# Patient Record
Sex: MALE | Race: WHITE | Employment: OTHER | URBAN - NONMETROPOLITAN AREA
[De-identification: names, ages, dates, MRNs, and addresses within clinical notes are randomized per-mention and may not be internally consistent; named-entity substitution may affect disease eponyms.]

---

## 2017-01-23 ENCOUNTER — HOSPITAL ENCOUNTER (OUTPATIENT)
Dept: PAIN MANAGEMENT | Age: 77
Discharge: HOME | End: 2017-01-23
Payer: MEDICARE

## 2017-01-23 VITALS
SYSTOLIC BLOOD PRESSURE: 135 MMHG | RESPIRATION RATE: 16 BRPM | HEIGHT: 66 IN | BODY MASS INDEX: 24.75 KG/M2 | DIASTOLIC BLOOD PRESSURE: 46 MMHG | HEART RATE: 62 BPM | TEMPERATURE: 97.3 F | OXYGEN SATURATION: 98 % | WEIGHT: 154 LBS

## 2017-01-23 DIAGNOSIS — M25.512 CHRONIC PAIN OF BOTH SHOULDERS: ICD-10-CM

## 2017-01-23 DIAGNOSIS — M54.50 CHRONIC BILATERAL LOW BACK PAIN WITHOUT SCIATICA: ICD-10-CM

## 2017-01-23 DIAGNOSIS — M51.16 LUMBAR DISC DISEASE WITH RADICULOPATHY: ICD-10-CM

## 2017-01-23 DIAGNOSIS — G89.29 CHRONIC BILATERAL LOW BACK PAIN WITHOUT SCIATICA: ICD-10-CM

## 2017-01-23 DIAGNOSIS — M51.36 LUMBAR DEGENERATIVE DISC DISEASE: ICD-10-CM

## 2017-01-23 DIAGNOSIS — M51.9 LUMBAR DISC DISEASE: ICD-10-CM

## 2017-01-23 DIAGNOSIS — G89.29 CHRONIC PAIN OF BOTH SHOULDERS: ICD-10-CM

## 2017-01-23 DIAGNOSIS — M25.511 CHRONIC PAIN OF BOTH SHOULDERS: ICD-10-CM

## 2017-01-23 PROCEDURE — 6360000002 HC RX W HCPCS

## 2017-01-23 PROCEDURE — 2580000003 HC RX 258

## 2017-01-23 PROCEDURE — 3209999900 FLUORO FOR SURGICAL PROCEDURES

## 2017-01-23 PROCEDURE — 20610 DRAIN/INJ JOINT/BURSA W/O US: CPT

## 2017-01-23 PROCEDURE — 2500000003 HC RX 250 WO HCPCS

## 2017-01-23 PROCEDURE — 62323 NJX INTERLAMINAR LMBR/SAC: CPT

## 2017-01-23 RX ORDER — BUPIVACAINE HYDROCHLORIDE 2.5 MG/ML
INJECTION, SOLUTION EPIDURAL; INFILTRATION; INTRACAUDAL
Status: COMPLETED | OUTPATIENT
Start: 2017-01-23 | End: 2017-01-23

## 2017-01-23 RX ORDER — TRIAMCINOLONE ACETONIDE 40 MG/ML
INJECTION, SUSPENSION INTRA-ARTICULAR; INTRAMUSCULAR
Status: COMPLETED | OUTPATIENT
Start: 2017-01-23 | End: 2017-01-23

## 2017-01-23 RX ORDER — BUPIVACAINE HYDROCHLORIDE 5 MG/ML
INJECTION, SOLUTION EPIDURAL; INTRACAUDAL
Status: COMPLETED | OUTPATIENT
Start: 2017-01-23 | End: 2017-01-23

## 2017-01-23 RX ORDER — 0.9 % SODIUM CHLORIDE 0.9 %
VIAL (ML) INJECTION
Status: COMPLETED | OUTPATIENT
Start: 2017-01-23 | End: 2017-01-23

## 2017-01-23 RX ORDER — METHYLPREDNISOLONE ACETATE 80 MG/ML
INJECTION, SUSPENSION INTRA-ARTICULAR; INTRALESIONAL; INTRAMUSCULAR; SOFT TISSUE
Status: COMPLETED | OUTPATIENT
Start: 2017-01-23 | End: 2017-01-23

## 2017-01-23 RX ORDER — LIDOCAINE HYDROCHLORIDE 10 MG/ML
INJECTION, SOLUTION EPIDURAL; INFILTRATION; INTRACAUDAL; PERINEURAL
Status: COMPLETED | OUTPATIENT
Start: 2017-01-23 | End: 2017-01-23

## 2017-01-23 RX ADMIN — BUPIVACAINE HYDROCHLORIDE 2.5 ML: 2.5 INJECTION, SOLUTION EPIDURAL; INFILTRATION; INTRACAUDAL at 16:09

## 2017-01-23 RX ADMIN — TRIAMCINOLONE ACETONIDE 40 MG: 40 INJECTION, SUSPENSION INTRA-ARTICULAR; INTRAMUSCULAR at 16:10

## 2017-01-23 RX ADMIN — BUPIVACAINE HYDROCHLORIDE 9 ML: 5 INJECTION, SOLUTION EPIDURAL; INTRACAUDAL at 16:10

## 2017-01-23 RX ADMIN — METHYLPREDNISOLONE ACETATE 80 MG: 80 INJECTION, SUSPENSION INTRA-ARTICULAR; INTRALESIONAL; INTRAMUSCULAR; SOFT TISSUE at 16:10

## 2017-01-23 RX ADMIN — Medication 1.5 ML: at 16:09

## 2017-01-23 RX ADMIN — LIDOCAINE HYDROCHLORIDE 3 ML: 10 INJECTION, SOLUTION EPIDURAL; INFILTRATION; INTRACAUDAL; PERINEURAL at 16:08

## 2017-01-23 ASSESSMENT — PAIN - FUNCTIONAL ASSESSMENT
PAIN_FUNCTIONAL_ASSESSMENT: 0-10

## 2017-01-23 ASSESSMENT — ACTIVITIES OF DAILY LIVING (ADL): EFFECT OF PAIN ON DAILY ACTIVITIES: DAILY CHORES AND ACTIVITIES

## 2017-01-23 ASSESSMENT — PAIN DESCRIPTION - DESCRIPTORS: DESCRIPTORS: ACHING;CONSTANT;STABBING

## 2017-03-02 RX ORDER — HYDROCODONE BITARTRATE AND ACETAMINOPHEN 10; 325 MG/1; MG/1
1 TABLET ORAL 4 TIMES DAILY PRN
Qty: 120 TABLET | Refills: 0 | Status: SHIPPED | OUTPATIENT
Start: 2017-03-04 | End: 2017-03-29 | Stop reason: SDUPTHER

## 2017-03-30 RX ORDER — HYDROCODONE BITARTRATE AND ACETAMINOPHEN 10; 325 MG/1; MG/1
1 TABLET ORAL 4 TIMES DAILY PRN
Qty: 120 TABLET | Refills: 0 | Status: SHIPPED | OUTPATIENT
Start: 2017-04-03 | End: 2017-04-25 | Stop reason: SDUPTHER

## 2017-04-25 ENCOUNTER — HOSPITAL ENCOUNTER (OUTPATIENT)
Dept: PAIN MANAGEMENT | Age: 77
Discharge: HOME | End: 2017-04-25
Payer: MEDICARE

## 2017-04-25 VITALS
DIASTOLIC BLOOD PRESSURE: 67 MMHG | HEIGHT: 66 IN | TEMPERATURE: 96.7 F | OXYGEN SATURATION: 97 % | BODY MASS INDEX: 24.59 KG/M2 | HEART RATE: 51 BPM | WEIGHT: 153 LBS | SYSTOLIC BLOOD PRESSURE: 139 MMHG | RESPIRATION RATE: 20 BRPM

## 2017-04-25 DIAGNOSIS — G89.29 CHRONIC PAIN OF BOTH SHOULDERS: ICD-10-CM

## 2017-04-25 DIAGNOSIS — G89.29 CHRONIC BILATERAL LOW BACK PAIN WITHOUT SCIATICA: ICD-10-CM

## 2017-04-25 DIAGNOSIS — M25.511 CHRONIC PAIN OF BOTH SHOULDERS: ICD-10-CM

## 2017-04-25 DIAGNOSIS — M51.36 LUMBAR DEGENERATIVE DISC DISEASE: ICD-10-CM

## 2017-04-25 DIAGNOSIS — M54.50 CHRONIC BILATERAL LOW BACK PAIN WITHOUT SCIATICA: ICD-10-CM

## 2017-04-25 DIAGNOSIS — M25.512 CHRONIC PAIN OF BOTH SHOULDERS: ICD-10-CM

## 2017-04-25 DIAGNOSIS — M51.16 LUMBAR DISC DISEASE WITH RADICULOPATHY: ICD-10-CM

## 2017-04-25 DIAGNOSIS — M51.9 LUMBAR DISC DISEASE: ICD-10-CM

## 2017-04-25 PROCEDURE — 20610 DRAIN/INJ JOINT/BURSA W/O US: CPT

## 2017-04-25 PROCEDURE — 2500000003 HC RX 250 WO HCPCS

## 2017-04-25 PROCEDURE — 2580000003 HC RX 258

## 2017-04-25 PROCEDURE — 3209999900 FLUORO FOR SURGICAL PROCEDURES

## 2017-04-25 PROCEDURE — 6360000002 HC RX W HCPCS

## 2017-04-25 PROCEDURE — 62323 NJX INTERLAMINAR LMBR/SAC: CPT

## 2017-04-25 RX ORDER — BUPIVACAINE HYDROCHLORIDE 2.5 MG/ML
INJECTION, SOLUTION EPIDURAL; INFILTRATION; INTRACAUDAL
Status: COMPLETED | OUTPATIENT
Start: 2017-04-25 | End: 2017-04-25

## 2017-04-25 RX ORDER — LIDOCAINE HYDROCHLORIDE 10 MG/ML
INJECTION, SOLUTION EPIDURAL; INFILTRATION; INTRACAUDAL; PERINEURAL
Status: COMPLETED | OUTPATIENT
Start: 2017-04-25 | End: 2017-04-25

## 2017-04-25 RX ORDER — 0.9 % SODIUM CHLORIDE 0.9 %
VIAL (ML) INJECTION
Status: COMPLETED | OUTPATIENT
Start: 2017-04-25 | End: 2017-04-25

## 2017-04-25 RX ORDER — HYDROCODONE BITARTRATE AND ACETAMINOPHEN 10; 325 MG/1; MG/1
1 TABLET ORAL 4 TIMES DAILY PRN
Qty: 120 TABLET | Refills: 0 | Status: SHIPPED | OUTPATIENT
Start: 2017-06-02 | End: 2017-06-13 | Stop reason: SDUPTHER

## 2017-04-25 RX ORDER — HYDROCODONE BITARTRATE AND ACETAMINOPHEN 10; 325 MG/1; MG/1
1 TABLET ORAL 4 TIMES DAILY PRN
Qty: 120 TABLET | Refills: 0 | Status: SHIPPED | OUTPATIENT
Start: 2017-05-03 | End: 2017-04-25 | Stop reason: SDUPTHER

## 2017-04-25 RX ORDER — METHYLPREDNISOLONE ACETATE 80 MG/ML
INJECTION, SUSPENSION INTRA-ARTICULAR; INTRALESIONAL; INTRAMUSCULAR; SOFT TISSUE
Status: COMPLETED | OUTPATIENT
Start: 2017-04-25 | End: 2017-04-25

## 2017-04-25 RX ORDER — BUPIVACAINE HYDROCHLORIDE 5 MG/ML
INJECTION, SOLUTION EPIDURAL; INTRACAUDAL
Status: COMPLETED | OUTPATIENT
Start: 2017-04-25 | End: 2017-04-25

## 2017-04-25 RX ORDER — TRIAMCINOLONE ACETONIDE 40 MG/ML
INJECTION, SUSPENSION INTRA-ARTICULAR; INTRAMUSCULAR
Status: COMPLETED | OUTPATIENT
Start: 2017-04-25 | End: 2017-04-25

## 2017-04-25 RX ADMIN — METHYLPREDNISOLONE ACETATE 80 MG: 80 INJECTION, SUSPENSION INTRA-ARTICULAR; INTRALESIONAL; INTRAMUSCULAR; SOFT TISSUE at 16:22

## 2017-04-25 RX ADMIN — Medication 1.5 ML: at 16:22

## 2017-04-25 RX ADMIN — TRIAMCINOLONE ACETONIDE 40 MG: 40 INJECTION, SUSPENSION INTRA-ARTICULAR; INTRAMUSCULAR at 16:23

## 2017-04-25 RX ADMIN — BUPIVACAINE HYDROCHLORIDE 2.5 ML: 2.5 INJECTION, SOLUTION EPIDURAL; INFILTRATION; INTRACAUDAL at 16:22

## 2017-04-25 RX ADMIN — BUPIVACAINE HYDROCHLORIDE 9 ML: 5 INJECTION, SOLUTION EPIDURAL; INTRACAUDAL at 16:23

## 2017-04-25 RX ADMIN — LIDOCAINE HYDROCHLORIDE 3 ML: 10 INJECTION, SOLUTION EPIDURAL; INFILTRATION; INTRACAUDAL; PERINEURAL at 16:19

## 2017-04-25 ASSESSMENT — PAIN DESCRIPTION - FREQUENCY: FREQUENCY: CONTINUOUS

## 2017-04-25 ASSESSMENT — PAIN DESCRIPTION - ORIENTATION: ORIENTATION: LOWER;RIGHT;LEFT

## 2017-04-25 ASSESSMENT — PAIN DESCRIPTION - PROGRESSION: CLINICAL_PROGRESSION: NOT CHANGED

## 2017-04-25 ASSESSMENT — PAIN DESCRIPTION - ONSET: ONSET: ON-GOING

## 2017-04-25 ASSESSMENT — PAIN SCALES - GENERAL: PAINLEVEL_OUTOF10: 7

## 2017-04-25 ASSESSMENT — PAIN DESCRIPTION - DESCRIPTORS: DESCRIPTORS: ACHING;CONSTANT;RADIATING;STABBING

## 2017-04-25 ASSESSMENT — PAIN DESCRIPTION - PAIN TYPE: TYPE: CHRONIC PAIN

## 2017-04-25 ASSESSMENT — PAIN DESCRIPTION - DIRECTION: RADIATING_TOWARDS: LOWER BACK PAIN THAT RADIATES OUT TO THE HIPS.

## 2017-04-25 ASSESSMENT — PAIN DESCRIPTION - LOCATION: LOCATION: BACK;HIP;SHOULDER

## 2017-06-13 ENCOUNTER — HOSPITAL ENCOUNTER (OUTPATIENT)
Dept: GENERAL RADIOLOGY | Age: 77
Discharge: HOME | End: 2017-06-13
Payer: MEDICARE

## 2017-06-13 ENCOUNTER — HOSPITAL ENCOUNTER (OUTPATIENT)
Dept: PAIN MANAGEMENT | Age: 77
Discharge: HOME | End: 2017-06-13
Payer: MEDICARE

## 2017-06-13 VITALS
RESPIRATION RATE: 18 BRPM | WEIGHT: 147 LBS | SYSTOLIC BLOOD PRESSURE: 143 MMHG | TEMPERATURE: 97.3 F | OXYGEN SATURATION: 96 % | DIASTOLIC BLOOD PRESSURE: 84 MMHG | HEIGHT: 66 IN | BODY MASS INDEX: 23.63 KG/M2 | HEART RATE: 65 BPM

## 2017-06-13 DIAGNOSIS — G89.29 CHRONIC PAIN OF BOTH SHOULDERS: ICD-10-CM

## 2017-06-13 DIAGNOSIS — M25.512 CHRONIC PAIN OF BOTH SHOULDERS: ICD-10-CM

## 2017-06-13 DIAGNOSIS — M25.511 CHRONIC PAIN OF BOTH SHOULDERS: ICD-10-CM

## 2017-06-13 DIAGNOSIS — M51.9 LUMBAR DISC DISEASE: ICD-10-CM

## 2017-06-13 DIAGNOSIS — M51.16 LUMBAR DISC DISEASE WITH RADICULOPATHY: ICD-10-CM

## 2017-06-13 DIAGNOSIS — G89.29 CHRONIC BILATERAL LOW BACK PAIN WITHOUT SCIATICA: ICD-10-CM

## 2017-06-13 DIAGNOSIS — M54.50 CHRONIC BILATERAL LOW BACK PAIN WITHOUT SCIATICA: ICD-10-CM

## 2017-06-13 PROCEDURE — 99213 OFFICE O/P EST LOW 20 MIN: CPT

## 2017-06-13 PROCEDURE — 73030 X-RAY EXAM OF SHOULDER: CPT

## 2017-06-13 PROCEDURE — 72120 X-RAY BEND ONLY L-S SPINE: CPT

## 2017-06-13 RX ORDER — HYDROCODONE BITARTRATE AND ACETAMINOPHEN 10; 325 MG/1; MG/1
1 TABLET ORAL 4 TIMES DAILY PRN
Qty: 120 TABLET | Refills: 0 | Status: SHIPPED | OUTPATIENT
Start: 2017-07-02 | End: 2017-08-01 | Stop reason: SDUPTHER

## 2017-06-13 ASSESSMENT — PAIN DESCRIPTION - ONSET: ONSET: ON-GOING

## 2017-06-13 ASSESSMENT — PAIN DESCRIPTION - PROGRESSION: CLINICAL_PROGRESSION: NOT CHANGED

## 2017-06-13 ASSESSMENT — ACTIVITIES OF DAILY LIVING (ADL): EFFECT OF PAIN ON DAILY ACTIVITIES: LIMITS ACTIVITY

## 2017-06-13 ASSESSMENT — PAIN DESCRIPTION - DESCRIPTORS: DESCRIPTORS: ACHING;CONSTANT;STABBING

## 2017-06-13 ASSESSMENT — PAIN DESCRIPTION - LOCATION: LOCATION: BACK;SHOULDER

## 2017-06-13 ASSESSMENT — PAIN SCALES - GENERAL: PAINLEVEL_OUTOF10: 4

## 2017-06-13 ASSESSMENT — PAIN DESCRIPTION - PAIN TYPE: TYPE: CHRONIC PAIN

## 2017-06-13 ASSESSMENT — PAIN DESCRIPTION - DIRECTION: RADIATING_TOWARDS: INTO BILATERAL HIPS

## 2017-06-13 ASSESSMENT — PAIN DESCRIPTION - ORIENTATION: ORIENTATION: RIGHT;LEFT;LOWER

## 2017-06-13 ASSESSMENT — PAIN DESCRIPTION - FREQUENCY: FREQUENCY: CONTINUOUS

## 2017-06-30 ENCOUNTER — HOSPITAL ENCOUNTER (OUTPATIENT)
Dept: ULTRASOUND IMAGING | Age: 77
Discharge: HOME | End: 2017-06-30
Payer: MEDICARE

## 2017-06-30 ENCOUNTER — HOSPITAL ENCOUNTER (OUTPATIENT)
Dept: CT IMAGING | Age: 77
Discharge: HOME | End: 2017-06-30
Payer: MEDICARE

## 2017-06-30 DIAGNOSIS — R63.4 ABNORMAL WEIGHT LOSS: ICD-10-CM

## 2017-06-30 DIAGNOSIS — R05.9 COUGH: ICD-10-CM

## 2017-06-30 DIAGNOSIS — R10.84 ABDOMINAL PAIN, GENERALIZED: ICD-10-CM

## 2017-06-30 LAB
GFR NON-AFRICAN AMERICAN: >60
PERFORMED ON: NORMAL
POC CREATININE: 0.9 MG/DL (ref 0.3–1.3)
POC SAMPLE TYPE: NORMAL

## 2017-06-30 PROCEDURE — 71270 CT THORAX DX C-/C+: CPT

## 2017-06-30 PROCEDURE — 76700 US EXAM ABDOM COMPLETE: CPT

## 2017-06-30 PROCEDURE — 82565 ASSAY OF CREATININE: CPT

## 2017-06-30 PROCEDURE — 6360000004 HC RX CONTRAST MEDICATION: Performed by: FAMILY MEDICINE

## 2017-06-30 RX ADMIN — IOVERSOL 60 ML: 741 INJECTION INTRA-ARTERIAL; INTRAVENOUS at 11:27

## 2017-07-18 ENCOUNTER — OFFICE VISIT (OUTPATIENT)
Dept: GASTROENTEROLOGY | Facility: CLINIC | Age: 77
End: 2017-07-18

## 2017-07-18 VITALS
BODY MASS INDEX: 24.11 KG/M2 | DIASTOLIC BLOOD PRESSURE: 76 MMHG | OXYGEN SATURATION: 98 % | SYSTOLIC BLOOD PRESSURE: 148 MMHG | WEIGHT: 150 LBS | HEART RATE: 70 BPM | TEMPERATURE: 95.6 F | HEIGHT: 66 IN

## 2017-07-18 DIAGNOSIS — R10.30 LOWER ABDOMINAL PAIN: ICD-10-CM

## 2017-07-18 DIAGNOSIS — R19.7 DIARRHEA, UNSPECIFIED TYPE: ICD-10-CM

## 2017-07-18 DIAGNOSIS — Z86.010 HX OF COLONIC POLYP: ICD-10-CM

## 2017-07-18 DIAGNOSIS — R19.4 CHANGE IN BOWEL HABITS: Primary | ICD-10-CM

## 2017-07-18 DIAGNOSIS — Z72.0 TOBACCO USE: ICD-10-CM

## 2017-07-18 DIAGNOSIS — R93.89 ABNORMAL CT OF THE CHEST: ICD-10-CM

## 2017-07-18 PROBLEM — Z86.0100 HX OF COLONIC POLYP: Status: ACTIVE | Noted: 2017-07-18

## 2017-07-18 PROBLEM — R93.5 ABNORMAL CT OF THE ABDOMEN: Status: ACTIVE | Noted: 2017-07-18

## 2017-07-18 PROCEDURE — 99204 OFFICE O/P NEW MOD 45 MIN: CPT | Performed by: NURSE PRACTITIONER

## 2017-07-18 RX ORDER — HYDROCODONE BITARTRATE AND ACETAMINOPHEN 10; 325 MG/1; MG/1
1 TABLET ORAL EVERY 6 HOURS PRN
COMMUNITY
Start: 2017-07-02 | End: 2021-02-22 | Stop reason: HOSPADM

## 2017-07-18 RX ORDER — SIMVASTATIN 80 MG
20 TABLET ORAL
COMMUNITY
End: 2017-08-10 | Stop reason: DRUGHIGH

## 2017-07-18 NOTE — PROGRESS NOTES
[facility] GASTROENTEROLOGY - OFFICE NOTE    7/18/2017    Ezekiel Velez   1940    Primary Physician: Shital Mendez MD    Chief Complaint   Patient presents with   • GI Problem     frequent stools         HISTORY OF PRESENT ILLNESS    Ezekiel Velez is a 77 y.o. male presents  with complaint of change in bowel habits. He has had intermittent postprandial loose stools over the last year.  He does have associated lower abdominal crampingthat usually resolves after bowel movement.  Prior to the last year he had 1 normal bowel movement daily.  He has found no certain foods that will trigger the symptoms.  He has had a few episodes where  he had associated nausea /vomiting.  No fevers.  No chills.  No rectal bleeding.  No new medicines. He does have history of colonoscopy.  His last colonoscopy was July 2012, had 9 polyps removed and was recommended have another colonoscopy in 3 years.    He recently had an ultrasound of his abdomen June 2017 that showed urinary bladder nodule, cholelithiasis, simple cyst of the left kidney, fusiform  aneurysmal dilatation distal abdominal aorta.  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  Of note he has seen Dr. Green in the past for history of lung nodules.  He is scheduled to have his gallbladder removed next week by Dr. Ina Issa next week..  He is also supposed to see Dr. Covington this week for the ultrasound showing urinary bladder nodule. This visit note recently of Dr. Mendez, she mentioned having radiology to review the liver lesions noted on the CT of the chest.  The patient does not recall if they have any further plans in regards to liver lesions.  The CT mentioned liver lesions could be cyst or hemangioma.  States he has had some labs in the last 6  months.    Past Medical History:   Diagnosis Date   • Arthritis    • Colon polyp    • COPD (chronic obstructive pulmonary disease)    • Hyperlipidemia        Past Surgical History:   Procedure Laterality Date   • CHOLECYSTECTOMY WITH INTRAOPERATIVE CHOLANGIOGRAM N/A 7/25/2017    Procedure: CHOLECYSTECTOMY LAPAROSCOPIC AND REMOVAL CYST ON CHEST;  Surgeon: Ina Issa MD;  Location: [facility];  Service:    • COLONOSCOPY  07/24/2012   • HEMORRHOIDECTOMY     • HERNIA REPAIR         Outpatient Prescriptions Marked as Taking for the 7/18/17 encounter (Office Visit) with TERRY Alvarado   Medication Sig Dispense Refill   • aspirin 81 MG tablet Take  by mouth.     • HYDROcodone-acetaminophen (NORCO)  MG per tablet Take  by mouth.     • simvastatin (ZOCOR) 80 MG tablet Take  by mouth.         Allergies   Allergen Reactions   • Celebrex [Celecoxib]        Social History     Social History   • Marital status:      Spouse name: N/A   • Number of children: N/A   • Years of education: N/A     Occupational History   • Not on file.     Social History Main Topics   • Smoking status: Current Every Day Smoker     Packs/day: 0.25     Types: Cigarettes   • Smokeless tobacco: Never Used   • Alcohol use No   • Drug use: No   • Sexual activity: Defer     Other Topics Concern   • Not on file     Social History Narrative       Family History   Problem Relation Age of Onset   • Colon cancer Neg Hx    • Colon polyps Neg Hx        Review of Systems   Constitutional: Negative for appetite change, chills, fatigue, fever and unexpected weight change.   HENT: Negative for sore throat and trouble swallowing.    Respiratory: Negative for cough, chest tightness, shortness of breath and wheezing.    Cardiovascular: Negative for chest pain and palpitations.   Gastrointestinal: Positive for abdominal pain, diarrhea, nausea and vomiting. Negative for abdominal distention, anal bleeding, blood in stool, constipation and rectal pain.  "       As mentioned in hpi   Genitourinary: Negative for difficulty urinating, dysuria and hematuria.   Musculoskeletal: Negative for arthralgias and back pain.   Skin: Negative for color change and rash.   Neurological: Negative for dizziness, tremors, seizures, syncope, light-headedness and headaches.   Hematological: Negative for adenopathy. Does not bruise/bleed easily.   Psychiatric/Behavioral: Negative for confusion. The patient is not nervous/anxious.        Vitals:    07/18/17 0943   BP: 148/76   BP Location: Left arm   Patient Position: Sitting   Cuff Size: Adult   Pulse: 70   Temp: 95.6 °F (35.3 °C)   SpO2: 98%   Weight: 150 lb (68 kg)   Height: 66\" (167.6 cm)      Body mass index is 24.21 kg/(m^2).    Physical Exam   Constitutional: He appears well-developed and well-nourished. No distress.   HENT:   Head: Normocephalic and atraumatic.   Mouth/Throat: Oropharynx is clear and moist.   Eyes: EOM are normal. No scleral icterus.   Neck: Normal range of motion. Neck supple. No JVD present. No tracheal deviation present.   Cardiovascular: Normal rate, regular rhythm and normal heart sounds.  Exam reveals no gallop and no friction rub.    No murmur heard.  Pulmonary/Chest: Effort normal and breath sounds normal. No stridor. No respiratory distress. He has no wheezes. He has no rales.   Abdominal: Soft. Bowel sounds are normal. He exhibits no distension and no mass. There is no tenderness. There is no rebound and no guarding.   Musculoskeletal: Normal range of motion. He exhibits no edema or deformity.   Neurological: He is alert.   Skin: Skin is warm and dry. No rash noted.   Psychiatric: He has a normal mood and affect. His behavior is normal.   Vitals reviewed.              ASSESSMENT AND PLAN    Ezekiel was seen today for gi problem.    Diagnoses and all orders for this visit:    Change in bowel habits  -     Case Request; Standing  -     Case Request    Diarrhea, unspecified type  -     Case Request; " Standing  -     Case Request    Lower abdominal pain    Hx of colonic polyp  -     Case Request; Standing  -     Case Request    Abnormal CT of the chest    Tobacco use    Other orders  -     Implement Anesthesia Orders Day of Procedure; Standing  -     Obtain Informed Consent; Standing  -     polyethylene glycol (GOLYTELY) 236 G solution; Take 4,000 mL by mouth 1 (One) Time for 1 dose. Take as directed per instruction sheet.    plan for colonoscopy.   Recommend daily fiber supplement.  We will request last office visit note from Dr. Ina Issa.  Ultrasound of the abdomen showed a normal liver however CT of the chest did show multiple low attenuation lesions of the liver, Dr. Mendez did comment she was going to have the radiologist to review.  I am going to call Dr. Mendez's office to see if they did get any further information about the CT of the chest with liver.  we may need to get further imaging studies such as an MRI of the liver.  He is scheduled to have cholecystectomy next week by Dr. Ina Issa, he is going also discussed the liver lesions with her maybe she can get a look at those during her surgical procedure.     We will also request most recent labs from Dr. Mendez's office.   discussed smoking cessation, > 3min time spent.     COLONOSCOPY WITH ANESTHESIA (N/A) All risks, benefits, alternatives, and indications of colonoscopy procedure have been discussed with the patient. Risks to include perforation of the colon requiring possible surgery or colostomy, risk of bleeding from biopsies or removal of colon tissue, possibility of missing a colon polyp or cancer, or adverse drug reaction.  Benefits to include the diagnosis and management of disease of the colon and rectum. Alternatives to include barium enema, radiographic evaluation, lab testing or no intervention. Pt verbalizes understanding and agrees.         Body mass index is 24.21 kg/(m^2).           Shereen Ponce, TERRY    EMR  Dragon/transcription disclaimer:  Much of this encounter note is electronic transcription/translation of spoken language to printed text.  The electronic translation of spoken language may be erroneous, or at times, nonsensical words or phrases may be inadvertently transcribed.  Although I have reviewed the note for such errors, some may still exist.        I did call and speak with Tere at Dr. Mendez's office.  It was mentioned in Dr. Mendez's office note that she would have radiologist to review the CT chest that showed a lesion, was not noted on the ultrasound.  Tere is going to contact radiology to get their opinion.  Tere will give us a call and let us know will radiology says.      08/30/17    I called and spoke with Mrs. Velez, she states that her  saw Dr. Tanesha Mendez on 08/18/2017, she says Dr. Mendez is going to order an ultrasound of his liver.  Previous CT of the chest did show multiple low attenuation hepatic lesions.  CT of the chest was done 06/30/2017.  The patient is currently scheduled for a colonoscopy 09/19/2017.

## 2017-07-18 NOTE — PATIENT INSTRUCTIONS
Steps to Quit Smoking   Smoking tobacco can be harmful to your health and can affect almost every organ in your body. Smoking puts you, and those around you, at risk for developing many serious chronic diseases. Quitting smoking is difficult, but it is one of the best things that you can do for your health. It is never too late to quit.  WHAT ARE THE BENEFITS OF QUITTING SMOKING?  When you quit smoking, you lower your risk of developing serious diseases and conditions, such as:  · Lung cancer or lung disease, such as COPD.  · Heart disease.  · Stroke.  · Heart attack.  · Infertility.  · Osteoporosis and bone fractures.  Additionally, symptoms such as coughing, wheezing, and shortness of breath may get better when you quit. You may also find that you get sick less often because your body is stronger at fighting off colds and infections. If you are pregnant, quitting smoking can help to reduce your chances of having a baby of low birth weight.  HOW DO I GET READY TO QUIT?  When you decide to quit smoking, create a plan to make sure that you are successful. Before you quit:  · Pick a date to quit. Set a date within the next two weeks to give you time to prepare.  · Write down the reasons why you are quitting. Keep this list in places where you will see it often, such as on your bathroom mirror or in your car or wallet.  · Identify the people, places, things, and activities that make you want to smoke (triggers) and avoid them. Make sure to take these actions:    Throw away all cigarettes at home, at work, and in your car.    Throw away smoking accessories, such as ashtrays and lighters.    Clean your car and make sure to empty the ashtray.    Clean your home, including curtains and carpets.  · Tell your family, friends, and coworkers that you are quitting. Support from your loved ones can make quitting easier.  · Talk with your health care provider about your options for quitting smoking.  · Find out what treatment  "options are covered by your health insurance.  WHAT STRATEGIES CAN I USE TO QUIT SMOKING?   Talk with your healthcare provider about different strategies to quit smoking. Some strategies include:  · Quitting smoking altogether instead of gradually lessening how much you smoke over a period of time. Research shows that quitting \"cold turkey\" is more successful than gradually quitting.  · Attending in-person counseling to help you build problem-solving skills. You are more likely to have success in quitting if you attend several counseling sessions. Even short sessions of 10 minutes can be effective.  · Finding resources and support systems that can help you to quit smoking and remain smoke-free after you quit. These resources are most helpful when you use them often. They can include:    Online chats with a counselor.    Telephone quitlines.    Printed self-help materials.    Support groups or group counseling.    Text messaging programs.    Mobile phone applications.  · Taking medicines to help you quit smoking. (If you are pregnant or breastfeeding, talk with your health care provider first.) Some medicines contain nicotine and some do not. Both types of medicines help with cravings, but the medicines that include nicotine help to relieve withdrawal symptoms. Your health care provider may recommend:    Nicotine patches, gum, or lozenges.    Nicotine inhalers or sprays.    Non-nicotine medicine that is taken by mouth.  Talk with your health care provider about combining strategies, such as taking medicines while you are also receiving in-person counseling. Using these two strategies together makes you more likely to succeed in quitting than if you used either strategy on its own.  If you are pregnant or breastfeeding, talk with your health care provider about finding counseling or other support strategies to quit smoking. Do not take medicine to help you quit smoking unless told to do so by your health care " provider.  WHAT THINGS CAN I DO TO MAKE IT EASIER TO QUIT?  Quitting smoking might feel overwhelming at first, but there is a lot that you can do to make it easier. Take these important actions:  · Reach out to your family and friends and ask that they support and encourage you during this time. Call telephone quitlines, reach out to support groups, or work with a counselor for support.  · Ask people who smoke to avoid smoking around you.  · Avoid places that trigger you to smoke, such as bars, parties, or smoke-break areas at work.  · Spend time around people who do not smoke.  · Lessen stress in your life, because stress can be a smoking trigger for some people. To lessen stress, try:    Exercising regularly.    Deep-breathing exercises.    Yoga.    Meditating.    Performing a body scan. This involves closing your eyes, scanning your body from head to toe, and noticing which parts of your body are particularly tense. Purposefully relax the muscles in those areas.  · Download or purchase mobile phone or tablet apps (applications) that can help you stick to your quit plan by providing reminders, tips, and encouragement. There are many free apps, such as QuitGuide from the CDC (Centers for Disease Control and Prevention). You can find other support for quitting smoking (smoking cessation) through smokefree.gov and other websites.  HOW WILL I FEEL WHEN I QUIT SMOKING?  Within the first 24 hours of quitting smoking, you may start to feel some withdrawal symptoms. These symptoms are usually most noticeable 2-3 days after quitting, but they usually do not last beyond 2-3 weeks. Changes or symptoms that you might experience include:  · Mood swings.  · Restlessness, anxiety, or irritation.  · Difficulty concentrating.  · Dizziness.  · Strong cravings for sugary foods in addition to nicotine.  · Mild weight gain.  · Constipation.  · Nausea.  · Coughing or a sore throat.  · Changes in how your medicines work in your  body.  · A depressed mood.  · Difficulty sleeping (insomnia).  After the first 2-3 weeks of quitting, you may start to notice more positive results, such as:  · Improved sense of smell and taste.  · Decreased coughing and sore throat.  · Slower heart rate.  · Lower blood pressure.  · Clearer skin.  · The ability to breathe more easily.  · Fewer sick days.  Quitting smoking is very challenging for most people. Do not get discouraged if you are not successful the first time. Some people need to make many attempts to quit before they achieve long-term success. Do your best to stick to your quit plan, and talk with your health care provider if you have any questions or concerns.     This information is not intended to replace advice given to you by your health care provider. Make sure you discuss any questions you have with your health care provider.     Document Released: 12/12/2002 Document Revised: 05/03/2016 Document Reviewed: 05/03/2016  [employer] Interactive Patient Education ©2017 [employer]    Smoking Hazards  Smoking cigarettes is extremely bad for your health. Tobacco smoke has over 200 known poisons in it. It contains the poisonous gases nitrogen oxide and carbon monoxide. There are over 60 chemicals in tobacco smoke that cause cancer. Some of the chemicals found in cigarette smoke include:   · Cyanide.    · Benzene.    · Formaldehyde.    · Methanol (wood alcohol).    · Acetylene (fuel used in welding torches).    · Ammonia.    Even smoking lightly shortens your life expectancy by several years. You can greatly reduce the risk of medical problems for you and your family by stopping now. Smoking is the most preventable cause of death and disease in our society. Within days of quitting smoking, your circulation improves, you decrease the risk of having a heart attack, and your lung capacity improves. There may be some increased phlegm in the first few days after quitting, and it may take months for your lungs  to clear up completely. Quitting for 10 years reduces your risk of developing lung cancer to almost that of a nonsmoker.   WHAT ARE THE RISKS OF SMOKING?  Cigarette smokers have an increased risk of many serious medical problems, including:  · Lung cancer.    · Lung disease (such as pneumonia, bronchitis, and emphysema).    · Heart attack and chest pain due to the heart not getting enough oxygen (angina).    · Heart disease and peripheral blood vessel disease.    · Hypertension.    · Stroke.    · Oral cancer (cancer of the lip, mouth, or voice box).    · Bladder cancer.    · Pancreatic cancer.    · Cervical cancer.    · Pregnancy complications, including premature birth.    · Stillbirths and smaller  babies, birth defects, and genetic damage to sperm.    · Early menopause.    · Lower estrogen level for women.    · Infertility.    · Facial wrinkles.    · Blindness.    · Increased risk of broken bones (fractures).    · Senile dementia.    · Stomach ulcers and internal bleeding.    · Delayed wound healing and increased risk of complications during surgery.  Because of secondhand smoke exposure, children of smokers have an increased risk of the following:   · Sudden infant death syndrome (SIDS).    · Respiratory infections.    · Lung cancer.    · Heart disease.    · Ear infections.    WHY IS SMOKING ADDICTIVE?  Nicotine is the chemical agent in tobacco that is capable of causing addiction or dependence. When you smoke and inhale, nicotine is absorbed rapidly into the bloodstream through your lungs. Both inhaled and noninhaled nicotine may be addictive.   WHAT ARE THE BENEFITS OF QUITTING?   There are many health benefits to quitting smoking. Some are:   · The likelihood of developing cancer and heart disease decreases. Health improvements are seen almost immediately.    · Blood pressure, pulse rate, and breathing patterns start returning to normal soon after quitting.    · People who quit may see an improvement  in their overall quality of life.    HOW DO YOU QUIT SMOKING?  Smoking is an addiction with both physical and psychological effects, and longtime habits can be hard to change. Your health care provider can recommend:  · Programs and community resources, which may include group support, education, or therapy.  · Replacement products, such as patches, gum, and nasal sprays. Use these products only as directed. Do not replace cigarette smoking with electronic cigarettes (commonly called e-cigarettes). The safety of e-cigarettes is unknown, and some may contain harmful chemicals.  FOR MORE INFORMATION  · American Lung Association: www.lung.org  · American Cancer Society: www.cancer.org     This information is not intended to replace advice given to you by your health care provider. Make sure you discuss any questions you have with your health care provider.     Document Released: 01/25/2006 Document Revised: 04/10/2017 Document Reviewed: 06/09/2014  [employer] Interactive Patient Education ©2017 [employer]

## 2017-07-20 ENCOUNTER — OFFICE VISIT (OUTPATIENT)
Dept: UROLOGY | Facility: CLINIC | Age: 77
End: 2017-07-20

## 2017-07-20 ENCOUNTER — APPOINTMENT (OUTPATIENT)
Dept: PREADMISSION TESTING | Facility: HOSPITAL | Age: 77
End: 2017-07-20

## 2017-07-20 VITALS
WEIGHT: 147.6 LBS | BODY MASS INDEX: 23.72 KG/M2 | DIASTOLIC BLOOD PRESSURE: 83 MMHG | SYSTOLIC BLOOD PRESSURE: 125 MMHG | OXYGEN SATURATION: 97 % | RESPIRATION RATE: 16 BRPM | HEART RATE: 59 BPM | HEIGHT: 66 IN

## 2017-07-20 VITALS
TEMPERATURE: 98 F | SYSTOLIC BLOOD PRESSURE: 115 MMHG | BODY MASS INDEX: 23.78 KG/M2 | DIASTOLIC BLOOD PRESSURE: 73 MMHG | WEIGHT: 148 LBS | HEIGHT: 66 IN

## 2017-07-20 DIAGNOSIS — N28.1 RENAL CYST, LEFT: ICD-10-CM

## 2017-07-20 DIAGNOSIS — N32.9 LESION OF BLADDER: Primary | ICD-10-CM

## 2017-07-20 LAB
ALBUMIN SERPL-MCNC: 4 G/DL (ref 3.5–5)
ALBUMIN/GLOB SERPL: 1.3 G/DL (ref 1.1–2.5)
ALP SERPL-CCNC: 89 U/L (ref 24–120)
ALT SERPL W P-5'-P-CCNC: 36 U/L (ref 0–54)
ANION GAP SERPL CALCULATED.3IONS-SCNC: 8 MMOL/L (ref 4–13)
AST SERPL-CCNC: 21 U/L (ref 7–45)
BILIRUB BLD-MCNC: NEGATIVE MG/DL
BILIRUB SERPL-MCNC: 0.8 MG/DL (ref 0.1–1)
BUN BLD-MCNC: 11 MG/DL (ref 5–21)
BUN/CREAT SERPL: 15.3 (ref 7–25)
CALCIUM SPEC-SCNC: 9.7 MG/DL (ref 8.4–10.4)
CHLORIDE SERPL-SCNC: 102 MMOL/L (ref 98–110)
CLARITY, POC: CLEAR
CO2 SERPL-SCNC: 32 MMOL/L (ref 24–31)
COLOR UR: YELLOW
CREAT BLD-MCNC: 0.72 MG/DL (ref 0.5–1.4)
DEPRECATED RDW RBC AUTO: 45.1 FL (ref 40–54)
ERYTHROCYTE [DISTWIDTH] IN BLOOD BY AUTOMATED COUNT: 13 % (ref 12–15)
GFR SERPL CREATININE-BSD FRML MDRD: 106 ML/MIN/1.73
GLOBULIN UR ELPH-MCNC: 3 GM/DL
GLUCOSE BLD-MCNC: 93 MG/DL (ref 70–100)
GLUCOSE UR STRIP-MCNC: NEGATIVE MG/DL
HCT VFR BLD AUTO: 44 % (ref 40–52)
HGB BLD-MCNC: 14.8 G/DL (ref 14–18)
KETONES UR QL: NEGATIVE
LEUKOCYTE EST, POC: NEGATIVE
MCH RBC QN AUTO: 32.1 PG (ref 28–32)
MCHC RBC AUTO-ENTMCNC: 33.6 G/DL (ref 33–36)
MCV RBC AUTO: 95.4 FL (ref 82–95)
NITRITE UR-MCNC: NEGATIVE MG/ML
PH UR: 7 [PH] (ref 5–8)
PLATELET # BLD AUTO: 137 10*3/MM3 (ref 130–400)
PMV BLD AUTO: 11.2 FL (ref 6–12)
POTASSIUM BLD-SCNC: 4.1 MMOL/L (ref 3.5–5.3)
PROT SERPL-MCNC: 7 G/DL (ref 6.3–8.7)
PROT UR STRIP-MCNC: NEGATIVE MG/DL
RBC # BLD AUTO: 4.61 10*6/MM3 (ref 4.8–5.9)
RBC # UR STRIP: NEGATIVE /UL
SODIUM BLD-SCNC: 142 MMOL/L (ref 135–145)
SP GR UR: 1.02 (ref 1–1.03)
UROBILINOGEN UR QL: NORMAL
WBC NRBC COR # BLD: 5.11 10*3/MM3 (ref 4.8–10.8)

## 2017-07-20 PROCEDURE — 93005 ELECTROCARDIOGRAM TRACING: CPT

## 2017-07-20 PROCEDURE — 85027 COMPLETE CBC AUTOMATED: CPT | Performed by: SPECIALIST

## 2017-07-20 PROCEDURE — 52000 CYSTOURETHROSCOPY: CPT | Performed by: UROLOGY

## 2017-07-20 PROCEDURE — 93010 ELECTROCARDIOGRAM REPORT: CPT | Performed by: INTERNAL MEDICINE

## 2017-07-20 PROCEDURE — 80053 COMPREHEN METABOLIC PANEL: CPT | Performed by: SPECIALIST

## 2017-07-20 PROCEDURE — 36415 COLL VENOUS BLD VENIPUNCTURE: CPT

## 2017-07-20 PROCEDURE — 81003 URINALYSIS AUTO W/O SCOPE: CPT | Performed by: UROLOGY

## 2017-07-20 PROCEDURE — 99204 OFFICE O/P NEW MOD 45 MIN: CPT | Performed by: UROLOGY

## 2017-07-20 NOTE — DISCHARGE INSTRUCTIONS
DAY OF SURGERY INSTRUCTIONS        YOUR SURGEON: ***APRIL ROSI    PROCEDURE: ***LAPAROSCOPIC CHOLECYSECTOMY    DATE OF SURGERY: ***JULY25,2017    ARRIVAL TIME: AS DIRECTED BY OFFICE    DAY OF SURGERY TAKE ONLY THESE MEDICATIONS: ***NONE        BEFORE YOU COME TO THE HOSPITAL  (Pre-op instructions)  • Do not eat, drink, smoke or chew gum after midnight the night before surgery.  This also includes no mints.  • Morning of surgery take only the medicines you have been instructed with a sip of water unless otherwise instructed  by your physician.  • Do not shave, wear makeup or dark nail polish.  • Remove all jewelry including rings.  • Leave anything you consider valuable at home.  • Leave your suitcase in the car until after your surgery.  • Bring the following with you if applicable:  o Picture ID and insurance, Medicare or Medicaid cards  o Co-pay/deductible required by insurance (cash, check, credit card)  o Copy of advance directive, living will or power-of- documents if not brought to PAT  o CPAP or BIPAP mask and tubing  o Relaxation aids (MP3 player, book, magazine)  • On the day of surgery check in at registration located at the main entrance of the hospital.       Outpatient Surgery Guidelines, Adult  Outpatient procedures are those for which the person having the procedure is allowed to go home the same day as the procedure. Various procedures are done on an outpatient basis. You should follow some general guidelines if you will be having an outpatient procedure.  LET YOUR HEALTH CARE PROVIDER KNOW ABOUT:  · Any allergies you have.  · All medicines you are taking, including vitamins, herbs, eye drops, creams, and over-the-counter medicines.  · Previous problems you or members of your family have had with the use of anesthetics.  · Any blood disorders you have.  · Previous surgeries you have had.  · Medical conditions you have.  RISKS AND COMPLICATIONS  Your health care provider will discuss  possible risks and complications with you before surgery. Common risks and complications include:    · Problems due to the use of anesthetics.  · Blood loss and replacement (does not apply to minor surgical procedures).  · Temporary increase in pain due to surgery.  · Uncorrected pain or problems that the surgery was meant to correct.  · Infection.  · New damage.  BEFORE THE PROCEDURE  · Ask your health care provider about changing or stopping your regular medicines. You may need to stop taking certain medicines in the days or weeks before the procedure.  · Stop smoking at least 2 weeks before surgery. This lowers your risk for complications during and after surgery. Ask your health care provider for help with this if needed.  · Eat your usual meals and a light supper the day before surgery. Continue fluid intake. Do not drink alcohol.  · Do not eat or drink after midnight the night before your surgery.   · Arrange for someone to take you home and to stay with you for 24 hours after the procedure. Medicine given for your procedure may affect your ability to drive or to care for yourself.  · Call your health care provider's office if you develop an illness or problem that may prevent you from safely having your procedure.  AFTER THE PROCEDURE  After surgery, you will be taken to a recovery area, where your progress will be monitored. If there are no complications, you will be allowed to go home when you are awake, stable, and taking fluids well. You may have numbness around the surgical site. Healing will take some time. You will have tenderness at the surgical site and may have some swelling and bruising. You may also have some nausea.  HOME CARE INSTRUCTIONS  · Do not drive for 24 hours, or as directed by your health care provider. Do not drive while taking prescription pain medicines.  · Do not drink alcohol for 24 hours.  · Do not make important decisions or sign legal documents for 24 hours.  · You may resume a  normal diet and activities as directed.  · Do not lift anything heavier than 10 pounds (4.5 kg) or play contact sports until your health care provider says it is okay.  · Change your bandages (dressings) as directed.  · Only take over-the-counter or prescription medicines as directed by your health care provider.  · Follow up with your health care provider as directed.  SEEK MEDICAL CARE IF:  · You have increased bleeding (more than a small spot) from the surgical site.  · You have redness, swelling, or increasing pain in the wound.  · You see pus coming from the wound.  · You have a fever.  · You notice a bad smell coming from the wound or dressing.  · You feel lightheaded or faint.  · You develop a rash.  · You have trouble breathing.  · You develop allergies.  MAKE SURE YOU:  · Understand these instructions.  · Will watch your condition.  · Will get help right away if you are not doing well or get worse.     This information is not intended to replace advice given to you by your health care provider. Make sure you discuss any questions you have with your health care provider.     Document Released: 09/12/2002 Document Revised: 05/03/2016 Document Reviewed: 05/22/2014  [employer] Interactive Patient Education ©2016 [employer]       Fall Prevention in Hospitals, Adult  As a hospital patient, your condition and the treatments you receive can increase your risk for falls. Some additional risk factors for falls in a hospital include:  · Being in an unfamiliar environment.  · Being on bed rest.  · Your surgery.  · Taking certain medicines.  · Your tubing requirements, such as intravenous (IV) therapy or catheters.  It is important that you learn how to decrease fall risks while at the hospital. Below are important tips that can help prevent falls.  SAFETY TIPS FOR PREVENTING FALLS  Talk about your risk of falling.  · Ask your health care provider why you are at risk for falling. Is it your medicine, illness, tubing  placement, or something else?  · Make a plan with your health care provider to keep you safe from falls.  · Ask your health care provider or pharmacist about side effects of your medicines. Some medicines can make you dizzy or affect your coordination.  Ask for help.  · Ask for help before getting out of bed. You may need to press your call button.  · Ask for assistance in getting safely to the toilet.  · Ask for a walker or cane to be put at your bedside. Ask that most of the side rails on your bed be placed up before your health care provider leaves the room.  · Ask family or friends to sit with you.  · Ask for things that are out of your reach, such as your glasses, hearing aids, telephone, bedside table, or call button.  Follow these tips to avoid falling:  · Stay lying or seated, rather than standing, while waiting for help.  · Wear rubber-soled slippers or shoes whenever you walk in the hospital.  · Avoid quick, sudden movements.  ¨ Change positions slowly.  ¨ Sit on the side of your bed before standing.  ¨ Stand up slowly and wait before you start to walk.  · Let your health care provider know if there is a spill on the floor.  · Pay careful attention to the medical equipment, electrical cords, and tubes around you.  · When you need help, use your call button by your bed or in the bathroom. Wait for one of your health care providers to help you.  · If you feel dizzy or unsure of your footing, return to bed and wait for assistance.  · Avoid being distracted by the TV, telephone, or another person in your room.  · Do not lean or support yourself on rolling objects, such as IV poles or bedside tables.     This information is not intended to replace advice given to you by your health care provider. Make sure you discuss any questions you have with your health care provider.     Document Released: 12/15/2001 Document Revised: 01/08/2016 Document Reviewed: 08/25/2013  [employer] Interactive Patient Education ©2016  [employer]       Surgical Site Infections FAQs  What is a Surgical Site Infection (SSI)?  A surgical site infection is an infection that occurs after surgery in the part of the body where the surgery took place. Most patients who have surgery do not develop an infection. However, infections develop in about 1 to 3 out of every 100 patients who have surgery.  Some of the common symptoms of a surgical site infection are:  · Redness and pain around the area where you had surgery  · Drainage of cloudy fluid from your surgical wound  · Fever  Can SSIs be treated?  Yes. Most surgical site infections can be treated with antibiotics. The antibiotic given to you depends on the bacteria (germs) causing the infection. Sometimes patients with SSIs also need another surgery to treat the infection.  What are some of the things that hospitals are doing to prevent SSIs?  To prevent SSIs, doctors, nurses, and other healthcare providers:  · Clean their hands and arms up to their elbows with an antiseptic agent just before the surgery.  · Clean their hands with soap and water or an alcohol-based hand rub before and after caring for each patient.  · May remove some of your hair immediately before your surgery using electric clippers if the hair is in the same area where the procedure will occur. They should not shave you with a razor.  · Wear special hair covers, masks, gowns, and gloves during surgery to keep the surgery area clean.  · Give you antibiotics before your surgery starts. In most cases, you should get antibiotics within 60 minutes before the surgery starts and the antibiotics should be stopped within 24 hours after surgery.  · Clean the skin at the site of your surgery with a special soap that kills germs.  What can I do to help prevent SSIs?  Before your surgery:  · Tell your doctor about other medical problems you may have. Health problems such as allergies, diabetes, and obesity could affect your surgery and your  treatment.  · Quit smoking. Patients who smoke get more infections. Talk to your doctor about how you can quit before your surgery.  · Do not shave near where you will have surgery. Shaving with a razor can irritate your skin and make it easier to develop an infection.  At the time of your surgery:  · Speak up if someone tries to shave you with a razor before surgery. Ask why you need to be shaved and talk with your surgeon if you have any concerns.  · Ask if you will get antibiotics before surgery.  After your surgery:  · Make sure that your healthcare providers clean their hands before examining you, either with soap and water or an alcohol-based hand rub.  · If you do not see your providers clean their hands, please ask them to do so.  · Family and friends who visit you should not touch the surgical wound or dressings.  · Family and friends should clean their hands with soap and water or an alcohol-based hand rub before and after visiting you. If you do not see them clean their hands, ask them to clean their hands.  What do I need to do when I go home from the hospital?  · Before you go home, your doctor or nurse should explain everything you need to know about taking care of your wound. Make sure you understand how to care for your wound before you leave the hospital.  · Always clean your hands before and after caring for your wound.  · Before you go home, make sure you know who to contact if you have questions or problems after you get home.  · If you have any symptoms of an infection, such as redness and pain at the surgery site, drainage, or fever, call your doctor immediately.  If you have additional questions, please ask your doctor or nurse.  Developed and co-sponsored by The Society for Healthcare Epidemiology of [place] (SHEA); Infectious Diseases Society of [place] (IDSA); American Hospital Association; Association for Professionals in Infection Control and Epidemiology (APIC); Centers for Disease  Control and Prevention (CDC); and The Joint Commission.     This information is not intended to replace advice given to you by your health care provider. Make sure you discuss any questions you have with your health care provider.     Document Released: 12/23/2014 Document Revised: 01/08/2016 Document Reviewed: 03/02/2016  [employer] Interactive Patient Education ©2016 [employer].       [facility]  CHG 4% Patient Instruction Sheet    Preparing the Skin Before Surgery  Preparing or “prepping” skin before surgery can reduce the risk of infection at the surgical site. To make the process easier,[facility] has chosen 4% Chlorhexidine Gluconate (CHG) antiseptic solution.   The steps below outline the prepping process and should be carefully followed.                                                                                                                                                      Prep the skin at the following time(s):                                                      We recommend you shower the night before surgery, and again the morning of surgery with the 4% CHG antiseptic solution using half of the bottle and a cloth each time.  Dress in clean clothes/sleepwear after showering.  See instructions below for application.          Do not apply any lotions or moisturizers.       Do not shave the area to be prepped for at least 2 days prior to surgery.    Clipping the hair may be done immediately prior to your surgery at the hospital    if needed.    Directions:  Thoroughly rinse your body with water.  Apply 4% CHG to a cloth and wash skin gently, paying special attention to the operative site.  Rinse again thoroughly.  Once you have begun using this product do not apply anything else to your skin. If itching or redness persists, rinse affected areas and discontinue use.    When using this product:  • Keep out of eyes, ears, and mouth.  • If solution should contact these areas, rinse out promptly  and thoroughly with water.  • For external use only.  • Do not use in genital area, on your face or head.        General Anesthesia, Adult, Care After  Refer to this sheet in the next few weeks. These instructions provide you with information on caring for yourself after your procedure. Your health care provider may also give you more specific instructions. Your treatment has been planned according to current medical practices, but problems sometimes occur. Call your health care provider if you have any problems or questions after your procedure.  WHAT TO EXPECT AFTER THE PROCEDURE  After the procedure, it is typical to experience:  · Sleepiness.  · Nausea and vomiting.  HOME CARE INSTRUCTIONS  · For the first 24 hours after general anesthesia:  ¨ Have a responsible person with you.  ¨ Do not drive a car. If you are alone, do not take public transportation.  ¨ Do not drink alcohol.  ¨ Do not take medicine that has not been prescribed by your health care provider.  ¨ Do not sign important papers or make important decisions.  ¨ You may resume a normal diet and activities as directed by your health care provider.  · Change bandages (dressings) as directed.  · If you have questions or problems that seem related to general anesthesia, call the hospital and ask for the anesthetist or anesthesiologist on call.  SEEK MEDICAL CARE IF:  · You have nausea and vomiting that continue the day after anesthesia.  · You develop a rash.  SEEK IMMEDIATE MEDICAL CARE IF:    · You have difficulty breathing.  · You have chest pain.  · You have any allergic problems.     This information is not intended to replace advice given to you by your health care provider. Make sure you discuss any questions you have with your health care provider.     Document Released: 03/26/2002 Document Revised: 01/08/2016 Document Reviewed: 07/03/2014  [employer] Interactive Patient Education ©2016 [employer]    CALL YOUR PHYSICIAN IF YOU EXPERIENCE   INCREASED PAIN NOT HELPED BY YOUR PAIN MEDICATION.    .                                              Fall Prevention in the Home      Falls can cause injuries. They can happen to people of all ages. There are many things you can do to make your home safe and to help prevent falls.    WHAT CAN I DO ON THE OUTSIDE OF MY HOME?  · Regularly fix the edges of walkways and driveways and fix any cracks.  · Remove anything that might make you trip as you walk through a door, such as a raised step or threshold.  · Trim any bushes or trees on the path to your home.  · Use bright outdoor lighting.  · Clear any walking paths of anything that might make someone trip, such as rocks or tools.  · Regularly check to see if handrails are loose or broken. Make sure that both sides of any steps have handrails.  · Any raised decks and porches should have guardrails on the edges.  · Have any leaves, snow, or ice cleared regularly.  · Use sand or salt on walking paths during winter.  · Clean up any spills in your garage right away. This includes oil or grease spills.  WHAT CAN I DO IN THE BATHROOM?    · Use night lights.  · Install grab bars by the toilet and in the tub and shower. Do not use towel bars as grab bars.  · Use non-skid mats or decals in the tub or shower.  · If you need to sit down in the shower, use a plastic, non-slip stool.  · Keep the floor dry. Clean up any water that spills on the floor as soon as it happens.  · Remove soap buildup in the tub or shower regularly.  · Attach bath mats securely with double-sided non-slip rug tape.  · Do not have throw rugs and other things on the floor that can make you trip.  WHAT CAN I DO IN THE BEDROOM?  · Use night lights.  · Make sure that you have a light by your bed that is easy to reach.  · Do not use any sheets or blankets that are too big for your bed. They should not hang down onto the floor.  · Have a firm chair that has side arms. You can use this for support while you get  dressed.  · Do not have throw rugs and other things on the floor that can make you trip.  WHAT CAN I DO IN THE KITCHEN?  · Clean up any spills right away.  · Avoid walking on wet floors.  · Keep items that you use a lot in easy-to-reach places.  · If you need to reach something above you, use a strong step stool that has a grab bar.  · Keep electrical cords out of the way.  · Do not use floor polish or wax that makes floors slippery. If you must use wax, use non-skid floor wax.  · Do not have throw rugs and other things on the floor that can make you trip.  WHAT CAN I DO WITH MY STAIRS?  · Do not leave any items on the stairs.  · Make sure that there are handrails on both sides of the stairs and use them. Fix handrails that are broken or loose. Make sure that handrails are as long as the stairways.  · Check any carpeting to make sure that it is firmly attached to the stairs. Fix any carpet that is loose or worn.  · Avoid having throw rugs at the top or bottom of the stairs. If you do have throw rugs, attach them to the floor with carpet tape.  · Make sure that you have a light switch at the top of the stairs and the bottom of the stairs. If you do not have them, ask someone to add them for you.  WHAT ELSE CAN I DO TO HELP PREVENT FALLS?  · Wear shoes that:  ¨ Do not have high heels.  ¨ Have rubber bottoms.  ¨ Are comfortable and fit you well.  ¨ Are closed at the toe. Do not wear sandals.  · If you use a stepladder:  ¨ Make sure that it is fully opened. Do not climb a closed stepladder.  ¨ Make sure that both sides of the stepladder are locked into place.  ¨ Ask someone to hold it for you, if possible.  · Clearly giuliano and make sure that you can see:  ¨ Any grab bars or handrails.  ¨ First and last steps.  ¨ Where the edge of each step is.  · Use tools that help you move around (mobility aids) if they are needed. These include:  ¨ Canes.  ¨ Walkers.  ¨ Scooters.  ¨ Crutches.  · Turn on the lights when you go into a  dark area. Replace any light bulbs as soon as they burn out.  · Set up your furniture so you have a clear path. Avoid moving your furniture around.  · If any of your floors are uneven, fix them.  · If there are any pets around you, be aware of where they are.  · Review your medicines with your doctor. Some medicines can make you feel dizzy. This can increase your chance of falling.  Ask your doctor what other things that you can do to help prevent falls.     This information is not intended to replace advice given to you by your health care provider. Make sure you discuss any questions you have with your health care provider.     Document Released: 10/14/2010 Document Revised: 05/03/2016 Document Reviewed: 01/22/2016  [employer] Interactive Patient Education ©2016 [employer]

## 2017-07-20 NOTE — PROGRESS NOTES
Mr. Velez is 77 y.o. male    CHIEF COMPLAINT: I am here to because of a bladder lesion. I am not having any trouble urinating. I have my disc (ultrasound).     HPI  Patient found to have a mass in the bladder.  Identified on ultrasound in context of evaluation for generalized abdominal pain.  Onset is unknown.  The ultrasound was done 3 weeks ago.  Associated symptoms include diffuse abdominal discomfort that is mild.  There is no urgency or frequency but some decrease in stream.    The following portions of the patient's history were reviewed and updated as appropriate: allergies, current medications, past family history, past medical history, past social history, past surgical history and problem list.    Review of Systems   Constitutional: Negative for appetite change and fever.   HENT: Negative for hearing loss and sore throat.    Eyes: Negative for pain and redness.   Respiratory: Negative for cough and shortness of breath.    Cardiovascular: Negative for chest pain and leg swelling.   Gastrointestinal: Negative for anal bleeding, nausea and vomiting.   Endocrine: Negative for cold intolerance and heat intolerance.   Genitourinary: Negative for dysuria, flank pain and hematuria.   Musculoskeletal: Negative for joint swelling and myalgias.   Skin: Negative for color change and rash.   Allergic/Immunologic: Negative for immunocompromised state.   Neurological: Negative for dizziness and speech difficulty.   Hematological: Negative for adenopathy. Does not bruise/bleed easily.   Psychiatric/Behavioral: Negative for dysphoric mood and suicidal ideas.         Current Outpatient Prescriptions:   •  aspirin 81 MG tablet, Take  by mouth., Disp: , Rfl:   •  HYDROcodone-acetaminophen (NORCO)  MG per tablet, Take  by mouth., Disp: , Rfl:   •  simvastatin (ZOCOR) 80 MG tablet, Take  by mouth., Disp: , Rfl:     Past Medical History:   Diagnosis Date   • Colon polyp    • COPD (chronic obstructive pulmonary disease)   "  • Diabetes mellitus    • Hyperlipidemia        Past Surgical History:   Procedure Laterality Date   • COLONOSCOPY  07/24/2012   • HEMORRHOIDECTOMY     • HERNIA REPAIR         Social History     Social History   • Marital status:      Spouse name: N/A   • Number of children: N/A   • Years of education: N/A     Social History Main Topics   • Smoking status: Current Every Day Smoker   • Smokeless tobacco: Never Used   • Alcohol use No   • Drug use: No   • Sexual activity: Not Asked     Other Topics Concern   • None     Social History Narrative       Family History   Problem Relation Age of Onset   • Colon cancer Neg Hx    • Colon polyps Neg Hx        /73  Temp 98 °F (36.7 °C)  Ht 66\" (167.6 cm)  Wt 148 lb (67.1 kg)  BMI 23.89 kg/m2    Physical Exam  Constitutional: He is oriented to person, place, and time. He appears well-developed and well-nourished. No distress.   HENT:   Nose: Nose normal.   Neck: Normal range of motion. Neck supple. No tracheal deviation present. No thyromegaly present.   Cardiovascular: Normal rate, regular rhythm and intact distal pulses.    No significant edema or tenderness    Pulmonary/Chest: Breath sounds normal. No accessory muscle usage. No respiratory distress.   Abdominal: Soft. Bowel sounds are normal. He exhibits no distension, no ascites and no mass. There is no hepatosplenomegaly. There is no tenderness. There is no rebound, no guarding and no CVA tenderness. No hernia.   Stool specimen is not indicated for my portion of the exam   Genitourinary: Testes normal and penis normal. Rectal exam shows no mass, no tenderness, anal tone normal and guaiac negative stool. Tender: no nodules. Right testis shows no mass, no swelling and no tenderness. Left testis shows no mass, no swelling and no tenderness. No penile tenderness (no lesion or deformities).   Genitourinary Comments:  The urethral meatus normal in position without evidence of stricture. Epididymis without mass " or tenderness. Vas Deferens is palpably normal.Anus and perineum without mass or tenderness. The seminal vesicle are without mass or enlargement. The prostate is approximately 35 ml. It is Symmetric, with a Soft consistency. There are no nodules present. . The seminal vesicles are Palpably normal in size and without mass.     Lymphadenopathy:     He has no cervical adenopathy. No inguinal adenopathy noted on the right or left side.        Right: No inguinal adenopathy present.        Left: No inguinal adenopathy present.   Neurological: He is alert and oriented to person, place, and time.   Skin: Skin is warm and dry. No rash noted. He is not diaphoretic. No pallor.   Psychiatric: He has a normal mood and affect. His behavior is normal.   Vitals reviewed.        Results for orders placed or performed in visit on 07/20/17   POC Urinalysis Dipstick, Automated   Result Value Ref Range    Color Yellow Yellow, Straw, Dark Yellow, Ann    Clarity, UA Clear Clear    Glucose, UA Negative Negative, 1000 mg/dL (3+) mg/dL    Bilirubin Negative Negative    Ketones, UA Negative Negative    Specific Gravity  1.020 1.005 - 1.030    Blood, UA Negative Negative    pH, Urine 7.0 5.0 - 8.0    Protein, POC Negative Negative mg/dL    Urobilinogen, UA Normal Normal    Leukocytes Negative Negative    Nitrite, UA Negative Negative     Independent renal ultrasound review  The renal ultrasound is available for me to review.  Treatment recommendations require an independent review.  This film has been reviewed by the radiologist to determine any non urologic abnormalities that are presents.  However, I very closely inspected the kidneys for size, symmetry, contour, parenchymal thickness, perinephric reaction, presence of calcifications, and intrarenal dilation of the collecting system.  This US shows left renal cysts and a nodule in the bladder that is small.  It does not have typical anechoic pattern after it to suggest  stone.      Assessment and Plan  Diagnoses and all orders for this visit:    Lesion of bladder  -     POC Urinalysis Dipstick, Automated    Renal cyst, left  #1. Cystoscopy as the definitive lower urinary tract study is discussed . The risks of pain and discomfort, infection, and urethral stricture are discussed with the patient including the technique used in the office setting.  All patient questions were answered.   #2. Patient has simple renal cysts that we will need no further follow-up.  They are asymptomatic and should not be an etiology of his discomfort.        Cystoscopy procedure note  Pre- operative diagnosis:  Bladder lesion on ultrasound    Post operative diagnosis:  Same    Procedure:  The patient was prepped and draped in a normal sterile fashion.  The urethra was anesthetized with 2% lidocaine jelly.  A flexible cystoscope was introduced per urethra.      Urethra:  No urethral stricture    Bladder:  Bladder tumor he has a 1 cm bladder tumor consistent with low-grade urothelial cancer most likely that he is in the posterior aspect toward the right above the trigone.    Ureteral orifices:  Normal position bilaterally and Clear efflux bilaterally    Prostate:  lateral lobe hypertrophy    Patient tolerated the procedure well    Complications: none    Blood loss: minimal    Diagnoses and all orders for this visit:    Lesion of bladder  -     POC Urinalysis Dipstick, Automated    Renal cyst, left        Follow up:    Schedule for OR  TURBT  Today cystoscopy has revealed a new diagnosis,a bladder tumor.  I explained to the patient that most tumors in the bladder are cancerous.  I also explained the outcome and ultimate treatment for bladder cancer depends upon the depth of invasion of the tumor, its size, and histologic grade of the tumor.  I then recommended a transurethral resection of the bladder tumor.  Alternative options would include watchful waiting and percutaneous biopsy but TURBT is the standard  of care in this situation.  The risks of the procedure including hematuria, possible urinary retention from clots, urinary tract infection, ureteral obstruction, bladder perforation and need for open surgery and even removal of a portion of the bladder is explained.  Open surgery is described as a rare event.  The patient does understand that the TURBT serves as a diagnostic procedure to determine the depth of invasion and grade but can be adequate treatment.  This will allow us to formulate a plan for definitive therapy which may require repeat bladder biopsy, surveillance cystoscopy (described to the patient as part of long term follow-up due to the recurrence rate ranged from 20-45% based upon histologic grade), partial or radical cystectomy, intravesical chemotherapy or immunotherapy.  The patient understands this, acknowledges informed consent, and will be scheduled.    I contacted Dr. Ina Issa, his general surgeon, who plans to proceed with laparoscopic cholecystectomy.  We would then proceed with a transurethral resection of bladder tumor with bilateral retrograde ureteropyelograms after he is fully recovered from this procedure.    Cale Covington MD  07/20/17  10:57 AM      Cc: Dr. Mendez

## 2017-07-25 ENCOUNTER — ANESTHESIA (OUTPATIENT)
Dept: PERIOP | Facility: HOSPITAL | Age: 77
End: 2017-07-25

## 2017-07-25 ENCOUNTER — ANESTHESIA EVENT (OUTPATIENT)
Dept: PERIOP | Facility: HOSPITAL | Age: 77
End: 2017-07-25

## 2017-07-25 ENCOUNTER — HOSPITAL ENCOUNTER (OUTPATIENT)
Facility: HOSPITAL | Age: 77
Setting detail: HOSPITAL OUTPATIENT SURGERY
Discharge: HOME | End: 2017-07-25
Attending: SPECIALIST | Admitting: SPECIALIST

## 2017-07-25 VITALS
OXYGEN SATURATION: 95 % | DIASTOLIC BLOOD PRESSURE: 60 MMHG | TEMPERATURE: 97.2 F | SYSTOLIC BLOOD PRESSURE: 132 MMHG | RESPIRATION RATE: 16 BRPM | HEART RATE: 81 BPM

## 2017-07-25 DIAGNOSIS — L72.0 EPIDERMAL CYST: ICD-10-CM

## 2017-07-25 DIAGNOSIS — K80.60 CALCULUS OF GALLBLADDER AND BILE DUCT W/CHOLECYSTITIS W/O OBSTRUCTION: ICD-10-CM

## 2017-07-25 PROCEDURE — 25010000002 HYDROMORPHONE PER 4 MG: Performed by: ANESTHESIOLOGY

## 2017-07-25 PROCEDURE — 25010000002 ONDANSETRON PER 1 MG: Performed by: ANESTHESIOLOGY

## 2017-07-25 PROCEDURE — 88304 TISSUE EXAM BY PATHOLOGIST: CPT | Performed by: SPECIALIST

## 2017-07-25 PROCEDURE — 25010000002 FENTANYL CITRATE (PF) 250 MCG/5ML SOLUTION: Performed by: NURSE ANESTHETIST, CERTIFIED REGISTERED

## 2017-07-25 PROCEDURE — 25010000002 MIDAZOLAM PER 1 MG: Performed by: ANESTHESIOLOGY

## 2017-07-25 PROCEDURE — 25010000002 FENTANYL CITRATE (PF) 100 MCG/2ML SOLUTION: Performed by: NURSE ANESTHETIST, CERTIFIED REGISTERED

## 2017-07-25 PROCEDURE — 25010000002 PROPOFOL 10 MG/ML EMULSION: Performed by: NURSE ANESTHETIST, CERTIFIED REGISTERED

## 2017-07-25 RX ORDER — NALOXONE HCL 0.4 MG/ML
0.04 VIAL (ML) INJECTION AS NEEDED
Status: DISCONTINUED | OUTPATIENT
Start: 2017-07-25 | End: 2017-07-25 | Stop reason: HOSPADM

## 2017-07-25 RX ORDER — ONDANSETRON 2 MG/ML
4 INJECTION INTRAMUSCULAR; INTRAVENOUS AS NEEDED
Status: DISCONTINUED | OUTPATIENT
Start: 2017-07-25 | End: 2017-07-25 | Stop reason: HOSPADM

## 2017-07-25 RX ORDER — BUPIVACAINE HYDROCHLORIDE AND EPINEPHRINE 2.5; 5 MG/ML; UG/ML
INJECTION, SOLUTION INFILTRATION; PERINEURAL AS NEEDED
Status: DISCONTINUED | OUTPATIENT
Start: 2017-07-25 | End: 2017-07-25 | Stop reason: HOSPADM

## 2017-07-25 RX ORDER — SODIUM CHLORIDE 0.9 % (FLUSH) 0.9 %
3 SYRINGE (ML) INJECTION AS NEEDED
Status: DISCONTINUED | OUTPATIENT
Start: 2017-07-25 | End: 2017-07-25 | Stop reason: HOSPADM

## 2017-07-25 RX ORDER — HYDROCODONE BITARTRATE AND ACETAMINOPHEN 5; 325 MG/1; MG/1
1-2 TABLET ORAL EVERY 4 HOURS PRN
Qty: 30 TABLET | Refills: 0 | Status: SHIPPED | OUTPATIENT
Start: 2017-07-25 | End: 2017-08-14

## 2017-07-25 RX ORDER — MAGNESIUM HYDROXIDE 1200 MG/15ML
LIQUID ORAL AS NEEDED
Status: DISCONTINUED | OUTPATIENT
Start: 2017-07-25 | End: 2017-07-25 | Stop reason: HOSPADM

## 2017-07-25 RX ORDER — MEPERIDINE HYDROCHLORIDE 25 MG/ML
12.5 INJECTION INTRAMUSCULAR; INTRAVENOUS; SUBCUTANEOUS
Status: DISCONTINUED | OUTPATIENT
Start: 2017-07-25 | End: 2017-07-25 | Stop reason: HOSPADM

## 2017-07-25 RX ORDER — ROCURONIUM BROMIDE 10 MG/ML
INJECTION, SOLUTION INTRAVENOUS AS NEEDED
Status: DISCONTINUED | OUTPATIENT
Start: 2017-07-25 | End: 2017-07-25 | Stop reason: SURG

## 2017-07-25 RX ORDER — MIDAZOLAM HYDROCHLORIDE 1 MG/ML
1 INJECTION INTRAMUSCULAR; INTRAVENOUS
Status: DISCONTINUED | OUTPATIENT
Start: 2017-07-25 | End: 2017-07-25 | Stop reason: HOSPADM

## 2017-07-25 RX ORDER — LIDOCAINE HYDROCHLORIDE 40 MG/ML
SOLUTION TOPICAL AS NEEDED
Status: DISCONTINUED | OUTPATIENT
Start: 2017-07-25 | End: 2017-07-25 | Stop reason: SURG

## 2017-07-25 RX ORDER — SODIUM CHLORIDE 0.9 % (FLUSH) 0.9 %
1-10 SYRINGE (ML) INJECTION AS NEEDED
Status: DISCONTINUED | OUTPATIENT
Start: 2017-07-25 | End: 2017-07-25 | Stop reason: HOSPADM

## 2017-07-25 RX ORDER — METOCLOPRAMIDE HYDROCHLORIDE 5 MG/ML
5 INJECTION INTRAMUSCULAR; INTRAVENOUS
Status: DISCONTINUED | OUTPATIENT
Start: 2017-07-25 | End: 2017-07-25 | Stop reason: HOSPADM

## 2017-07-25 RX ORDER — PHENYLEPHRINE HCL IN 0.9% NACL 0.8MG/10ML
SYRINGE (ML) INTRAVENOUS AS NEEDED
Status: DISCONTINUED | OUTPATIENT
Start: 2017-07-25 | End: 2017-07-25 | Stop reason: SURG

## 2017-07-25 RX ORDER — FENTANYL CITRATE 50 UG/ML
INJECTION, SOLUTION INTRAMUSCULAR; INTRAVENOUS AS NEEDED
Status: DISCONTINUED | OUTPATIENT
Start: 2017-07-25 | End: 2017-07-25 | Stop reason: SURG

## 2017-07-25 RX ORDER — HYDROCODONE BITARTRATE AND ACETAMINOPHEN 5; 325 MG/1; MG/1
1 TABLET ORAL ONCE AS NEEDED
Status: DISCONTINUED | OUTPATIENT
Start: 2017-07-25 | End: 2017-07-25 | Stop reason: HOSPADM

## 2017-07-25 RX ORDER — FLUMAZENIL 0.1 MG/ML
0.2 INJECTION INTRAVENOUS AS NEEDED
Status: DISCONTINUED | OUTPATIENT
Start: 2017-07-25 | End: 2017-07-25 | Stop reason: HOSPADM

## 2017-07-25 RX ORDER — SODIUM CHLORIDE 9 MG/ML
INJECTION, SOLUTION INTRAVENOUS AS NEEDED
Status: DISCONTINUED | OUTPATIENT
Start: 2017-07-25 | End: 2017-07-25 | Stop reason: HOSPADM

## 2017-07-25 RX ORDER — MIDAZOLAM HYDROCHLORIDE 1 MG/ML
2 INJECTION INTRAMUSCULAR; INTRAVENOUS
Status: DISCONTINUED | OUTPATIENT
Start: 2017-07-25 | End: 2017-07-25 | Stop reason: HOSPADM

## 2017-07-25 RX ORDER — LIDOCAINE HYDROCHLORIDE 10 MG/ML
0.5 INJECTION, SOLUTION INFILTRATION; PERINEURAL ONCE AS NEEDED
Status: COMPLETED | OUTPATIENT
Start: 2017-07-25 | End: 2017-07-25

## 2017-07-25 RX ORDER — HYDRALAZINE HYDROCHLORIDE 20 MG/ML
5 INJECTION INTRAMUSCULAR; INTRAVENOUS
Status: DISCONTINUED | OUTPATIENT
Start: 2017-07-25 | End: 2017-07-25 | Stop reason: HOSPADM

## 2017-07-25 RX ORDER — SODIUM CHLORIDE, SODIUM LACTATE, POTASSIUM CHLORIDE, CALCIUM CHLORIDE 600; 310; 30; 20 MG/100ML; MG/100ML; MG/100ML; MG/100ML
1000 INJECTION, SOLUTION INTRAVENOUS CONTINUOUS PRN
Status: DISCONTINUED | OUTPATIENT
Start: 2017-07-25 | End: 2017-07-25 | Stop reason: HOSPADM

## 2017-07-25 RX ORDER — LIDOCAINE HYDROCHLORIDE 20 MG/ML
INJECTION, SOLUTION INFILTRATION; PERINEURAL AS NEEDED
Status: DISCONTINUED | OUTPATIENT
Start: 2017-07-25 | End: 2017-07-25 | Stop reason: SURG

## 2017-07-25 RX ORDER — IPRATROPIUM BROMIDE AND ALBUTEROL SULFATE 2.5; .5 MG/3ML; MG/3ML
3 SOLUTION RESPIRATORY (INHALATION) ONCE AS NEEDED
Status: DISCONTINUED | OUTPATIENT
Start: 2017-07-25 | End: 2017-07-25 | Stop reason: HOSPADM

## 2017-07-25 RX ORDER — LABETALOL HYDROCHLORIDE 5 MG/ML
5 INJECTION, SOLUTION INTRAVENOUS
Status: DISCONTINUED | OUTPATIENT
Start: 2017-07-25 | End: 2017-07-25 | Stop reason: HOSPADM

## 2017-07-25 RX ORDER — SODIUM CHLORIDE, SODIUM LACTATE, POTASSIUM CHLORIDE, CALCIUM CHLORIDE 600; 310; 30; 20 MG/100ML; MG/100ML; MG/100ML; MG/100ML
100 INJECTION, SOLUTION INTRAVENOUS CONTINUOUS
Status: DISCONTINUED | OUTPATIENT
Start: 2017-07-25 | End: 2017-07-25 | Stop reason: HOSPADM

## 2017-07-25 RX ORDER — PROPOFOL 10 MG/ML
VIAL (ML) INTRAVENOUS AS NEEDED
Status: DISCONTINUED | OUTPATIENT
Start: 2017-07-25 | End: 2017-07-25 | Stop reason: SURG

## 2017-07-25 RX ADMIN — HYDROMORPHONE HYDROCHLORIDE 0.5 MG: 1 INJECTION, SOLUTION INTRAMUSCULAR; INTRAVENOUS; SUBCUTANEOUS at 10:49

## 2017-07-25 RX ADMIN — CEFAZOLIN 1 G: 1 INJECTION, POWDER, FOR SOLUTION INTRAMUSCULAR; INTRAVENOUS; PARENTERAL at 07:33

## 2017-07-25 RX ADMIN — FENTANYL CITRATE 250 MCG: 50 INJECTION INTRAMUSCULAR; INTRAVENOUS at 07:36

## 2017-07-25 RX ADMIN — LIDOCAINE HYDROCHLORIDE 1 EACH: 40 SOLUTION TOPICAL at 07:36

## 2017-07-25 RX ADMIN — LIDOCAINE HYDROCHLORIDE 0.5 ML: 10 INJECTION, SOLUTION INFILTRATION; PERINEURAL at 06:00

## 2017-07-25 RX ADMIN — FENTANYL CITRATE 50 MCG: 50 INJECTION, SOLUTION INTRAMUSCULAR; INTRAVENOUS at 08:00

## 2017-07-25 RX ADMIN — PROPOFOL 150 MG: 10 INJECTION, EMULSION INTRAVENOUS at 07:36

## 2017-07-25 RX ADMIN — LIDOCAINE HYDROCHLORIDE 100 MG: 20 INJECTION, SOLUTION INFILTRATION; PERINEURAL at 07:36

## 2017-07-25 RX ADMIN — Medication 80 MCG: at 07:40

## 2017-07-25 RX ADMIN — SODIUM CHLORIDE, POTASSIUM CHLORIDE, SODIUM LACTATE AND CALCIUM CHLORIDE 1000 ML: 600; 310; 30; 20 INJECTION, SOLUTION INTRAVENOUS at 06:00

## 2017-07-25 RX ADMIN — ONDANSETRON 4 MG: 2 INJECTION INTRAMUSCULAR; INTRAVENOUS at 08:46

## 2017-07-25 RX ADMIN — HYDROMORPHONE HYDROCHLORIDE 1 MG: 1 INJECTION, SOLUTION INTRAMUSCULAR; INTRAVENOUS; SUBCUTANEOUS at 08:45

## 2017-07-25 RX ADMIN — ROCURONIUM BROMIDE 20 MG: 10 INJECTION INTRAVENOUS at 07:36

## 2017-07-25 RX ADMIN — MIDAZOLAM HYDROCHLORIDE 2 MG: 1 INJECTION, SOLUTION INTRAMUSCULAR; INTRAVENOUS at 07:29

## 2017-07-25 RX ADMIN — FENTANYL CITRATE 50 MCG: 50 INJECTION, SOLUTION INTRAMUSCULAR; INTRAVENOUS at 08:19

## 2017-07-25 RX ADMIN — HYDROCODONE BITARTRATE AND ACETAMINOPHEN 1 TABLET: 5; 325 TABLET ORAL at 10:12

## 2017-07-25 NOTE — PLAN OF CARE
Problem: Patient Care Overview (Adult)  Goal: Plan of Care Review  Outcome: Ongoing (interventions implemented as appropriate)    07/25/17 0853   Coping/Psychosocial Response Interventions   Plan Of Care Reviewed With patient   Patient Care Overview   Progress improving   Outcome Evaluation   Outcome Summary/Follow up Plan DILAUDID HELPED RELIEVE PAIN-MEETS PACU DC CRITERIA         Problem: Perioperative Period (Adult)  Goal: Signs and Symptoms of Listed Potential Problems Will be Absent or Manageable (Perioperative Period)  Outcome: Ongoing (interventions implemented as appropriate)

## 2017-07-25 NOTE — ANESTHESIA PREPROCEDURE EVALUATION
Anesthesia Evaluation     Patient summary reviewed   NPO Solid Status: > 8 hours       Airway   Mallampati: II  TM distance: >3 FB  Neck ROM: full  no difficulty expected  Dental    (+) edentulous, upper dentures and lower dentures    Pulmonary - normal exam   (+) a smoker Current, COPD moderate,   Cardiovascular - normal exam  Exercise tolerance: good (4-7 METS)    (+) hyperlipidemia      Neuro/Psych  GI/Hepatic/Renal/Endo      Musculoskeletal     (+) back pain,       ROS comment: Chronic pain   Abdominal  - normal exam   Substance History      OB/GYN          Other   (+) arthritis                                     Anesthesia Plan    ASA 3     general     intravenous induction   Anesthetic plan and risks discussed with patient.    Plan discussed with CRNA.

## 2017-07-25 NOTE — PLAN OF CARE
Problem: Perioperative Period (Adult)  Goal: Signs and Symptoms of Listed Potential Problems Will be Absent or Manageable (Perioperative Period)  Outcome: Ongoing (interventions implemented as appropriate)    07/25/17 8022   Perioperative Period   Problems Assessed (Perioperative Period) pain;hypoxia/hypoxemia;hypothermia;situational response   Problems Present (Perioperative Period) hypothermia;hypoxia/hypoxemia;situational response

## 2017-07-25 NOTE — DISCHARGE INSTRUCTIONS

## 2017-07-25 NOTE — PLAN OF CARE
Problem: Patient Care Overview (Adult)  Goal: Plan of Care Review  Outcome: Outcome(s) achieved Date Met:  07/25/17 07/25/17 8526   Coping/Psychosocial Response Interventions   Plan Of Care Reviewed With patient;spouse   Patient Care Overview   Progress improving   Outcome Evaluation   Outcome Summary/Follow up Plan patient meets discharge criteria         Problem: Perioperative Period (Adult)  Goal: Signs and Symptoms of Listed Potential Problems Will be Absent or Manageable (Perioperative Period)  Outcome: Outcome(s) achieved Date Met:  07/25/17

## 2017-07-25 NOTE — ANESTHESIA PROCEDURE NOTES
Airway  Urgency: elective    Airway not difficult    General Information and Staff    Patient location during procedure: OR  CRNA: DEMAR CHAN    Indications and Patient Condition  Indications for airway management: airway protection    Preoxygenated: yes  MILS maintained throughout  Mask difficulty assessment: 1 - vent by mask    Final Airway Details  Final airway type: endotracheal airway      Successful airway: ETT  Cuffed: yes   Successful intubation technique: direct laryngoscopy  Facilitating devices/methods: intubating stylet  Endotracheal tube insertion site: oral  Blade: Aleksander  Blade size: #4  ETT size: 7.5 mm  Cormack-Lehane Classification: grade I - full view of glottis  Placement verified by: chest auscultation and capnometry   Cuff volume (mL): 8  Measured from: lips  ETT to lips (cm): 21  Number of attempts at approach: 1

## 2017-07-25 NOTE — PLAN OF CARE
Problem: Patient Care Overview (Adult)  Goal: Plan of Care Review  Outcome: Ongoing (interventions implemented as appropriate)    07/25/17 0735   Coping/Psychosocial Response Interventions   Plan Of Care Reviewed With patient   Patient Care Overview   Progress no change

## 2017-07-25 NOTE — ANESTHESIA POSTPROCEDURE EVALUATION
Patient: Ezekiel Velez    Procedure Summary     Date Anesthesia Start Anesthesia Stop Room / Location    07/25/17 0733 0822  [facility] OR 04 / [facility]       Procedure Diagnosis Surgeon Provider    CHOLECYSTECTOMY LAPAROSCOPIC AND REMOVAL CYST ON CHEST (N/A Abdomen) (CHOLELITHIASIS ) MD Alfredo Ron CRNA          Anesthesia Type: general  Last vitals  BP   132/92 (07/25/17 0839)    Temp   97 °F (36.1 °C) (07/25/17 0824)    Pulse   63 (07/25/17 0839)   Resp   14 (07/25/17 0839)    SpO2   100 % (07/25/17 0839)      Post Anesthesia Care and Evaluation    Patient location during evaluation: PACU  Patient participation: complete - patient participated  Level of consciousness: awake and alert  Pain management: adequate  Airway patency: patent  Anesthetic complications: No anesthetic complications    Cardiovascular status: acceptable and hemodynamically stable  Respiratory status: acceptable  Hydration status: acceptable

## 2017-07-26 LAB
CYTO UR: NORMAL
LAB AP CASE REPORT: NORMAL
Lab: NORMAL
PATH REPORT.FINAL DX SPEC: NORMAL
PATH REPORT.GROSS SPEC: NORMAL

## 2017-08-01 ENCOUNTER — HOSPITAL ENCOUNTER (OUTPATIENT)
Dept: PAIN MANAGEMENT | Age: 77
Discharge: HOME | End: 2017-08-01
Payer: MEDICARE

## 2017-08-01 VITALS
DIASTOLIC BLOOD PRESSURE: 56 MMHG | HEIGHT: 66 IN | TEMPERATURE: 97.3 F | HEART RATE: 67 BPM | SYSTOLIC BLOOD PRESSURE: 119 MMHG | WEIGHT: 146 LBS | BODY MASS INDEX: 23.46 KG/M2 | OXYGEN SATURATION: 97 % | RESPIRATION RATE: 20 BRPM

## 2017-08-01 DIAGNOSIS — M51.36 LUMBAR DEGENERATIVE DISC DISEASE: ICD-10-CM

## 2017-08-01 DIAGNOSIS — M51.16 LUMBAR DISC DISEASE WITH RADICULOPATHY: Chronic | ICD-10-CM

## 2017-08-01 PROCEDURE — 20610 DRAIN/INJ JOINT/BURSA W/O US: CPT

## 2017-08-01 PROCEDURE — 6360000002 HC RX W HCPCS

## 2017-08-01 PROCEDURE — 80307 DRUG TEST PRSMV CHEM ANLYZR: CPT

## 2017-08-01 PROCEDURE — 2500000003 HC RX 250 WO HCPCS

## 2017-08-01 PROCEDURE — 62323 NJX INTERLAMINAR LMBR/SAC: CPT

## 2017-08-01 PROCEDURE — 3209999900 FLUORO FOR SURGICAL PROCEDURES

## 2017-08-01 PROCEDURE — 2580000003 HC RX 258

## 2017-08-01 RX ORDER — 0.9 % SODIUM CHLORIDE 0.9 %
VIAL (ML) INJECTION
Status: COMPLETED | OUTPATIENT
Start: 2017-08-01 | End: 2017-08-01

## 2017-08-01 RX ORDER — BUPIVACAINE HYDROCHLORIDE 2.5 MG/ML
INJECTION, SOLUTION EPIDURAL; INFILTRATION; INTRACAUDAL
Status: COMPLETED | OUTPATIENT
Start: 2017-08-01 | End: 2017-08-01

## 2017-08-01 RX ORDER — LIDOCAINE HYDROCHLORIDE 10 MG/ML
INJECTION, SOLUTION EPIDURAL; INFILTRATION; INTRACAUDAL; PERINEURAL
Status: COMPLETED | OUTPATIENT
Start: 2017-08-01 | End: 2017-08-01

## 2017-08-01 RX ORDER — METHYLPREDNISOLONE ACETATE 80 MG/ML
INJECTION, SUSPENSION INTRA-ARTICULAR; INTRALESIONAL; INTRAMUSCULAR; SOFT TISSUE
Status: COMPLETED | OUTPATIENT
Start: 2017-08-01 | End: 2017-08-01

## 2017-08-01 RX ORDER — TRIAMCINOLONE ACETONIDE 40 MG/ML
INJECTION, SUSPENSION INTRA-ARTICULAR; INTRAMUSCULAR
Status: COMPLETED | OUTPATIENT
Start: 2017-08-01 | End: 2017-08-01

## 2017-08-01 RX ORDER — BUPIVACAINE HYDROCHLORIDE 5 MG/ML
INJECTION, SOLUTION EPIDURAL; INTRACAUDAL
Status: COMPLETED | OUTPATIENT
Start: 2017-08-01 | End: 2017-08-01

## 2017-08-01 RX ORDER — HYDROCODONE BITARTRATE AND ACETAMINOPHEN 10; 325 MG/1; MG/1
1 TABLET ORAL 4 TIMES DAILY PRN
Qty: 120 TABLET | Refills: 0 | Status: SHIPPED | OUTPATIENT
Start: 2017-08-01 | End: 2017-08-30 | Stop reason: SDUPTHER

## 2017-08-01 RX ADMIN — METHYLPREDNISOLONE ACETATE 80 MG: 80 INJECTION, SUSPENSION INTRA-ARTICULAR; INTRALESIONAL; INTRAMUSCULAR; SOFT TISSUE at 16:20

## 2017-08-01 RX ADMIN — BUPIVACAINE HYDROCHLORIDE 2.5 ML: 2.5 INJECTION, SOLUTION EPIDURAL; INFILTRATION; INTRACAUDAL at 16:19

## 2017-08-01 RX ADMIN — TRIAMCINOLONE ACETONIDE 30 MG: 40 INJECTION, SUSPENSION INTRA-ARTICULAR; INTRAMUSCULAR at 16:21

## 2017-08-01 RX ADMIN — BUPIVACAINE HYDROCHLORIDE 7.5 ML: 5 INJECTION, SOLUTION EPIDURAL; INTRACAUDAL at 16:20

## 2017-08-01 RX ADMIN — LIDOCAINE HYDROCHLORIDE 3 ML: 10 INJECTION, SOLUTION EPIDURAL; INFILTRATION; INTRACAUDAL; PERINEURAL at 16:17

## 2017-08-01 RX ADMIN — Medication 1.5 ML: at 16:19

## 2017-08-01 ASSESSMENT — PAIN DESCRIPTION - DESCRIPTORS: DESCRIPTORS: ACHING

## 2017-08-01 ASSESSMENT — ACTIVITIES OF DAILY LIVING (ADL): EFFECT OF PAIN ON DAILY ACTIVITIES: LIMITS ACTIVITIES

## 2017-08-01 ASSESSMENT — PAIN - FUNCTIONAL ASSESSMENT: PAIN_FUNCTIONAL_ASSESSMENT: 0-10

## 2017-08-04 ENCOUNTER — TELEPHONE (OUTPATIENT)
Dept: UROLOGY | Facility: CLINIC | Age: 77
End: 2017-08-04

## 2017-08-04 NOTE — TELEPHONE ENCOUNTER
CALLED PT TO SEE IF HE WANTED TO SET UP SURGERY. PT STATED THAT HE HAS A FOLLOW UP WITH DR. ARANDA ON MON. 8/7/17 SO HE WANTED TO CHECK WITH THEM AND THEN LET US KNOW.

## 2017-08-07 PROBLEM — N32.9 LESION OF BLADDER: Status: ACTIVE | Noted: 2017-08-07

## 2017-08-07 LAB
AMPHETAMINES, URINE: NEGATIVE NG/ML
BARBITURATES, URINE: NEGATIVE NG/ML
BENZODIAZEPINES, URINE: NEGATIVE NG/ML
CANNABINOIDS, URINE: NEGATIVE NG/ML
COCAINE METABOLITE, URINE: NEGATIVE NG/ML
CODEINE, URINE: NEGATIVE
CREATININE, URINE: 191.1 MG/DL (ref 20–300)
ETHANOL U, QUAN: NEGATIVE %
FENTANYL URINE: NEGATIVE PG/ML
HYDROCODONE, UR CONF: >3000 NG/ML
HYDROCODONE, URINE: POSITIVE
HYDROMORPHONE, UR CONF: 2960 NG/ML
HYDROMORPHONE, URINE: POSITIVE
MEPERIDINE, UR: NEGATIVE NG/ML
METHADONE SCREEN, URINE: NEGATIVE NG/ML
MORPHINE URINE: NEGATIVE
OPIATES, URINE: ABNORMAL NG/ML
OPIATES, URINE: POSITIVE NG/ML
OXYCODONE/OXYMORPHONE, UR: NEGATIVE NG/ML
PH, URINE: 5.4 (ref 4.5–8.9)
PHENCYCLIDINE, URINE: NEGATIVE NG/ML
PROPOXYPHENE, URINE: NEGATIVE NG/ML

## 2017-08-09 ENCOUNTER — TELEPHONE (OUTPATIENT)
Dept: GASTROENTEROLOGY | Facility: CLINIC | Age: 77
End: 2017-08-09

## 2017-08-09 ENCOUNTER — OFFICE VISIT (OUTPATIENT)
Dept: CARDIAC SURGERY | Facility: CLINIC | Age: 77
End: 2017-08-09

## 2017-08-09 VITALS
SYSTOLIC BLOOD PRESSURE: 120 MMHG | DIASTOLIC BLOOD PRESSURE: 68 MMHG | BODY MASS INDEX: 23.46 KG/M2 | OXYGEN SATURATION: 99 % | WEIGHT: 146 LBS | HEIGHT: 66 IN | HEART RATE: 55 BPM

## 2017-08-09 DIAGNOSIS — R91.8 LUNG NODULE, MULTIPLE: Primary | ICD-10-CM

## 2017-08-09 DIAGNOSIS — F17.200 TOBACCO USE DISORDER: ICD-10-CM

## 2017-08-09 PROCEDURE — 99406 BEHAV CHNG SMOKING 3-10 MIN: CPT | Performed by: THORACIC SURGERY (CARDIOTHORACIC VASCULAR SURGERY)

## 2017-08-09 PROCEDURE — 99204 OFFICE O/P NEW MOD 45 MIN: CPT | Performed by: THORACIC SURGERY (CARDIOTHORACIC VASCULAR SURGERY)

## 2017-08-09 NOTE — TELEPHONE ENCOUNTER
I did call and speak with the wife today, I wanted to let her know that Tere from Dr. Mendez's office had called our office and left a message that the patient was to have a follow-up ultrasound of the abdomen 3 months from the last one which was June 2017.  He had had an ultrasound of the abdomen June 2017 and the liver was normal.  However, he had a CT of his chest June 2017 as well which noted multiple low attenuation hepatic lesions in the differential considerations were small cysts or hemangiomas.  Since then he has had his gallbladder removed and has also been diagnosed with bladder cancer.  This is information that I obtained from his wife.  He has an appointment with Dr. Covington tomorrow she says he is scheduled for surgery on his bladder 08/22/2017.  When I saw him in the office he had a change in his bowel habits and some lower abdominal pain with personal history of colon polyps and we schedule him for a colonoscopy.  At this point he is not sure he wants to have the colonoscopy.  He is 2 years overdue for colonoscopy.  There going to talk with Dr. Covington tomorrow to get the details of the bladder surgery and then will come back to our office to talk.  I just wanted to make sure that he did follow up with Dr. Mendez's office about the CT of his chest showing the liver lesions.  Dr. Mendez had mentioned in her office note she was going to talk with the radiologist and as stated there saying now he will have another ultrasound 3 months from the last.

## 2017-08-10 ENCOUNTER — APPOINTMENT (OUTPATIENT)
Dept: PREADMISSION TESTING | Facility: HOSPITAL | Age: 77
End: 2017-08-10

## 2017-08-10 ENCOUNTER — OFFICE VISIT (OUTPATIENT)
Dept: UROLOGY | Facility: CLINIC | Age: 77
End: 2017-08-10

## 2017-08-10 VITALS — BODY MASS INDEX: 23.14 KG/M2 | WEIGHT: 144 LBS | TEMPERATURE: 98.4 F | HEIGHT: 66 IN

## 2017-08-10 DIAGNOSIS — N32.9 LESION OF BLADDER: Primary | ICD-10-CM

## 2017-08-10 LAB
BILIRUB BLD-MCNC: NEGATIVE MG/DL
CLARITY, POC: CLEAR
COLOR UR: YELLOW
GLUCOSE UR STRIP-MCNC: NEGATIVE MG/DL
KETONES UR QL: NEGATIVE
LEUKOCYTE EST, POC: NEGATIVE
NITRITE UR-MCNC: NEGATIVE MG/ML
PH UR: 5.5 [PH] (ref 5–8)
PROT UR STRIP-MCNC: NEGATIVE MG/DL
RBC # UR STRIP: NEGATIVE /UL
SP GR UR: 1.02 (ref 1–1.03)
UROBILINOGEN UR QL: NORMAL

## 2017-08-10 PROCEDURE — 99213 OFFICE O/P EST LOW 20 MIN: CPT | Performed by: UROLOGY

## 2017-08-10 PROCEDURE — 81003 URINALYSIS AUTO W/O SCOPE: CPT | Performed by: UROLOGY

## 2017-08-10 RX ORDER — SIMVASTATIN 20 MG
20 TABLET ORAL NIGHTLY
COMMUNITY

## 2017-08-10 NOTE — PROGRESS NOTES
Mr. Velez is 77 y.o. male    CHIEF COMPLAINT: I am here today to discuss my bladder lesion.    HPI  Patient with known bladder tumor.  It was identified a month ago.  Patient was seen before he was scheduled for a lap scopic cholecystectomy.  He returns today in the context of being postoperative.  Once arrange for drainage resection bladder tumor.  He denies gross hematuria dysuria or urgency.    The following portions of the patient's history were reviewed and updated as appropriate: allergies, current medications, past family history, past medical history, past social history, past surgical history and problem list.    Review of Systems   Constitutional: Negative for chills and fever.   Gastrointestinal: Negative for abdominal pain, anal bleeding and blood in stool.   Genitourinary: Negative for difficulty urinating, dysuria, flank pain, frequency, hematuria and urgency.         Current Outpatient Prescriptions:   •  HYDROcodone-acetaminophen (NORCO) 5-325 MG per tablet, Take 1-2 tablets by mouth Every 4 (Four) Hours As Needed (Pain)., Disp: 30 tablet, Rfl: 0  •  simvastatin (ZOCOR) 20 MG tablet, Take 20 mg by mouth Every Night., Disp: , Rfl:   •  aspirin 81 MG tablet, Take  by mouth., Disp: , Rfl:   •  HYDROcodone-acetaminophen (NORCO)  MG per tablet, Take  by mouth., Disp: , Rfl:     Past Medical History:   Diagnosis Date   • Arthritis    • Colon polyp    • COPD (chronic obstructive pulmonary disease)    • Hyperlipidemia        Past Surgical History:   Procedure Laterality Date   • CHOLECYSTECTOMY WITH INTRAOPERATIVE CHOLANGIOGRAM N/A 7/25/2017    Procedure: CHOLECYSTECTOMY LAPAROSCOPIC AND REMOVAL CYST ON CHEST;  Surgeon: Ina Issa MD;  Location: [facility];  Service:    • COLONOSCOPY  07/24/2012   • HEMORRHOIDECTOMY     • HERNIA REPAIR         Social History     Social History   • Marital status:      Spouse name: N/A   • Number of children: N/A   • Years of education: N/A     Social  "History Main Topics   • Smoking status: Current Every Day Smoker     Packs/day: 0.25     Types: Cigarettes   • Smokeless tobacco: Never Used      Comment: 1/4 pk/day    • Alcohol use No   • Drug use: No   • Sexual activity: Defer     Other Topics Concern   • None     Social History Narrative       Family History   Problem Relation Age of Onset   • No Known Problems Father    • No Known Problems Mother    • Colon cancer Neg Hx    • Colon polyps Neg Hx        Temp 98.4 °F (36.9 °C)  Ht 66\" (167.6 cm)  Wt 144 lb (65.3 kg)  BMI 23.24 kg/m2    Physical Exam  Constitutional: The patient  is oriented to person, place, and time. They  appear well-developed and well-nourished. No distress.   Pulmonary/Chest: Effort normal.   Abdominal: Soft. The patient exhibits no distension and no mass. There is no tenderness. There is no rebound and no guarding. No hernia.   Neurological: Patient is alert and oriented to person, place, and time.   Skin: Skin is warm and dry. Not diaphoretic.   Psychiatric:  normal mood and affect.   Vitals reviewed.      Results for orders placed or performed in visit on 08/10/17   POC Urinalysis Dipstick, Automated   Result Value Ref Range    Color Yellow Yellow, Straw, Dark Yellow, Ann    Clarity, UA Clear Clear    Glucose, UA Negative Negative, 1000 mg/dL (3+) mg/dL    Bilirubin Negative Negative    Ketones, UA Negative Negative    Specific Gravity  1.020 1.005 - 1.030    Blood, UA Negative Negative    pH, Urine 5.5 5.0 - 8.0    Protein, POC Negative Negative mg/dL    Urobilinogen, UA Normal Normal    Leukocytes Negative Negative    Nitrite, UA Negative Negative       Imaging Results (last 7 days)     ** No results found for the last 168 hours. **          Assessment and Plan  Diagnoses and all orders for this visit:    Lesion of bladder  -     POC Urinalysis Dipstick, Automated  Recent visit I explained to him the finding on cystoscopy in 1 over the procedure transrectal resection bladder tumor.  " He appears fully recovered from his cholecystectomy and is fit for surgery.  They did want to go over the risks of the surgery which I discussed with him including perforation continued hematuria and required versus prolonged period of urethral catheterization.  I think he will at least need a catheter for 5-7 days.  We also talked briefly about how bladder cancer is a spectrum of disease from low-grade superficial disease all the way to high-grade disease is prone to metastasis requiring more aggressive therapy that could include systemic chemotherapy, cystectomy, or possibly radiation.    Cale Covington MD  08/10/17  2:36 PM      Cc: Shital Mendez MD

## 2017-08-14 ENCOUNTER — APPOINTMENT (OUTPATIENT)
Dept: PREADMISSION TESTING | Facility: HOSPITAL | Age: 77
End: 2017-08-14

## 2017-08-14 VITALS
OXYGEN SATURATION: 96 % | HEIGHT: 66 IN | HEART RATE: 78 BPM | SYSTOLIC BLOOD PRESSURE: 132 MMHG | WEIGHT: 144 LBS | RESPIRATION RATE: 16 BRPM | BODY MASS INDEX: 23.14 KG/M2 | DIASTOLIC BLOOD PRESSURE: 81 MMHG

## 2017-08-14 DIAGNOSIS — N32.9 LESION OF BLADDER: ICD-10-CM

## 2017-08-14 LAB
ANION GAP SERPL CALCULATED.3IONS-SCNC: 12 MMOL/L (ref 4–13)
BILIRUB UR QL STRIP: NEGATIVE
BUN BLD-MCNC: 14 MG/DL (ref 5–21)
BUN/CREAT SERPL: 20 (ref 7–25)
CALCIUM SPEC-SCNC: 9.5 MG/DL (ref 8.4–10.4)
CHLORIDE SERPL-SCNC: 103 MMOL/L (ref 98–110)
CLARITY UR: CLEAR
CO2 SERPL-SCNC: 29 MMOL/L (ref 24–31)
COLOR UR: ABNORMAL
CREAT BLD-MCNC: 0.7 MG/DL (ref 0.5–1.4)
DEPRECATED RDW RBC AUTO: 46.5 FL (ref 40–54)
ERYTHROCYTE [DISTWIDTH] IN BLOOD BY AUTOMATED COUNT: 13 % (ref 12–15)
GFR SERPL CREATININE-BSD FRML MDRD: 110 ML/MIN/1.73
GLUCOSE BLD-MCNC: 92 MG/DL (ref 70–100)
GLUCOSE UR STRIP-MCNC: NEGATIVE MG/DL
HCT VFR BLD AUTO: 45.6 % (ref 40–52)
HGB BLD-MCNC: 15.1 G/DL (ref 14–18)
HGB UR QL STRIP.AUTO: NEGATIVE
KETONES UR QL STRIP: ABNORMAL
LEUKOCYTE ESTERASE UR QL STRIP.AUTO: NEGATIVE
MCH RBC QN AUTO: 32.3 PG (ref 28–32)
MCHC RBC AUTO-ENTMCNC: 33.1 G/DL (ref 33–36)
MCV RBC AUTO: 97.6 FL (ref 82–95)
NITRITE UR QL STRIP: NEGATIVE
PH UR STRIP.AUTO: <=5 [PH] (ref 5–8)
PLATELET # BLD AUTO: 175 10*3/MM3 (ref 130–400)
PMV BLD AUTO: 11.1 FL (ref 6–12)
POTASSIUM BLD-SCNC: 3.9 MMOL/L (ref 3.5–5.3)
PROT UR QL STRIP: NEGATIVE
RBC # BLD AUTO: 4.67 10*6/MM3 (ref 4.8–5.9)
SODIUM BLD-SCNC: 144 MMOL/L (ref 135–145)
SP GR UR STRIP: >1.03 (ref 1–1.03)
UROBILINOGEN UR QL STRIP: ABNORMAL
WBC NRBC COR # BLD: 11.14 10*3/MM3 (ref 4.8–10.8)

## 2017-08-14 PROCEDURE — 81003 URINALYSIS AUTO W/O SCOPE: CPT | Performed by: UROLOGY

## 2017-08-14 PROCEDURE — 36415 COLL VENOUS BLD VENIPUNCTURE: CPT

## 2017-08-14 PROCEDURE — 85027 COMPLETE CBC AUTOMATED: CPT | Performed by: UROLOGY

## 2017-08-14 PROCEDURE — 80048 BASIC METABOLIC PNL TOTAL CA: CPT | Performed by: UROLOGY

## 2017-08-14 NOTE — DISCHARGE INSTRUCTIONS
DAY OF SURGERY INSTRUCTIONS        YOUR SURGEON: DR WELLS    PROCEDURE: CYSTOSCOPY, TRANSURETHRAL RESECTION OF BLADDER TUMOR AND POSSIBLE RETROGRADE URETEROPYELOGRAMS    DATE OF SURGERY: AUGUST 22, 2017    ARRIVAL TIME: AS DIRECTED BY OFFICE    DAY OF SURGERY TAKE ONLY THESE MEDICATIONS: NONE            BEFORE YOU COME TO THE HOSPITAL  (Pre-op instructions)  • Do not eat, drink, smoke or chew gum after midnight the night before surgery.  This also includes no mints.  • Morning of surgery take only the medicines you have been instructed with a sip of water unless otherwise instructed  by your physician.  • Do not shave, wear makeup or dark nail polish.  • Remove all jewelry including rings.  • Leave anything you consider valuable at home.  • Leave your suitcase in the car until after your surgery.  • Bring the following with you if applicable:  o Picture ID and insurance, Medicare or Medicaid cards  o Co-pay/deductible required by insurance (cash, check, credit card)  o Copy of advance directive, living will or power-of- documents if not brought to PAT  o CPAP or BIPAP mask and tubing  o Relaxation aids (MP3 player, book, magazine)  • On the day of surgery check in at registration located at the main entrance of the hospital.       Outpatient Surgery Guidelines, Adult  Outpatient procedures are those for which the person having the procedure is allowed to go home the same day as the procedure. Various procedures are done on an outpatient basis. You should follow some general guidelines if you will be having an outpatient procedure.  LET YOUR HEALTH CARE PROVIDER KNOW ABOUT:  · Any allergies you have.  · All medicines you are taking, including vitamins, herbs, eye drops, creams, and over-the-counter medicines.  · Previous problems you or members of your family have had with the use of anesthetics.  · Any blood disorders you have.  · Previous surgeries you have had.  · Medical conditions you have.  RISKS AND  COMPLICATIONS  Your health care provider will discuss possible risks and complications with you before surgery. Common risks and complications include:    · Problems due to the use of anesthetics.  · Blood loss and replacement (does not apply to minor surgical procedures).  · Temporary increase in pain due to surgery.  · Uncorrected pain or problems that the surgery was meant to correct.  · Infection.  · New damage.  BEFORE THE PROCEDURE  · Ask your health care provider about changing or stopping your regular medicines. You may need to stop taking certain medicines in the days or weeks before the procedure.  · Stop smoking at least 2 weeks before surgery. This lowers your risk for complications during and after surgery. Ask your health care provider for help with this if needed.  · Eat your usual meals and a light supper the day before surgery. Continue fluid intake. Do not drink alcohol.  · Do not eat or drink after midnight the night before your surgery.   · Arrange for someone to take you home and to stay with you for 24 hours after the procedure. Medicine given for your procedure may affect your ability to drive or to care for yourself.  · Call your health care provider's office if you develop an illness or problem that may prevent you from safely having your procedure.  AFTER THE PROCEDURE  After surgery, you will be taken to a recovery area, where your progress will be monitored. If there are no complications, you will be allowed to go home when you are awake, stable, and taking fluids well. You may have numbness around the surgical site. Healing will take some time. You will have tenderness at the surgical site and may have some swelling and bruising. You may also have some nausea.  HOME CARE INSTRUCTIONS  · Do not drive for 24 hours, or as directed by your health care provider. Do not drive while taking prescription pain medicines.  · Do not drink alcohol for 24 hours.  · Do not make important decisions or  sign legal documents for 24 hours.  · You may resume a normal diet and activities as directed.  · Do not lift anything heavier than 10 pounds (4.5 kg) or play contact sports until your health care provider says it is okay.  · Change your bandages (dressings) as directed.  · Only take over-the-counter or prescription medicines as directed by your health care provider.  · Follow up with your health care provider as directed.  SEEK MEDICAL CARE IF:  · You have increased bleeding (more than a small spot) from the surgical site.  · You have redness, swelling, or increasing pain in the wound.  · You see pus coming from the wound.  · You have a fever.  · You notice a bad smell coming from the wound or dressing.  · You feel lightheaded or faint.  · You develop a rash.  · You have trouble breathing.  · You develop allergies.  MAKE SURE YOU:  · Understand these instructions.  · Will watch your condition.  · Will get help right away if you are not doing well or get worse.     This information is not intended to replace advice given to you by your health care provider. Make sure you discuss any questions you have with your health care provider.     Document Released: 09/12/2002 Document Revised: 05/03/2016 Document Reviewed: 05/22/2014  [employer] Interactive Patient Education ©2016 [employer]       Fall Prevention in Hospitals, Adult  As a hospital patient, your condition and the treatments you receive can increase your risk for falls. Some additional risk factors for falls in a hospital include:  · Being in an unfamiliar environment.  · Being on bed rest.  · Your surgery.  · Taking certain medicines.  · Your tubing requirements, such as intravenous (IV) therapy or catheters.  It is important that you learn how to decrease fall risks while at the hospital. Below are important tips that can help prevent falls.  SAFETY TIPS FOR PREVENTING FALLS  Talk about your risk of falling.  · Ask your health care provider why you are at  risk for falling. Is it your medicine, illness, tubing placement, or something else?  · Make a plan with your health care provider to keep you safe from falls.  · Ask your health care provider or pharmacist about side effects of your medicines. Some medicines can make you dizzy or affect your coordination.  Ask for help.  · Ask for help before getting out of bed. You may need to press your call button.  · Ask for assistance in getting safely to the toilet.  · Ask for a walker or cane to be put at your bedside. Ask that most of the side rails on your bed be placed up before your health care provider leaves the room.  · Ask family or friends to sit with you.  · Ask for things that are out of your reach, such as your glasses, hearing aids, telephone, bedside table, or call button.  Follow these tips to avoid falling:  · Stay lying or seated, rather than standing, while waiting for help.  · Wear rubber-soled slippers or shoes whenever you walk in the hospital.  · Avoid quick, sudden movements.  ¨ Change positions slowly.  ¨ Sit on the side of your bed before standing.  ¨ Stand up slowly and wait before you start to walk.  · Let your health care provider know if there is a spill on the floor.  · Pay careful attention to the medical equipment, electrical cords, and tubes around you.  · When you need help, use your call button by your bed or in the bathroom. Wait for one of your health care providers to help you.  · If you feel dizzy or unsure of your footing, return to bed and wait for assistance.  · Avoid being distracted by the TV, telephone, or another person in your room.  · Do not lean or support yourself on rolling objects, such as IV poles or bedside tables.     This information is not intended to replace advice given to you by your health care provider. Make sure you discuss any questions you have with your health care provider.     Document Released: 12/15/2001 Document Revised: 01/08/2016 Document Reviewed:  08/25/2013  [employer] Interactive Patient Education ©2016 [employer]       Surgical Site Infections FAQs  What is a Surgical Site Infection (SSI)?  A surgical site infection is an infection that occurs after surgery in the part of the body where the surgery took place. Most patients who have surgery do not develop an infection. However, infections develop in about 1 to 3 out of every 100 patients who have surgery.  Some of the common symptoms of a surgical site infection are:  · Redness and pain around the area where you had surgery  · Drainage of cloudy fluid from your surgical wound  · Fever  Can SSIs be treated?  Yes. Most surgical site infections can be treated with antibiotics. The antibiotic given to you depends on the bacteria (germs) causing the infection. Sometimes patients with SSIs also need another surgery to treat the infection.  What are some of the things that hospitals are doing to prevent SSIs?  To prevent SSIs, doctors, nurses, and other healthcare providers:  · Clean their hands and arms up to their elbows with an antiseptic agent just before the surgery.  · Clean their hands with soap and water or an alcohol-based hand rub before and after caring for each patient.  · May remove some of your hair immediately before your surgery using electric clippers if the hair is in the same area where the procedure will occur. They should not shave you with a razor.  · Wear special hair covers, masks, gowns, and gloves during surgery to keep the surgery area clean.  · Give you antibiotics before your surgery starts. In most cases, you should get antibiotics within 60 minutes before the surgery starts and the antibiotics should be stopped within 24 hours after surgery.  · Clean the skin at the site of your surgery with a special soap that kills germs.  What can I do to help prevent SSIs?  Before your surgery:  · Tell your doctor about other medical problems you may have. Health problems such as allergies,  diabetes, and obesity could affect your surgery and your treatment.  · Quit smoking. Patients who smoke get more infections. Talk to your doctor about how you can quit before your surgery.  · Do not shave near where you will have surgery. Shaving with a razor can irritate your skin and make it easier to develop an infection.  At the time of your surgery:  · Speak up if someone tries to shave you with a razor before surgery. Ask why you need to be shaved and talk with your surgeon if you have any concerns.  · Ask if you will get antibiotics before surgery.  After your surgery:  · Make sure that your healthcare providers clean their hands before examining you, either with soap and water or an alcohol-based hand rub.  · If you do not see your providers clean their hands, please ask them to do so.  · Family and friends who visit you should not touch the surgical wound or dressings.  · Family and friends should clean their hands with soap and water or an alcohol-based hand rub before and after visiting you. If you do not see them clean their hands, ask them to clean their hands.  What do I need to do when I go home from the hospital?  · Before you go home, your doctor or nurse should explain everything you need to know about taking care of your wound. Make sure you understand how to care for your wound before you leave the hospital.  · Always clean your hands before and after caring for your wound.  · Before you go home, make sure you know who to contact if you have questions or problems after you get home.  · If you have any symptoms of an infection, such as redness and pain at the surgery site, drainage, or fever, call your doctor immediately.  If you have additional questions, please ask your doctor or nurse.  Developed and co-sponsored by The Society for Healthcare Epidemiology of [place] (SHEA); Infectious Diseases Society of [place] (IDSA); American Hospital Association; Association for Professionals in Infection  Control and Epidemiology (APIC); Centers for Disease Control and Prevention (CDC); and The Joint Commission.     This information is not intended to replace advice given to you by your health care provider. Make sure you discuss any questions you have with your health care provider.     Document Released: 12/23/2014 Document Revised: 01/08/2016 Document Reviewed: 03/02/2016  [employer] Interactive Patient Education ©2016 [employer]     PATIENT/FAMILY/RESPONSIBLE PARTY VERBALIZES UNDERSTANDING OF ABOVE EDUCATION.  COPY OF PAIN SCALE GIVEN AND REVIEWED WITH VERBALIZED UNDERSTANDING.

## 2017-08-22 ENCOUNTER — TELEPHONE (OUTPATIENT)
Dept: UROLOGY | Facility: CLINIC | Age: 77
End: 2017-08-22

## 2017-08-22 ENCOUNTER — ANESTHESIA EVENT (OUTPATIENT)
Dept: PERIOP | Facility: HOSPITAL | Age: 77
End: 2017-08-22

## 2017-08-22 ENCOUNTER — APPOINTMENT (OUTPATIENT)
Dept: GENERAL RADIOLOGY | Facility: HOSPITAL | Age: 77
End: 2017-08-22

## 2017-08-22 ENCOUNTER — HOSPITAL ENCOUNTER (OUTPATIENT)
Facility: HOSPITAL | Age: 77
Setting detail: HOSPITAL OUTPATIENT SURGERY
Discharge: HOME | End: 2017-08-22
Attending: UROLOGY | Admitting: UROLOGY

## 2017-08-22 ENCOUNTER — ANESTHESIA (OUTPATIENT)
Dept: PERIOP | Facility: HOSPITAL | Age: 77
End: 2017-08-22

## 2017-08-22 VITALS
DIASTOLIC BLOOD PRESSURE: 78 MMHG | TEMPERATURE: 97.9 F | SYSTOLIC BLOOD PRESSURE: 123 MMHG | HEART RATE: 60 BPM | RESPIRATION RATE: 16 BRPM | OXYGEN SATURATION: 95 %

## 2017-08-22 DIAGNOSIS — N32.9 LESION OF BLADDER: ICD-10-CM

## 2017-08-22 PROCEDURE — 25010000002 DEXAMETHASONE PER 1 MG: Performed by: NURSE ANESTHETIST, CERTIFIED REGISTERED

## 2017-08-22 PROCEDURE — 0 IOPAMIDOL 61 % SOLUTION: Performed by: UROLOGY

## 2017-08-22 PROCEDURE — 52235 CYSTOSCOPY AND TREATMENT: CPT | Performed by: UROLOGY

## 2017-08-22 PROCEDURE — C1769 GUIDE WIRE: HCPCS | Performed by: UROLOGY

## 2017-08-22 PROCEDURE — 74420 UROGRAPHY RTRGR +-KUB: CPT | Performed by: UROLOGY

## 2017-08-22 PROCEDURE — 25010000002 HYDROMORPHONE PER 4 MG: Performed by: ANESTHESIOLOGY

## 2017-08-22 PROCEDURE — C1758 CATHETER, URETERAL: HCPCS | Performed by: UROLOGY

## 2017-08-22 PROCEDURE — 25010000002 PROPOFOL 10 MG/ML EMULSION: Performed by: NURSE ANESTHETIST, CERTIFIED REGISTERED

## 2017-08-22 PROCEDURE — 25010000002 NEOSTIGMINE PER 0.5 MG: Performed by: NURSE ANESTHETIST, CERTIFIED REGISTERED

## 2017-08-22 PROCEDURE — 52351 CYSTOURETERO & OR PYELOSCOPE: CPT | Performed by: UROLOGY

## 2017-08-22 PROCEDURE — 74420 UROGRAPHY RTRGR +-KUB: CPT

## 2017-08-22 PROCEDURE — 25010000002 FENTANYL CITRATE (PF) 100 MCG/2ML SOLUTION: Performed by: NURSE ANESTHETIST, CERTIFIED REGISTERED

## 2017-08-22 PROCEDURE — 25010000002 ONDANSETRON PER 1 MG: Performed by: NURSE ANESTHETIST, CERTIFIED REGISTERED

## 2017-08-22 PROCEDURE — 88307 TISSUE EXAM BY PATHOLOGIST: CPT | Performed by: UROLOGY

## 2017-08-22 PROCEDURE — 25010000003 CEFAZOLIN PER 500 MG: Performed by: UROLOGY

## 2017-08-22 RX ORDER — ONDANSETRON 2 MG/ML
4 INJECTION INTRAMUSCULAR; INTRAVENOUS AS NEEDED
Status: DISCONTINUED | OUTPATIENT
Start: 2017-08-22 | End: 2017-08-22 | Stop reason: HOSPADM

## 2017-08-22 RX ORDER — SODIUM CHLORIDE, SODIUM LACTATE, POTASSIUM CHLORIDE, CALCIUM CHLORIDE 600; 310; 30; 20 MG/100ML; MG/100ML; MG/100ML; MG/100ML
100 INJECTION, SOLUTION INTRAVENOUS CONTINUOUS
Status: DISCONTINUED | OUTPATIENT
Start: 2017-08-22 | End: 2017-08-22 | Stop reason: HOSPADM

## 2017-08-22 RX ORDER — ROCURONIUM BROMIDE 10 MG/ML
INJECTION, SOLUTION INTRAVENOUS AS NEEDED
Status: DISCONTINUED | OUTPATIENT
Start: 2017-08-22 | End: 2017-08-22 | Stop reason: SURG

## 2017-08-22 RX ORDER — SODIUM CHLORIDE, SODIUM LACTATE, POTASSIUM CHLORIDE, CALCIUM CHLORIDE 600; 310; 30; 20 MG/100ML; MG/100ML; MG/100ML; MG/100ML
1000 INJECTION, SOLUTION INTRAVENOUS CONTINUOUS PRN
Status: DISCONTINUED | OUTPATIENT
Start: 2017-08-22 | End: 2017-08-22 | Stop reason: HOSPADM

## 2017-08-22 RX ORDER — ONDANSETRON 2 MG/ML
INJECTION INTRAMUSCULAR; INTRAVENOUS AS NEEDED
Status: DISCONTINUED | OUTPATIENT
Start: 2017-08-22 | End: 2017-08-22 | Stop reason: SURG

## 2017-08-22 RX ORDER — DEXAMETHASONE SODIUM PHOSPHATE 4 MG/ML
INJECTION, SOLUTION INTRA-ARTICULAR; INTRALESIONAL; INTRAMUSCULAR; INTRAVENOUS; SOFT TISSUE AS NEEDED
Status: DISCONTINUED | OUTPATIENT
Start: 2017-08-22 | End: 2017-08-22 | Stop reason: SURG

## 2017-08-22 RX ORDER — PROPOFOL 10 MG/ML
VIAL (ML) INTRAVENOUS AS NEEDED
Status: DISCONTINUED | OUTPATIENT
Start: 2017-08-22 | End: 2017-08-22 | Stop reason: SURG

## 2017-08-22 RX ORDER — SORBITOL 30 G/1000ML
IRRIGANT IRRIGATION AS NEEDED
Status: DISCONTINUED | OUTPATIENT
Start: 2017-08-22 | End: 2017-08-22 | Stop reason: HOSPADM

## 2017-08-22 RX ORDER — FENTANYL CITRATE 50 UG/ML
INJECTION, SOLUTION INTRAMUSCULAR; INTRAVENOUS AS NEEDED
Status: DISCONTINUED | OUTPATIENT
Start: 2017-08-22 | End: 2017-08-22 | Stop reason: SURG

## 2017-08-22 RX ORDER — PHENYLEPHRINE HCL IN 0.9% NACL 0.8MG/10ML
SYRINGE (ML) INTRAVENOUS AS NEEDED
Status: DISCONTINUED | OUTPATIENT
Start: 2017-08-22 | End: 2017-08-22 | Stop reason: SURG

## 2017-08-22 RX ORDER — HYDROCODONE BITARTRATE AND ACETAMINOPHEN 5; 325 MG/1; MG/1
1 TABLET ORAL ONCE AS NEEDED
Status: COMPLETED | OUTPATIENT
Start: 2017-08-22 | End: 2017-08-22

## 2017-08-22 RX ORDER — FLUMAZENIL 0.1 MG/ML
0.2 INJECTION INTRAVENOUS AS NEEDED
Status: DISCONTINUED | OUTPATIENT
Start: 2017-08-22 | End: 2017-08-22 | Stop reason: HOSPADM

## 2017-08-22 RX ORDER — GLYCOPYRROLATE 0.2 MG/ML
INJECTION INTRAMUSCULAR; INTRAVENOUS AS NEEDED
Status: DISCONTINUED | OUTPATIENT
Start: 2017-08-22 | End: 2017-08-22 | Stop reason: SURG

## 2017-08-22 RX ORDER — MAGNESIUM HYDROXIDE 1200 MG/15ML
LIQUID ORAL AS NEEDED
Status: DISCONTINUED | OUTPATIENT
Start: 2017-08-22 | End: 2017-08-22 | Stop reason: HOSPADM

## 2017-08-22 RX ORDER — SODIUM CHLORIDE 0.9 % (FLUSH) 0.9 %
1-10 SYRINGE (ML) INJECTION AS NEEDED
Status: DISCONTINUED | OUTPATIENT
Start: 2017-08-22 | End: 2017-08-22 | Stop reason: HOSPADM

## 2017-08-22 RX ORDER — METOCLOPRAMIDE HYDROCHLORIDE 5 MG/ML
5 INJECTION INTRAMUSCULAR; INTRAVENOUS
Status: DISCONTINUED | OUTPATIENT
Start: 2017-08-22 | End: 2017-08-22 | Stop reason: HOSPADM

## 2017-08-22 RX ORDER — HYDRALAZINE HYDROCHLORIDE 20 MG/ML
5 INJECTION INTRAMUSCULAR; INTRAVENOUS
Status: DISCONTINUED | OUTPATIENT
Start: 2017-08-22 | End: 2017-08-22 | Stop reason: HOSPADM

## 2017-08-22 RX ORDER — LABETALOL HYDROCHLORIDE 5 MG/ML
5 INJECTION, SOLUTION INTRAVENOUS
Status: DISCONTINUED | OUTPATIENT
Start: 2017-08-22 | End: 2017-08-22 | Stop reason: HOSPADM

## 2017-08-22 RX ORDER — NALOXONE HCL 0.4 MG/ML
0.04 VIAL (ML) INJECTION AS NEEDED
Status: DISCONTINUED | OUTPATIENT
Start: 2017-08-22 | End: 2017-08-22 | Stop reason: HOSPADM

## 2017-08-22 RX ORDER — SODIUM CHLORIDE 0.9 % (FLUSH) 0.9 %
3 SYRINGE (ML) INJECTION AS NEEDED
Status: DISCONTINUED | OUTPATIENT
Start: 2017-08-22 | End: 2017-08-22 | Stop reason: HOSPADM

## 2017-08-22 RX ORDER — HYDROCODONE BITARTRATE AND ACETAMINOPHEN 5; 325 MG/1; MG/1
1 TABLET ORAL EVERY 4 HOURS PRN
Qty: 12 TABLET | Refills: 0 | Status: ON HOLD | OUTPATIENT
Start: 2017-08-22 | End: 2017-09-19

## 2017-08-22 RX ORDER — MORPHINE SULFATE 2 MG/ML
2 INJECTION, SOLUTION INTRAMUSCULAR; INTRAVENOUS AS NEEDED
Status: DISCONTINUED | OUTPATIENT
Start: 2017-08-22 | End: 2017-08-22 | Stop reason: HOSPADM

## 2017-08-22 RX ORDER — IPRATROPIUM BROMIDE AND ALBUTEROL SULFATE 2.5; .5 MG/3ML; MG/3ML
3 SOLUTION RESPIRATORY (INHALATION) ONCE AS NEEDED
Status: DISCONTINUED | OUTPATIENT
Start: 2017-08-22 | End: 2017-08-22 | Stop reason: HOSPADM

## 2017-08-22 RX ORDER — LIDOCAINE HYDROCHLORIDE 40 MG/ML
SOLUTION TOPICAL AS NEEDED
Status: DISCONTINUED | OUTPATIENT
Start: 2017-08-22 | End: 2017-08-22 | Stop reason: SURG

## 2017-08-22 RX ORDER — LIDOCAINE HYDROCHLORIDE 10 MG/ML
0.5 INJECTION, SOLUTION INFILTRATION; PERINEURAL ONCE AS NEEDED
Status: DISCONTINUED | OUTPATIENT
Start: 2017-08-22 | End: 2017-08-22 | Stop reason: SDUPTHER

## 2017-08-22 RX ORDER — MEPERIDINE HYDROCHLORIDE 25 MG/ML
12.5 INJECTION INTRAMUSCULAR; INTRAVENOUS; SUBCUTANEOUS
Status: DISCONTINUED | OUTPATIENT
Start: 2017-08-22 | End: 2017-08-22 | Stop reason: HOSPADM

## 2017-08-22 RX ORDER — LEVOFLOXACIN 500 MG/1
500 TABLET, FILM COATED ORAL ONCE
Qty: 1 TABLET | Refills: 0 | Status: SHIPPED | OUTPATIENT
Start: 2017-08-22 | End: 2017-08-22

## 2017-08-22 RX ADMIN — ONDANSETRON HYDROCHLORIDE 4 MG: 2 SOLUTION INTRAMUSCULAR; INTRAVENOUS at 07:56

## 2017-08-22 RX ADMIN — HYDROMORPHONE HYDROCHLORIDE 1 MG: 1 INJECTION, SOLUTION INTRAMUSCULAR; INTRAVENOUS; SUBCUTANEOUS at 09:04

## 2017-08-22 RX ADMIN — DEXAMETHASONE SODIUM PHOSPHATE 4 MG: 4 INJECTION, SOLUTION INTRAMUSCULAR; INTRAVENOUS at 07:56

## 2017-08-22 RX ADMIN — Medication 4 MG: at 08:26

## 2017-08-22 RX ADMIN — FENTANYL CITRATE 100 MCG: 50 INJECTION, SOLUTION INTRAMUSCULAR; INTRAVENOUS at 07:50

## 2017-08-22 RX ADMIN — SODIUM CHLORIDE, POTASSIUM CHLORIDE, SODIUM LACTATE AND CALCIUM CHLORIDE 1000 ML: 600; 310; 30; 20 INJECTION, SOLUTION INTRAVENOUS at 05:45

## 2017-08-22 RX ADMIN — ROCURONIUM BROMIDE 10 MG: 10 INJECTION INTRAVENOUS at 08:15

## 2017-08-22 RX ADMIN — LIDOCAINE HYDROCHLORIDE 1 EACH: 40 SOLUTION TOPICAL at 07:52

## 2017-08-22 RX ADMIN — GLYCOPYRROLATE 0.6 MG: 0.2 INJECTION, SOLUTION INTRAMUSCULAR; INTRAVENOUS at 08:26

## 2017-08-22 RX ADMIN — LIDOCAINE HYDROCHLORIDE 0.5 ML: 10 INJECTION, SOLUTION EPIDURAL; INFILTRATION; INTRACAUDAL; PERINEURAL at 05:45

## 2017-08-22 RX ADMIN — Medication 40 MCG: at 08:04

## 2017-08-22 RX ADMIN — CEFAZOLIN SODIUM 2 G: 2 SOLUTION INTRAVENOUS at 07:56

## 2017-08-22 RX ADMIN — PROPOFOL 130 MG: 10 INJECTION, EMULSION INTRAVENOUS at 07:50

## 2017-08-22 RX ADMIN — HYDROCODONE BITARTRATE AND ACETAMINOPHEN 1 TABLET: 5; 325 TABLET ORAL at 10:18

## 2017-08-22 RX ADMIN — HYDROMORPHONE HYDROCHLORIDE 1 MG: 1 INJECTION, SOLUTION INTRAMUSCULAR; INTRAVENOUS; SUBCUTANEOUS at 09:10

## 2017-08-22 RX ADMIN — ROCURONIUM BROMIDE 35 MG: 10 INJECTION INTRAVENOUS at 07:50

## 2017-08-22 NOTE — PLAN OF CARE
Problem: Perioperative Period (Adult)  Goal: Signs and Symptoms of Listed Potential Problems Will be Absent or Manageable (Perioperative Period)  Outcome: Ongoing (interventions implemented as appropriate)    08/22/17 0649   Perioperative Period   Problems Assessed (Perioperative Period) pain;hypothermia;hypoxia/hypoxemia;situational response;infection   Problems Present (Perioperative Period) situational response

## 2017-08-22 NOTE — ANESTHESIA POSTPROCEDURE EVALUATION
Patient: Ezekiel Velez    Procedure Summary     Date Anesthesia Start Anesthesia Stop Room / Location    08/22/17 0745 0838  [facility] OR 01 / [facility]       Procedure Diagnosis Surgeon Provider    CYSTOSCOPY TRANSURETHRAL RESECTION OF BLADDER TUMOR bilateral retrogrades (Bilateral Bladder) Lesion of bladder  (Lesion of bladder [N32.9]) MD Trino Pereira, HOLLY          Anesthesia Type: general  Last vitals  BP        Temp        Pulse       Resp        SpO2          Post Anesthesia Care and Evaluation      Comments: Patient discharged from PACU prior to anesthesia evaluation based on Geni Score.  For details, see RN note.     /78  Pulse 60  Temp 97.9 °F (36.6 °C) (Temporal Artery )   Resp 16  SpO2 95%

## 2017-08-22 NOTE — ANESTHESIA PREPROCEDURE EVALUATION
Anesthesia Evaluation     Patient summary reviewed and Nursing notes reviewed   no history of anesthetic complications:  NPO Solid Status: > 8 hours  NPO Liquid Status: > 8 hours     Airway   Mallampati: II  TM distance: >3 FB  Neck ROM: full  no difficulty expected  Dental    (+) edentulous, upper dentures and lower dentures    Pulmonary - normal exam    breath sounds clear to auscultation  (+) a smoker (4-5 cigarettes daily) Current, COPD,   (-) asthma, recent URI, sleep apnea  Cardiovascular - normal exam  Exercise tolerance: good (4-7 METS)    ECG reviewed  Rhythm: regular  Rate: normal    (+) hyperlipidemia  (-) hypertension, past MI, angina, murmur, cardiac stents      Neuro/Psych  (-) seizures, TIA, CVA  GI/Hepatic/Renal/Endo    (-) GERD, liver disease, no renal disease, diabetes, hypothyroidism, hyperthyroidism    Musculoskeletal     (+) back pain,   (-) neck pain, neck stiffness      ROS comment: Chronic pain   Abdominal  - normal exam   Substance History      OB/GYN          Other   (+) arthritis                                     Anesthesia Plan    ASA 3     general     intravenous induction   Anesthetic plan and risks discussed with patient.  Use of blood products discussed with patient  Consented to blood products.

## 2017-08-22 NOTE — PLAN OF CARE
Problem: Patient Care Overview (Adult)  Goal: Plan of Care Review  Outcome: Ongoing (interventions implemented as appropriate)    08/22/17 0855   Coping/Psychosocial Response Interventions   Plan Of Care Reviewed With patient   Patient Care Overview   Progress improving   Outcome Evaluation   Outcome Summary/Follow up Plan Alert, calm. PACU dc criteria met.         Problem: Perioperative Period (Adult)  Goal: Signs and Symptoms of Listed Potential Problems Will be Absent or Manageable (Perioperative Period)  Outcome: Ongoing (interventions implemented as appropriate)

## 2017-08-22 NOTE — H&P (VIEW-ONLY)
Mr. Velez is 77 y.o. male    CHIEF COMPLAINT: I am here today to discuss my bladder lesion.    HPI  Patient with known bladder tumor.  It was identified a month ago.  Patient was seen before he was scheduled for a lap scopic cholecystectomy.  He returns today in the context of being postoperative.  Once arrange for drainage resection bladder tumor.  He denies gross hematuria dysuria or urgency.    The following portions of the patient's history were reviewed and updated as appropriate: allergies, current medications, past family history, past medical history, past social history, past surgical history and problem list.    Review of Systems   Constitutional: Negative for chills and fever.   Gastrointestinal: Negative for abdominal pain, anal bleeding and blood in stool.   Genitourinary: Negative for difficulty urinating, dysuria, flank pain, frequency, hematuria and urgency.         Current Outpatient Prescriptions:   •  HYDROcodone-acetaminophen (NORCO) 5-325 MG per tablet, Take 1-2 tablets by mouth Every 4 (Four) Hours As Needed (Pain)., Disp: 30 tablet, Rfl: 0  •  simvastatin (ZOCOR) 20 MG tablet, Take 20 mg by mouth Every Night., Disp: , Rfl:   •  aspirin 81 MG tablet, Take  by mouth., Disp: , Rfl:   •  HYDROcodone-acetaminophen (NORCO)  MG per tablet, Take  by mouth., Disp: , Rfl:     Past Medical History:   Diagnosis Date   • Arthritis    • Colon polyp    • COPD (chronic obstructive pulmonary disease)    • Hyperlipidemia        Past Surgical History:   Procedure Laterality Date   • CHOLECYSTECTOMY WITH INTRAOPERATIVE CHOLANGIOGRAM N/A 7/25/2017    Procedure: CHOLECYSTECTOMY LAPAROSCOPIC AND REMOVAL CYST ON CHEST;  Surgeon: Ina Issa MD;  Location: [facility];  Service:    • COLONOSCOPY  07/24/2012   • HEMORRHOIDECTOMY     • HERNIA REPAIR         Social History     Social History   • Marital status:      Spouse name: N/A   • Number of children: N/A   • Years of education: N/A     Social  "History Main Topics   • Smoking status: Current Every Day Smoker     Packs/day: 0.25     Types: Cigarettes   • Smokeless tobacco: Never Used      Comment: 1/4 pk/day    • Alcohol use No   • Drug use: No   • Sexual activity: Defer     Other Topics Concern   • None     Social History Narrative       Family History   Problem Relation Age of Onset   • No Known Problems Father    • No Known Problems Mother    • Colon cancer Neg Hx    • Colon polyps Neg Hx        Temp 98.4 °F (36.9 °C)  Ht 66\" (167.6 cm)  Wt 144 lb (65.3 kg)  BMI 23.24 kg/m2    Physical Exam  Constitutional: The patient  is oriented to person, place, and time. They  appear well-developed and well-nourished. No distress.   Pulmonary/Chest: Effort normal.   Abdominal: Soft. The patient exhibits no distension and no mass. There is no tenderness. There is no rebound and no guarding. No hernia.   Neurological: Patient is alert and oriented to person, place, and time.   Skin: Skin is warm and dry. Not diaphoretic.   Psychiatric:  normal mood and affect.   Vitals reviewed.      Results for orders placed or performed in visit on 08/10/17   POC Urinalysis Dipstick, Automated   Result Value Ref Range    Color Yellow Yellow, Straw, Dark Yellow, Ann    Clarity, UA Clear Clear    Glucose, UA Negative Negative, 1000 mg/dL (3+) mg/dL    Bilirubin Negative Negative    Ketones, UA Negative Negative    Specific Gravity  1.020 1.005 - 1.030    Blood, UA Negative Negative    pH, Urine 5.5 5.0 - 8.0    Protein, POC Negative Negative mg/dL    Urobilinogen, UA Normal Normal    Leukocytes Negative Negative    Nitrite, UA Negative Negative       Imaging Results (last 7 days)     ** No results found for the last 168 hours. **          Assessment and Plan  Diagnoses and all orders for this visit:    Lesion of bladder  -     POC Urinalysis Dipstick, Automated  Recent visit I explained to him the finding on cystoscopy in 1 over the procedure transrectal resection bladder tumor.  " He appears fully recovered from his cholecystectomy and is fit for surgery.  They did want to go over the risks of the surgery which I discussed with him including perforation continued hematuria and required versus prolonged period of urethral catheterization.  I think he will at least need a catheter for 5-7 days.  We also talked briefly about how bladder cancer is a spectrum of disease from low-grade superficial disease all the way to high-grade disease is prone to metastasis requiring more aggressive therapy that could include systemic chemotherapy, cystectomy, or possibly radiation.    Cale Covington MD  08/10/17  2:36 PM      Cc: Shital Mendez MD

## 2017-08-22 NOTE — OP NOTE
Operative Summary    Ezekiel DIEGO Millay  Date of Procedure: 8/22/2017    Pre-op Diagnosis:   Lesion of bladder [N32.9]    Post-op Diagnosis:     Post-Op Diagnosis Codes:     * Lesion of bladder [N32.9]    Procedure/CPT® Codes:      Procedure(s):  CYSTOSCOPY TRANSURETHRAL RESECTION OF BLADDER TUMOR (3x2 cm) &  bilateral retrogrades    Surgeon(s):  Cale Covington MD    Anesthesia: General    Staff:   Circulator: Desmond Cage RN  Scrub Person: Amelie Rand; Hortencia Skelton    Indications for procedure:  This is a 77 year old Caucasianmale without a prior history of urothelial carcinoma of the bladder found to have a suspicious lesion on cystoscopy that is concerning enough to warrant further evaluation. The risks of bleeding (immediate or delayed), infection, perforation of urinary bladder requiring prolonged urethral catheterization or even immediate open repair(the latter could lead to progression of disease outside of bladder), ureteral obstruction with possible need for ureteral stent or percutaneous nephrostomy tube, incomplete resection or inadequate specimen for diagnosis, are described.     Findings:   #1.  2 x 3 cm papillary bladder tumor lateral and cephalad to the trigone and the posterior lateral aspect of the bladder.  #2.  Normal bilateral retrograde ureteropyelograms    Procedure details:  The patient is identified in the preoperative holding area.  The informed consent process had been completed In the office documented by my last progress note that included a discussion of the risks of this procedure, alternative management options, and the potential benefits of this procedure.  The patient voiced a good recollection of that discussion.  The patient voiced no additional questions.     The patient is taken to the cystoscopic suite and given effective general anesthesia. The patient is then placed in theBody position: dorsal lithotomy position with care being placed to appropriately pad the patient to  avoid excessive compression with the Zion stirrups, especially laterally to the leg on the peroneal nerve.  The patient is then prepped and draped in the usual sterile fashion.  At this point appropriate timeout protocol was observed.    A 22 Cymro cystoscope sheath with 30° lens is inserted into the bladder.  I also used a 70° lens to carry out a panendoscopic inspection of the urinary bladder.  Findings are as follows:  The urethra is without evidence of mass or obstruction. There is no evidence of stricture. ., The prostatic urethra reveals  ds prostate cystoscopic findings: Bilobar enlargement with  moderate obstructive appearance. and The bladder  detrussor shows  moderate trabeculation  without diverticuli. , the ureteral orifices are orthotopic and normal in appearance and shows a bladder tumor located in the right posterior lateral aspect of the bladder that is cephalad and lateral to the right ureteral orifice of the urinary bladder measurin approximately 3 x 2 cm.      Bilateral retrograde ureteropyelograms are then carried out with a cone tip ureteral using diluted contrast with spot fluoroscopic images taken. Findings are described below as interpreted by me using the spot images and live fluoroscopy.     The previously described bladder tumor(s) as described is then addressed.  I used the cold cup biopsy forcep to do 3 separate biopsies with the cold cup.  This was to aid in histologic diagnosis of grade of tumor.  I then switched over to 26 Cymro sec scope sheath with 30° lens and small cutting loop.  I resected this tumor into the muscle of the bladder.  Some fat was exposed.    At the conclusion of this I fulgurated this electrocautery staying away from the ureteral orifice.  I then placed a three-way Louie catheter for the patient to go home with.  .       Interpretation of right ureteropyelogram:  The right ureteral orifice is identified in the orthotopic position. It is cannulated with an 5  Telugu  cone tip ureteral using diluted contrast injected under fluoroscopic vision. The course and caliber of the ureter are normal from ureterovesical junction to ureteropelvic junction  and The pelvis and calyceal anatomy is normal without filling defect, hydronephrosis or extrinsic compression.      Interpretation of left ureteropyelogram:  The left ureteral orifice is identified in the orthotopic position. It is cannulated with an 5 Telugu  cone tip ureteral using diluted contrast injected under fluoroscopic vision. The course and caliber of the ureter are normal from ureterovesical junction to ureteropelvic junction  and The pelvis and calyceal anatomy is normal without filling defect, hydronephrosis or extrinsic compression.      Estimated Blood Loss: Less than 30 mL    Specimens:                  ID Type Source Tests Collected by Time Destination   A : cold cut resection of right lateral bladder tumor Tissue Urinary Bladder TISSUE EXAM Cale Covington MD 8/22/2017 0821    B : tumor resection Tissue Urinary Bladder TISSUE EXAM Cale Covington MD 8/22/2017 0822          Drains:   Urethral Catheter 08/22/17 0826 latex 20 5 5 (Active)   Daily Indications Selected surgeries ( tract, abdomen) 8/22/2017  8:34 AM   Securement secured to upper leg with adhesive device 8/22/2017  8:34 AM   Tolerance no signs/symptoms of discomfort 8/22/2017  8:34 AM           Complications: none    Plan: Continue catheter until the patient returns on Monday, August 28 to discuss pathology at which time the catheter will be removed.    Cale Covington MD     Date: 8/22/2017  Time: 8:55 AM

## 2017-08-22 NOTE — TELEPHONE ENCOUNTER
Spoke with patient and she informed me that she thought patient was suppose to go home with a leg and bed bag. I informed patients wife that he could use the leg bag all the time. She voiced understanding. I did offer patient a bed bag that she could come . She will call me tomorrow if she thinks he needs this. HC

## 2017-08-22 NOTE — PLAN OF CARE
Problem: Patient Care Overview (Adult)  Goal: Plan of Care Review  Outcome: Outcome(s) achieved Date Met:  08/22/17 08/22/17 8418   Coping/Psychosocial Response Interventions   Plan Of Care Reviewed With patient;spouse   Patient Care Overview   Progress improving   Outcome Evaluation   Outcome Summary/Follow up Plan Patient meets discharge criteria. Patient medicated for compliants of pain with good relief noted. Patient and spouse instructed on saxena catheter care. Patient and spouse verbalize understanding of discharge instructions.          Problem: Perioperative Period (Adult)  Goal: Signs and Symptoms of Listed Potential Problems Will be Absent or Manageable (Perioperative Period)  Outcome: Outcome(s) achieved Date Met:  08/22/17

## 2017-08-22 NOTE — ANESTHESIA PROCEDURE NOTES
Airway  Airway not difficult    General Information and Staff    Patient location during procedure: OR  CRNA: EDDIE ALMANZA    Indications and Patient Condition  Indications for airway management: airway protection    Preoxygenated: yes  Mask difficulty assessment: 2 - vent by mask + OA or adjuvant +/- NMBA    Final Airway Details  Final airway type: endotracheal airway      Successful airway: ETT  Cuffed: yes   Successful intubation technique: direct laryngoscopy  Facilitating devices/methods: intubating stylet  Endotracheal tube insertion site: oral  Blade: Aleksander  Blade size: #3  ETT size: 7.0 mm  Cormack-Lehane Classification: grade I - full view of glottis  Placement verified by: chest auscultation and capnometry   Cuff volume (mL): 6  Measured from: lips  ETT to lips (cm): 22  Number of attempts at approach: 1    Additional Comments  ATRAUMATIC INTUBATION

## 2017-08-22 NOTE — TELEPHONE ENCOUNTER
Patient's wife called.  Pt had been discharged from hospital today with one catheter.  She felt he was supposed to have 2 catheters.  Also she did not get any instructions of how to take care of it.      Pt's wife asked if she could get a call-back soon.

## 2017-08-22 NOTE — DISCHARGE INSTRUCTIONS

## 2017-08-23 ENCOUNTER — PROCEDURE VISIT (OUTPATIENT)
Dept: UROLOGY | Facility: CLINIC | Age: 77
End: 2017-08-23

## 2017-08-23 DIAGNOSIS — N32.9 LESION OF BLADDER: Primary | ICD-10-CM

## 2017-08-23 LAB
CYTO UR: NORMAL
LAB AP CASE REPORT: NORMAL
LAB AP SYNOPTIC CHECKLIST: NORMAL
Lab: NORMAL
PATH REPORT.FINAL DX SPEC: NORMAL
PATH REPORT.GROSS SPEC: NORMAL

## 2017-08-23 NOTE — PROGRESS NOTES
Patient was in office today complaining about his catheter. He was concerned about the way the bag was and informed me that the catheter was leaking around penis and at the bag. I replaced this catheter bag with another catheter bag and patient was please with this new bag. I also informed patient that he was leaking around his penis because of bladder spasms. I informed patient I will call in a medication that will help with this. Patient voiced understanding.  Patient will call me with any problems regarding the catheter.

## 2017-08-28 ENCOUNTER — OFFICE VISIT (OUTPATIENT)
Dept: UROLOGY | Facility: CLINIC | Age: 77
End: 2017-08-28

## 2017-08-28 VITALS — WEIGHT: 147 LBS | BODY MASS INDEX: 24.49 KG/M2 | HEIGHT: 65 IN | TEMPERATURE: 98.1 F

## 2017-08-28 DIAGNOSIS — F17.200 SMOKING: ICD-10-CM

## 2017-08-28 DIAGNOSIS — C67.2 MALIGNANT NEOPLASM OF LATERAL WALL OF URINARY BLADDER (HCC): Primary | ICD-10-CM

## 2017-08-28 PROCEDURE — 99214 OFFICE O/P EST MOD 30 MIN: CPT | Performed by: UROLOGY

## 2017-08-28 NOTE — PROGRESS NOTES
Mr. Velez is 77 y.o. male    CHIEF COMPLAINT: I am here to discuss my bladder tumor.     HPI  Bladder Cancer  The patient presents today with urothelial cancer of the bladder. This is a new diagnois diagnosis.. The patient was initially diagnosed 1 week(s) ago. Severity is best is explained as low grade superficial disease. Previous management includes TURBT. Symptoms include no hematuria, dysuria or flank pain.  Last upper tract imaging was retrograde ureteropyelogram done approximately  1  week(s) ago.     The following portions of the patient's history were reviewed and updated as appropriate: allergies, current medications, past family history, past medical history, past social history, past surgical history and problem list.    Review of Systems   Constitutional: Negative for chills and fever.   Gastrointestinal: Negative for abdominal pain, anal bleeding and blood in stool.   Genitourinary: Negative for flank pain and hematuria.         Current Outpatient Prescriptions:   •  aspirin 81 MG tablet, Take  by mouth., Disp: , Rfl:   •  HYDROcodone-acetaminophen (NORCO)  MG per tablet, Take  by mouth., Disp: , Rfl:   •  HYDROcodone-acetaminophen (NORCO) 5-325 MG per tablet, Take 1 tablet by mouth Every 4 (Four) Hours As Needed for Moderate Pain  for up to 12 doses., Disp: 12 tablet, Rfl: 0  •  simvastatin (ZOCOR) 20 MG tablet, Take 20 mg by mouth Every Night., Disp: , Rfl:     Past Medical History:   Diagnosis Date   • Arthritis    • Colon polyp    • COPD (chronic obstructive pulmonary disease)    • Hyperlipidemia        Past Surgical History:   Procedure Laterality Date   • CHOLECYSTECTOMY WITH INTRAOPERATIVE CHOLANGIOGRAM N/A 7/25/2017    Procedure: CHOLECYSTECTOMY LAPAROSCOPIC AND REMOVAL CYST ON CHEST;  Surgeon: Ina Issa MD;  Location: [facility];  Service:    • COLONOSCOPY  07/24/2012   • HEMORRHOIDECTOMY     • HERNIA REPAIR     • TRANSURETHRAL RESECTION OF BLADDER TUMOR Bilateral 8/22/2017     "Procedure: CYSTOSCOPY TRANSURETHRAL RESECTION OF BLADDER TUMOR bilateral retrogrades;  Surgeon: Cale Covington MD;  Location: [facility];  Service:        Social History     Social History   • Marital status:      Spouse name: N/A   • Number of children: N/A   • Years of education: N/A     Social History Main Topics   • Smoking status: Current Every Day Smoker     Packs/day: 0.25     Types: Cigarettes   • Smokeless tobacco: Never Used      Comment: 1/4 pk/day    • Alcohol use No   • Drug use: No   • Sexual activity: Defer     Other Topics Concern   • None     Social History Narrative       Family History   Problem Relation Age of Onset   • No Known Problems Father    • No Known Problems Mother    • Colon cancer Neg Hx    • Colon polyps Neg Hx        Temp 98.1 °F (36.7 °C)  Ht 65\" (165.1 cm)  Wt 147 lb (66.7 kg)  BMI 24.46 kg/m2    Physical Exam  Constitutional: The patient  is oriented to person, place, and time. They  appear well-developed and well-nourished. No distress.   Pulmonary/Chest: Effort normal.   Abdominal: Soft. The patient exhibits no distension and no mass. There is no tenderness. There is no rebound and no guarding. No hernia.   Neurological: Patient is alert and oriented to person, place, and time.   Skin: Skin is warm and dry. Not diaphoretic.   Psychiatric:  normal mood and affect.   Vitals reviewed.      Results for orders placed or performed during the hospital encounter of 08/22/17   Tissue Pathology Exam   Result Value Ref Range    Case Report       Surgical Pathology Report                         Case: [id number]                                  Authorizing Provider:  Cale Covington MD        Collected:           08/22/2017 08:21 AM          Ordering Location:     [facility]  Received:            08/22/2017 10:20 AM          Pathologist:           Jon Pires MD                                                         Specimens:   1) - Urinary Bladder, cold " "cut resection of right lateral bladder tumor                             2) - Urinary Bladder, tumor resection                                                      Final Diagnosis       1.  Bladder tumor, right lateral wall, transurethral resection:  Low-grade papillary urothelial carcinoma, noninvasive.  Muscularis propria not present.    2.  Bladder tumor, transurethral resection:  Low-grade papillary urothelial carcinoma, noninvasive.  Muscularis propria identified with no evidence of tumor involvement.    AJCC pathologic stage:  pTa Nx      Synoptic Checklist       URINARY BLADDER: Biopsy and Transurethral Resection of Bladder Tumor (TURBT)  (Bladder Bx - All Specimens)            Specimen Site:    Urinary bladder structure      Tumor Site:    Urinary bladder structure      SPECIMEN      Procedure:    TURBT      TUMOR      Tumor Type:    Non-invasive (papillary) urothelial carcinoma        Non-invasive Histologic Type:    Non-invasive urothelial (transitional cell) carcinoma      Histologic Grade  (select appropriate histologic category and grade):    Urothelial carcinoma        Urothelial Carcinoma Grade:    Low-grade      EXTENT      Microscopic Tumor Extension:    Noninvasive papillary carcinoma      ACCESSORY FINDINGS      Tumor Configuration:    Papillary      Adequacy of Material for Determining Muscularis Propria Invasion:    Muscularis propria (detrusor muscle) present      Lymph-Vascular Invasion:    Not identified      ADDITIONAL NON-TUMOR      Associated Epithelial Lesions:    None identified      Additional Pathologic Findings:    Inflammation / regenerative changes      Gross Description       Specimen #1 is received in a formalin filled container, labeled with the patient's name, date of birth, and \"resection of right lateral bladder tumor\".  The specimen consists of 2 pink-gray soft tissue fragments aggregating to 0.2 x 0.1 x 0.1 cm, totally submitted in block 1A.    Specimen #2 is received in a " "formalin filled container, labeled with the patient's name, date of birth, and \"bladder tumor resection\".  The specimen consists of multiple red-tan mucosa covered tissue fragments aggregating to 1.5 x 0.9 x 0.3 cm mucosal surfaces appear hyperemic and raised.  The specimen is totally submitted in block 2A.      Microscopic Description       1.  Sections demonstrate fragments of a papillary urothelial carcinoma with low nuclear grade and no evidence of lamina propria invasion.  Muscularis propria is not present.    2.  Sections demonstrate fragments of bladder wall which show a low-grade papillary urothelial carcinoma with no lamina propria invasion.  Also present are fragments of muscularis propria with no evidence of tumor involvement.      Embedded Images         Imaging Results (last 7 days)     ** No results found for the last 168 hours. **          Assessment and Plan  Diagnoses and all orders for this visit:    Malignant neoplasm of lateral wall of urinary bladder    Smoking    #1. Discussed surveillance. Discussed path report. Explained the will need cystoscopy every three months this year.   #2. Explained importance to stop smoking. Discussed nicotine replacement products.     Cale Covington MD  08/28/17  10:06 AM      Cc: Shital Mendez MD  "

## 2017-08-30 ENCOUNTER — TELEPHONE (OUTPATIENT)
Dept: GASTROENTEROLOGY | Facility: CLINIC | Age: 77
End: 2017-08-30

## 2017-08-30 NOTE — TELEPHONE ENCOUNTER
I called today and spoke with Cliff Rosie, she states that her  saw Dr. Mendez on 08/18/2017 and  they discussed setting up an ultrasound of the liver.  I just wanted to follow up and make sure that that was taking care of,   the patient is now scheduled for colonoscopy 09/19/2017

## 2017-08-31 RX ORDER — HYDROCODONE BITARTRATE AND ACETAMINOPHEN 10; 325 MG/1; MG/1
1 TABLET ORAL 4 TIMES DAILY PRN
Qty: 120 TABLET | Refills: 0 | Status: SHIPPED | OUTPATIENT
Start: 2017-08-31 | End: 2017-09-21 | Stop reason: SDUPTHER

## 2017-09-19 ENCOUNTER — ANESTHESIA EVENT (OUTPATIENT)
Dept: GASTROENTEROLOGY | Facility: HOSPITAL | Age: 77
End: 2017-09-19

## 2017-09-19 ENCOUNTER — HOSPITAL ENCOUNTER (OUTPATIENT)
Facility: HOSPITAL | Age: 77
Setting detail: HOSPITAL OUTPATIENT SURGERY
Discharge: HOME | End: 2017-09-19
Attending: INTERNAL MEDICINE | Admitting: INTERNAL MEDICINE

## 2017-09-19 ENCOUNTER — ANESTHESIA (OUTPATIENT)
Dept: GASTROENTEROLOGY | Facility: HOSPITAL | Age: 77
End: 2017-09-19

## 2017-09-19 VITALS
TEMPERATURE: 97.1 F | BODY MASS INDEX: 23.63 KG/M2 | OXYGEN SATURATION: 94 % | RESPIRATION RATE: 21 BRPM | SYSTOLIC BLOOD PRESSURE: 145 MMHG | HEIGHT: 66 IN | DIASTOLIC BLOOD PRESSURE: 75 MMHG | WEIGHT: 147 LBS | HEART RATE: 56 BPM

## 2017-09-19 DIAGNOSIS — R19.7 DIARRHEA, UNSPECIFIED TYPE: ICD-10-CM

## 2017-09-19 DIAGNOSIS — Z86.010 HX OF COLONIC POLYP: ICD-10-CM

## 2017-09-19 DIAGNOSIS — R19.4 CHANGE IN BOWEL HABITS: ICD-10-CM

## 2017-09-19 PROCEDURE — 25010000002 PROPOFOL 10 MG/ML EMULSION: Performed by: NURSE ANESTHETIST, CERTIFIED REGISTERED

## 2017-09-19 PROCEDURE — 45385 COLONOSCOPY W/LESION REMOVAL: CPT | Performed by: INTERNAL MEDICINE

## 2017-09-19 PROCEDURE — 88305 TISSUE EXAM BY PATHOLOGIST: CPT | Performed by: INTERNAL MEDICINE

## 2017-09-19 RX ORDER — SODIUM CHLORIDE 9 MG/ML
500 INJECTION, SOLUTION INTRAVENOUS CONTINUOUS PRN
Status: DISCONTINUED | OUTPATIENT
Start: 2017-09-19 | End: 2017-09-19 | Stop reason: HOSPADM

## 2017-09-19 RX ORDER — SODIUM CHLORIDE 9 MG/ML
100 INJECTION, SOLUTION INTRAVENOUS CONTINUOUS
Status: CANCELLED | OUTPATIENT
Start: 2017-09-19

## 2017-09-19 RX ORDER — SODIUM CHLORIDE 0.9 % (FLUSH) 0.9 %
3 SYRINGE (ML) INJECTION AS NEEDED
Status: DISCONTINUED | OUTPATIENT
Start: 2017-09-19 | End: 2017-09-19 | Stop reason: HOSPADM

## 2017-09-19 RX ORDER — PROPOFOL 10 MG/ML
VIAL (ML) INTRAVENOUS AS NEEDED
Status: DISCONTINUED | OUTPATIENT
Start: 2017-09-19 | End: 2017-09-19 | Stop reason: SURG

## 2017-09-19 RX ORDER — SODIUM CHLORIDE 0.9 % (FLUSH) 0.9 %
1-10 SYRINGE (ML) INJECTION AS NEEDED
Status: CANCELLED | OUTPATIENT
Start: 2017-09-19

## 2017-09-19 RX ORDER — ONDANSETRON 2 MG/ML
4 INJECTION INTRAMUSCULAR; INTRAVENOUS ONCE AS NEEDED
Status: DISCONTINUED | OUTPATIENT
Start: 2017-09-19 | End: 2017-09-19 | Stop reason: HOSPADM

## 2017-09-19 RX ADMIN — PROPOFOL 290 MG: 10 INJECTION, EMULSION INTRAVENOUS at 08:48

## 2017-09-19 RX ADMIN — SODIUM CHLORIDE 500 ML: 9 INJECTION, SOLUTION INTRAVENOUS at 07:47

## 2017-09-19 NOTE — H&P
"[facility]  Pre Procedure History & Physical    Chief Complaint:   Colon polyps    Subjective     HPI:   The patient has a history of colon polyps who presents for exam.    Past Medical History:   Past Medical History:   Diagnosis Date   • Arthritis    • Cancer     bladder   • Colon polyp    • COPD (chronic obstructive pulmonary disease)    • Hyperlipidemia        Past Surgical History:  [unfilled]    Family History:  Family History   Problem Relation Age of Onset   • No Known Problems Father    • No Known Problems Mother    • Colon cancer Neg Hx    • Colon polyps Neg Hx        Social History:   reports that he has been smoking Cigarettes.  He has been smoking about 0.25 packs per day. He has never used smokeless tobacco. He reports that he does not drink alcohol or use illicit drugs.    Medications:   Prior to Admission medications    Medication Sig Start Date End Date Taking? Authorizing Provider   HYDROcodone-acetaminophen (NORCO)  MG per tablet Take  by mouth. 7/2/17  Yes Historical Provider, MD   aspirin 81 MG tablet Take  by mouth.    Historical Provider, MD   simvastatin (ZOCOR) 20 MG tablet Take 20 mg by mouth Every Night.    Historical Provider, MD   HYDROcodone-acetaminophen (NORCO) 5-325 MG per tablet Take 1 tablet by mouth Every 4 (Four) Hours As Needed for Moderate Pain  for up to 12 doses. 8/22/17 9/19/17  Cale Covington MD       Allergies:  Celebrex [celecoxib]    Objective     Blood pressure 129/71, pulse 60, temperature 97.1 °F (36.2 °C), temperature source Temporal Artery , resp. rate 20, height 66\" (167.6 cm), weight 147 lb (66.7 kg), SpO2 96 %.    Physical Exam   Constitutional: Pt is oriented to person, place, and in no distress.   HENT: Mouth/Throat: Oropharynx is clear.   Cardiovascular: Normal rate, regular rhythm.    Pulmonary/Chest: Effort normal. No respiratory distress. No  wheezes.   Abdominal: Soft. Non-distended.  Skin: Skin is warm and dry.   Psychiatric: Mood, " memory, affect and judgment appear normal.     Assessment/Plan     Diagnosis:  Colon polyps    Anticipated Surgical Procedure:  Colonoscopy    The risks, benefits, and alternatives of this procedure have been discussed with the patient or the responsible party- the patient understands and agrees to proceed.      EMR Dragon/transcription disclaimer:  Much of this encounter note is electronic transcription/translation of spoken language to printed text.  The electronic translation of spoken language may be erroneous, or at times, nonsensical words or phrases may be inadvertently transcribed.  Although I have reviewed the note for such errors, some may still exist.

## 2017-09-19 NOTE — PLAN OF CARE
Problem: Patient Care Overview (Adult)  Goal: Plan of Care Review  Outcome: Ongoing (interventions implemented as appropriate)    09/19/17 0923   Coping/Psychosocial Response Interventions   Plan Of Care Reviewed With patient   Patient Care Overview   Progress no change   Outcome Evaluation   Outcome Summary/Follow up Plan pt tolerated procedure well

## 2017-09-19 NOTE — PLAN OF CARE
Problem: Patient Care Overview (Adult)  Goal: Plan of Care Review  Outcome: Outcome(s) achieved Date Met:  09/19/17 09/19/17 0929   Coping/Psychosocial Response Interventions   Plan Of Care Reviewed With patient;spouse   Patient Care Overview   Progress improving   Outcome Evaluation   Outcome Summary/Follow up Plan DISCHARGE CRITERIA MET

## 2017-09-19 NOTE — ANESTHESIA POSTPROCEDURE EVALUATION
"Patient: Ezekiel Velez    Procedure Summary     Date Anesthesia Start Anesthesia Stop Room / Location    09/19/17 0845 0922  [facility] / [facility]       Procedure Diagnosis Surgeon Provider    COLONOSCOPY WITH ANESTHESIA (N/A ) Change in bowel habits; Hx of colonic polyp; Diarrhea, unspecified type  (Change in bowel habits [R19.4]; Hx of colonic polyp [Z86.010]; Diarrhea, unspecified type [R19.7]) MD Alfredo Schafer CRNA          Anesthesia Type: general  Last vitals  BP        Temp        Pulse       Resp        SpO2          Post Anesthesia Care and Evaluation    Patient location during evaluation: PACU  Patient participation: complete - patient participated  Level of consciousness: awake and alert  Pain score: 0  Pain management: adequate  Airway patency: patent  Anesthetic complications: No anesthetic complications    Cardiovascular status: acceptable  Respiratory status: acceptable  Hydration status: acceptable    Comments: Blood pressure 129/71, pulse 60, temperature 97.1 °F (36.2 °C), temperature source Temporal Artery , resp. rate 20, height 66\" (167.6 cm), weight 147 lb (66.7 kg), SpO2 96 %.        "

## 2017-09-19 NOTE — PLAN OF CARE
Problem: Patient Care Overview (Adult)  Goal: Adult Individualization and Mutuality  Outcome: Ongoing (interventions implemented as appropriate)    09/19/17 0766   Individualization   Patient Specific Preferences none

## 2017-09-19 NOTE — ANESTHESIA PREPROCEDURE EVALUATION
Anesthesia Evaluation     Patient summary reviewed and Nursing notes reviewed   no history of anesthetic complications:  NPO Solid Status: > 8 hours  NPO Liquid Status: > 2 hours     Airway   Mallampati: II  TM distance: >3 FB  Neck ROM: full  Dental    (+) edentulous, upper dentures and lower dentures    Pulmonary - normal exam    breath sounds clear to auscultation  (+) a smoker (4-5 cigarettes daily) Current, COPD,   (-) asthma, recent URI, sleep apnea  Cardiovascular - normal exam  Exercise tolerance: good (4-7 METS)    ECG reviewed  Rhythm: regular  Rate: normal    (+) hyperlipidemia  (-) hypertension, past MI, angina, murmur, cardiac stents, CABG      Neuro/Psych  (-) seizures, TIA, CVA  GI/Hepatic/Renal/Endo    (-) GERD, liver disease, no renal disease, diabetes, hypothyroidism, hyperthyroidism    Musculoskeletal     (+) back pain,   (-) neck pain, neck stiffness      ROS comment: Chronic pain   Abdominal  - normal exam   Substance History      OB/GYN          Other   (+) arthritis                                     Anesthesia Plan    ASA 3     general   total IV anesthesia  intravenous induction   Anesthetic plan and risks discussed with patient.

## 2017-09-19 NOTE — PLAN OF CARE
Problem: GI Endoscopy (Adult)  Goal: Signs and Symptoms of Listed Potential Problems Will be Absent or Manageable (GI Endoscopy)  Outcome: Outcome(s) achieved Date Met:  09/19/17 09/19/17 0982   GI Endoscopy   Problems Assessed (GI Endoscopy) all   Problems Present (GI Endoscopy) none

## 2017-09-20 LAB
CYTO UR: NORMAL
LAB AP CASE REPORT: NORMAL
LAB AP CLINICAL INFORMATION: NORMAL
Lab: NORMAL
PATH REPORT.FINAL DX SPEC: NORMAL
PATH REPORT.GROSS SPEC: NORMAL

## 2017-09-21 ENCOUNTER — HOSPITAL ENCOUNTER (OUTPATIENT)
Dept: PAIN MANAGEMENT | Age: 77
Discharge: HOME | End: 2017-09-21
Payer: MEDICARE

## 2017-09-21 VITALS
TEMPERATURE: 97.6 F | DIASTOLIC BLOOD PRESSURE: 65 MMHG | HEART RATE: 62 BPM | WEIGHT: 149 LBS | RESPIRATION RATE: 18 BRPM | BODY MASS INDEX: 23.95 KG/M2 | HEIGHT: 66 IN | SYSTOLIC BLOOD PRESSURE: 114 MMHG | OXYGEN SATURATION: 98 %

## 2017-09-21 DIAGNOSIS — M51.16 LUMBAR DISC DISEASE WITH RADICULOPATHY: ICD-10-CM

## 2017-09-21 DIAGNOSIS — M25.511 CHRONIC PAIN OF BOTH SHOULDERS: ICD-10-CM

## 2017-09-21 DIAGNOSIS — G89.29 CHRONIC BILATERAL LOW BACK PAIN WITHOUT SCIATICA: ICD-10-CM

## 2017-09-21 DIAGNOSIS — M54.50 CHRONIC BILATERAL LOW BACK PAIN WITHOUT SCIATICA: ICD-10-CM

## 2017-09-21 DIAGNOSIS — M25.512 CHRONIC PAIN OF BOTH SHOULDERS: ICD-10-CM

## 2017-09-21 DIAGNOSIS — M51.9 LUMBAR DISC DISEASE: ICD-10-CM

## 2017-09-21 DIAGNOSIS — G89.29 CHRONIC PAIN OF BOTH SHOULDERS: ICD-10-CM

## 2017-09-21 PROCEDURE — 99213 OFFICE O/P EST LOW 20 MIN: CPT

## 2017-09-21 RX ORDER — HYDROCODONE BITARTRATE AND ACETAMINOPHEN 10; 325 MG/1; MG/1
1 TABLET ORAL 4 TIMES DAILY PRN
Qty: 120 TABLET | Refills: 0 | Status: SHIPPED | OUTPATIENT
Start: 2017-09-30 | End: 2017-10-31 | Stop reason: SDUPTHER

## 2017-09-21 ASSESSMENT — PAIN DESCRIPTION - DIRECTION: RADIATING_TOWARDS: INTO BILATERAL HIPS

## 2017-09-21 ASSESSMENT — PAIN DESCRIPTION - ORIENTATION: ORIENTATION: LOWER;RIGHT;LEFT

## 2017-09-21 ASSESSMENT — PAIN DESCRIPTION - PROGRESSION: CLINICAL_PROGRESSION: NOT CHANGED

## 2017-09-21 ASSESSMENT — PAIN DESCRIPTION - DESCRIPTORS: DESCRIPTORS: ACHING;CONSTANT

## 2017-09-21 ASSESSMENT — PAIN SCALES - GENERAL: PAINLEVEL_OUTOF10: 5

## 2017-09-21 ASSESSMENT — PAIN DESCRIPTION - FREQUENCY: FREQUENCY: CONTINUOUS

## 2017-09-21 ASSESSMENT — PAIN DESCRIPTION - PAIN TYPE: TYPE: CHRONIC PAIN

## 2017-09-21 ASSESSMENT — PAIN DESCRIPTION - LOCATION: LOCATION: BACK;SHOULDER

## 2017-09-21 ASSESSMENT — PAIN DESCRIPTION - ONSET: ONSET: ON-GOING

## 2017-09-21 ASSESSMENT — ACTIVITIES OF DAILY LIVING (ADL): EFFECT OF PAIN ON DAILY ACTIVITIES: LIMITS ACTIVITY

## 2017-09-25 ENCOUNTER — OFFICE VISIT (OUTPATIENT)
Dept: VASCULAR SURGERY | Age: 77
End: 2017-09-25
Payer: MEDICARE

## 2017-09-25 ENCOUNTER — HOSPITAL ENCOUNTER (OUTPATIENT)
Dept: VASCULAR LAB | Age: 77
Discharge: HOME | End: 2017-09-25
Payer: MEDICARE

## 2017-09-25 VITALS
RESPIRATION RATE: 18 BRPM | DIASTOLIC BLOOD PRESSURE: 83 MMHG | SYSTOLIC BLOOD PRESSURE: 150 MMHG | HEIGHT: 66 IN | BODY MASS INDEX: 23.63 KG/M2 | HEART RATE: 51 BPM | WEIGHT: 147 LBS

## 2017-09-25 DIAGNOSIS — I65.23 BILATERAL CAROTID ARTERY STENOSIS: Primary | ICD-10-CM

## 2017-09-25 DIAGNOSIS — I71.40 ABDOMINAL AORTIC ANEURYSM (AAA) WITHOUT RUPTURE: ICD-10-CM

## 2017-09-25 DIAGNOSIS — I65.23 BILATERAL CAROTID ARTERY STENOSIS: ICD-10-CM

## 2017-09-25 PROCEDURE — G8427 DOCREV CUR MEDS BY ELIG CLIN: HCPCS | Performed by: NURSE PRACTITIONER

## 2017-09-25 PROCEDURE — 99212 OFFICE O/P EST SF 10 MIN: CPT | Performed by: NURSE PRACTITIONER

## 2017-09-25 PROCEDURE — 1036F TOBACCO NON-USER: CPT | Performed by: NURSE PRACTITIONER

## 2017-09-25 PROCEDURE — 93880 EXTRACRANIAL BILAT STUDY: CPT

## 2017-09-25 PROCEDURE — G8598 ASA/ANTIPLAT THER USED: HCPCS | Performed by: NURSE PRACTITIONER

## 2017-09-25 PROCEDURE — 1123F ACP DISCUSS/DSCN MKR DOCD: CPT | Performed by: NURSE PRACTITIONER

## 2017-09-25 PROCEDURE — 4040F PNEUMOC VAC/ADMIN/RCVD: CPT | Performed by: NURSE PRACTITIONER

## 2017-09-25 PROCEDURE — G8420 CALC BMI NORM PARAMETERS: HCPCS | Performed by: NURSE PRACTITIONER

## 2017-10-03 ENCOUNTER — TELEPHONE (OUTPATIENT)
Dept: GASTROENTEROLOGY | Facility: CLINIC | Age: 77
End: 2017-10-03

## 2017-10-03 NOTE — TELEPHONE ENCOUNTER
Tree from Dr Soto office called stating Dr Mendez is wanting to know if there is any more testing they need to have the pt do? Pt had US and CT. I asked Tere to fax those to [facility].

## 2017-10-06 ENCOUNTER — HOSPITAL ENCOUNTER (OUTPATIENT)
Dept: ULTRASOUND IMAGING | Age: 77
Discharge: HOME | End: 2017-10-06
Payer: MEDICARE

## 2017-10-06 DIAGNOSIS — R93.2 ABNORMAL FINDINGS ON DIAGNOSTIC IMAGING OF LIVER: ICD-10-CM

## 2017-10-06 PROCEDURE — 76700 US EXAM ABDOM COMPLETE: CPT

## 2017-10-06 NOTE — TELEPHONE ENCOUNTER
I spoke with Tere at Dr. Mendez's office.  They evidently spoke with the radiologist about the CT of the chest with liver lesions, radiologist a differential considerations include small cyst or hemangiomas.  I told Tere that if if she spoke with radiologist and they felt like these were either cyst or hemangiomas then no further workup would be recommended.  However if they felt like these were suspicious liver lesions then the metastatic workup since he has had bladder cancer and has lung nodules.  Tere verbalized understanding and will call me if any further questions.

## 2017-10-31 RX ORDER — HYDROCODONE BITARTRATE AND ACETAMINOPHEN 10; 325 MG/1; MG/1
1 TABLET ORAL 4 TIMES DAILY PRN
Qty: 120 TABLET | Refills: 0 | Status: SHIPPED | OUTPATIENT
Start: 2017-11-01 | End: 2017-11-09 | Stop reason: SDUPTHER

## 2017-11-09 ENCOUNTER — HOSPITAL ENCOUNTER (OUTPATIENT)
Dept: PAIN MANAGEMENT | Age: 77
Discharge: HOME | End: 2017-11-09
Payer: MEDICARE

## 2017-11-09 VITALS
RESPIRATION RATE: 20 BRPM | TEMPERATURE: 97.1 F | SYSTOLIC BLOOD PRESSURE: 146 MMHG | OXYGEN SATURATION: 98 % | WEIGHT: 152 LBS | HEART RATE: 61 BPM | BODY MASS INDEX: 24.43 KG/M2 | DIASTOLIC BLOOD PRESSURE: 61 MMHG | HEIGHT: 66 IN

## 2017-11-09 DIAGNOSIS — M51.9 LUMBAR DISC DISEASE: ICD-10-CM

## 2017-11-09 DIAGNOSIS — M25.511 CHRONIC PAIN OF BOTH SHOULDERS: ICD-10-CM

## 2017-11-09 DIAGNOSIS — M51.16 LUMBAR DISC DISEASE WITH RADICULOPATHY: ICD-10-CM

## 2017-11-09 DIAGNOSIS — G89.29 CHRONIC PAIN OF BOTH SHOULDERS: ICD-10-CM

## 2017-11-09 DIAGNOSIS — G89.29 CHRONIC BILATERAL LOW BACK PAIN WITHOUT SCIATICA: ICD-10-CM

## 2017-11-09 DIAGNOSIS — M25.512 CHRONIC PAIN OF BOTH SHOULDERS: ICD-10-CM

## 2017-11-09 DIAGNOSIS — M51.16 DISPLACEMENT OF LUMBAR DISC WITH RADICULOPATHY: Chronic | ICD-10-CM

## 2017-11-09 DIAGNOSIS — M54.50 CHRONIC BILATERAL LOW BACK PAIN WITHOUT SCIATICA: ICD-10-CM

## 2017-11-09 DIAGNOSIS — M51.36 LUMBAR DEGENERATIVE DISC DISEASE: ICD-10-CM

## 2017-11-09 PROCEDURE — 62323 NJX INTERLAMINAR LMBR/SAC: CPT

## 2017-11-09 PROCEDURE — 20610 DRAIN/INJ JOINT/BURSA W/O US: CPT

## 2017-11-09 PROCEDURE — 3209999900 FLUORO FOR SURGICAL PROCEDURES

## 2017-11-09 PROCEDURE — 2500000003 HC RX 250 WO HCPCS

## 2017-11-09 PROCEDURE — 6360000002 HC RX W HCPCS

## 2017-11-09 PROCEDURE — 2580000003 HC RX 258

## 2017-11-09 RX ORDER — HYDROCODONE BITARTRATE AND ACETAMINOPHEN 10; 325 MG/1; MG/1
1 TABLET ORAL 4 TIMES DAILY PRN
Qty: 120 TABLET | Refills: 0 | Status: SHIPPED | OUTPATIENT
Start: 2017-12-01 | End: 2017-11-30 | Stop reason: SDUPTHER

## 2017-11-09 RX ORDER — LIDOCAINE HYDROCHLORIDE 10 MG/ML
INJECTION, SOLUTION EPIDURAL; INFILTRATION; INTRACAUDAL; PERINEURAL
Status: COMPLETED | OUTPATIENT
Start: 2017-11-09 | End: 2017-11-09

## 2017-11-09 RX ORDER — BUPIVACAINE HYDROCHLORIDE 5 MG/ML
INJECTION, SOLUTION EPIDURAL; INTRACAUDAL
Status: COMPLETED | OUTPATIENT
Start: 2017-11-09 | End: 2017-11-09

## 2017-11-09 RX ORDER — METHYLPREDNISOLONE ACETATE 80 MG/ML
INJECTION, SUSPENSION INTRA-ARTICULAR; INTRALESIONAL; INTRAMUSCULAR; SOFT TISSUE
Status: COMPLETED | OUTPATIENT
Start: 2017-11-09 | End: 2017-11-09

## 2017-11-09 RX ORDER — 0.9 % SODIUM CHLORIDE 0.9 %
VIAL (ML) INJECTION
Status: COMPLETED | OUTPATIENT
Start: 2017-11-09 | End: 2017-11-09

## 2017-11-09 RX ORDER — TRIAMCINOLONE ACETONIDE 40 MG/ML
INJECTION, SUSPENSION INTRA-ARTICULAR; INTRAMUSCULAR
Status: COMPLETED | OUTPATIENT
Start: 2017-11-09 | End: 2017-11-09

## 2017-11-09 RX ORDER — BUPIVACAINE HYDROCHLORIDE 2.5 MG/ML
INJECTION, SOLUTION EPIDURAL; INFILTRATION; INTRACAUDAL
Status: COMPLETED | OUTPATIENT
Start: 2017-11-09 | End: 2017-11-09

## 2017-11-09 RX ADMIN — Medication 1.5 ML: at 14:14

## 2017-11-09 RX ADMIN — LIDOCAINE HYDROCHLORIDE 3 ML: 10 INJECTION, SOLUTION EPIDURAL; INFILTRATION; INTRACAUDAL; PERINEURAL at 14:12

## 2017-11-09 RX ADMIN — BUPIVACAINE HYDROCHLORIDE 2.5 ML: 2.5 INJECTION, SOLUTION EPIDURAL; INFILTRATION; INTRACAUDAL at 14:14

## 2017-11-09 RX ADMIN — BUPIVACAINE HYDROCHLORIDE 9 ML: 5 INJECTION, SOLUTION EPIDURAL; INTRACAUDAL at 14:15

## 2017-11-09 RX ADMIN — METHYLPREDNISOLONE ACETATE 80 MG: 80 INJECTION, SUSPENSION INTRA-ARTICULAR; INTRALESIONAL; INTRAMUSCULAR; SOFT TISSUE at 14:15

## 2017-11-09 RX ADMIN — TRIAMCINOLONE ACETONIDE 40 MG: 40 INJECTION, SUSPENSION INTRA-ARTICULAR; INTRAMUSCULAR at 14:15

## 2017-11-09 ASSESSMENT — PAIN DESCRIPTION - LOCATION: LOCATION: BACK;HIP;SHOULDER

## 2017-11-09 ASSESSMENT — PAIN DESCRIPTION - PAIN TYPE: TYPE: CHRONIC PAIN

## 2017-11-09 ASSESSMENT — PAIN SCALES - GENERAL: PAINLEVEL_OUTOF10: 8

## 2017-11-09 ASSESSMENT — PAIN DESCRIPTION - ORIENTATION: ORIENTATION: RIGHT;LEFT;LOWER

## 2017-11-09 ASSESSMENT — PAIN DESCRIPTION - ONSET: ONSET: ON-GOING

## 2017-11-09 ASSESSMENT — PAIN - FUNCTIONAL ASSESSMENT: PAIN_FUNCTIONAL_ASSESSMENT: 0-10

## 2017-11-09 ASSESSMENT — PAIN DESCRIPTION - DESCRIPTORS: DESCRIPTORS: ACHING;CONSTANT;RADIATING

## 2017-11-09 ASSESSMENT — PAIN DESCRIPTION - FREQUENCY: FREQUENCY: CONTINUOUS

## 2017-11-09 NOTE — PROGRESS NOTES
Nursing Admission Record    Current Issues / Falls / ER Visits:  No    Percentage of Pain Relief after Last Procedure:  70 %    How long lasted:  10 weeks    Radiology exams received during the last 12 months: Yes       When 9/2017              Where [facility]       Imaging on chart: Yes         Imaging records requested: Yes  MRI exams received in the past 2 years:  No  Physical therapy during the last 6 months: Yes         Labs during the last 12 months: Yes    Education Provided:  [x] Review of Peggy Laure  [] Agreement Review  [] Compliance Issues Discussed    [] Cognitive Behavior Needs [] Exercise [] Review of Test [] Financial Issues  [] Tobacco/Alcohol Use [x] Teaching [] New Patient [] Picture Obtained    Physician Plan:  [] Outgoing Referral  [] Pharmacy Consult  [] Test Ordered   [] Obtained Test Results / Consult Notes  [] UDS due at next visit, verified per EPIC      [] Suspected Physical Abuse or Suicide Risk assessed - IF YES COMPLETE QUESTIONS BELOW    If any of the following questions are answered yes - contact attending physician for referral:    Has been considering harming self to escape stress, pain problems? [] YES  [] NO  Has a suicide plan? [] YES  [] NO  Has attempted suicide in the past?   [] YES  [] NO  Has a close friend or family member who committed suicide? [] YES  [] NO    Patient Referred To :      Additional Notes:    Assessment Completed by:  Electronically signed by Mercedes Altamirano RN on 11/9/2017 at 1:28 PM

## 2017-11-09 NOTE — PROCEDURES
DATE: 11/9/2017      REASON FOR VISIT: Lumbar Degenerative Disc Disease, Lumbar Radiculopathy  PROCEDURE: Lumbar Epidural Steroid Injection. [] Moderate Sedation    DESCRIPTION OF PROCEDURE:              After obtaining informed consent, the patient was taken to the procedure room, positioned prone, and sterilely prepped.  The procedure was performed under fluoroscopic guidance.  First 5 ml of 1% Xylocaine was used at L2 - 3 for local anesthesia.  A 19-gauge Omnisiotead needle was advanced to the epidural space.  The was confirmed on the fluoroscope with an injection of [x] 2ml   Contrast.  Then, [x] 2.5ml of 0.25% Marcaine, [x] 1.5ml of Normal Saline, and [x] 80 mg of Depo Medrol was gently injected.      There were no complications. There were no complications. DIAGNOSES:  [] Low Back Pain  [x] *Lumbar Radiculopathy  [x] *Lumbar Degenerative Disc Disease  [] *Lumbar Spinal Stenosis  [] *Lumbar Postlaminectomy Syndrome  [] Other            REASON FOR VISIT: Bilateral Shoulder Osteoarthritis  PROCEDURE: Steroid Joint Injection    [] Moderate Sedation      [x] Fluoroscopy Used  [] Ultrasound guidance    DESCRIPTION OF PROCEDURE:    After obtaining informed consent, 10 ml of 0.5% Marcaine and 40 mg of Kenalog was injected using sterile technique in the [x] Left Shoulder . [x]  In addition, 10 ml of 0.5% Marcaine and 40 mg of Kenalog was injected using sterile technique into the [x]  Right Shoulder . There were no complications. He is considering joint replacement surgery    DIAGNOSES:  [x] Osteoarthritis   [] Left [] Right [x] Bilateral  []  Ankle [] Wrist [] Knee [x] Shoulder [] Hip [] Other    [] Trochanteric Bursitis  [] Intercostal Neuralgia    [] Other         . Procedure:  Level of Consciousness: [x]Alert [x]Oriented []Disoriented []Lethargic  Anxiety Level: [x]Calm []Anxious []Depressed []Other  Skin: [x]Warm [x]Dry []Cool []Moist []Intact []Other  Cardiovascular: [x]Palpitations: []Never []Occasionally []Frequently  Chest Pain: [x]No []Yes  Respiratory:  [x]Unlabored []Labored []Cough ([] Productive []Unproductive)  HCG Required: [x]No []Yes   Results: []Negative []Positive  Knowledge Level:        [x]Patient/Other verbalized understanding of pre-procedure instructions. [x]Assessment of post-op care needs (transportation, responsible caregiver)        [x]Able to discuss health care problems and how to deal with it. Factors that Affect Teaching:        Language Barrier: [x]No []Yes - why:        Hearing Loss:        []No [x]Yes            Corrective Device:  [x]Yes []No        Vision Loss:           []No [x]Yes            Corrective Device:  [x]Yes []No        Memory Loss:       [x]No []Yes            []Short Term []Long Term  Motivational Level:  [x]Asks Questions                  []Extremely Anxious       [x]Seems Interested               []Seems Uninterested                  []Denies need for Education  Risk for Injury:  [x]Patient oriented to person, place and time  []History of frequent falls/loss of balance  Nutritional:  []Change in appetite   []Weight Gain   []Weight Loss  Functional:      Nursing Admission Record   Current Issues / Geoffrey Frye / ER Visits:  No   Percentage of Pain Relief after Last Procedure:  70 %    How long lasted:  10 weeks   Radiology exams received during the last 12 months: Yes       When 9/2017              Where [facility]       Imaging on chart: Yes         Imaging records requested: Yes  MRI exams received in the past 2 years:  No  Physical therapy during the last 6 months: Yes       Labs during the last 12 months: Yes    COMMENTS:  Patient complains of low back and bilateral shoulder pain. Patient feels that he gained 70% relief that has lasted 10 weeks from last injections. Discussed the risk and benefits of procedure with patient. Will proceed today with Lumbar Epidural and Bilateral Shoulder Injections.      PLAN:  [x] Will return to office in  3 month(s)for  [x] Planned

## 2017-11-20 ENCOUNTER — TELEPHONE (OUTPATIENT)
Dept: GASTROENTEROLOGY | Facility: CLINIC | Age: 77
End: 2017-11-20

## 2017-11-20 NOTE — TELEPHONE ENCOUNTER
Received copy of an ultrasound the abdomen that was ordered by Dr. Mendez done on 10/06/2017.  I will Dr. Mendez's office and once again spoke with Tere, once again her referred back to the message that had discussed with her on 10/03/2017.  Once again referred back to the same information regarding if the liver lesions are suspicious then  the patient would need a metastatic workup since he has history of bladder cancer as well as lung nodules.  Tere from Dr. Mendez's office stated that Dr. Mendez just wanted us to  have a copy of the ultrasound.  She will continue to follow this patient.

## 2017-11-30 RX ORDER — HYDROCODONE BITARTRATE AND ACETAMINOPHEN 10; 325 MG/1; MG/1
1 TABLET ORAL 4 TIMES DAILY PRN
Qty: 120 TABLET | Refills: 0 | Status: SHIPPED | OUTPATIENT
Start: 2017-12-01 | End: 2017-12-26 | Stop reason: SDUPTHER

## 2017-12-04 ENCOUNTER — PROCEDURE VISIT (OUTPATIENT)
Dept: UROLOGY | Facility: CLINIC | Age: 77
End: 2017-12-04

## 2017-12-04 DIAGNOSIS — C67.2 MALIGNANT NEOPLASM OF LATERAL WALL OF URINARY BLADDER (HCC): Primary | ICD-10-CM

## 2017-12-04 LAB
BILIRUB BLD-MCNC: NEGATIVE MG/DL
CLARITY, POC: CLEAR
COLOR UR: YELLOW
GLUCOSE UR STRIP-MCNC: NEGATIVE MG/DL
KETONES UR QL: NEGATIVE
LEUKOCYTE EST, POC: NEGATIVE
NITRITE UR-MCNC: NEGATIVE MG/ML
PH UR: 7 [PH] (ref 5–8)
PROT UR STRIP-MCNC: NEGATIVE MG/DL
RBC # UR STRIP: NEGATIVE /UL
SP GR UR: 1.02 (ref 1–1.03)
UROBILINOGEN UR QL: NORMAL

## 2017-12-04 PROCEDURE — 52000 CYSTOURETHROSCOPY: CPT | Performed by: UROLOGY

## 2017-12-04 PROCEDURE — 81003 URINALYSIS AUTO W/O SCOPE: CPT | Performed by: UROLOGY

## 2017-12-04 NOTE — PROGRESS NOTES
CC: I am here for the doctor to look at my bladder    Cystoscopy procedure note  Pre- operative diagnosis:  Bladder cancer surveillance    Post operative diagnosis:  Same    Procedure:  The patient was prepped and draped in a normal sterile fashion.  The urethra was anesthetized with 2% lidocaine jelly.  A flexible cystoscope was introduced per urethra.      Urethra:  No urethral stricture    Bladder:  There is no evidence of a stone, foreign body or mass within the bladder.  The bladder is minimally trabeculated.  The bladder neck is without contracture.    Ureteral orifices:  Normal position bilaterally and Clear efflux bilaterally    Prostate:  lateral lobe hypertrophy    Patient tolerated the procedure well    Complications: none    Blood loss: minimal    Diagnoses and all orders for this visit:    Malignant neoplasm of lateral wall of urinary bladder  Comments:  low grade ta disease  Orders:  -     POC Urinalysis Dipstick, Automated    Other orders  -     SCANNED - IMAGING        Follow up:  No evidence recurrent disease on today's exam.  Routine follow up with follow-up surveillance cystoscopy in 3 months.

## 2017-12-07 DIAGNOSIS — N32.9 LESION OF BLADDER: ICD-10-CM

## 2017-12-07 RX ORDER — LEVOFLOXACIN 500 MG/1
TABLET, FILM COATED ORAL
Qty: 1 TABLET | Refills: 0 | OUTPATIENT
Start: 2017-12-07

## 2017-12-26 ENCOUNTER — HOSPITAL ENCOUNTER (OUTPATIENT)
Dept: PAIN MANAGEMENT | Age: 77
Discharge: HOME | End: 2017-12-26
Payer: MEDICARE

## 2017-12-26 VITALS
HEIGHT: 67 IN | BODY MASS INDEX: 24.48 KG/M2 | OXYGEN SATURATION: 96 % | WEIGHT: 156 LBS | RESPIRATION RATE: 18 BRPM | HEART RATE: 63 BPM | DIASTOLIC BLOOD PRESSURE: 72 MMHG | SYSTOLIC BLOOD PRESSURE: 134 MMHG | TEMPERATURE: 97 F

## 2017-12-26 DIAGNOSIS — M51.9 LUMBAR DISC DISEASE: ICD-10-CM

## 2017-12-26 DIAGNOSIS — M25.512 CHRONIC PAIN OF BOTH SHOULDERS: ICD-10-CM

## 2017-12-26 DIAGNOSIS — G89.29 CHRONIC BILATERAL LOW BACK PAIN WITHOUT SCIATICA: ICD-10-CM

## 2017-12-26 DIAGNOSIS — M51.16 LUMBAR DISC DISEASE WITH RADICULOPATHY: ICD-10-CM

## 2017-12-26 DIAGNOSIS — M51.16 DISPLACEMENT OF LUMBAR DISC WITH RADICULOPATHY: ICD-10-CM

## 2017-12-26 DIAGNOSIS — G89.29 CHRONIC PAIN OF BOTH SHOULDERS: ICD-10-CM

## 2017-12-26 DIAGNOSIS — M25.511 CHRONIC PAIN OF BOTH SHOULDERS: ICD-10-CM

## 2017-12-26 DIAGNOSIS — M54.50 CHRONIC BILATERAL LOW BACK PAIN WITHOUT SCIATICA: ICD-10-CM

## 2017-12-26 PROCEDURE — 99213 OFFICE O/P EST LOW 20 MIN: CPT

## 2017-12-26 RX ORDER — HYDROCODONE BITARTRATE AND ACETAMINOPHEN 10; 325 MG/1; MG/1
1 TABLET ORAL 4 TIMES DAILY PRN
Qty: 120 TABLET | Refills: 0 | Status: SHIPPED | OUTPATIENT
Start: 2017-12-31 | End: 2018-01-30 | Stop reason: SDUPTHER

## 2017-12-26 ASSESSMENT — PAIN DESCRIPTION - ORIENTATION: ORIENTATION: LOWER;LEFT;RIGHT

## 2017-12-26 ASSESSMENT — PAIN DESCRIPTION - DESCRIPTORS: DESCRIPTORS: ACHING;CONSTANT;RADIATING

## 2017-12-26 ASSESSMENT — PAIN DESCRIPTION - ONSET: ONSET: ON-GOING

## 2017-12-26 ASSESSMENT — PAIN DESCRIPTION - PROGRESSION: CLINICAL_PROGRESSION: NOT CHANGED

## 2017-12-26 ASSESSMENT — ACTIVITIES OF DAILY LIVING (ADL): EFFECT OF PAIN ON DAILY ACTIVITIES: LIMITS ACTIVITY

## 2017-12-26 ASSESSMENT — PAIN DESCRIPTION - FREQUENCY: FREQUENCY: CONTINUOUS

## 2017-12-26 ASSESSMENT — PAIN SCALES - GENERAL: PAINLEVEL_OUTOF10: 6

## 2017-12-26 ASSESSMENT — PAIN DESCRIPTION - LOCATION: LOCATION: BACK;SHOULDER

## 2017-12-26 ASSESSMENT — PAIN DESCRIPTION - PAIN TYPE: TYPE: CHRONIC PAIN

## 2017-12-26 NOTE — PROGRESS NOTES
activity: Not Asked     Other Topics Concern    None     Social History Narrative    None      reports that he does not use drugs. History   Alcohol Use No       Tobacco Use:    He was counseled regarding smoking cessation    Review of Systems:  Other than stated above in the HPI, is negative     UDS done today? no  Are you currently pregnant? no     Reagan Bustillo compliant? yes  Concern for prescription abuse? no          Past Medical History      Diagnosis Date    AAA (abdominal aortic aneurysm) (HCC)     Arthritis     Bilateral low back pain without sciatica 1/13/2016    Bilateral shoulder pain 1/13/2016    Blood clotting disorder (HCC)     Cancer [facility] [address] 09/2017    Bladder CA    Carotid artery stenosis     DDD (degenerative disc disease), lumbar     Hx of blood clots     Hyperlipidemia        Surgical History  Past Surgical History:   Procedure Laterality Date    BLADDER SURGERY  08/22/2017    Removal of Cancer by Dr. Marisa Peng, LAPAROSCOPIC      july 25, 2017    COLONOSCOPY      HEMORRHOID SURGERY      HERNIA REPAIR      8/2016       Medications  Current Outpatient Prescriptions   Medication Sig Dispense Refill    HYDROcodone-acetaminophen (NORCO)  MG per tablet Take 1 tablet by mouth 4 times daily as needed for Pain (may fill 12/01/17) . 120 tablet 0    aspirin 81 MG tablet Take 81 mg by mouth daily      simvastatin (ZOCOR) 80 MG tablet Take 80 mg by mouth daily       No current facility-administered medications for this encounter. Allergies  Celebrex [celecoxib]    Family History  family history includes Cancer in his sister. Objective Information:  Vitals: /72   Pulse 63   Temp 97 °F (36.1 °C) (Oral)   Resp 18   Ht 5' 7\" (1.702 m)   Wt 156 lb (70.8 kg)   SpO2 96%   BMI 24.43 kg/m² , Body mass index is 24.43 kg/m².     Physical Exam  General appearance: no acute distress  Head: NCAT, EOMI  SKIN: Warm, Dry   Musculoskeletal: ambulatory per self, steady gait  Positive for pain straight leg raise left  Neurologic: alert and oriented X 3, speech clear  Mood and affect: appropriate, no SI or HI     UDS:  Lab Results   Component Value Date    BARBITURATES Negative 08/01/2017    BENZODIAZURI Negative 08/01/2017    OPIAU SEE BELOW 08/01/2017    OPIAU Positive 08/01/2017    OXYCOOXYMO Negative 08/01/2017    PHENCYCU Negative 08/01/2017    LABMETH Negative 08/01/2017    PPXUR Negative 08/01/2017    MEPERIDU Negative 08/01/2017    FENTU Negative 08/01/2017    ETHUQN Negative 08/01/2017    CANNANBINURI Negative 08/01/2017    CODEINEU Negative 08/01/2017    MORPHINE Negative 08/01/2017    HYDROMORU Positive 08/01/2017    HYDROMORPH 2,960 08/01/2017    HYDROCOU Positive 08/01/2017    HYDROCODONE >3,000 08/01/2017    AMPHETUR Negative 08/01/2017    COCAINEMETUR Negative 08/01/2017   Nursing Admission Record    Current Issues / Falls / ER Visits:  No    Percentage of Pain Relief after Last Procedure:  80 %    How long lasted:  4 weeks    Radiology exams received during the last 12 months: Yes       When 6/2017                                              Where [facility]       Imaging on chart: Yes         Imaging records requested: No  MRI exams received in the past 2 years:  No  Physical therapy during the last 6 months: No       When: na                                             Where  [facility]  Labs during the last 12 months: Yes    Injections for pain control discussed:  Yes    ASSESSMENT  Patient Active Problem List   Diagnosis    Bilateral low back pain without sciatica    Bilateral shoulder pain    Bilateral carotid artery stenosis    Lumbar disc disease    Lumbar disc disease with radiculopathy    Lumbar disc disease with radiculopathy    Lumbar disc disease with radiculopathy    Lumbar disc disease with radiculopathy    Lumbar disc disease with radiculopathy    AAA (abdominal aortic aneurysm) ([address]    Carotid artery stenosis    Displacement of lumbar disc with radiculopathy        PLAN  1. Patient is to call with any questions or concerns which may arise prior to the next office visit   2. Continue current dosage of Norco 10mg #120 per KONG fill date   3. Schedule LESI at level L2-3 and bilateral shoulder injections    Discussion:    Education Provided:  [x] Review of Hood Miller [x] Agreement Review [x] Compliance Issues Discussed   [] Cognitive Behavior Needs [x] Exercise [] Review of Test [] Financial Issues  [x] Tobacco/Alcohol Use [x] Teaching [] New Patient [] Picture Obtained    Controlled Substance Monitoring:   Discussed with patient possible medication side effects, risk of tolerance and/or dependence, and alternative treatments. Discussed the growing epidemic in the [address] [place] [address] with the overprescribing and at times the abuse of narcotics. Discussed the detrimental effects of long term narcotic use in younger patients. Patient encouraged to set daily goals of exercising and decreasing daily narcotic intake. Discussed with the patient about the development of hyperalgesia with long term narcotic intake.      Electronically signed by Rand Saldaña RN on 12/26/2017 at 1:55 PM

## 2018-01-30 RX ORDER — HYDROCODONE BITARTRATE AND ACETAMINOPHEN 10; 325 MG/1; MG/1
1 TABLET ORAL 4 TIMES DAILY PRN
Qty: 120 TABLET | Refills: 0 | Status: SHIPPED | OUTPATIENT
Start: 2018-01-31 | End: 2018-02-12 | Stop reason: SDUPTHER

## 2018-02-12 ENCOUNTER — HOSPITAL ENCOUNTER (OUTPATIENT)
Dept: PAIN MANAGEMENT | Age: 78
Discharge: HOME | End: 2018-02-12
Payer: MEDICARE

## 2018-02-12 VITALS
OXYGEN SATURATION: 100 % | HEART RATE: 59 BPM | WEIGHT: 160 LBS | TEMPERATURE: 96.7 F | HEIGHT: 67 IN | SYSTOLIC BLOOD PRESSURE: 124 MMHG | BODY MASS INDEX: 25.11 KG/M2 | DIASTOLIC BLOOD PRESSURE: 67 MMHG | RESPIRATION RATE: 20 BRPM

## 2018-02-12 DIAGNOSIS — M51.36 LUMBAR DEGENERATIVE DISC DISEASE: ICD-10-CM

## 2018-02-12 DIAGNOSIS — M51.16 LUMBAR DISC DISEASE WITH RADICULOPATHY: ICD-10-CM

## 2018-02-12 DIAGNOSIS — M51.16 DISPLACEMENT OF LUMBAR DISC WITH RADICULOPATHY: ICD-10-CM

## 2018-02-12 PROCEDURE — 20610 DRAIN/INJ JOINT/BURSA W/O US: CPT

## 2018-02-12 PROCEDURE — 3209999900 FLUORO FOR SURGICAL PROCEDURES

## 2018-02-12 PROCEDURE — 6360000002 HC RX W HCPCS

## 2018-02-12 PROCEDURE — 2500000003 HC RX 250 WO HCPCS

## 2018-02-12 PROCEDURE — 2580000003 HC RX 258

## 2018-02-12 PROCEDURE — 62323 NJX INTERLAMINAR LMBR/SAC: CPT

## 2018-02-12 RX ORDER — METHYLPREDNISOLONE ACETATE 80 MG/ML
INJECTION, SUSPENSION INTRA-ARTICULAR; INTRALESIONAL; INTRAMUSCULAR; SOFT TISSUE
Status: COMPLETED | OUTPATIENT
Start: 2018-02-12 | End: 2018-02-12

## 2018-02-12 RX ORDER — BUPIVACAINE HYDROCHLORIDE 2.5 MG/ML
INJECTION, SOLUTION EPIDURAL; INFILTRATION; INTRACAUDAL
Status: COMPLETED | OUTPATIENT
Start: 2018-02-12 | End: 2018-02-12

## 2018-02-12 RX ORDER — BUPIVACAINE HYDROCHLORIDE 5 MG/ML
INJECTION, SOLUTION EPIDURAL; INTRACAUDAL
Status: COMPLETED | OUTPATIENT
Start: 2018-02-12 | End: 2018-02-12

## 2018-02-12 RX ORDER — LIDOCAINE HYDROCHLORIDE 10 MG/ML
INJECTION, SOLUTION EPIDURAL; INFILTRATION; INTRACAUDAL; PERINEURAL
Status: COMPLETED | OUTPATIENT
Start: 2018-02-12 | End: 2018-02-12

## 2018-02-12 RX ORDER — TRIAMCINOLONE ACETONIDE 40 MG/ML
INJECTION, SUSPENSION INTRA-ARTICULAR; INTRAMUSCULAR
Status: COMPLETED | OUTPATIENT
Start: 2018-02-12 | End: 2018-02-12

## 2018-02-12 RX ORDER — 0.9 % SODIUM CHLORIDE 0.9 %
VIAL (ML) INJECTION
Status: COMPLETED | OUTPATIENT
Start: 2018-02-12 | End: 2018-02-12

## 2018-02-12 RX ADMIN — BUPIVACAINE HYDROCHLORIDE 2.5 ML: 2.5 INJECTION, SOLUTION EPIDURAL; INFILTRATION; INTRACAUDAL at 14:47

## 2018-02-12 RX ADMIN — TRIAMCINOLONE ACETONIDE 40 MG: 40 INJECTION, SUSPENSION INTRA-ARTICULAR; INTRAMUSCULAR at 14:48

## 2018-02-12 RX ADMIN — Medication 1.5 ML: at 14:47

## 2018-02-12 RX ADMIN — METHYLPREDNISOLONE ACETATE 80 MG: 80 INJECTION, SUSPENSION INTRA-ARTICULAR; INTRALESIONAL; INTRAMUSCULAR; SOFT TISSUE at 14:47

## 2018-02-12 RX ADMIN — BUPIVACAINE HYDROCHLORIDE 9 ML: 5 INJECTION, SOLUTION EPIDURAL; INTRACAUDAL at 14:48

## 2018-02-12 RX ADMIN — LIDOCAINE HYDROCHLORIDE 3 ML: 10 INJECTION, SOLUTION EPIDURAL; INFILTRATION; INTRACAUDAL; PERINEURAL at 14:45

## 2018-02-12 ASSESSMENT — PAIN DESCRIPTION - DESCRIPTORS: DESCRIPTORS: ACHING;CONSTANT;RADIATING

## 2018-02-12 ASSESSMENT — PAIN - FUNCTIONAL ASSESSMENT: PAIN_FUNCTIONAL_ASSESSMENT: 0-10

## 2018-02-12 NOTE — PROCEDURES
DATE: 2/12/2018      REASON FOR VISIT: Principal Problem:    Lumbar disc disease with radiculopathy  Resolved Problems:    * No resolved hospital problems. *    Procedure:  Level of Consciousness: [x]Alert [x]Oriented []Disoriented []Lethargic  Anxiety Level: [x]Calm []Anxious []Depressed []Other  Skin: [x]Warm [x]Dry []Cool []Moist []Intact []Other  Cardiovascular: []Palpitations: [x]Never []Occasionally []Frequently  Chest Pain: [x]No []Yes  Respiratory:  [x]Unlabored []Labored []Cough ([] Productive []Unproductive)  HCG Required: [x]No []Yes   Results: []Negative []Positive  Knowledge Level:        [x]Patient/Other verbalized understanding of pre-procedure instructions. [x]Assessment of post-op care needs (transportation, responsible caregiver)        [x]Able to discuss health care problems and how to deal with it.   Factors that Affect Teaching:        Language Barrier: [x]No []Yes - why:        Hearing Loss:        [x]No []Yes            Corrective Device:  []Yes []No        Vision Loss:           []No [x]Yes            Corrective Device:  [x]Yes []No        Memory Loss:       [x]No []Yes            []Short Term []Long Term  Motivational Level:  [x]Asks Questions                  []Extremely Anxious       [x]Seems Interested               []Seems Uninterested                  [x]Denies need for Education  Risk for Injury:  [x]Patient oriented to person, place and time  [x]History of frequent falls/loss of balance  Nutritional:  []Change in appetite   []Weight Gain   []Weight Loss  Functional:  []Requires assistance with ADL'sNursing Admission Record     Current Issues / Falls / ER Visits:  No     Percentage of Pain Relief after Last Procedure:  80 %    How long lasted:  4 weeks     Radiology exams received during the last 12 months: Yes Myke Barcenas       [facility]                                              [facility]       Imaging on chart: Yes         Imaging records requested: Yes  MRI exams received in the past 2 years:  No  Physical therapy during the last 6 months: No       When: na                                             Where [facility]  Labs during the last 12 months: Yes     Education Provided:  [x] Review of Michelle Del Toro  [x] Agreement Review  [] Compliance Issues Discussed    [] Cognitive Behavior Needs [x] Exercise [] Review of Test [] Financial Issues  [] Tobacco/Alcohol Use [x] Teaching [] New Patient [] Picture Obtained    PROCEDURE: Lumbar Epidural Steroid Injection. [] Moderate Sedation    DESCRIPTION OF PROCEDURE:              After obtaining informed consent, the patient was taken to the procedure room, positioned prone, and sterilely prepped. The procedure was performed under fluoroscopic guidance. First 5 ml of 1% Xylocaine was used at L4 - 5 for local anesthesia. A 19-gauge [employer] needle was advanced to the epidural space. The was confirmed on the fluoroscope with an injection of [x] 2ml  [] ml Contrast.  Then, [x] 2.5ml of 0.25% Marcaine, [x] 1.5ml of Normal Saline, and [x] 80 mg of Depo Medrol was gently injected. There were no complications. DIAGNOSES:  [] Low Back Pain  [x] *Lumbar Radiculopathy  [x] *Lumbar Degenerative Disc Disease  [] *Lumbar Spinal Stenosis  [] *Lumbar Postlaminectomy Syndrome  [] Other      REASON FOR VISIT: Shoulder pain    PROCEDURE: Steroid Joint Injection    [] Moderate Sedation      [x] Fluoroscopy Used    DESCRIPTION OF PROCEDURE:    After obtaining informed consent, 9 ml of 0.5% Marcaine and 40 mg of Kenalog was injected using sterile technique in the [x] Left [] Right shoulder. [x]  In addition, 9 ml of 0.5% Marcaine and 40 mg of Kenalog was injected using sterile technique into the [] Left [x] Right Shoulder . There were no complications.     DIAGNOSES:  [x] Osteoarthritis   [] Left [] Right [x] Bilateral  []  Ankle [] Wrist [] Knee [x] Shoulder [] Hip [] Other    [] Trochanteric Bursitis  [] Intercostal Neuralgia    [] Other     PLAN:  [x] Will return to office in 3 month(s)  for [x] Planned Procedure [] Office Visit  [x] Prescriptions were given today    [] No prescriptions needed today  [x] Patient is to call with any questions or concerns which may arise prior to the next office visit. COMMENTS:    Left shoulder seems worsening more quickly                      [] Over 50% of today's appointment was given to discussion, evaluation and counseling.

## 2018-02-12 NOTE — PROGRESS NOTES
months: Yes    Education Provided:  [x] Review of Halle Aracelis  [x] Agreement Review  [] Compliance Issues Discussed    [] Cognitive Behavior Needs [x] Exercise [] Review of Test [] Financial Issues  [] Tobacco/Alcohol Use [x] Teaching [] New Patient [] Picture Obtained    Physician Plan:  [] Outgoing Referral  [] Pharmacy Consult  [] Test Ordered   [] Obtained Test Results / Consult Notes  [] UDS due at next visit, verified per EPIC      [] Suspected Physical Abuse or Suicide Risk assessed - IF YES COMPLETE QUESTIONS BELOW    If any of the following questions are answered yes - contact attending physician for referral:    Has been considering harming self to escape stress, pain problems? [] YES  [] NO  Has a suicide plan? [] YES  [] NO  Has attempted suicide in the past?   [] YES  [] NO  Has a close friend or family member who committed suicide? [] YES  [] NO    Patient Referred To :      Additional Notes:    Assessment Completed by:  Electronically signed by Handy Zamudio RN on 2/12/2018 at 1:41 PM

## 2018-02-26 ENCOUNTER — APPOINTMENT (OUTPATIENT)
Dept: GENERAL RADIOLOGY | Age: 78
End: 2018-02-26
Attending: FAMILY MEDICINE
Payer: MEDICARE

## 2018-02-26 ENCOUNTER — HOSPITAL ENCOUNTER (OUTPATIENT)
Age: 78
Setting detail: OBSERVATION
Discharge: HOME | End: 2018-03-03
Attending: FAMILY MEDICINE | Admitting: FAMILY MEDICINE
Payer: MEDICARE

## 2018-02-26 LAB
ALBUMIN SERPL-MCNC: 3 G/DL (ref 3.5–5.2)
ALP BLD-CCNC: 102 U/L (ref 40–130)
ALT SERPL-CCNC: 42 U/L (ref 5–41)
ANION GAP SERPL CALCULATED.3IONS-SCNC: 13 MMOL/L (ref 7–19)
AST SERPL-CCNC: 29 U/L (ref 5–40)
BACTERIA: NEGATIVE /HPF
BASE EXCESS ARTERIAL: 5.2 MMOL/L (ref -2–2)
BASOPHILS ABSOLUTE: 0 K/UL (ref 0–0.2)
BASOPHILS RELATIVE PERCENT: 0.3 % (ref 0–1)
BILIRUB SERPL-MCNC: 0.8 MG/DL (ref 0.2–1.2)
BILIRUBIN URINE: ABNORMAL
BLOOD, URINE: ABNORMAL
BUN BLDV-MCNC: 14 MG/DL (ref 8–23)
CALCIUM SERPL-MCNC: 8.9 MG/DL (ref 8.8–10.2)
CARBOXYHEMOGLOBIN ARTERIAL: 3.2 % (ref 0–5)
CHLORIDE BLD-SCNC: 99 MMOL/L (ref 98–111)
CLARITY: CLEAR
CO2: 27 MMOL/L (ref 22–29)
COLOR: ABNORMAL
CREAT SERPL-MCNC: 0.6 MG/DL (ref 0.5–1.2)
EOSINOPHILS ABSOLUTE: 0 K/UL (ref 0–0.6)
EOSINOPHILS RELATIVE PERCENT: 0.2 % (ref 0–5)
EPITHELIAL CELLS, UA: 2 /HPF (ref 0–5)
GFR NON-AFRICAN AMERICAN: >60
GLUCOSE BLD-MCNC: 123 MG/DL (ref 74–109)
GLUCOSE URINE: NEGATIVE MG/DL
HCO3 ARTERIAL: 29 MMOL/L (ref 22–26)
HCT VFR BLD CALC: 43 % (ref 42–52)
HEMOGLOBIN, ART, EXTENDED: 15.1 G/DL (ref 14–18)
HEMOGLOBIN: 14.3 G/DL (ref 14–18)
HYALINE CASTS: 10 /HPF (ref 0–8)
KETONES, URINE: NEGATIVE MG/DL
LEUKOCYTE ESTERASE, URINE: NEGATIVE
LYMPHOCYTES ABSOLUTE: 1.3 K/UL (ref 1.1–4.5)
LYMPHOCYTES RELATIVE PERCENT: 12.1 % (ref 20–40)
MCH RBC QN AUTO: 32.1 PG (ref 27–31)
MCHC RBC AUTO-ENTMCNC: 33.3 G/DL (ref 33–37)
MCV RBC AUTO: 96.6 FL (ref 80–94)
METHEMOGLOBIN ARTERIAL: 1.1 %
MONOCYTES ABSOLUTE: 1.5 K/UL (ref 0–0.9)
MONOCYTES RELATIVE PERCENT: 13.9 % (ref 0–10)
NEUTROPHILS ABSOLUTE: 7.7 K/UL (ref 1.5–7.5)
NEUTROPHILS RELATIVE PERCENT: 73.2 % (ref 50–65)
NITRITE, URINE: NEGATIVE
O2 CONTENT ARTERIAL: 19.5 ML/DL
O2 SAT, ARTERIAL: 91.8 %
O2 THERAPY: ABNORMAL
PCO2 ARTERIAL: 39 MMHG (ref 35–45)
PDW BLD-RTO: 12.5 % (ref 11.5–14.5)
PH ARTERIAL: 7.48 (ref 7.35–7.45)
PH UA: 5
PLATELET # BLD: 159 K/UL (ref 130–400)
PLATELET SLIDE REVIEW: ADEQUATE
PMV BLD AUTO: 11.7 FL (ref 9.4–12.4)
PO2 ARTERIAL: 69 MMHG (ref 80–100)
POTASSIUM SERPL-SCNC: 3.8 MMOL/L (ref 3.5–5)
POTASSIUM, WHOLE BLOOD: 3.7
PROTEIN UA: 30 MG/DL
RAPID INFLUENZA  B AGN: NEGATIVE
RAPID INFLUENZA A AGN: NEGATIVE
RBC # BLD: 4.45 M/UL (ref 4.7–6.1)
RBC UA: 5 /HPF (ref 0–4)
SODIUM BLD-SCNC: 139 MMOL/L (ref 136–145)
SPECIFIC GRAVITY UA: 1.03
TOTAL PROTEIN: 7.1 G/DL (ref 6.6–8.7)
URINE REFLEX TO CULTURE: ABNORMAL
UROBILINOGEN, URINE: 1 E.U./DL
WBC # BLD: 10.6 K/UL (ref 4.8–10.8)
WBC UA: 3 /HPF (ref 0–5)

## 2018-02-26 PROCEDURE — 80053 COMPREHEN METABOLIC PANEL: CPT

## 2018-02-26 PROCEDURE — 87804 INFLUENZA ASSAY W/OPTIC: CPT

## 2018-02-26 PROCEDURE — 2580000003 HC RX 258: Performed by: FAMILY MEDICINE

## 2018-02-26 PROCEDURE — 6370000000 HC RX 637 (ALT 250 FOR IP): Performed by: FAMILY MEDICINE

## 2018-02-26 PROCEDURE — 1210000000 HC MED SURG R&B

## 2018-02-26 PROCEDURE — 36415 COLL VENOUS BLD VENIPUNCTURE: CPT

## 2018-02-26 PROCEDURE — 85025 COMPLETE CBC W/AUTO DIFF WBC: CPT

## 2018-02-26 PROCEDURE — 36600 WITHDRAWAL OF ARTERIAL BLOOD: CPT

## 2018-02-26 PROCEDURE — 71046 X-RAY EXAM CHEST 2 VIEWS: CPT

## 2018-02-26 PROCEDURE — 82803 BLOOD GASES ANY COMBINATION: CPT

## 2018-02-26 PROCEDURE — 84132 ASSAY OF SERUM POTASSIUM: CPT

## 2018-02-26 PROCEDURE — 6360000002 HC RX W HCPCS: Performed by: FAMILY MEDICINE

## 2018-02-26 PROCEDURE — 81001 URINALYSIS AUTO W/SCOPE: CPT

## 2018-02-26 PROCEDURE — 87040 BLOOD CULTURE FOR BACTERIA: CPT

## 2018-02-26 PROCEDURE — 94640 AIRWAY INHALATION TREATMENT: CPT

## 2018-02-26 RX ORDER — IPRATROPIUM BROMIDE AND ALBUTEROL SULFATE 2.5; .5 MG/3ML; MG/3ML
1 SOLUTION RESPIRATORY (INHALATION)
Status: DISCONTINUED | OUTPATIENT
Start: 2018-02-26 | End: 2018-02-28

## 2018-02-26 RX ORDER — HYDROCODONE BITARTRATE AND ACETAMINOPHEN 5; 325 MG/1; MG/1
1 TABLET ORAL EVERY 6 HOURS PRN
Status: DISCONTINUED | OUTPATIENT
Start: 2018-02-26 | End: 2018-03-03 | Stop reason: HOSPADM

## 2018-02-26 RX ORDER — METHYLPREDNISOLONE SODIUM SUCCINATE 40 MG/ML
40 INJECTION, POWDER, LYOPHILIZED, FOR SOLUTION INTRAMUSCULAR; INTRAVENOUS EVERY 6 HOURS
Status: DISCONTINUED | OUTPATIENT
Start: 2018-02-26 | End: 2018-03-02

## 2018-02-26 RX ORDER — ACETAMINOPHEN 325 MG/1
650 TABLET ORAL EVERY 4 HOURS PRN
Status: DISCONTINUED | OUTPATIENT
Start: 2018-02-26 | End: 2018-03-03 | Stop reason: HOSPADM

## 2018-02-26 RX ADMIN — IPRATROPIUM BROMIDE AND ALBUTEROL SULFATE 1 AMPULE: .5; 3 SOLUTION RESPIRATORY (INHALATION) at 19:47

## 2018-02-26 RX ADMIN — Medication 1 G: at 22:00

## 2018-02-26 RX ADMIN — HYDROCODONE BITARTRATE AND ACETAMINOPHEN 1 TABLET: 5; 325 TABLET ORAL at 22:00

## 2018-02-26 RX ADMIN — METHYLPREDNISOLONE SODIUM SUCCINATE 40 MG: 40 INJECTION, POWDER, FOR SOLUTION INTRAMUSCULAR; INTRAVENOUS at 22:12

## 2018-02-26 RX ADMIN — ENOXAPARIN SODIUM 40 MG: 40 INJECTION SUBCUTANEOUS at 22:00

## 2018-02-26 RX ADMIN — AZITHROMYCIN MONOHYDRATE 500 MG: 500 INJECTION, POWDER, LYOPHILIZED, FOR SOLUTION INTRAVENOUS at 22:01

## 2018-02-26 ASSESSMENT — PAIN SCALES - GENERAL
PAINLEVEL_OUTOF10: 9
PAINLEVEL_OUTOF10: 0
PAINLEVEL_OUTOF10: 0

## 2018-02-27 PROCEDURE — 96372 THER/PROPH/DIAG INJ SC/IM: CPT

## 2018-02-27 PROCEDURE — 6370000000 HC RX 637 (ALT 250 FOR IP): Performed by: FAMILY MEDICINE

## 2018-02-27 PROCEDURE — G0378 HOSPITAL OBSERVATION PER HR: HCPCS

## 2018-02-27 PROCEDURE — 2580000003 HC RX 258: Performed by: FAMILY MEDICINE

## 2018-02-27 PROCEDURE — 94640 AIRWAY INHALATION TREATMENT: CPT

## 2018-02-27 PROCEDURE — 6360000002 HC RX W HCPCS: Performed by: FAMILY MEDICINE

## 2018-02-27 PROCEDURE — 96376 TX/PRO/DX INJ SAME DRUG ADON: CPT

## 2018-02-27 PROCEDURE — 96375 TX/PRO/DX INJ NEW DRUG ADDON: CPT

## 2018-02-27 PROCEDURE — 96365 THER/PROPH/DIAG IV INF INIT: CPT

## 2018-02-27 RX ORDER — GUAIFENESIN 600 MG/1
1200 TABLET, EXTENDED RELEASE ORAL 2 TIMES DAILY
Status: DISCONTINUED | OUTPATIENT
Start: 2018-02-27 | End: 2018-03-03 | Stop reason: HOSPADM

## 2018-02-27 RX ADMIN — HYDROCODONE BITARTRATE AND ACETAMINOPHEN 1 TABLET: 5; 325 TABLET ORAL at 08:04

## 2018-02-27 RX ADMIN — METHYLPREDNISOLONE SODIUM SUCCINATE 40 MG: 40 INJECTION, POWDER, FOR SOLUTION INTRAMUSCULAR; INTRAVENOUS at 14:26

## 2018-02-27 RX ADMIN — AZITHROMYCIN MONOHYDRATE 500 MG: 500 INJECTION, POWDER, LYOPHILIZED, FOR SOLUTION INTRAVENOUS at 19:59

## 2018-02-27 RX ADMIN — IPRATROPIUM BROMIDE AND ALBUTEROL SULFATE 1 AMPULE: .5; 3 SOLUTION RESPIRATORY (INHALATION) at 19:58

## 2018-02-27 RX ADMIN — HYDROCODONE BITARTRATE AND ACETAMINOPHEN 1 TABLET: 5; 325 TABLET ORAL at 14:26

## 2018-02-27 RX ADMIN — METHYLPREDNISOLONE SODIUM SUCCINATE 40 MG: 40 INJECTION, POWDER, FOR SOLUTION INTRAMUSCULAR; INTRAVENOUS at 08:04

## 2018-02-27 RX ADMIN — Medication 1 G: at 19:54

## 2018-02-27 RX ADMIN — IPRATROPIUM BROMIDE AND ALBUTEROL SULFATE 1 AMPULE: .5; 3 SOLUTION RESPIRATORY (INHALATION) at 15:21

## 2018-02-27 RX ADMIN — GUAIFENESIN 1200 MG: 600 TABLET, EXTENDED RELEASE ORAL at 19:54

## 2018-02-27 RX ADMIN — METHYLPREDNISOLONE SODIUM SUCCINATE 40 MG: 40 INJECTION, POWDER, FOR SOLUTION INTRAMUSCULAR; INTRAVENOUS at 19:55

## 2018-02-27 RX ADMIN — HYDROCODONE BITARTRATE AND ACETAMINOPHEN 1 TABLET: 5; 325 TABLET ORAL at 20:07

## 2018-02-27 RX ADMIN — IPRATROPIUM BROMIDE AND ALBUTEROL SULFATE 1 AMPULE: .5; 3 SOLUTION RESPIRATORY (INHALATION) at 07:32

## 2018-02-27 RX ADMIN — METHYLPREDNISOLONE SODIUM SUCCINATE 40 MG: 40 INJECTION, POWDER, FOR SOLUTION INTRAMUSCULAR; INTRAVENOUS at 02:34

## 2018-02-27 RX ADMIN — IPRATROPIUM BROMIDE AND ALBUTEROL SULFATE 1 AMPULE: .5; 3 SOLUTION RESPIRATORY (INHALATION) at 10:50

## 2018-02-27 RX ADMIN — ENOXAPARIN SODIUM 40 MG: 40 INJECTION SUBCUTANEOUS at 19:54

## 2018-02-27 ASSESSMENT — PAIN SCALES - GENERAL
PAINLEVEL_OUTOF10: 0
PAINLEVEL_OUTOF10: 6
PAINLEVEL_OUTOF10: 7
PAINLEVEL_OUTOF10: 0
PAINLEVEL_OUTOF10: 6
PAINLEVEL_OUTOF10: 7

## 2018-02-27 ASSESSMENT — PAIN DESCRIPTION - PROGRESSION: CLINICAL_PROGRESSION: NOT CHANGED

## 2018-02-27 NOTE — PROGRESS NOTES
Patient arrived to room 322. Alert and oriented. Call light within reach.  Will notify dr. Mk Mejía on patient's arrival.

## 2018-02-27 NOTE — PLAN OF CARE
Problem: Safety:  Goal: Free from accidental physical injury  Free from accidental physical injury   Outcome: Ongoing    Goal: Free from intentional harm  Free from intentional harm   Outcome: Ongoing

## 2018-02-28 LAB
ANION GAP SERPL CALCULATED.3IONS-SCNC: 13 MMOL/L (ref 7–19)
BASOPHILS ABSOLUTE: 0 K/UL (ref 0–0.2)
BASOPHILS RELATIVE PERCENT: 0.1 % (ref 0–1)
BUN BLDV-MCNC: 22 MG/DL (ref 8–23)
CALCIUM SERPL-MCNC: 9.2 MG/DL (ref 8.8–10.2)
CHLORIDE BLD-SCNC: 97 MMOL/L (ref 98–111)
CO2: 31 MMOL/L (ref 22–29)
CREAT SERPL-MCNC: 0.8 MG/DL (ref 0.5–1.2)
EOSINOPHILS ABSOLUTE: 0 K/UL (ref 0–0.6)
EOSINOPHILS RELATIVE PERCENT: 0 % (ref 0–5)
GFR NON-AFRICAN AMERICAN: >60
GLUCOSE BLD-MCNC: 145 MG/DL (ref 74–109)
HCT VFR BLD CALC: 41.4 % (ref 42–52)
HEMOGLOBIN: 13.7 G/DL (ref 14–18)
LYMPHOCYTES ABSOLUTE: 0.9 K/UL (ref 1.1–4.5)
LYMPHOCYTES RELATIVE PERCENT: 10.4 % (ref 20–40)
MCH RBC QN AUTO: 32.1 PG (ref 27–31)
MCHC RBC AUTO-ENTMCNC: 33.1 G/DL (ref 33–37)
MCV RBC AUTO: 97 FL (ref 80–94)
MONOCYTES ABSOLUTE: 0.5 K/UL (ref 0–0.9)
MONOCYTES RELATIVE PERCENT: 5.6 % (ref 0–10)
NEUTROPHILS ABSOLUTE: 7 K/UL (ref 1.5–7.5)
NEUTROPHILS RELATIVE PERCENT: 83.3 % (ref 50–65)
PDW BLD-RTO: 12.4 % (ref 11.5–14.5)
PLATELET # BLD: 176 K/UL (ref 130–400)
PMV BLD AUTO: 11 FL (ref 9.4–12.4)
POTASSIUM SERPL-SCNC: 3.5 MMOL/L (ref 3.5–5)
RBC # BLD: 4.27 M/UL (ref 4.7–6.1)
SODIUM BLD-SCNC: 141 MMOL/L (ref 136–145)
WBC # BLD: 8.4 K/UL (ref 4.8–10.8)

## 2018-02-28 PROCEDURE — 85025 COMPLETE CBC W/AUTO DIFF WBC: CPT

## 2018-02-28 PROCEDURE — 96372 THER/PROPH/DIAG INJ SC/IM: CPT

## 2018-02-28 PROCEDURE — 6360000002 HC RX W HCPCS: Performed by: FAMILY MEDICINE

## 2018-02-28 PROCEDURE — 6370000000 HC RX 637 (ALT 250 FOR IP): Performed by: FAMILY MEDICINE

## 2018-02-28 PROCEDURE — 96366 THER/PROPH/DIAG IV INF ADDON: CPT

## 2018-02-28 PROCEDURE — 6360000002 HC RX W HCPCS: Performed by: NURSE PRACTITIONER

## 2018-02-28 PROCEDURE — 2580000003 HC RX 258: Performed by: FAMILY MEDICINE

## 2018-02-28 PROCEDURE — 94640 AIRWAY INHALATION TREATMENT: CPT

## 2018-02-28 PROCEDURE — 6370000000 HC RX 637 (ALT 250 FOR IP): Performed by: NURSE PRACTITIONER

## 2018-02-28 PROCEDURE — 36415 COLL VENOUS BLD VENIPUNCTURE: CPT

## 2018-02-28 PROCEDURE — G0378 HOSPITAL OBSERVATION PER HR: HCPCS

## 2018-02-28 PROCEDURE — 96376 TX/PRO/DX INJ SAME DRUG ADON: CPT

## 2018-02-28 PROCEDURE — 80048 BASIC METABOLIC PNL TOTAL CA: CPT

## 2018-02-28 RX ORDER — LEVALBUTEROL INHALATION SOLUTION 1.25 MG/3ML
1.25 SOLUTION RESPIRATORY (INHALATION) EVERY 6 HOURS
Status: DISCONTINUED | OUTPATIENT
Start: 2018-02-28 | End: 2018-03-03 | Stop reason: HOSPADM

## 2018-02-28 RX ADMIN — Medication 1 G: at 20:55

## 2018-02-28 RX ADMIN — HYDROCODONE BITARTRATE AND ACETAMINOPHEN 1 TABLET: 5; 325 TABLET ORAL at 14:55

## 2018-02-28 RX ADMIN — AZITHROMYCIN MONOHYDRATE 500 MG: 500 INJECTION, POWDER, LYOPHILIZED, FOR SOLUTION INTRAVENOUS at 21:00

## 2018-02-28 RX ADMIN — HYDROCODONE BITARTRATE AND ACETAMINOPHEN 1 TABLET: 5; 325 TABLET ORAL at 20:59

## 2018-02-28 RX ADMIN — METHYLPREDNISOLONE SODIUM SUCCINATE 40 MG: 40 INJECTION, POWDER, FOR SOLUTION INTRAMUSCULAR; INTRAVENOUS at 14:55

## 2018-02-28 RX ADMIN — GUAIFENESIN 1200 MG: 600 TABLET, EXTENDED RELEASE ORAL at 08:42

## 2018-02-28 RX ADMIN — HYDROCODONE BITARTRATE AND ACETAMINOPHEN 1 TABLET: 5; 325 TABLET ORAL at 08:42

## 2018-02-28 RX ADMIN — LEVALBUTEROL HYDROCHLORIDE 1.25 MG: 1.25 SOLUTION RESPIRATORY (INHALATION) at 19:36

## 2018-02-28 RX ADMIN — METHYLPREDNISOLONE SODIUM SUCCINATE 40 MG: 40 INJECTION, POWDER, FOR SOLUTION INTRAMUSCULAR; INTRAVENOUS at 08:43

## 2018-02-28 RX ADMIN — GUAIFENESIN 1200 MG: 600 TABLET, EXTENDED RELEASE ORAL at 20:56

## 2018-02-28 RX ADMIN — ENOXAPARIN SODIUM 40 MG: 40 INJECTION SUBCUTANEOUS at 20:56

## 2018-02-28 RX ADMIN — IPRATROPIUM BROMIDE AND ALBUTEROL SULFATE 1 AMPULE: .5; 3 SOLUTION RESPIRATORY (INHALATION) at 06:24

## 2018-02-28 RX ADMIN — METHYLPREDNISOLONE SODIUM SUCCINATE 40 MG: 40 INJECTION, POWDER, FOR SOLUTION INTRAMUSCULAR; INTRAVENOUS at 01:52

## 2018-02-28 RX ADMIN — MOMETASONE FUROATE AND FORMOTEROL FUMARATE DIHYDRATE 2 PUFF: 200; 5 AEROSOL RESPIRATORY (INHALATION) at 20:56

## 2018-02-28 RX ADMIN — IPRATROPIUM BROMIDE AND ALBUTEROL SULFATE 1 AMPULE: .5; 3 SOLUTION RESPIRATORY (INHALATION) at 10:45

## 2018-02-28 RX ADMIN — METHYLPREDNISOLONE SODIUM SUCCINATE 40 MG: 40 INJECTION, POWDER, FOR SOLUTION INTRAMUSCULAR; INTRAVENOUS at 20:56

## 2018-02-28 RX ADMIN — LEVALBUTEROL HYDROCHLORIDE 1.25 MG: 1.25 SOLUTION RESPIRATORY (INHALATION) at 15:07

## 2018-02-28 ASSESSMENT — ENCOUNTER SYMPTOMS
NAUSEA: 0
COUGH: 1
CONSTIPATION: 0
ABDOMINAL PAIN: 0
SHORTNESS OF BREATH: 1
DIARRHEA: 0
DOUBLE VISION: 0
WHEEZING: 1
BLURRED VISION: 0
VOMITING: 0

## 2018-02-28 ASSESSMENT — PAIN SCALES - GENERAL
PAINLEVEL_OUTOF10: 6
PAINLEVEL_OUTOF10: 0

## 2018-02-28 NOTE — PROGRESS NOTES
Progress Note  Jackie Cohen  2/28/2018 12:02 PM  Subjective:   Admit Date:   2/26/2018      CC/ADMIT DX:       Interval History:   Reviewed overnight events and nursing notes. He is still c/o SOA. No CP. He has a more productive cough. I have reviewed all labs/diagnostics from the last 24hrs. ROS:   I have done a 10 point ROS and all are negative, except what is mentioned in the HPI. DIET GENERAL;    Medications:      guaiFENesin  1,200 mg Oral BID    methylPREDNISolone  40 mg Intravenous Q6H    cefTRIAXone (ROCEPHIN) IV  1 g Intravenous Q24H    azithromycin  500 mg Intravenous Q24H    enoxaparin  40 mg Subcutaneous Nightly    ipratropium-albuterol  1 ampule Inhalation Q4H WA           Objective:   Vitals: /72   Pulse 86   Temp 97.9 °F (36.6 °C) (Temporal)   Resp 16   SpO2 93%    Intake/Output Summary (Last 24 hours) at 02/28/18 1202  Last data filed at 02/28/18 0744   Gross per 24 hour   Intake              830 ml   Output                0 ml   Net              830 ml     General appearance: alert and cooperative with exam  Lungs: wheezing throughout  Heart: RRR  Abdomen: soft, non-tender; bowel sounds normal; no masses,  no organomegaly  Extremities: extremities normal, atraumatic, no cyanosis or edema  Neurologic:  No obvious focal neurologic deficits. Assessment and Plan:   COPD Exac  Acute Bronchitis      Plan:  1. Continue present medication(s)   2. Continue current care  3. Ask Pulmonology to see patient in consultation  4. Increase activity      Discharge planning:  home     Reviewed treatment plans with the patient and/or family.              Electronically signed by Pavithra Babb MD on 2/28/2018 at 12:02 PM

## 2018-02-28 NOTE — CONSULTS
content normal.   Nursing note and vitals reviewed. Recent Labs      02/26/18 1937 02/28/18 0420   WBC  10.6  8.4   RBC  4.45*  4.27*   HGB  14.3  13.7*   HCT  43.0  41.4*   PLT  159  176   MCV  96.6*  97.0*   MCH  32.1*  32.1*   MCHC  33.3  33.1   RDW  12.5  12.4      Recent Labs      02/26/18 1937 02/26/18 1942 02/28/18 0420   NA  139   --   141   K  3.8  3.7  3.5   CL  99   --   97*   CO2  27   --   31*   BUN  14   --   22   CREATININE  0.6   --   0.8   CALCIUM  8.9   --   9.2   GLUCOSE  123*   --   145*      Recent Labs      02/26/18 1942   PHART  7.480*   [id number]  39.0   PO2ART  69.0*   [id number]  29.0*   [id number]  91.8   BEART  5.2*     Recent Labs      02/26/18 1937 02/28/18 0420   AST  29   --    ALT  42*   --    ALKPHOS  102   --    BILITOT  0.8   --    CALCIUM  8.9  9.2     Recent Labs      02/26/18 1936   BC  No Growth to date. Any change in status will be called. Radiograph:  XR CHEST (2 VW) 2/26/2018 6:00 PM   HISTORY: Short of air cough   COMPARISON: None. FINDINGS:    Hyperinflation flattening diaphragms noted. Mild chronic interstitial   changes present. . The cardiomediastinal silhouette and pulmonary   vascularity are within normal limits. Vascular calcifications noted in   the aortic arch The osseous structures and surrounding soft tissues   demonstrate no acute abnormality.       Impression   1. Mild changes COPD no evidence of active disease.    Signed by Dr Vianey Jarvis on 2/26/2018 7:28 PM     My radiograph interpretation/independent review of imaging: No infiltrates, hyperinflation  Other test results (not lab or imaging): none   Independent review of ekg: none   Patient Active Problem List   Diagnosis    Bilateral low back pain without sciatica    Bilateral shoulder pain    Bilateral carotid artery stenosis    Lumbar disc disease    Lumbar disc disease with radiculopathy    Lumbar disc disease with radiculopathy    Lumbar disc disease with radiculopathy  Lumbar disc disease with radiculopathy    Lumbar disc disease with radiculopathy    AAA (abdominal aortic aneurysm) (HCC)    Carotid artery stenosis    Displacement of lumbar disc with radiculopathy    Lumbar disc disease with radiculopathy    Dyspnea     Pulmonary Assessment:  New problem (to me), with additional workup planned: bronchospasm, probable COPD AE, spirometry unknown   New problem (to me), no additional workup planned: abdominal aortic aneurysm  Other problems either stable, failing to improve or worsenin. Tobacco abuse     Recommend/plan:   · Continue zithromax/rocephin  · Continue solumedrol 40mg IV q6hr  · Will change albuterol to xopenex, patient states he does not think albuterol is helping  · Continue mucinex   · Respiratory cx pending  · BC no growth to date noted, follow  · Add IS/flutter  · Add dulera   · Plan for outpatient complete PFTs pre and post bronchodilator     Electronically signed by Abdelrahman Palomo on 18 at 2:56 PM    Physician's substantive portion:  Subjective: Pt admitted with wheezing and cough. Says he has never had lung problems until now. Hx smoking. Says no benefit from aerosol nebs  Objective: ill appearing, wheezing, no rhonchi  Assessment/recommendation: change to xopenex, possibly sensitive to benzalkonium chloride preservative in other aerosols. Continue iv steroids. Need to consider other dx including fixed or central airway obstruction, chf, although currently no clear evidence for these diagnoses. An older CT chest 17 is reviewed with the following findings:  1. Multiple subcentimeter pulmonary nodules. Imaging follow-up   suggested. 2. Remote granulomatous disease. 3. Probable chronic changes in the lung apices. 4. 2 cm subcutaneous soft tissue density mass medial left breast.   Correlate with physical findings. 5. Multiple low-attenuation hepatic lesions. Differential   considerations could small cysts or hemangiomas.    6.

## 2018-02-28 NOTE — PROGRESS NOTES
Patient's home medications of 81mg daily aspirin and 80mg Zocor have not been added to [facility]. Can these be added?

## 2018-03-01 PROCEDURE — 6360000002 HC RX W HCPCS: Performed by: NURSE PRACTITIONER

## 2018-03-01 PROCEDURE — 2580000003 HC RX 258: Performed by: FAMILY MEDICINE

## 2018-03-01 PROCEDURE — 6360000002 HC RX W HCPCS: Performed by: FAMILY MEDICINE

## 2018-03-01 PROCEDURE — 96366 THER/PROPH/DIAG IV INF ADDON: CPT

## 2018-03-01 PROCEDURE — 94640 AIRWAY INHALATION TREATMENT: CPT

## 2018-03-01 PROCEDURE — 96372 THER/PROPH/DIAG INJ SC/IM: CPT

## 2018-03-01 PROCEDURE — G0378 HOSPITAL OBSERVATION PER HR: HCPCS

## 2018-03-01 PROCEDURE — 6370000000 HC RX 637 (ALT 250 FOR IP): Performed by: FAMILY MEDICINE

## 2018-03-01 PROCEDURE — 96376 TX/PRO/DX INJ SAME DRUG ADON: CPT

## 2018-03-01 PROCEDURE — 94664 DEMO&/EVAL PT USE INHALER: CPT

## 2018-03-01 RX ADMIN — MOMETASONE FUROATE AND FORMOTEROL FUMARATE DIHYDRATE 2 PUFF: 200; 5 AEROSOL RESPIRATORY (INHALATION) at 21:05

## 2018-03-01 RX ADMIN — GUAIFENESIN 1200 MG: 600 TABLET, EXTENDED RELEASE ORAL at 21:05

## 2018-03-01 RX ADMIN — IPRATROPIUM BROMIDE 0.5 MG: 0.5 SOLUTION RESPIRATORY (INHALATION) at 20:30

## 2018-03-01 RX ADMIN — IPRATROPIUM BROMIDE 0.5 MG: 0.5 SOLUTION RESPIRATORY (INHALATION) at 15:20

## 2018-03-01 RX ADMIN — IPRATROPIUM BROMIDE 0.5 MG: 0.5 SOLUTION RESPIRATORY (INHALATION) at 02:26

## 2018-03-01 RX ADMIN — Medication 1 G: at 21:19

## 2018-03-01 RX ADMIN — METHYLPREDNISOLONE SODIUM SUCCINATE 40 MG: 40 INJECTION, POWDER, FOR SOLUTION INTRAMUSCULAR; INTRAVENOUS at 13:09

## 2018-03-01 RX ADMIN — HYDROCODONE BITARTRATE AND ACETAMINOPHEN 1 TABLET: 5; 325 TABLET ORAL at 14:56

## 2018-03-01 RX ADMIN — LEVALBUTEROL HYDROCHLORIDE 1.25 MG: 1.25 SOLUTION RESPIRATORY (INHALATION) at 07:31

## 2018-03-01 RX ADMIN — METHYLPREDNISOLONE SODIUM SUCCINATE 40 MG: 40 INJECTION, POWDER, FOR SOLUTION INTRAMUSCULAR; INTRAVENOUS at 21:04

## 2018-03-01 RX ADMIN — LEVALBUTEROL HYDROCHLORIDE 1.25 MG: 1.25 SOLUTION RESPIRATORY (INHALATION) at 15:20

## 2018-03-01 RX ADMIN — METHYLPREDNISOLONE SODIUM SUCCINATE 40 MG: 40 INJECTION, POWDER, FOR SOLUTION INTRAMUSCULAR; INTRAVENOUS at 02:28

## 2018-03-01 RX ADMIN — AZITHROMYCIN MONOHYDRATE 500 MG: 500 INJECTION, POWDER, LYOPHILIZED, FOR SOLUTION INTRAVENOUS at 21:31

## 2018-03-01 RX ADMIN — ENOXAPARIN SODIUM 40 MG: 40 INJECTION SUBCUTANEOUS at 21:04

## 2018-03-01 RX ADMIN — HYDROCODONE BITARTRATE AND ACETAMINOPHEN 1 TABLET: 5; 325 TABLET ORAL at 21:19

## 2018-03-01 RX ADMIN — LEVALBUTEROL HYDROCHLORIDE 1.25 MG: 1.25 SOLUTION RESPIRATORY (INHALATION) at 02:26

## 2018-03-01 RX ADMIN — HYDROCODONE BITARTRATE AND ACETAMINOPHEN 1 TABLET: 5; 325 TABLET ORAL at 08:22

## 2018-03-01 RX ADMIN — GUAIFENESIN 1200 MG: 600 TABLET, EXTENDED RELEASE ORAL at 08:17

## 2018-03-01 RX ADMIN — IPRATROPIUM BROMIDE 0.5 MG: 0.5 SOLUTION RESPIRATORY (INHALATION) at 07:30

## 2018-03-01 RX ADMIN — LEVALBUTEROL HYDROCHLORIDE 1.25 MG: 1.25 SOLUTION RESPIRATORY (INHALATION) at 20:30

## 2018-03-01 RX ADMIN — MOMETASONE FUROATE AND FORMOTEROL FUMARATE DIHYDRATE 2 PUFF: 200; 5 AEROSOL RESPIRATORY (INHALATION) at 08:17

## 2018-03-01 RX ADMIN — METHYLPREDNISOLONE SODIUM SUCCINATE 40 MG: 40 INJECTION, POWDER, FOR SOLUTION INTRAMUSCULAR; INTRAVENOUS at 08:18

## 2018-03-01 ASSESSMENT — PAIN SCALES - GENERAL
PAINLEVEL_OUTOF10: 6
PAINLEVEL_OUTOF10: 2
PAINLEVEL_OUTOF10: 6
PAINLEVEL_OUTOF10: 6

## 2018-03-01 ASSESSMENT — ENCOUNTER SYMPTOMS
SPUTUM PRODUCTION: 0
SHORTNESS OF BREATH: 1

## 2018-03-01 NOTE — PROGRESS NOTES
Pulmonary and Critical Care Progress Note 400 [facility]    Patient: Adilia Deutsch  1940   MR# [id number]   Acct# [de-identified]  03/01/18   11:54 AM  Referring Provider: Francois Yusuf MD  Problem list:   Patient Active Problem List   Diagnosis    Bilateral low back pain without sciatica    Bilateral shoulder pain    Bilateral carotid artery stenosis    Lumbar disc disease    Lumbar disc disease with radiculopathy    Lumbar disc disease with radiculopathy    Lumbar disc disease with radiculopathy    Lumbar disc disease with radiculopathy    Lumbar disc disease with radiculopathy    AAA (abdominal aortic aneurysm) ([facility] [address]    Carotid artery stenosis    Displacement of lumbar disc with radiculopathy    Lumbar disc disease with radiculopathy    Dyspnea     Chief Complaint: shortness of breath  Interval history: Patient complains of moderate shortness of breath in the mid chest, left chest and right chest for 2 days, aggravated by exertion and alleviated by rest, and associated with wheezing. Otilia [place] has been helpful.      levalbuterol 1.25 mg Nebulization Q6H   ipratropium 0.5 mg Nebulization Q6H   mometasone-formoterol 2 puff Inhalation BID   guaiFENesin 1,200 mg Oral BID   methylPREDNISolone 40 mg Intravenous Q6H   cefTRIAXone (ROCEPHIN) IV 1 g Intravenous Q24H   azithromycin 500 mg Intravenous Q24H   enoxaparin 40 mg Subcutaneous Nightly       Review of Systems:   Review of Systems   Constitutional: Negative for chills, fever and malaise/fatigue. Respiratory: Positive for shortness of breath. Negative for sputum production. Genitourinary: Negative for dysuria. Physical Exam:  Blood pressure 119/63, pulse 68, temperature 97.6 °F (36.4 °C), resp. rate 18, SpO2 96 %.   Intake/Output Summary (Last 24 hours) at 03/01/18 1154  Last data filed at 03/01/18 0902   Gross per 24 hour   Intake              630 ml   Output                0 ml   Net              630 ml     Physical Exam

## 2018-03-01 NOTE — PLAN OF CARE
Problem: Safety:  Goal: Free from accidental physical injury  Free from accidental physical injury   Outcome: Ongoing    Goal: Free from intentional harm  Free from intentional harm   Outcome: Ongoing      Problem: Falls - Risk of  Goal: Absence of falls  Outcome: Ongoing

## 2018-03-02 PROCEDURE — 96376 TX/PRO/DX INJ SAME DRUG ADON: CPT

## 2018-03-02 PROCEDURE — 6360000002 HC RX W HCPCS: Performed by: FAMILY MEDICINE

## 2018-03-02 PROCEDURE — 6370000000 HC RX 637 (ALT 250 FOR IP): Performed by: FAMILY MEDICINE

## 2018-03-02 PROCEDURE — 2580000003 HC RX 258: Performed by: FAMILY MEDICINE

## 2018-03-02 PROCEDURE — 6370000000 HC RX 637 (ALT 250 FOR IP): Performed by: INTERNAL MEDICINE

## 2018-03-02 PROCEDURE — 6360000002 HC RX W HCPCS: Performed by: INTERNAL MEDICINE

## 2018-03-02 PROCEDURE — 96372 THER/PROPH/DIAG INJ SC/IM: CPT

## 2018-03-02 PROCEDURE — G0378 HOSPITAL OBSERVATION PER HR: HCPCS

## 2018-03-02 PROCEDURE — 94640 AIRWAY INHALATION TREATMENT: CPT

## 2018-03-02 PROCEDURE — 6360000002 HC RX W HCPCS: Performed by: NURSE PRACTITIONER

## 2018-03-02 RX ORDER — AZITHROMYCIN 250 MG/1
250 TABLET, FILM COATED ORAL DAILY
Status: DISCONTINUED | OUTPATIENT
Start: 2018-03-02 | End: 2018-03-03

## 2018-03-02 RX ORDER — METHYLPREDNISOLONE SODIUM SUCCINATE 40 MG/ML
40 INJECTION, POWDER, LYOPHILIZED, FOR SOLUTION INTRAMUSCULAR; INTRAVENOUS EVERY 12 HOURS
Status: DISCONTINUED | OUTPATIENT
Start: 2018-03-02 | End: 2018-03-03

## 2018-03-02 RX ADMIN — GUAIFENESIN 1200 MG: 600 TABLET, EXTENDED RELEASE ORAL at 20:50

## 2018-03-02 RX ADMIN — GUAIFENESIN 1200 MG: 600 TABLET, EXTENDED RELEASE ORAL at 08:16

## 2018-03-02 RX ADMIN — HYDROCODONE BITARTRATE AND ACETAMINOPHEN 1 TABLET: 5; 325 TABLET ORAL at 08:15

## 2018-03-02 RX ADMIN — METHYLPREDNISOLONE SODIUM SUCCINATE 40 MG: 40 INJECTION, POWDER, FOR SOLUTION INTRAMUSCULAR; INTRAVENOUS at 08:16

## 2018-03-02 RX ADMIN — HYDROCODONE BITARTRATE AND ACETAMINOPHEN 1 TABLET: 5; 325 TABLET ORAL at 21:18

## 2018-03-02 RX ADMIN — ENOXAPARIN SODIUM 40 MG: 40 INJECTION SUBCUTANEOUS at 20:50

## 2018-03-02 RX ADMIN — Medication 1 G: at 19:54

## 2018-03-02 RX ADMIN — LEVALBUTEROL HYDROCHLORIDE 1.25 MG: 1.25 SOLUTION RESPIRATORY (INHALATION) at 07:47

## 2018-03-02 RX ADMIN — IPRATROPIUM BROMIDE 0.5 MG: 0.5 SOLUTION RESPIRATORY (INHALATION) at 15:54

## 2018-03-02 RX ADMIN — AZITHROMYCIN 250 MG: 250 TABLET, FILM COATED ORAL at 20:53

## 2018-03-02 RX ADMIN — LEVALBUTEROL HYDROCHLORIDE 1.25 MG: 1.25 SOLUTION RESPIRATORY (INHALATION) at 19:36

## 2018-03-02 RX ADMIN — MOMETASONE FUROATE AND FORMOTEROL FUMARATE DIHYDRATE 2 PUFF: 200; 5 AEROSOL RESPIRATORY (INHALATION) at 08:20

## 2018-03-02 RX ADMIN — IPRATROPIUM BROMIDE 0.5 MG: 0.5 SOLUTION RESPIRATORY (INHALATION) at 07:47

## 2018-03-02 RX ADMIN — LEVALBUTEROL HYDROCHLORIDE 1.25 MG: 1.25 SOLUTION RESPIRATORY (INHALATION) at 15:55

## 2018-03-02 RX ADMIN — LEVALBUTEROL HYDROCHLORIDE 1.25 MG: 1.25 SOLUTION RESPIRATORY (INHALATION) at 02:47

## 2018-03-02 RX ADMIN — METHYLPREDNISOLONE SODIUM SUCCINATE 40 MG: 40 INJECTION, POWDER, FOR SOLUTION INTRAMUSCULAR; INTRAVENOUS at 19:55

## 2018-03-02 RX ADMIN — METHYLPREDNISOLONE SODIUM SUCCINATE 40 MG: 40 INJECTION, POWDER, FOR SOLUTION INTRAMUSCULAR; INTRAVENOUS at 02:16

## 2018-03-02 RX ADMIN — IPRATROPIUM BROMIDE 0.5 MG: 0.5 SOLUTION RESPIRATORY (INHALATION) at 02:47

## 2018-03-02 RX ADMIN — HYDROCODONE BITARTRATE AND ACETAMINOPHEN 1 TABLET: 5; 325 TABLET ORAL at 15:15

## 2018-03-02 RX ADMIN — IPRATROPIUM BROMIDE 0.5 MG: 0.5 SOLUTION RESPIRATORY (INHALATION) at 19:36

## 2018-03-02 RX ADMIN — MOMETASONE FUROATE AND FORMOTEROL FUMARATE DIHYDRATE 2 PUFF: 200; 5 AEROSOL RESPIRATORY (INHALATION) at 19:54

## 2018-03-02 ASSESSMENT — ENCOUNTER SYMPTOMS
SPUTUM PRODUCTION: 0
NAUSEA: 0
SHORTNESS OF BREATH: 1
VOMITING: 0

## 2018-03-02 ASSESSMENT — PAIN SCALES - GENERAL
PAINLEVEL_OUTOF10: 6
PAINLEVEL_OUTOF10: 0
PAINLEVEL_OUTOF10: 7
PAINLEVEL_OUTOF10: 6

## 2018-03-02 NOTE — PROGRESS NOTES
Pulmonary and Critical Care Progress Note 400 [facility]    Patient: Pierce Chaparro  1940   MR# [id number]   Acct# [de-identified]  03/02/18   11:29 AM  Referring Provider: Triston Sctot MD  Problem list:   Patient Active Problem List   Diagnosis    Bilateral low back pain without sciatica    Bilateral shoulder pain    Bilateral carotid artery stenosis    Lumbar disc disease    Lumbar disc disease with radiculopathy    Lumbar disc disease with radiculopathy    Lumbar disc disease with radiculopathy    Lumbar disc disease with radiculopathy    Lumbar disc disease with radiculopathy    AAA (abdominal aortic aneurysm) ([facility] [address]    Carotid artery stenosis    Displacement of lumbar disc with radiculopathy    Lumbar disc disease with radiculopathy    Dyspnea     Chief Complaint: shortness of breath  Interval history: Patient complains of moderate shortness of breath in the mid chest, left chest and right chest for 3 days, improved slightly overnight last night., aggravated by exertion and alleviated by rest, and associated with wheezing. . Akash Blazer has continued to be well tolerated and helpful.      levalbuterol 1.25 mg Nebulization Q6H   ipratropium 0.5 mg Nebulization Q6H   mometasone-formoterol 2 puff Inhalation BID   guaiFENesin 1,200 mg Oral BID   methylPREDNISolone 40 mg Intravenous Q6H   cefTRIAXone (ROCEPHIN) IV 1 g Intravenous Q24H   azithromycin 500 mg Intravenous Q24H   enoxaparin 40 mg Subcutaneous Nightly       Review of Systems:   Review of Systems   Constitutional: Negative for chills, fever and malaise/fatigue. Respiratory: Positive for shortness of breath (improved). Negative for sputum production. Cardiovascular: Negative for chest pain. Gastrointestinal: Negative for nausea and vomiting. Physical Exam:  Blood pressure 137/81, pulse 78, temperature 98.3 °F (36.8 °C), resp. rate 18, SpO2 97 %.     Intake/Output Summary (Last 24 hours) at 03/02/18 1129  Last data filed at

## 2018-03-02 NOTE — PROGRESS NOTES
Progress Note  Melissa Fonseca  3/2/2018 5:10 PM  Subjective:   Admit Date:   2/26/2018      CC/ADMIT DX:       Interval History:   Reviewed overnight events and nursing notes. He continues to feel better. Still with SOA. No CP. I have reviewed all labs/diagnostics from the last 24hrs. ROS:   I have done a 10 point ROS and all are negative, except what is mentioned in the HPI. DIET GENERAL;    Medications:      methylPREDNISolone  40 mg Intravenous Q12H    azithromycin  250 mg Oral Daily    levalbuterol  1.25 mg Nebulization Q6H    ipratropium  0.5 mg Nebulization Q6H    mometasone-formoterol  2 puff Inhalation BID    guaiFENesin  1,200 mg Oral BID    cefTRIAXone (ROCEPHIN) IV  1 g Intravenous Q24H    enoxaparin  40 mg Subcutaneous Nightly           Objective:   Vitals: /67   Pulse 62   Temp 98.3 °F (36.8 °C)   Resp 16   SpO2 95%      Intake/Output Summary (Last 24 hours) at 03/02/18 1710  Last data filed at 03/02/18 1521   Gross per 24 hour   Intake             1440 ml   Output                0 ml   Net             1440 ml     General appearance: alert and cooperative with exam  Lungs: wheeze throughout  Heart: RRR  Abdomen: soft, non-tender; bowel sounds normal; no masses,  no organomegaly  Extremities: extremities normal, atraumatic, no cyanosis or edema  Neurologic:  No obvious focal neurologic deficits. Assessment and Plan:   COPD Exacerbation  Acute Bronchitis      Plan:  1. Continue present medication(s)   2. Continue current care. 3.  Plan for d/c in next 1-2 days if he is feeling better. 4.  Increase activity      Discharge planning:   home     Reviewed treatment plans with the patient and/or family.              Electronically signed by Guido Joyner MD on 3/2/2018 at 5:10 PM

## 2018-03-03 VITALS
HEART RATE: 64 BPM | RESPIRATION RATE: 16 BRPM | SYSTOLIC BLOOD PRESSURE: 133 MMHG | TEMPERATURE: 97.7 F | OXYGEN SATURATION: 95 % | DIASTOLIC BLOOD PRESSURE: 68 MMHG

## 2018-03-03 LAB — BLOOD CULTURE, ROUTINE: NORMAL

## 2018-03-03 PROCEDURE — 96376 TX/PRO/DX INJ SAME DRUG ADON: CPT

## 2018-03-03 PROCEDURE — 6360000002 HC RX W HCPCS: Performed by: INTERNAL MEDICINE

## 2018-03-03 PROCEDURE — 6370000000 HC RX 637 (ALT 250 FOR IP): Performed by: INTERNAL MEDICINE

## 2018-03-03 PROCEDURE — G0378 HOSPITAL OBSERVATION PER HR: HCPCS

## 2018-03-03 PROCEDURE — 6370000000 HC RX 637 (ALT 250 FOR IP): Performed by: FAMILY MEDICINE

## 2018-03-03 PROCEDURE — 6360000002 HC RX W HCPCS: Performed by: NURSE PRACTITIONER

## 2018-03-03 PROCEDURE — 94640 AIRWAY INHALATION TREATMENT: CPT

## 2018-03-03 PROCEDURE — 6370000000 HC RX 637 (ALT 250 FOR IP): Performed by: NURSE PRACTITIONER

## 2018-03-03 RX ORDER — PREDNISONE 1 MG/1
5 TABLET ORAL DAILY
Status: DISCONTINUED | OUTPATIENT
Start: 2018-03-12 | End: 2018-03-03 | Stop reason: HOSPADM

## 2018-03-03 RX ORDER — PREDNISONE 1 MG/1
15 TABLET ORAL DAILY
Qty: 30 TABLET | Refills: 0 | Status: SHIPPED | OUTPATIENT
Start: 2018-03-06 | End: 2018-03-16

## 2018-03-03 RX ORDER — PREDNISONE 10 MG/1
10 TABLET ORAL DAILY
Qty: 10 TABLET | Refills: 0 | Status: SHIPPED | OUTPATIENT
Start: 2018-03-09 | End: 2018-03-19

## 2018-03-03 RX ORDER — PREDNISONE 10 MG/1
10 TABLET ORAL DAILY
Status: DISCONTINUED | OUTPATIENT
Start: 2018-03-09 | End: 2018-03-03 | Stop reason: HOSPADM

## 2018-03-03 RX ORDER — CEFDINIR 300 MG/1
300 CAPSULE ORAL EVERY 12 HOURS SCHEDULED
Status: DISCONTINUED | OUTPATIENT
Start: 2018-03-03 | End: 2018-03-03 | Stop reason: HOSPADM

## 2018-03-03 RX ORDER — PREDNISONE 20 MG/1
20 TABLET ORAL DAILY
Status: DISCONTINUED | OUTPATIENT
Start: 2018-03-03 | End: 2018-03-03 | Stop reason: HOSPADM

## 2018-03-03 RX ORDER — LEVALBUTEROL TARTRATE 45 UG/1
1 AEROSOL, METERED ORAL EVERY 6 HOURS PRN
Qty: 1 INHALER | Refills: 3 | Status: SHIPPED | OUTPATIENT
Start: 2018-03-03 | End: 2018-10-03 | Stop reason: CLARIF

## 2018-03-03 RX ORDER — PREDNISONE 1 MG/1
5 TABLET ORAL DAILY
Qty: 10 TABLET | Refills: 0 | Status: SHIPPED | OUTPATIENT
Start: 2018-03-12 | End: 2018-03-22

## 2018-03-03 RX ORDER — CEFDINIR 300 MG/1
300 CAPSULE ORAL EVERY 12 HOURS SCHEDULED
Qty: 20 CAPSULE | Refills: 0 | Status: SHIPPED | OUTPATIENT
Start: 2018-03-03 | End: 2018-03-13

## 2018-03-03 RX ORDER — PREDNISONE 20 MG/1
20 TABLET ORAL DAILY
Qty: 10 TABLET | Refills: 0 | Status: SHIPPED | OUTPATIENT
Start: 2018-03-03 | End: 2018-03-13

## 2018-03-03 RX ORDER — GUAIFENESIN 600 MG/1
1200 TABLET, EXTENDED RELEASE ORAL 2 TIMES DAILY
Qty: 60 TABLET | Refills: 3 | Status: SHIPPED | OUTPATIENT
Start: 2018-03-03 | End: 2018-10-03 | Stop reason: CLARIF

## 2018-03-03 RX ADMIN — IPRATROPIUM BROMIDE 0.5 MG: 0.5 SOLUTION RESPIRATORY (INHALATION) at 07:12

## 2018-03-03 RX ADMIN — GUAIFENESIN 1200 MG: 600 TABLET, EXTENDED RELEASE ORAL at 08:40

## 2018-03-03 RX ADMIN — HYDROCODONE BITARTRATE AND ACETAMINOPHEN 1 TABLET: 5; 325 TABLET ORAL at 15:24

## 2018-03-03 RX ADMIN — MOMETASONE FUROATE AND FORMOTEROL FUMARATE DIHYDRATE 2 PUFF: 200; 5 AEROSOL RESPIRATORY (INHALATION) at 08:41

## 2018-03-03 RX ADMIN — HYDROCODONE BITARTRATE AND ACETAMINOPHEN 1 TABLET: 5; 325 TABLET ORAL at 08:40

## 2018-03-03 RX ADMIN — AZITHROMYCIN 250 MG: 250 TABLET, FILM COATED ORAL at 08:40

## 2018-03-03 RX ADMIN — LEVALBUTEROL HYDROCHLORIDE 1.25 MG: 1.25 SOLUTION RESPIRATORY (INHALATION) at 07:12

## 2018-03-03 RX ADMIN — METHYLPREDNISOLONE SODIUM SUCCINATE 40 MG: 40 INJECTION, POWDER, FOR SOLUTION INTRAMUSCULAR; INTRAVENOUS at 08:40

## 2018-03-03 RX ADMIN — PREDNISONE 20 MG: 20 TABLET ORAL at 15:22

## 2018-03-03 RX ADMIN — LEVALBUTEROL HYDROCHLORIDE 1.25 MG: 1.25 SOLUTION RESPIRATORY (INHALATION) at 14:48

## 2018-03-03 RX ADMIN — IPRATROPIUM BROMIDE 0.5 MG: 0.5 SOLUTION RESPIRATORY (INHALATION) at 14:48

## 2018-03-03 ASSESSMENT — PAIN SCALES - GENERAL
PAINLEVEL_OUTOF10: 7
PAINLEVEL_OUTOF10: 0
PAINLEVEL_OUTOF10: 7

## 2018-03-03 ASSESSMENT — ENCOUNTER SYMPTOMS
NAUSEA: 0
SHORTNESS OF BREATH: 1
SPUTUM PRODUCTION: 0
VOMITING: 0

## 2018-03-03 NOTE — PROGRESS NOTES
Pulmonary and Critical Care Progress Note 400 [facility]    Patient: Kateryna Diallo  1940   MR# [id number]   Acct# [de-identified]  03/03/18   12:20 PM  Referring Provider: Irasema Duncan MD  Problem list:   Patient Active Problem List   Diagnosis    Bilateral low back pain without sciatica    Bilateral shoulder pain    Bilateral carotid artery stenosis    Lumbar disc disease    Lumbar disc disease with radiculopathy    Lumbar disc disease with radiculopathy    Lumbar disc disease with radiculopathy    Lumbar disc disease with radiculopathy    Lumbar disc disease with radiculopathy    AAA (abdominal aortic aneurysm) ([facility] [address]    Carotid artery stenosis    Displacement of lumbar disc with radiculopathy    Lumbar disc disease with radiculopathy    Dyspnea     Chief Complaint: Shortness of breath    Interval history: Patient complains of moderate shortness of breath in the mid chest, left chest and right chest for 8 days, improved slightly overnight last night., aggravated by exertion and alleviated by rest, and associated with wheezing. Roly Lopesay has continued to be well tolerated and helpful. He feels better and is anxious to go home and wants to know if he can mow his yard. He is on room air. No other aggravating or alleviating factors or associated symptoms. cefdinir 300 mg Oral 2 times per day   predniSONE 20 mg Oral Daily   Followed by      Shiar Martinez ON 3/6/2018] predniSONE 15 mg Oral Daily   Followed by      Shira Martinez ON 3/9/2018] predniSONE 10 mg Oral Daily   Followed by      Shira Martinez ON 3/12/2018] predniSONE 5 mg Oral Daily   levalbuterol 1.25 mg Nebulization Q6H   ipratropium 0.5 mg Nebulization Q6H   mometasone-formoterol 2 puff Inhalation BID   guaiFENesin 1,200 mg Oral BID   enoxaparin 40 mg Subcutaneous Nightly       Review of Systems:   Review of Systems   Constitutional: Negative for chills, fever and malaise/fatigue. Respiratory: Positive for shortness of breath (improved).  Negative will need [address] outpatient for 5 more days  · Continue to ambulate at home  · Needs rx for xopenex and atrovent inhalers at discharge please  · F/U with Dr. Reese Davey in one month  · Transition antibiotics to po  · Encourage to hold off on yard work for the time being    Electronically signed by Ericka Dale on 3/3/2018 at 12:20 PM     Physician's substantive portion:  Subjective: pt is better, feels better  Objective: NAD, chest mild wheeze  Assessment/recommendation: pt is anxious to go home. Has been mobile in hospital.  Recommend transition to po meds, continue to ambulate and ok to home if no new problems. I have seen and examined patient personally, performing a face-to-face diagnostic evaluation with plan of care reviewed and developed with APRN. I have addended and/or modified the above history of present illness, physical examination, and assessment and plan to reflect my findings and impressions. Essential elements of the care plan were discussed with APRN above. Agree with findings and assessment/plan as documented above.     Electronically signed by Maria Fernanda Gómez MD on 3/3/18 at 6:40 PM

## 2018-03-03 NOTE — DISCHARGE SUMMARY
Hospital Discharge Summary    Lashonda Perez  :  1940  MRN:  [id number]    Admit date:  2018  Discharge date:   3/3/2018      Admitting Physician:    Xavier Wang MD    Discharge Diagnoses:    COPD EXACERBATION  AAA    Hospital Course: The patient was admitted for the above noted medical/surgical issues. Please see daily progress note for further details concerning their stay. The patient improved throughout their stay and reached maximum medical improvement on the day of discharge. The patient/family agree with the treatment plan as outlined above. All questions concerning their stay were answered prior to discharge. They understand the importance of follow up concerning any abnormal test results. Discharge Medications:       Anna Martin   [place] Medication Instructions [facility]:[id number]    Printed on:[id number]   Medication Information                      aspirin 81 MG tablet  Take 81 mg by mouth daily             cefdinir (OMNICEF) 300 MG capsule  Take 1 capsule by mouth every 12 hours for 10 days             guaiFENesin (MUCINEX) 600 MG extended release tablet  Take 2 tablets by mouth 2 times daily             HYDROcodone-acetaminophen (NORCO)  MG per tablet  Take 1 tablet by mouth 4 times daily as needed for Pain for up to 30 days.  Earliest Fill Date: 3/2/18             levalbuterol (XOPENEX HFA) 45 MCG/ACT inhaler  Inhale 1 puff into the lungs every 6 hours as needed for Wheezing             mometasone-formoterol (DULERA) 200-5 MCG/ACT inhaler  Inhale 2 puffs into the lungs 2 times daily             predniSONE (DELTASONE) 10 MG tablet  Take 1 tablet by mouth daily for 10 days             predniSONE (DELTASONE) 20 MG tablet  Take 1 tablet by mouth daily for 10 days             predniSONE (DELTASONE) 5 MG tablet  Take 3 tablets by mouth daily for 10 days             predniSONE (DELTASONE) 5 MG tablet  Take 1 tablet by mouth daily for 10 days             simvastatin (ZOCOR) 80 MG tablet  Take 80 mg by mouth daily                 Consults:   DR Shaneka Rahman  Significant Diagnostic Studies:    See summary of labs/x-rays/path report for further details      Treatments:   IV ABX, STEROIDS, NEBS    Disposition:   HOME  Follow up with Patricia Muniz MD in 1 weeks.     Signed:  Armando Abdul MD, MPH  3/3/2018, 2:21 PM

## 2018-03-04 LAB — CULTURE, BLOOD 2: NORMAL

## 2018-03-12 ENCOUNTER — PROCEDURE VISIT (OUTPATIENT)
Dept: UROLOGY | Facility: CLINIC | Age: 78
End: 2018-03-12

## 2018-03-12 DIAGNOSIS — C67.2 MALIGNANT NEOPLASM OF LATERAL WALL OF URINARY BLADDER (HCC): Primary | ICD-10-CM

## 2018-03-12 LAB
BILIRUB BLD-MCNC: NEGATIVE MG/DL
CLARITY, POC: CLEAR
COLOR UR: YELLOW
GLUCOSE UR STRIP-MCNC: NEGATIVE MG/DL
KETONES UR QL: NEGATIVE
LEUKOCYTE EST, POC: NEGATIVE
NITRITE UR-MCNC: NEGATIVE MG/ML
PH UR: 5 [PH] (ref 5–8)
PROT UR STRIP-MCNC: NEGATIVE MG/DL
RBC # UR STRIP: NEGATIVE /UL
SP GR UR: 1.02 (ref 1–1.03)
UROBILINOGEN UR QL: NORMAL

## 2018-03-12 PROCEDURE — 81003 URINALYSIS AUTO W/O SCOPE: CPT | Performed by: UROLOGY

## 2018-03-12 PROCEDURE — 52000 CYSTOURETHROSCOPY: CPT | Performed by: UROLOGY

## 2018-03-12 NOTE — PROGRESS NOTES
CC: I am here for the doctor to look at my bladder    Cystoscopy procedure note  Pre- operative diagnosis:  Bladder cancer surveillance    Post operative diagnosis:  No recurrent bladder tumor    Procedure:  The patient was prepped and draped in a normal sterile fashion.  The urethra was anesthetized with 2% lidocaine jelly.  A flexible cystoscope was introduced per urethra.      Urethra:  No urethral stricture    Bladder:  There is no evidence of a stone, foreign body or mass within the bladder.  The bladder is minimally trabeculated.  The bladder neck is without contracture.    Ureteral orifices:  Normal position bilaterally    Prostate:  lateral lobe hypertrophy    Patient tolerated the procedure well    Complications: none    Blood loss: minimal    Diagnoses and all orders for this visit:    Malignant neoplasm of lateral wall of urinary bladder  -     POC Urinalysis Dipstick, Automated        Follow up:    Routine follow up  08/2017- TURBT  Tissue Pathology Exam   Order: [id number]   Status:  Final result   Visible to patient:  No (Not Released) Dx:  Lesion of bladder    6mo ago   Final Diagnosis   1.  Bladder tumor, right lateral wall, transurethral resection:  Low-grade papillary urothelial carcinoma, noninvasive.  Muscularis propria not present.     2.  Bladder tumor, transurethral resection:  Low-grade papillary urothelial carcinoma, noninvasive.  Muscularis propria identified with no evidence of tumor involvement.     AJCC pathologic stage:  pTa Nx   Electronically signed by Jon Pires MD on 8/23/2017 at 0928

## 2018-04-03 RX ORDER — HYDROCODONE BITARTRATE AND ACETAMINOPHEN 10; 325 MG/1; MG/1
1 TABLET ORAL 4 TIMES DAILY PRN
Qty: 120 TABLET | Refills: 0 | Status: SHIPPED | OUTPATIENT
Start: 2018-04-04 | End: 2018-04-26 | Stop reason: SDUPTHER

## 2018-04-09 ENCOUNTER — HOSPITAL ENCOUNTER (OUTPATIENT)
Dept: CT IMAGING | Age: 78
Discharge: HOME | End: 2018-04-09
Payer: MEDICARE

## 2018-04-09 LAB
GFR NON-AFRICAN AMERICAN: >60
PERFORMED ON: NORMAL
POC CREATININE: 0.8 MG/DL (ref 0.3–1.3)
POC SAMPLE TYPE: NORMAL

## 2018-04-09 PROCEDURE — 82565 ASSAY OF CREATININE: CPT

## 2018-04-09 PROCEDURE — 74177 CT ABD & PELVIS W/CONTRAST: CPT

## 2018-04-09 PROCEDURE — 6360000004 HC RX CONTRAST MEDICATION: Performed by: FAMILY MEDICINE

## 2018-04-09 RX ADMIN — IOPAMIDOL 90 ML: 755 INJECTION, SOLUTION INTRAVENOUS at 09:04

## 2018-04-26 ENCOUNTER — HOSPITAL ENCOUNTER (OUTPATIENT)
Dept: PAIN MANAGEMENT | Age: 78
Discharge: HOME | End: 2018-04-26
Payer: MEDICARE

## 2018-04-26 VITALS
HEART RATE: 60 BPM | HEIGHT: 67 IN | RESPIRATION RATE: 18 BRPM | DIASTOLIC BLOOD PRESSURE: 83 MMHG | BODY MASS INDEX: 24.33 KG/M2 | SYSTOLIC BLOOD PRESSURE: 146 MMHG | OXYGEN SATURATION: 92 % | TEMPERATURE: 97.9 F | WEIGHT: 155 LBS

## 2018-04-26 DIAGNOSIS — M51.16 DISPLACEMENT OF LUMBAR DISC WITH RADICULOPATHY: ICD-10-CM

## 2018-04-26 DIAGNOSIS — M51.16 LUMBAR DISC DISEASE WITH RADICULOPATHY: ICD-10-CM

## 2018-04-26 PROCEDURE — 99213 OFFICE O/P EST LOW 20 MIN: CPT

## 2018-04-26 RX ORDER — HYDROCODONE BITARTRATE AND ACETAMINOPHEN 10; 325 MG/1; MG/1
1 TABLET ORAL 4 TIMES DAILY PRN
Qty: 120 TABLET | Refills: 0 | Status: SHIPPED | OUTPATIENT
Start: 2018-05-05 | End: 2018-05-15 | Stop reason: SDUPTHER

## 2018-04-26 ASSESSMENT — PAIN SCALES - GENERAL: PAINLEVEL_OUTOF10: 7

## 2018-04-26 ASSESSMENT — PAIN DESCRIPTION - ONSET: ONSET: ON-GOING

## 2018-04-26 ASSESSMENT — PAIN DESCRIPTION - PROGRESSION: CLINICAL_PROGRESSION: NOT CHANGED

## 2018-04-26 ASSESSMENT — PAIN DESCRIPTION - ORIENTATION: ORIENTATION: LOWER;RIGHT;LEFT

## 2018-04-26 ASSESSMENT — PAIN DESCRIPTION - LOCATION: LOCATION: BACK;SHOULDER

## 2018-04-26 ASSESSMENT — PAIN DESCRIPTION - FREQUENCY: FREQUENCY: CONTINUOUS

## 2018-04-26 ASSESSMENT — PAIN DESCRIPTION - DESCRIPTORS: DESCRIPTORS: ACHING;CONSTANT

## 2018-04-26 ASSESSMENT — PAIN DESCRIPTION - PAIN TYPE: TYPE: CHRONIC PAIN

## 2018-04-27 ENCOUNTER — HOSPITAL ENCOUNTER (OUTPATIENT)
Dept: CT IMAGING | Age: 78
Discharge: HOME | End: 2018-04-27
Payer: MEDICARE

## 2018-04-27 DIAGNOSIS — R91.8 LUNG NODULES: ICD-10-CM

## 2018-04-27 PROCEDURE — 71250 CT THORAX DX C-: CPT

## 2018-05-15 ENCOUNTER — HOSPITAL ENCOUNTER (OUTPATIENT)
Dept: PAIN MANAGEMENT | Age: 78
Discharge: HOME | End: 2018-05-15
Payer: MEDICARE

## 2018-05-15 VITALS
HEART RATE: 60 BPM | HEIGHT: 67 IN | WEIGHT: 154 LBS | RESPIRATION RATE: 16 BRPM | OXYGEN SATURATION: 97 % | SYSTOLIC BLOOD PRESSURE: 129 MMHG | DIASTOLIC BLOOD PRESSURE: 65 MMHG | BODY MASS INDEX: 24.17 KG/M2 | TEMPERATURE: 97.1 F

## 2018-05-15 DIAGNOSIS — M51.36 LUMBAR DEGENERATIVE DISC DISEASE: ICD-10-CM

## 2018-05-15 DIAGNOSIS — M51.16 LUMBAR DISC DISEASE WITH RADICULOPATHY: Chronic | ICD-10-CM

## 2018-05-15 PROCEDURE — 2580000003 HC RX 258

## 2018-05-15 PROCEDURE — 20610 DRAIN/INJ JOINT/BURSA W/O US: CPT

## 2018-05-15 PROCEDURE — 62323 NJX INTERLAMINAR LMBR/SAC: CPT

## 2018-05-15 PROCEDURE — 3209999900 FLUORO FOR SURGICAL PROCEDURES

## 2018-05-15 PROCEDURE — 2500000003 HC RX 250 WO HCPCS

## 2018-05-15 PROCEDURE — 6360000002 HC RX W HCPCS

## 2018-05-15 RX ORDER — METHYLPREDNISOLONE ACETATE 80 MG/ML
INJECTION, SUSPENSION INTRA-ARTICULAR; INTRALESIONAL; INTRAMUSCULAR; SOFT TISSUE
Status: COMPLETED | OUTPATIENT
Start: 2018-05-15 | End: 2018-05-15

## 2018-05-15 RX ORDER — HYDROCODONE BITARTRATE AND ACETAMINOPHEN 10; 325 MG/1; MG/1
1 TABLET ORAL 4 TIMES DAILY PRN
Qty: 120 TABLET | Refills: 0 | Status: SHIPPED | OUTPATIENT
Start: 2018-06-04 | End: 2018-07-05 | Stop reason: SDUPTHER

## 2018-05-15 RX ORDER — BUPIVACAINE HYDROCHLORIDE 5 MG/ML
INJECTION, SOLUTION EPIDURAL; INTRACAUDAL
Status: COMPLETED | OUTPATIENT
Start: 2018-05-15 | End: 2018-05-15

## 2018-05-15 RX ORDER — 0.9 % SODIUM CHLORIDE 0.9 %
VIAL (ML) INJECTION
Status: COMPLETED | OUTPATIENT
Start: 2018-05-15 | End: 2018-05-15

## 2018-05-15 RX ORDER — TRIAMCINOLONE ACETONIDE 40 MG/ML
INJECTION, SUSPENSION INTRA-ARTICULAR; INTRAMUSCULAR
Status: COMPLETED | OUTPATIENT
Start: 2018-05-15 | End: 2018-05-15

## 2018-05-15 RX ORDER — BUPIVACAINE HYDROCHLORIDE 2.5 MG/ML
INJECTION, SOLUTION EPIDURAL; INFILTRATION; INTRACAUDAL
Status: COMPLETED | OUTPATIENT
Start: 2018-05-15 | End: 2018-05-15

## 2018-05-15 RX ORDER — LIDOCAINE HYDROCHLORIDE 10 MG/ML
INJECTION, SOLUTION EPIDURAL; INFILTRATION; INTRACAUDAL; PERINEURAL
Status: COMPLETED | OUTPATIENT
Start: 2018-05-15 | End: 2018-05-15

## 2018-05-15 RX ADMIN — TRIAMCINOLONE ACETONIDE 40 MG: 40 INJECTION, SUSPENSION INTRA-ARTICULAR; INTRAMUSCULAR at 15:55

## 2018-05-15 RX ADMIN — BUPIVACAINE HYDROCHLORIDE 2.5 ML: 2.5 INJECTION, SOLUTION EPIDURAL; INFILTRATION; INTRACAUDAL at 15:53

## 2018-05-15 RX ADMIN — METHYLPREDNISOLONE ACETATE 80 MG: 80 INJECTION, SUSPENSION INTRA-ARTICULAR; INTRALESIONAL; INTRAMUSCULAR; SOFT TISSUE at 15:54

## 2018-05-15 RX ADMIN — BUPIVACAINE HYDROCHLORIDE 9 ML: 5 INJECTION, SOLUTION EPIDURAL; INTRACAUDAL at 15:55

## 2018-05-15 RX ADMIN — Medication 1.5 ML: at 15:54

## 2018-05-15 RX ADMIN — LIDOCAINE HYDROCHLORIDE 3 ML: 10 INJECTION, SOLUTION EPIDURAL; INFILTRATION; INTRACAUDAL; PERINEURAL at 15:52

## 2018-05-15 ASSESSMENT — PAIN DESCRIPTION - DESCRIPTORS: DESCRIPTORS: ACHING;CONSTANT;RADIATING

## 2018-05-15 ASSESSMENT — PAIN DESCRIPTION - PAIN TYPE: TYPE: CHRONIC PAIN

## 2018-05-15 ASSESSMENT — PAIN DESCRIPTION - FREQUENCY: FREQUENCY: CONTINUOUS

## 2018-05-15 ASSESSMENT — PAIN SCALES - GENERAL: PAINLEVEL_OUTOF10: 8

## 2018-05-15 ASSESSMENT — PAIN DESCRIPTION - ORIENTATION: ORIENTATION: RIGHT;LEFT;LOWER

## 2018-05-15 ASSESSMENT — PAIN DESCRIPTION - ONSET: ONSET: ON-GOING

## 2018-05-15 ASSESSMENT — PAIN DESCRIPTION - LOCATION: LOCATION: BACK;HIP;SHOULDER

## 2018-05-15 ASSESSMENT — PAIN DESCRIPTION - PROGRESSION: CLINICAL_PROGRESSION: NOT CHANGED

## 2018-07-05 DIAGNOSIS — M51.16 LUMBAR DISC DISEASE WITH RADICULOPATHY: Primary | ICD-10-CM

## 2018-07-05 RX ORDER — HYDROCODONE BITARTRATE AND ACETAMINOPHEN 10; 325 MG/1; MG/1
1 TABLET ORAL 4 TIMES DAILY PRN
Qty: 120 TABLET | Refills: 0 | Status: SHIPPED | OUTPATIENT
Start: 2018-07-06 | End: 2020-10-28 | Stop reason: ALTCHOICE

## 2018-07-19 ENCOUNTER — HOSPITAL ENCOUNTER (OUTPATIENT)
Dept: PAIN MANAGEMENT | Age: 78
Discharge: HOME | End: 2018-07-19
Payer: MEDICARE

## 2018-07-19 VITALS
OXYGEN SATURATION: 96 % | BODY MASS INDEX: 24.17 KG/M2 | TEMPERATURE: 97.6 F | HEART RATE: 65 BPM | RESPIRATION RATE: 16 BRPM | HEIGHT: 67 IN | WEIGHT: 154 LBS | SYSTOLIC BLOOD PRESSURE: 159 MMHG | DIASTOLIC BLOOD PRESSURE: 78 MMHG

## 2018-07-19 DIAGNOSIS — M51.16 LUMBAR DISC DISEASE WITH RADICULOPATHY: ICD-10-CM

## 2018-07-19 DIAGNOSIS — M51.16 DISPLACEMENT OF LUMBAR DISC WITH RADICULOPATHY: ICD-10-CM

## 2018-07-19 PROCEDURE — 99212 OFFICE O/P EST SF 10 MIN: CPT

## 2018-07-19 PROCEDURE — 99212 OFFICE O/P EST SF 10 MIN: CPT | Performed by: NURSE PRACTITIONER

## 2018-07-19 ASSESSMENT — PAIN DESCRIPTION - DESCRIPTORS: DESCRIPTORS: ACHING;CONSTANT;RADIATING

## 2018-07-19 ASSESSMENT — PAIN DESCRIPTION - FREQUENCY: FREQUENCY: CONTINUOUS

## 2018-07-19 ASSESSMENT — PAIN DESCRIPTION - ORIENTATION: ORIENTATION: LOWER;RIGHT;LEFT

## 2018-07-19 ASSESSMENT — PAIN DESCRIPTION - LOCATION: LOCATION: BACK;SHOULDER

## 2018-07-19 ASSESSMENT — PAIN DESCRIPTION - PROGRESSION: CLINICAL_PROGRESSION: NOT CHANGED

## 2018-07-19 ASSESSMENT — ENCOUNTER SYMPTOMS
BACK PAIN: 1
CONSTIPATION: 0

## 2018-07-19 ASSESSMENT — PAIN SCALES - GENERAL: PAINLEVEL_OUTOF10: 8

## 2018-07-19 ASSESSMENT — PAIN DESCRIPTION - ONSET: ONSET: ON-GOING

## 2018-07-19 ASSESSMENT — PAIN DESCRIPTION - PAIN TYPE: TYPE: CHRONIC PAIN

## 2018-07-19 NOTE — PROGRESS NOTES
 COLONOSCOPY      HEMORRHOID SURGERY      HERNIA REPAIR      8/2016        Family History  family history includes Cancer in his sister. Social History    Social History   Substance Use Topics    Smoking status: Current Every Day Smoker     Packs/day: 0.25     Years: 67.00     Types: Cigarettes    Smokeless tobacco: Never Used      Comment: started smoking again    Alcohol use No       Allergies  Celebrex [celecoxib]     Current Medications  Current Outpatient Prescriptions   Medication Sig Dispense Refill    HYDROcodone-acetaminophen (NORCO)  MG per tablet Take 1 tablet by mouth 4 times daily as needed for Pain for up to 30 days. Basia Gonzalez Date: 7/6/18 120 tablet 0    mometasone-formoterol (DULERA) 200-5 MCG/ACT inhaler Inhale 2 puffs into the lungs 2 times daily 120 puff 1    guaiFENesin (MUCINEX) 600 MG extended release tablet Take 2 tablets by mouth 2 times daily 60 tablet 3    levalbuterol (XOPENEX HFA) 45 MCG/ACT inhaler Inhale 1 puff into the lungs every 6 hours as needed for Wheezing 1 Inhaler 3    aspirin 81 MG tablet Take 81 mg by mouth daily      simvastatin (ZOCOR) 80 MG tablet Take 80 mg by mouth daily       No current facility-administered medications for this encounter. Review of Systems:  Review of Systems   Gastrointestinal: Negative for constipation. Musculoskeletal: Positive for back pain and joint pain. Negative for neck pain. Neurological: Negative for tingling, sensory change, weakness and headaches. 14 point ROS negative besides that noted in HPI    Physical Exam:    Vitals:    07/19/18 1116   BP: (!) 159/78   Pulse: 65   Resp: 16   Temp: 97.6 °F (36.4 °C)   TempSrc: Oral   SpO2: 96%   Weight: 154 lb (69.9 kg)   Height: 5' 7\" (1.702 m)       Body mass index is 24.12 kg/m². Physical Exam   Constitutional: He is oriented to person, place, and time. He appears well-developed and well-nourished. No distress. HENT:   Head: Normocephalic.    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 Discussed the growing epidemic in the [place] with the overprescribing and at times the abuse of narcotics. Discussed the detrimental effects of long term narcotic use in younger patients. Patient encouraged to set daily goals of exercising and if on narcotics decreasing daily narcotic intake. Discussed with the patient about the development of hyperalgesia with long term narcotic intake.      CC:  MD Prabhjot Bird, APRN - CNP, 7/19/2018 at 11:27 AM

## 2018-08-15 ENCOUNTER — HOSPITAL ENCOUNTER (OUTPATIENT)
Dept: SLEEP CENTER | Age: 78
Discharge: HOME | End: 2018-08-15
Payer: MEDICARE

## 2018-08-20 ENCOUNTER — PROCEDURE VISIT (OUTPATIENT)
Dept: UROLOGY | Facility: CLINIC | Age: 78
End: 2018-08-20

## 2018-08-20 DIAGNOSIS — C67.2 MALIGNANT NEOPLASM OF LATERAL WALL OF URINARY BLADDER (HCC): Primary | ICD-10-CM

## 2018-08-20 LAB
BILIRUB BLD-MCNC: ABNORMAL MG/DL
CLARITY, POC: CLEAR
COLOR UR: YELLOW
GLUCOSE UR STRIP-MCNC: NEGATIVE MG/DL
KETONES UR QL: NEGATIVE
LEUKOCYTE EST, POC: NEGATIVE
NITRITE UR-MCNC: NEGATIVE MG/ML
PH UR: 5 [PH] (ref 5–8)
PROT UR STRIP-MCNC: NEGATIVE MG/DL
RBC # UR STRIP: NEGATIVE /UL
SP GR UR: 1.03 (ref 1–1.03)
UROBILINOGEN UR QL: NORMAL

## 2018-08-20 PROCEDURE — 81003 URINALYSIS AUTO W/O SCOPE: CPT | Performed by: UROLOGY

## 2018-08-20 PROCEDURE — 52000 CYSTOURETHROSCOPY: CPT | Performed by: UROLOGY

## 2018-08-20 NOTE — PROGRESS NOTES
CC: I am here for the doctor to look at my bladder    Cystoscopy procedure note  Pre- operative diagnosis:  Bladder cancer surveillance    Post operative diagnosis:  Same    Procedure:  The patient was prepped and draped in a normal sterile fashion.  The urethra was anesthetized with 2% lidocaine jelly.  A well lubricated flexible cystoscope was introduced per urethra.  The anterior urethra is normal in its caliber without evidence of a mass.  There appears to be no contracture at the bladder neck.  The prostatic urethra reveals Bilateral lobe enlargement.   The bladder shows a normal urothelium without evidence of a mass, erythema, nor diverticulum.  There is no evidence of a bladder stone nor foreign body.  The detrusor is minimally trabeculated.  The ureteral orifces are essentially normal in locationn and there is clear efflux of urine.    Patient tolerated the procedure well    Complications: none    Blood loss: minimal    Diagnoses and all orders for this visit:    Malignant neoplasm of lateral wall of urinary bladder (CMS/HCC)  -     POC Urinalysis Dipstick, Multipro        Follow up:    Routine follow up    Final Diagnosis of Initial Bladder Tumor   1.  Bladder tumor, right lateral wall, transurethral resection:  Low-grade papillary urothelial carcinoma, noninvasive.  Muscularis propria not present.     2.  Bladder tumor, transurethral resection:  Low-grade papillary urothelial carcinoma, noninvasive.  Muscularis propria identified with no evidence of tumor involvement.     AJCC pathologic stage:  pTa Nx   Electronically signed by Jon Pires MD on 8/23/2017 at 0954

## 2018-10-03 ENCOUNTER — HOSPITAL ENCOUNTER (OUTPATIENT)
Dept: VASCULAR LAB | Age: 78
Discharge: HOME | End: 2018-10-03
Payer: MEDICARE

## 2018-10-03 ENCOUNTER — OFFICE VISIT (OUTPATIENT)
Dept: VASCULAR SURGERY | Age: 78
End: 2018-10-03
Payer: MEDICARE

## 2018-10-03 VITALS
SYSTOLIC BLOOD PRESSURE: 147 MMHG | HEART RATE: 50 BPM | DIASTOLIC BLOOD PRESSURE: 76 MMHG | RESPIRATION RATE: 18 BRPM | TEMPERATURE: 98.6 F

## 2018-10-03 DIAGNOSIS — I65.23 BILATERAL CAROTID ARTERY STENOSIS: Primary | ICD-10-CM

## 2018-10-03 DIAGNOSIS — I71.40 ABDOMINAL AORTIC ANEURYSM (AAA) WITHOUT RUPTURE: ICD-10-CM

## 2018-10-03 DIAGNOSIS — I65.23 BILATERAL CAROTID ARTERY STENOSIS: ICD-10-CM

## 2018-10-03 PROCEDURE — 4004F PT TOBACCO SCREEN RCVD TLK: CPT | Performed by: NURSE PRACTITIONER

## 2018-10-03 PROCEDURE — 4040F PNEUMOC VAC/ADMIN/RCVD: CPT | Performed by: NURSE PRACTITIONER

## 2018-10-03 PROCEDURE — 1101F PT FALLS ASSESS-DOCD LE1/YR: CPT | Performed by: NURSE PRACTITIONER

## 2018-10-03 PROCEDURE — G8427 DOCREV CUR MEDS BY ELIG CLIN: HCPCS | Performed by: NURSE PRACTITIONER

## 2018-10-03 PROCEDURE — 1123F ACP DISCUSS/DSCN MKR DOCD: CPT | Performed by: NURSE PRACTITIONER

## 2018-10-03 PROCEDURE — G8598 ASA/ANTIPLAT THER USED: HCPCS | Performed by: NURSE PRACTITIONER

## 2018-10-03 PROCEDURE — G8484 FLU IMMUNIZE NO ADMIN: HCPCS | Performed by: NURSE PRACTITIONER

## 2018-10-03 PROCEDURE — G8420 CALC BMI NORM PARAMETERS: HCPCS | Performed by: NURSE PRACTITIONER

## 2018-10-03 PROCEDURE — 99212 OFFICE O/P EST SF 10 MIN: CPT | Performed by: NURSE PRACTITIONER

## 2018-10-03 PROCEDURE — 93880 EXTRACRANIAL BILAT STUDY: CPT

## 2018-10-03 NOTE — PROGRESS NOTES
Patient Care Team:  Darcy Hernandez MD as PCP - General (Family Medicine)      Subjective    He presents for follow-up of carotid artery stenosis. He has prior history of carotid artery stenosis for 1 - 5 years. His current treatment includes ASA EC daily. He has no history of CVA. He reports no TIA's, episodes of amaurosis fugax and episodes of lateralizing weakness. Fallon Eliazbeth is a 69 y.o. male with the following history reviewed and recorded in [facility]:  Patient Active Problem List    Diagnosis Date Noted    Lumbar disc disease with radiculopathy 05/15/2018    Dyspnea 02/27/2018    Lumbar disc disease with radiculopathy 02/12/2018    Displacement of lumbar disc with radiculopathy 11/09/2017    AAA (abdominal aortic aneurysm) (HCC)     Carotid artery stenosis     Lumbar disc disease with radiculopathy 08/01/2017    Lumbar disc disease with radiculopathy 04/25/2017    Lumbar disc disease with radiculopathy 01/23/2017    Lumbar disc disease with radiculopathy 10/18/2016    Lumbar disc disease with radiculopathy 07/12/2016    Lumbar disc disease 04/13/2016    Bilateral carotid artery stenosis 03/14/2016    Bilateral low back pain without sciatica 01/13/2016    Bilateral shoulder pain 01/13/2016     Current Outpatient Prescriptions   Medication Sig Dispense Refill    mometasone-formoterol (DULERA) 200-5 MCG/ACT inhaler Inhale 2 puffs into the lungs 2 times daily 120 puff 1    aspirin 81 MG tablet Take 81 mg by mouth daily      simvastatin (ZOCOR) 80 MG tablet Take 80 mg by mouth daily      HYDROcodone-acetaminophen (NORCO)  MG per tablet Take 1 tablet by mouth 4 times daily as needed for Pain for up to 30 days. Curvin Smart Date: 7/6/18 120 tablet 0     No current facility-administered medications for this visit.       Current Outpatient Prescriptions on File Prior to Visit   Medication Sig Dispense Refill    mometasone-formoterol (DULERA) 200-5 MCG/ACT inhaler Inhale 2 puffs into the lungs 2 times daily 120 puff 1    aspirin 81 MG tablet Take 81 mg by mouth daily      simvastatin (ZOCOR) 80 MG tablet Take 80 mg by mouth daily      HYDROcodone-acetaminophen (NORCO)  MG per tablet Take 1 tablet by mouth 4 times daily as needed for Pain for up to 30 days. Nimesh Buenos Date: 7/6/18 120 tablet 0     No current facility-administered medications on file prior to visit. Allergies: Celebrex [celecoxib]  Past Medical History:   Diagnosis Date    AAA (abdominal aortic aneurysm) (HCC)     Arthritis     Bilateral low back pain without sciatica 1/13/2016    Bilateral shoulder pain 1/13/2016    Blood clotting disorder (HCC)     Cancer ([facility] [address] 09/2017    Bladder CA    Carotid artery stenosis     DDD (degenerative disc disease), lumbar     Hx of blood clots     Hyperlipidemia      Past Surgical History:   Procedure Laterality Date    BLADDER SURGERY  08/22/2017    Removal of Cancer by Dr. Luz Elena Gurrola, LAPAROSCOPIC      july 25, 2017    COLONOSCOPY      HEMORRHOID SURGERY      HERNIA REPAIR      8/2016     Family History   Problem Relation Age of Onset    Cancer Sister      Social History   Substance Use Topics    Smoking status: Current Every Day Smoker     Packs/day: 0.25     Years: 67.00     Types: Cigarettes    Smokeless tobacco: Never Used      Comment: started smoking again    Alcohol use No           Review of Systems    Constitutional - no significant activity change, appetite change, or unexpected weight change. No fever or chills. No diaphoresis or significant fatigue. HENT - no significant rhinorrhea or epistaxis. No tinnitus or significant hearing loss. Eyes - no sudden vision change or amaurosis. Respiratory - no significant shortness of breath, wheezing, or stridor. No apnea, cough, or chest tightness associated with shortness of breath. Cardiovascular - no chest pain, syncope, or significant dizziness.   No palpitations or significant leg

## 2018-10-08 ENCOUNTER — HOSPITAL ENCOUNTER (OUTPATIENT)
Dept: CT IMAGING | Age: 78
Discharge: HOME | End: 2018-10-08
Payer: MEDICARE

## 2018-10-08 DIAGNOSIS — K76.89 HEPATIC CYST: ICD-10-CM

## 2018-10-08 LAB
GFR NON-AFRICAN AMERICAN: >60
PERFORMED ON: NORMAL
POC CREATININE: 1 MG/DL (ref 0.3–1.3)
POC SAMPLE TYPE: NORMAL

## 2018-10-08 PROCEDURE — 6360000004 HC RX CONTRAST MEDICATION: Performed by: FAMILY MEDICINE

## 2018-10-08 PROCEDURE — 74177 CT ABD & PELVIS W/CONTRAST: CPT

## 2018-10-08 PROCEDURE — 82565 ASSAY OF CREATININE: CPT

## 2018-10-08 RX ADMIN — IOPAMIDOL 90 ML: 755 INJECTION, SOLUTION INTRAVENOUS at 10:01

## 2018-11-01 ENCOUNTER — HOSPITAL ENCOUNTER (OUTPATIENT)
Dept: SLEEP CENTER | Age: 78
Setting detail: SPECIMEN
Discharge: HOME | End: 2018-11-03
Payer: MEDICARE

## 2018-11-01 PROCEDURE — 95810 POLYSOM 6/> YRS 4/> PARAM: CPT | Performed by: PSYCHIATRY & NEUROLOGY

## 2018-11-01 PROCEDURE — 95810 POLYSOM 6/> YRS 4/> PARAM: CPT

## 2018-11-23 DIAGNOSIS — G47.33 OBSTRUCTIVE SLEEP APNEA: Primary | ICD-10-CM

## 2018-12-14 ENCOUNTER — HOSPITAL ENCOUNTER (OUTPATIENT)
Dept: SLEEP CENTER | Age: 78
Discharge: HOME | End: 2018-12-16
Payer: MEDICARE

## 2018-12-14 PROCEDURE — 95811 POLYSOM 6/>YRS CPAP 4/> PARM: CPT | Performed by: PSYCHIATRY & NEUROLOGY

## 2018-12-14 PROCEDURE — 95811 POLYSOM 6/>YRS CPAP 4/> PARM: CPT

## 2018-12-27 ENCOUNTER — TRANSCRIBE ORDERS (OUTPATIENT)
Dept: ADMINISTRATIVE | Facility: HOSPITAL | Age: 78
End: 2018-12-27

## 2018-12-27 DIAGNOSIS — M25.511 RIGHT SHOULDER PAIN, UNSPECIFIED CHRONICITY: ICD-10-CM

## 2018-12-27 DIAGNOSIS — M43.06 SPONDYLOLYSIS, LUMBAR REGION: Primary | ICD-10-CM

## 2019-01-06 DIAGNOSIS — G47.33 OBSTRUCTIVE SLEEP APNEA: Primary | ICD-10-CM

## 2019-01-14 ENCOUNTER — HOSPITAL ENCOUNTER (OUTPATIENT)
Dept: MRI IMAGING | Facility: HOSPITAL | Age: 79
Discharge: HOME | End: 2019-01-14
Admitting: ANESTHESIOLOGY

## 2019-01-14 ENCOUNTER — TRANSCRIBE ORDERS (OUTPATIENT)
Dept: ADMINISTRATIVE | Facility: HOSPITAL | Age: 79
End: 2019-01-14

## 2019-01-14 ENCOUNTER — HOSPITAL ENCOUNTER (OUTPATIENT)
Dept: GENERAL RADIOLOGY | Facility: HOSPITAL | Age: 79
Discharge: HOME | End: 2019-01-14

## 2019-01-14 DIAGNOSIS — M25.511 RIGHT SHOULDER PAIN, UNSPECIFIED CHRONICITY: ICD-10-CM

## 2019-01-14 DIAGNOSIS — M43.06 SPONDYLOLYSIS, LUMBAR REGION: ICD-10-CM

## 2019-01-14 DIAGNOSIS — M25.511 RIGHT SHOULDER PAIN, UNSPECIFIED CHRONICITY: Primary | ICD-10-CM

## 2019-01-14 DIAGNOSIS — M25.512 LEFT SHOULDER PAIN, UNSPECIFIED CHRONICITY: ICD-10-CM

## 2019-01-14 PROCEDURE — 72148 MRI LUMBAR SPINE W/O DYE: CPT

## 2019-01-14 PROCEDURE — 73030 X-RAY EXAM OF SHOULDER: CPT

## 2019-02-01 ENCOUNTER — TELEPHONE (OUTPATIENT)
Dept: NEUROLOGY | Age: 79
End: 2019-02-01

## 2019-02-18 ENCOUNTER — PROCEDURE VISIT (OUTPATIENT)
Dept: UROLOGY | Facility: CLINIC | Age: 79
End: 2019-02-18

## 2019-02-18 ENCOUNTER — APPOINTMENT (OUTPATIENT)
Dept: PREADMISSION TESTING | Facility: HOSPITAL | Age: 79
End: 2019-02-18

## 2019-02-18 VITALS
OXYGEN SATURATION: 98 % | HEART RATE: 60 BPM | DIASTOLIC BLOOD PRESSURE: 68 MMHG | RESPIRATION RATE: 14 BRPM | WEIGHT: 156.53 LBS | SYSTOLIC BLOOD PRESSURE: 139 MMHG | HEIGHT: 65 IN | BODY MASS INDEX: 26.08 KG/M2

## 2019-02-18 DIAGNOSIS — C67.4 MALIGNANT NEOPLASM OF POSTERIOR WALL OF URINARY BLADDER (HCC): ICD-10-CM

## 2019-02-18 DIAGNOSIS — C67.2 MALIGNANT NEOPLASM OF LATERAL WALL OF URINARY BLADDER (HCC): Primary | ICD-10-CM

## 2019-02-18 LAB
ANION GAP SERPL CALCULATED.3IONS-SCNC: 4 MMOL/L (ref 4–13)
BILIRUB BLD-MCNC: NEGATIVE MG/DL
BUN BLD-MCNC: 13 MG/DL (ref 5–21)
BUN/CREAT SERPL: 19.1 (ref 7–25)
CALCIUM SPEC-SCNC: 9.2 MG/DL (ref 8.4–10.4)
CHLORIDE SERPL-SCNC: 100 MMOL/L (ref 98–110)
CLARITY, POC: CLEAR
CO2 SERPL-SCNC: 33 MMOL/L (ref 24–31)
COLOR UR: YELLOW
CREAT BLD-MCNC: 0.68 MG/DL (ref 0.5–1.4)
DEPRECATED RDW RBC AUTO: 42.6 FL (ref 40–54)
ERYTHROCYTE [DISTWIDTH] IN BLOOD BY AUTOMATED COUNT: 12.3 % (ref 12–15)
GFR SERPL CREATININE-BSD FRML MDRD: 113 ML/MIN/1.73
GLUCOSE BLD-MCNC: 111 MG/DL (ref 70–100)
GLUCOSE UR STRIP-MCNC: NEGATIVE MG/DL
HCT VFR BLD AUTO: 40.4 % (ref 40–52)
HGB BLD-MCNC: 14 G/DL (ref 14–18)
KETONES UR QL: NEGATIVE
LEUKOCYTE EST, POC: NEGATIVE
MCH RBC QN AUTO: 32.6 PG (ref 28–32)
MCHC RBC AUTO-ENTMCNC: 34.7 G/DL (ref 33–36)
MCV RBC AUTO: 94 FL (ref 82–95)
NITRITE UR-MCNC: NEGATIVE MG/ML
PH UR: 7.5 [PH] (ref 5–8)
PLATELET # BLD AUTO: 123 10*3/MM3 (ref 130–400)
PMV BLD AUTO: 11.1 FL (ref 6–12)
POTASSIUM BLD-SCNC: 4 MMOL/L (ref 3.5–5.3)
PROT UR STRIP-MCNC: NEGATIVE MG/DL
RBC # BLD AUTO: 4.3 10*6/MM3 (ref 4.8–5.9)
RBC # UR STRIP: NEGATIVE /UL
SODIUM BLD-SCNC: 137 MMOL/L (ref 135–145)
SP GR UR: 1.02 (ref 1–1.03)
UROBILINOGEN UR QL: NORMAL
WBC NRBC COR # BLD: 4.85 10*3/MM3 (ref 4.8–10.8)

## 2019-02-18 PROCEDURE — 80048 BASIC METABOLIC PNL TOTAL CA: CPT | Performed by: UROLOGY

## 2019-02-18 PROCEDURE — 93005 ELECTROCARDIOGRAM TRACING: CPT

## 2019-02-18 PROCEDURE — 36415 COLL VENOUS BLD VENIPUNCTURE: CPT

## 2019-02-18 PROCEDURE — 85027 COMPLETE CBC AUTOMATED: CPT | Performed by: UROLOGY

## 2019-02-18 PROCEDURE — 99213 OFFICE O/P EST LOW 20 MIN: CPT | Performed by: UROLOGY

## 2019-02-18 PROCEDURE — 81003 URINALYSIS AUTO W/O SCOPE: CPT | Performed by: UROLOGY

## 2019-02-18 PROCEDURE — 93010 ELECTROCARDIOGRAM REPORT: CPT | Performed by: INTERNAL MEDICINE

## 2019-02-18 PROCEDURE — 52000 CYSTOURETHROSCOPY: CPT | Performed by: UROLOGY

## 2019-02-18 NOTE — PROGRESS NOTES
CC: I am here for the doctor to look at my bladder    Cystoscopy procedure note  Pre- operative diagnosis:  Bladder cancer surveillance    Post operative diagnosis:  Bladder Tumor    Procedure:  The patient was prepped and draped in a normal sterile fashion.  The urethra was anesthetized with 2% lidocaine jelly.  A flexible cystoscope was introduced per urethra.      Urethra:  No urethral stricture    Bladder:  Bladder tumor 1.5 cm lesion in dome of the bladder    Ureteral orifices:  Normal position bilaterally and Clear efflux bilaterally    Prostate:  lateral lobe hypertrophy    Patient tolerated the procedure well    Complications: none    Blood loss: minimal      Final Diagnosis of Initial Bladder Tumor   1.  Bladder tumor, right lateral wall, transurethral resection:  Low-grade papillary urothelial carcinoma, noninvasive.  Muscularis propria not present.     2.  Bladder tumor, transurethral resection:  Low-grade papillary urothelial carcinoma, noninvasive.  Muscularis propria identified with no evidence of tumor involvement.     AJCC pathologic stage:  pTa Nx   Electronically signed by Jon Pires MD on 8/23/2017 at 0928     Given these findings, I had to evaluate the patient to assess fitness for surgery, formulate and discussa plan, that included alternatives, risks and benefits of management.. This went well beyond the typical description of procedural findings and therefore an additional evaluation and management was required.        HPI  Cystoscopy finding  The patient's cystoscopy today revealed papillary bladder neoplasm worrisome for urothelial cancer on today's exam  will require further evaluation. This is a recurrence of a previous diagnosis finding warranting further evaluation. Onset of this is unknown. It was found in the context of cystoscopy being done for an evaluation of prior hx of urothelial carcinoma.  Symptoms include no hematuria, dysuria or flank pain.      The following portions  of the patient's history were reviewed and updated as appropriate: allergies, current medications, past family history, past medical history, past social history, past surgical history and problem list.      Review of Systems   Constitutional: Negative for chills and fever.   Gastrointestinal: Negative for abdominal pain, anal bleeding and blood in stool.   Genitourinary: Negative for dysuria and hematuria.           Current Outpatient Medications:   •  aspirin 81 MG tablet, Take  by mouth., Disp: , Rfl:   •  HYDROcodone-acetaminophen (NORCO)  MG per tablet, Take  by mouth., Disp: , Rfl:   •  simvastatin (ZOCOR) 20 MG tablet, Take 20 mg by mouth Every Night., Disp: , Rfl:     Past Medical History:   Diagnosis Date   • Arthritis    • Cancer (CMS/HCC) 08/2017    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   • Colon polyp    • COPD (chronic obstructive pulmonary disease) (CMS/HCC)    • Hyperlipidemia        Past Surgical History:   Procedure Laterality Date   • CHOLECYSTECTOMY WITH INTRAOPERATIVE CHOLANGIOGRAM N/A 7/25/2017    Procedure: CHOLECYSTECTOMY LAPAROSCOPIC AND REMOVAL CYST ON CHEST;  Surgeon: Ina Issa MD;  Location: [facility] OR;  Service:    • COLONOSCOPY  07/24/2012   • COLONOSCOPY N/A 9/19/2017    Procedure: COLONOSCOPY WITH ANESTHESIA;  Surgeon: Jimmie Tirado MD;  Location: [facility] ENDOSCOPY;  Service:    • HEMORRHOIDECTOMY     • HERNIA REPAIR     • TRANSURETHRAL RESECTION OF BLADDER TUMOR Bilateral 8/22/2017    Procedure: CYSTOSCOPY TRANSURETHRAL RESECTION OF BLADDER TUMOR bilateral retrogrades;  Surgeon: Cale Covington MD;  Location: [facility] OR;  Service:        Social History     Socioeconomic History   • Marital status:      Spouse name: Not on file   • Number of children: Not on file   • Years of education: Not on file   • Highest education level: Not on file   Tobacco Use   • Smoking status: Current Every Day Smoker     Packs/day: 0.25     Types: Cigarettes   • Smokeless  tobacco: Never Used   • Tobacco comment: quit about a month ago   Substance and Sexual Activity   • Alcohol use: No   • Drug use: No   • Sexual activity: Defer       Family History   Problem Relation Age of Onset   • No Known Problems Father    • No Known Problems Mother    • Colon cancer Neg Hx    • Colon polyps Neg Hx          There were no vitals taken for this visit.      Physical Exam  Neuro: Alert and oriented ×3  Const: Not agitated or distressed; Vital signs are reviewed. No obvious deformities  Pulmonary: No respiratory distress  Skin: Without pallor or diaphoresis  GI: Abdomen is soft and nontender.  No significant distention.  No hernias noted.  : Penis and testicles are normal.      Data  Results for orders placed or performed in visit on 02/18/19   POC Urinalysis Dipstick, Multipro   Result Value Ref Range    Color Yellow Yellow, Straw, Dark Yellow, Ann    Clarity, UA Clear Clear    Glucose, UA Negative Negative, 1000 mg/dL (3+) mg/dL    Bilirubin Negative Negative    Ketones, UA Negative Negative    Specific Gravity  1.020 1.005 - 1.030    Blood, UA Negative Negative    pH, Urine 7.5 5.0 - 8.0    Protein, POC Negative Negative mg/dL    Urobilinogen, UA Normal Normal    Nitrite, UA Negative Negative    Leukocytes Negative Negative         Imaging Results (last 7 days)     ** No results found for the last 168 hours. **            Assessment and Plan  Diagnoses and all orders for this visit:    Malignant neoplasm of lateral wall of urinary bladder (CMS/HCC)  -     POC Urinalysis Dipstick, Multipro    Malignant neoplasm of posterior wall of urinary bladder (CMS/HCC)  -     Case Request; Standing  -     ceFAZolin (ANCEF) 2 g in sodium chloride 0.9 % 100 mL IVPB; Infuse 2 g into a venous catheter 1 (One) Time.  -     Case Request    Other orders  -     Follow Anesthesia Guidelines / Standing Orders; Future  -     Provide NPO Instructions to Patient; Future  -     Follow Anesthesia Guidelines / Standing  Orders; Standing  -     Verify NPO Status; Standing  -     Obtain Informed Consent; Standing      He has a definite recurrence of bladder cancer.  This is a small lesion that is located at the posterior aspect of the dome.  Is clearly a low-grade superficial lesion and I think we can just biopsy and fulgurate this as opposed to doing a full blown transurethral resection.  We discussed pros and cons of using intravesical mitomycin.  At this point he would like to hold off on this due to concerns about the possibility of developing a chemical cystitis.  I think that is reasonable given such a small lesion.  We would of course highly recommend this if he developed another lesion.    (Please note that portions of this note were completed with a voice recognition program.)  Cale Covington MD  02/18/19  9:43 AM      Cc:Dr. Mendez

## 2019-02-18 NOTE — DISCHARGE INSTRUCTIONS
DAY OF SURGERY INSTRUCTIONS        YOUR SURGEON: MARIO WELLS    PROCEDURE: CYSTOSCOPY, BLADDER BIOPSY WITH FULGURATION, BILATERAL RETROGRADE PYELOGRAMS    DATE OF SURGERY: February 22, 2019    ARRIVAL TIME: AS DIRECTED BY OFFICE    YOU MAY TAKE THE FOLLOWING MEDICATION(S) THE MORNING OF SURGERY WITH A SIP OF WATER: NORCO    ALL OTHER HOME MEDICATIONS CHECK WITH YOUR DOCTOR              MANAGING PAIN AFTER SURGERY    We know you are probably wondering what your pain will be like after surgery.  Following surgery it is unrealistic to expect you will not have pain.   Pain is how our bodies let us know that something is wrong or cautions us to be careful.  That said, our goal is to make your pain tolerable.    Methods we may use to treat your pain include (oral or IV medications, PCAs, epidurals, nerve blocks, etc.)   While some procedures require IV pain medications for a short time after surgery, transitioning to pain medications by mouth allows for better management of pain.   Your nurse will encourage you to take oral pain medications whenever possible.  IV medications work almost immediately, but only last a short while.  Taking medications by mouth allows for a more constant level of medication in your blood stream for a longer period of time.      Once your pain is out of control it is harder to get back under control.  It is important you are aware when your next dose of pain medication is due.  If you are admitted, your nurse may write the time of your next dose on the white board in your room to help you remember.      We are interested in your pain and encourage you to inform us about aggravating factors during your visit.   Many times a simple repositioning every few hours can make a big difference.    If your physician says it is okay, do not let your pain prevent you from getting out of bed. Be sure to call your nurse for assistance prior to getting up so you do not fall.      Before surgery, please  decide your tolerable pain goal.  These faces help describe the pain ratings we use on a 0-10 scale.   Be prepared to tell us your goal and whether or not you take pain or anxiety medications at home.            BEFORE YOU COME TO THE HOSPITAL  (Pre-op instructions)  • Do not eat, drink, smoke or chew gum after midnight the night before surgery.  This also includes no mints.  • Morning of surgery take only the medicines you have been instructed with a sip of water unless otherwise instructed  by your physician.  • Do not shave, wear makeup or dark nail polish.  • Remove all jewelry including rings.  • Leave anything you consider valuable at home.  • Leave your suitcase in the car until after your surgery.  • Bring the following with you if applicable:  o Picture ID and insurance, Medicare or Medicaid cards  o Co-pay/deductible required by insurance (cash, check, credit card)  o Copy of advance directive, living will or power-of- documents if not brought to PAT  o CPAP or BIPAP mask and tubing  o Relaxation aids (MP3 player, book, magazine)  • On the day of surgery check in at registration located at the main entrance of the hospital.       Outpatient Surgery Guidelines, Adult  Outpatient procedures are those for which the person having the procedure is allowed to go home the same day as the procedure. Various procedures are done on an outpatient basis. You should follow some general guidelines if you will be having an outpatient procedure.  LET YOUR HEALTH CARE PROVIDER KNOW ABOUT:  · Any allergies you have.  · All medicines you are taking, including vitamins, herbs, eye drops, creams, and over-the-counter medicines.  · Previous problems you or members of your family have had with the use of anesthetics.  · Any blood disorders you have.  · Previous surgeries you have had.  · Medical conditions you have.  RISKS AND COMPLICATIONS  Your health care provider will discuss possible risks and complications with you  before surgery. Common risks and complications include:    · Problems due to the use of anesthetics.  · Blood loss and replacement (does not apply to minor surgical procedures).  · Temporary increase in pain due to surgery.  · Uncorrected pain or problems that the surgery was meant to correct.  · Infection.  · New damage.  BEFORE THE PROCEDURE  · Ask your health care provider about changing or stopping your regular medicines. You may need to stop taking certain medicines in the days or weeks before the procedure.  · Stop smoking at least 2 weeks before surgery. This lowers your risk for complications during and after surgery. Ask your health care provider for help with this if needed.  · Eat your usual meals and a light supper the day before surgery. Continue fluid intake. Do not drink alcohol.  · Do not eat or drink after midnight the night before your surgery.   · Arrange for someone to take you home and to stay with you for 24 hours after the procedure. Medicine given for your procedure may affect your ability to drive or to care for yourself.  · Call your health care provider's office if you develop an illness or problem that may prevent you from safely having your procedure.  AFTER THE PROCEDURE  After surgery, you will be taken to a recovery area, where your progress will be monitored. If there are no complications, you will be allowed to go home when you are awake, stable, and taking fluids well. You may have numbness around the surgical site. Healing will take some time. You will have tenderness at the surgical site and may have some swelling and bruising. You may also have some nausea.  HOME CARE INSTRUCTIONS  · Do not drive for 24 hours, or as directed by your health care provider. Do not drive while taking prescription pain medicines.  · Do not drink alcohol for 24 hours.  · Do not make important decisions or sign legal documents for 24 hours.  · You may resume a normal diet and activities as  directed.  · Do not lift anything heavier than 10 pounds (4.5 kg) or play contact sports until your health care provider says it is okay.  · Change your bandages (dressings) as directed.  · Only take over-the-counter or prescription medicines as directed by your health care provider.  · Follow up with your health care provider as directed.  SEEK MEDICAL CARE IF:  · You have increased bleeding (more than a small spot) from the surgical site.  · You have redness, swelling, or increasing pain in the wound.  · You see pus coming from the wound.  · You have a fever.  · You notice a bad smell coming from the wound or dressing.  · You feel lightheaded or faint.  · You develop a rash.  · You have trouble breathing.  · You develop allergies.  MAKE SURE YOU:  · Understand these instructions.  · Will watch your condition.  · Will get help right away if you are not doing well or get worse.     This information is not intended to replace advice given to you by your health care provider. Make sure you discuss any questions you have with your health care provider.     Document Released: 09/12/2002 Document Revised: 05/03/2016 Document Reviewed: 05/22/2014  [employer] Interactive Patient Education ©2016 [employer]       Fall Prevention in Hospitals, Adult  As a hospital patient, your condition and the treatments you receive can increase your risk for falls. Some additional risk factors for falls in a hospital include:  · Being in an unfamiliar environment.  · Being on bed rest.  · Your surgery.  · Taking certain medicines.  · Your tubing requirements, such as intravenous (IV) therapy or catheters.  It is important that you learn how to decrease fall risks while at the hospital. Below are important tips that can help prevent falls.  SAFETY TIPS FOR PREVENTING FALLS  Talk about your risk of falling.  · Ask your health care provider why you are at risk for falling. Is it your medicine, illness, tubing placement, or something  else?  · Make a plan with your health care provider to keep you safe from falls.  · Ask your health care provider or pharmacist about side effects of your medicines. Some medicines can make you dizzy or affect your coordination.  Ask for help.  · Ask for help before getting out of bed. You may need to press your call button.  · Ask for assistance in getting safely to the toilet.  · Ask for a walker or cane to be put at your bedside. Ask that most of the side rails on your bed be placed up before your health care provider leaves the room.  · Ask family or friends to sit with you.  · Ask for things that are out of your reach, such as your glasses, hearing aids, telephone, bedside table, or call button.  Follow these tips to avoid falling:  · Stay lying or seated, rather than standing, while waiting for help.  · Wear rubber-soled slippers or shoes whenever you walk in the hospital.  · Avoid quick, sudden movements.  ¨ Change positions slowly.  ¨ Sit on the side of your bed before standing.  ¨ Stand up slowly and wait before you start to walk.  · Let your health care provider know if there is a spill on the floor.  · Pay careful attention to the medical equipment, electrical cords, and tubes around you.  · When you need help, use your call button by your bed or in the bathroom. Wait for one of your health care providers to help you.  · If you feel dizzy or unsure of your footing, return to bed and wait for assistance.  · Avoid being distracted by the TV, telephone, or another person in your room.  · Do not lean or support yourself on rolling objects, such as IV poles or bedside tables.     This information is not intended to replace advice given to you by your health care provider. Make sure you discuss any questions you have with your health care provider.     Document Released: 12/15/2001 Document Revised: 01/08/2016 Document Reviewed: 08/25/2013  [employer] Interactive Patient Education ©2016 [employer]       Surgical Site Infections FAQs  What is a Surgical Site Infection (SSI)?  A surgical site infection is an infection that occurs after surgery in the part of the body where the surgery took place. Most patients who have surgery do not develop an infection. However, infections develop in about 1 to 3 out of every 100 patients who have surgery.  Some of the common symptoms of a surgical site infection are:  · Redness and pain around the area where you had surgery  · Drainage of cloudy fluid from your surgical wound  · Fever  Can SSIs be treated?  Yes. Most surgical site infections can be treated with antibiotics. The antibiotic given to you depends on the bacteria (germs) causing the infection. Sometimes patients with SSIs also need another surgery to treat the infection.  What are some of the things that hospitals are doing to prevent SSIs?  To prevent SSIs, doctors, nurses, and other healthcare providers:  · Clean their hands and arms up to their elbows with an antiseptic agent just before the surgery.  · Clean their hands with soap and water or an alcohol-based hand rub before and after caring for each patient.  · May remove some of your hair immediately before your surgery using electric clippers if the hair is in the same area where the procedure will occur. They should not shave you with a razor.  · Wear special hair covers, masks, gowns, and gloves during surgery to keep the surgery area clean.  · Give you antibiotics before your surgery starts. In most cases, you should get antibiotics within 60 minutes before the surgery starts and the antibiotics should be stopped within 24 hours after surgery.  · Clean the skin at the site of your surgery with a special soap that kills germs.  What can I do to help prevent SSIs?  Before your surgery:  · Tell your doctor about other medical problems you may have. Health problems such as allergies, diabetes, and obesity could affect your surgery and your  treatment.  · Quit smoking. Patients who smoke get more infections. Talk to your doctor about how you can quit before your surgery.  · Do not shave near where you will have surgery. Shaving with a razor can irritate your skin and make it easier to develop an infection.  At the time of your surgery:  · Speak up if someone tries to shave you with a razor before surgery. Ask why you need to be shaved and talk with your surgeon if you have any concerns.  · Ask if you will get antibiotics before surgery.  After your surgery:  · Make sure that your healthcare providers clean their hands before examining you, either with soap and water or an alcohol-based hand rub.  · If you do not see your providers clean their hands, please ask them to do so.  · Family and friends who visit you should not touch the surgical wound or dressings.  · Family and friends should clean their hands with soap and water or an alcohol-based hand rub before and after visiting you. If you do not see them clean their hands, ask them to clean their hands.  What do I need to do when I go home from the hospital?  · Before you go home, your doctor or nurse should explain everything you need to know about taking care of your wound. Make sure you understand how to care for your wound before you leave the hospital.  · Always clean your hands before and after caring for your wound.  · Before you go home, make sure you know who to contact if you have questions or problems after you get home.  · If you have any symptoms of an infection, such as redness and pain at the surgery site, drainage, or fever, call your doctor immediately.  If you have additional questions, please ask your doctor or nurse.  Developed and co-sponsored by The Society for Healthcare Epidemiology of [place] (SHEA); Infectious Diseases Society of [place] (IDSA); American Hospital Association; Association for Professionals in Infection Control and Epidemiology (APIC); Centers for Disease  Control and Prevention (CDC); and The Joint Commission.     This information is not intended to replace advice given to you by your health care provider. Make sure you discuss any questions you have with your health care provider.     Document Released: 12/23/2014 Document Revised: 01/08/2016 Document Reviewed: 03/02/2016  [employer] Interactive Patient Education ©2016 [employer]     PATIENT/FAMILY/RESPONSIBLE PARTY VERBALIZES UNDERSTANDING OF ABOVE EDUCATION.  COPY OF PAIN SCALE GIVEN AND REVIEWED WITH VERBALIZED UNDERSTANDING.

## 2019-02-18 NOTE — H&P (VIEW-ONLY)
CC: I am here for the doctor to look at my bladder    Cystoscopy procedure note  Pre- operative diagnosis:  Bladder cancer surveillance    Post operative diagnosis:  Bladder Tumor    Procedure:  The patient was prepped and draped in a normal sterile fashion.  The urethra was anesthetized with 2% lidocaine jelly.  A flexible cystoscope was introduced per urethra.      Urethra:  No urethral stricture    Bladder:  Bladder tumor 1.5 cm lesion in dome of the bladder    Ureteral orifices:  Normal position bilaterally and Clear efflux bilaterally    Prostate:  lateral lobe hypertrophy    Patient tolerated the procedure well    Complications: none    Blood loss: minimal      Final Diagnosis of Initial Bladder Tumor   1.  Bladder tumor, right lateral wall, transurethral resection:  Low-grade papillary urothelial carcinoma, noninvasive.  Muscularis propria not present.     2.  Bladder tumor, transurethral resection:  Low-grade papillary urothelial carcinoma, noninvasive.  Muscularis propria identified with no evidence of tumor involvement.     AJCC pathologic stage:  pTa Nx   Electronically signed by Jon Pires MD on 8/23/2017 at 0928     Given these findings, I had to evaluate the patient to assess fitness for surgery, formulate and discussa plan, that included alternatives, risks and benefits of management.. This went well beyond the typical description of procedural findings and therefore an additional evaluation and management was required.        HPI  Cystoscopy finding  The patient's cystoscopy today revealed papillary bladder neoplasm worrisome for urothelial cancer on today's exam  will require further evaluation. This is a recurrence of a previous diagnosis finding warranting further evaluation. Onset of this is unknown. It was found in the context of cystoscopy being done for an evaluation of prior hx of urothelial carcinoma.  Symptoms include no hematuria, dysuria or flank pain.      The following portions  of the patient's history were reviewed and updated as appropriate: allergies, current medications, past family history, past medical history, past social history, past surgical history and problem list.      Review of Systems   Constitutional: Negative for chills and fever.   Gastrointestinal: Negative for abdominal pain, anal bleeding and blood in stool.   Genitourinary: Negative for dysuria and hematuria.           Current Outpatient Medications:   •  aspirin 81 MG tablet, Take  by mouth., Disp: , Rfl:   •  HYDROcodone-acetaminophen (NORCO)  MG per tablet, Take  by mouth., Disp: , Rfl:   •  simvastatin (ZOCOR) 20 MG tablet, Take 20 mg by mouth Every Night., Disp: , Rfl:     Past Medical History:   Diagnosis Date   • Arthritis    • Cancer (CMS/HCC) 08/2017    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   • Colon polyp    • COPD (chronic obstructive pulmonary disease) (CMS/HCC)    • Hyperlipidemia        Past Surgical History:   Procedure Laterality Date   • CHOLECYSTECTOMY WITH INTRAOPERATIVE CHOLANGIOGRAM N/A 7/25/2017    Procedure: CHOLECYSTECTOMY LAPAROSCOPIC AND REMOVAL CYST ON CHEST;  Surgeon: Ina Issa MD;  Location: [facility] OR;  Service:    • COLONOSCOPY  07/24/2012   • COLONOSCOPY N/A 9/19/2017    Procedure: COLONOSCOPY WITH ANESTHESIA;  Surgeon: Jimmie Tirado MD;  Location: [facility] ENDOSCOPY;  Service:    • HEMORRHOIDECTOMY     • HERNIA REPAIR     • TRANSURETHRAL RESECTION OF BLADDER TUMOR Bilateral 8/22/2017    Procedure: CYSTOSCOPY TRANSURETHRAL RESECTION OF BLADDER TUMOR bilateral retrogrades;  Surgeon: Cale Covington MD;  Location: [facility] OR;  Service:        Social History     Socioeconomic History   • Marital status:      Spouse name: Not on file   • Number of children: Not on file   • Years of education: Not on file   • Highest education level: Not on file   Tobacco Use   • Smoking status: Current Every Day Smoker     Packs/day: 0.25     Types: Cigarettes   • Smokeless  tobacco: Never Used   • Tobacco comment: quit about a month ago   Substance and Sexual Activity   • Alcohol use: No   • Drug use: No   • Sexual activity: Defer       Family History   Problem Relation Age of Onset   • No Known Problems Father    • No Known Problems Mother    • Colon cancer Neg Hx    • Colon polyps Neg Hx          There were no vitals taken for this visit.      Physical Exam  Neuro: Alert and oriented ×3  Const: Not agitated or distressed; Vital signs are reviewed. No obvious deformities  Pulmonary: No respiratory distress  Skin: Without pallor or diaphoresis  GI: Abdomen is soft and nontender.  No significant distention.  No hernias noted.  : Penis and testicles are normal.      Data  Results for orders placed or performed in visit on 02/18/19   POC Urinalysis Dipstick, Multipro   Result Value Ref Range    Color Yellow Yellow, Straw, Dark Yellow, Ann    Clarity, UA Clear Clear    Glucose, UA Negative Negative, 1000 mg/dL (3+) mg/dL    Bilirubin Negative Negative    Ketones, UA Negative Negative    Specific Gravity  1.020 1.005 - 1.030    Blood, UA Negative Negative    pH, Urine 7.5 5.0 - 8.0    Protein, POC Negative Negative mg/dL    Urobilinogen, UA Normal Normal    Nitrite, UA Negative Negative    Leukocytes Negative Negative         Imaging Results (last 7 days)     ** No results found for the last 168 hours. **            Assessment and Plan  Diagnoses and all orders for this visit:    Malignant neoplasm of lateral wall of urinary bladder (CMS/HCC)  -     POC Urinalysis Dipstick, Multipro    Malignant neoplasm of posterior wall of urinary bladder (CMS/HCC)  -     Case Request; Standing  -     ceFAZolin (ANCEF) 2 g in sodium chloride 0.9 % 100 mL IVPB; Infuse 2 g into a venous catheter 1 (One) Time.  -     Case Request    Other orders  -     Follow Anesthesia Guidelines / Standing Orders; Future  -     Provide NPO Instructions to Patient; Future  -     Follow Anesthesia Guidelines / Standing  Orders; Standing  -     Verify NPO Status; Standing  -     Obtain Informed Consent; Standing      He has a definite recurrence of bladder cancer.  This is a small lesion that is located at the posterior aspect of the dome.  Is clearly a low-grade superficial lesion and I think we can just biopsy and fulgurate this as opposed to doing a full blown transurethral resection.  We discussed pros and cons of using intravesical mitomycin.  At this point he would like to hold off on this due to concerns about the possibility of developing a chemical cystitis.  I think that is reasonable given such a small lesion.  We would of course highly recommend this if he developed another lesion.    (Please note that portions of this note were completed with a voice recognition program.)  Cale Covington MD  02/18/19  9:43 AM      Cc:Dr. Mendez

## 2019-02-22 ENCOUNTER — ANESTHESIA EVENT (OUTPATIENT)
Dept: PERIOP | Facility: HOSPITAL | Age: 79
End: 2019-02-22

## 2019-02-22 ENCOUNTER — ANESTHESIA (OUTPATIENT)
Dept: PERIOP | Facility: HOSPITAL | Age: 79
End: 2019-02-22

## 2019-02-22 ENCOUNTER — HOSPITAL ENCOUNTER (OUTPATIENT)
Facility: HOSPITAL | Age: 79
Setting detail: HOSPITAL OUTPATIENT SURGERY
Discharge: HOME | End: 2019-02-22
Attending: UROLOGY | Admitting: UROLOGY

## 2019-02-22 ENCOUNTER — APPOINTMENT (OUTPATIENT)
Dept: GENERAL RADIOLOGY | Facility: HOSPITAL | Age: 79
End: 2019-02-22

## 2019-02-22 VITALS
SYSTOLIC BLOOD PRESSURE: 125 MMHG | DIASTOLIC BLOOD PRESSURE: 70 MMHG | TEMPERATURE: 97.7 F | HEART RATE: 70 BPM | RESPIRATION RATE: 16 BRPM | OXYGEN SATURATION: 95 %

## 2019-02-22 DIAGNOSIS — C67.4 MALIGNANT NEOPLASM OF POSTERIOR WALL OF URINARY BLADDER (HCC): ICD-10-CM

## 2019-02-22 PROCEDURE — 25010000002 DEXAMETHASONE PER 1 MG: Performed by: NURSE ANESTHETIST, CERTIFIED REGISTERED

## 2019-02-22 PROCEDURE — 52214 CYSTOSCOPY AND TREATMENT: CPT | Performed by: UROLOGY

## 2019-02-22 PROCEDURE — 25010000002 ONDANSETRON PER 1 MG: Performed by: NURSE ANESTHETIST, CERTIFIED REGISTERED

## 2019-02-22 PROCEDURE — 74420 UROGRAPHY RTRGR +-KUB: CPT

## 2019-02-22 PROCEDURE — 25010000002 FENTANYL CITRATE (PF) 100 MCG/2ML SOLUTION: Performed by: NURSE ANESTHETIST, CERTIFIED REGISTERED

## 2019-02-22 PROCEDURE — C1758 CATHETER, URETERAL: HCPCS | Performed by: UROLOGY

## 2019-02-22 PROCEDURE — 25010000002 SUCCINYLCHOLINE PER 20 MG: Performed by: NURSE ANESTHETIST, CERTIFIED REGISTERED

## 2019-02-22 PROCEDURE — 88305 TISSUE EXAM BY PATHOLOGIST: CPT | Performed by: UROLOGY

## 2019-02-22 PROCEDURE — 25010000002 MIDAZOLAM PER 1 MG: Performed by: ANESTHESIOLOGY

## 2019-02-22 PROCEDURE — 25010000002 PROPOFOL 10 MG/ML EMULSION: Performed by: NURSE ANESTHETIST, CERTIFIED REGISTERED

## 2019-02-22 PROCEDURE — 25010000002 IOPAMIDOL 61 % SOLUTION: Performed by: UROLOGY

## 2019-02-22 PROCEDURE — 52351 CYSTOURETERO & OR PYELOSCOPE: CPT | Performed by: UROLOGY

## 2019-02-22 RX ORDER — ACETAMINOPHEN 500 MG
1000 TABLET ORAL ONCE
Status: COMPLETED | OUTPATIENT
Start: 2019-02-22 | End: 2019-02-22

## 2019-02-22 RX ORDER — METOCLOPRAMIDE HYDROCHLORIDE 5 MG/ML
5 INJECTION INTRAMUSCULAR; INTRAVENOUS
Status: DISCONTINUED | OUTPATIENT
Start: 2019-02-22 | End: 2019-02-22 | Stop reason: HOSPADM

## 2019-02-22 RX ORDER — BUPIVACAINE HCL/0.9 % NACL/PF 0.1 %
2 PLASTIC BAG, INJECTION (ML) EPIDURAL ONCE
Status: COMPLETED | OUTPATIENT
Start: 2019-02-22 | End: 2019-02-22

## 2019-02-22 RX ORDER — DEXAMETHASONE SODIUM PHOSPHATE 4 MG/ML
INJECTION, SOLUTION INTRA-ARTICULAR; INTRALESIONAL; INTRAMUSCULAR; INTRAVENOUS; SOFT TISSUE AS NEEDED
Status: DISCONTINUED | OUTPATIENT
Start: 2019-02-22 | End: 2019-02-22 | Stop reason: SURG

## 2019-02-22 RX ORDER — FENTANYL CITRATE 50 UG/ML
25 INJECTION, SOLUTION INTRAMUSCULAR; INTRAVENOUS AS NEEDED
Status: DISCONTINUED | OUTPATIENT
Start: 2019-02-22 | End: 2019-02-22 | Stop reason: HOSPADM

## 2019-02-22 RX ORDER — FENTANYL CITRATE 50 UG/ML
INJECTION, SOLUTION INTRAMUSCULAR; INTRAVENOUS AS NEEDED
Status: DISCONTINUED | OUTPATIENT
Start: 2019-02-22 | End: 2019-02-22 | Stop reason: SURG

## 2019-02-22 RX ORDER — IPRATROPIUM BROMIDE AND ALBUTEROL SULFATE 2.5; .5 MG/3ML; MG/3ML
3 SOLUTION RESPIRATORY (INHALATION) ONCE AS NEEDED
Status: DISCONTINUED | OUTPATIENT
Start: 2019-02-22 | End: 2019-02-22 | Stop reason: HOSPADM

## 2019-02-22 RX ORDER — LABETALOL HYDROCHLORIDE 5 MG/ML
5 INJECTION, SOLUTION INTRAVENOUS
Status: DISCONTINUED | OUTPATIENT
Start: 2019-02-22 | End: 2019-02-22 | Stop reason: HOSPADM

## 2019-02-22 RX ORDER — HYDROCODONE BITARTRATE AND ACETAMINOPHEN 5; 325 MG/1; MG/1
1 TABLET ORAL ONCE AS NEEDED
Status: DISCONTINUED | OUTPATIENT
Start: 2019-02-22 | End: 2019-02-22 | Stop reason: HOSPADM

## 2019-02-22 RX ORDER — SODIUM CHLORIDE 0.9 % (FLUSH) 0.9 %
1-10 SYRINGE (ML) INJECTION AS NEEDED
Status: DISCONTINUED | OUTPATIENT
Start: 2019-02-22 | End: 2019-02-22 | Stop reason: HOSPADM

## 2019-02-22 RX ORDER — IBUPROFEN 600 MG/1
600 TABLET ORAL ONCE AS NEEDED
Status: DISCONTINUED | OUTPATIENT
Start: 2019-02-22 | End: 2019-02-22 | Stop reason: HOSPADM

## 2019-02-22 RX ORDER — MAGNESIUM HYDROXIDE 1200 MG/15ML
LIQUID ORAL AS NEEDED
Status: DISCONTINUED | OUTPATIENT
Start: 2019-02-22 | End: 2019-02-22 | Stop reason: HOSPADM

## 2019-02-22 RX ORDER — MIDAZOLAM HYDROCHLORIDE 1 MG/ML
1 INJECTION INTRAMUSCULAR; INTRAVENOUS
Status: DISCONTINUED | OUTPATIENT
Start: 2019-02-22 | End: 2019-02-22 | Stop reason: HOSPADM

## 2019-02-22 RX ORDER — MEPERIDINE HYDROCHLORIDE 25 MG/ML
12.5 INJECTION INTRAMUSCULAR; INTRAVENOUS; SUBCUTANEOUS
Status: DISCONTINUED | OUTPATIENT
Start: 2019-02-22 | End: 2019-02-22 | Stop reason: HOSPADM

## 2019-02-22 RX ORDER — SODIUM CHLORIDE, SODIUM LACTATE, POTASSIUM CHLORIDE, CALCIUM CHLORIDE 600; 310; 30; 20 MG/100ML; MG/100ML; MG/100ML; MG/100ML
1000 INJECTION, SOLUTION INTRAVENOUS CONTINUOUS
Status: DISCONTINUED | OUTPATIENT
Start: 2019-02-22 | End: 2019-02-22 | Stop reason: HOSPADM

## 2019-02-22 RX ORDER — SUCCINYLCHOLINE CHLORIDE 20 MG/ML
INJECTION INTRAMUSCULAR; INTRAVENOUS AS NEEDED
Status: DISCONTINUED | OUTPATIENT
Start: 2019-02-22 | End: 2019-02-22 | Stop reason: SURG

## 2019-02-22 RX ORDER — PHENYLEPHRINE HCL IN 0.9% NACL 0.8MG/10ML
SYRINGE (ML) INTRAVENOUS AS NEEDED
Status: DISCONTINUED | OUTPATIENT
Start: 2019-02-22 | End: 2019-02-22 | Stop reason: SURG

## 2019-02-22 RX ORDER — PROPOFOL 10 MG/ML
VIAL (ML) INTRAVENOUS AS NEEDED
Status: DISCONTINUED | OUTPATIENT
Start: 2019-02-22 | End: 2019-02-22 | Stop reason: SURG

## 2019-02-22 RX ORDER — SODIUM CHLORIDE, SODIUM LACTATE, POTASSIUM CHLORIDE, CALCIUM CHLORIDE 600; 310; 30; 20 MG/100ML; MG/100ML; MG/100ML; MG/100ML
100 INJECTION, SOLUTION INTRAVENOUS CONTINUOUS
Status: DISCONTINUED | OUTPATIENT
Start: 2019-02-22 | End: 2019-02-22 | Stop reason: HOSPADM

## 2019-02-22 RX ORDER — ROCURONIUM BROMIDE 10 MG/ML
INJECTION, SOLUTION INTRAVENOUS AS NEEDED
Status: DISCONTINUED | OUTPATIENT
Start: 2019-02-22 | End: 2019-02-22 | Stop reason: SURG

## 2019-02-22 RX ORDER — NALOXONE HCL 0.4 MG/ML
0.4 VIAL (ML) INJECTION AS NEEDED
Status: DISCONTINUED | OUTPATIENT
Start: 2019-02-22 | End: 2019-02-22 | Stop reason: HOSPADM

## 2019-02-22 RX ORDER — SORBITOL 30 G/1000ML
IRRIGANT IRRIGATION AS NEEDED
Status: DISCONTINUED | OUTPATIENT
Start: 2019-02-22 | End: 2019-02-22 | Stop reason: HOSPADM

## 2019-02-22 RX ORDER — MIDAZOLAM HYDROCHLORIDE 1 MG/ML
2 INJECTION INTRAMUSCULAR; INTRAVENOUS
Status: DISCONTINUED | OUTPATIENT
Start: 2019-02-22 | End: 2019-02-22 | Stop reason: HOSPADM

## 2019-02-22 RX ORDER — OXYCODONE AND ACETAMINOPHEN 10; 325 MG/1; MG/1
1 TABLET ORAL ONCE AS NEEDED
Status: COMPLETED | OUTPATIENT
Start: 2019-02-22 | End: 2019-02-22

## 2019-02-22 RX ORDER — LIDOCAINE HYDROCHLORIDE 40 MG/ML
SOLUTION TOPICAL AS NEEDED
Status: DISCONTINUED | OUTPATIENT
Start: 2019-02-22 | End: 2019-02-22 | Stop reason: SURG

## 2019-02-22 RX ORDER — OXYCODONE AND ACETAMINOPHEN 7.5; 325 MG/1; MG/1
2 TABLET ORAL EVERY 4 HOURS PRN
Status: DISCONTINUED | OUTPATIENT
Start: 2019-02-22 | End: 2019-02-22 | Stop reason: HOSPADM

## 2019-02-22 RX ORDER — ONDANSETRON 2 MG/ML
4 INJECTION INTRAMUSCULAR; INTRAVENOUS ONCE AS NEEDED
Status: DISCONTINUED | OUTPATIENT
Start: 2019-02-22 | End: 2019-02-22 | Stop reason: HOSPADM

## 2019-02-22 RX ORDER — SODIUM CHLORIDE 0.9 % (FLUSH) 0.9 %
3 SYRINGE (ML) INJECTION EVERY 12 HOURS SCHEDULED
Status: DISCONTINUED | OUTPATIENT
Start: 2019-02-22 | End: 2019-02-22 | Stop reason: HOSPADM

## 2019-02-22 RX ORDER — SODIUM CHLORIDE 0.9 % (FLUSH) 0.9 %
3 SYRINGE (ML) INJECTION AS NEEDED
Status: DISCONTINUED | OUTPATIENT
Start: 2019-02-22 | End: 2019-02-22 | Stop reason: HOSPADM

## 2019-02-22 RX ORDER — LIDOCAINE HYDROCHLORIDE 20 MG/ML
INJECTION, SOLUTION INFILTRATION; PERINEURAL AS NEEDED
Status: DISCONTINUED | OUTPATIENT
Start: 2019-02-22 | End: 2019-02-22 | Stop reason: SURG

## 2019-02-22 RX ORDER — ONDANSETRON 2 MG/ML
INJECTION INTRAMUSCULAR; INTRAVENOUS AS NEEDED
Status: DISCONTINUED | OUTPATIENT
Start: 2019-02-22 | End: 2019-02-22 | Stop reason: SURG

## 2019-02-22 RX ADMIN — LIDOCAINE HYDROCHLORIDE 1 EACH: 40 SOLUTION TOPICAL at 14:43

## 2019-02-22 RX ADMIN — HYDROCODONE BITARTRATE AND ACETAMINOPHEN 1 TABLET: 5; 325 TABLET ORAL at 16:53

## 2019-02-22 RX ADMIN — LIDOCAINE HYDROCHLORIDE 50 MG: 20 INJECTION, SOLUTION INFILTRATION; PERINEURAL at 14:42

## 2019-02-22 RX ADMIN — PROPOFOL 150 MG: 10 INJECTION, EMULSION INTRAVENOUS at 14:42

## 2019-02-22 RX ADMIN — MIDAZOLAM 1 MG: 1 INJECTION INTRAMUSCULAR; INTRAVENOUS at 14:12

## 2019-02-22 RX ADMIN — FENTANYL CITRATE 100 MCG: 50 INJECTION, SOLUTION INTRAMUSCULAR; INTRAVENOUS at 14:42

## 2019-02-22 RX ADMIN — SUCCINYLCHOLINE CHLORIDE 100 MG: 20 INJECTION, SOLUTION INTRAMUSCULAR; INTRAVENOUS at 14:42

## 2019-02-22 RX ADMIN — DEXAMETHASONE SODIUM PHOSPHATE 4 MG: 4 INJECTION, SOLUTION INTRAMUSCULAR; INTRAVENOUS at 14:42

## 2019-02-22 RX ADMIN — Medication 2 G: at 14:44

## 2019-02-22 RX ADMIN — SODIUM CHLORIDE, POTASSIUM CHLORIDE, SODIUM LACTATE AND CALCIUM CHLORIDE 1000 ML: 600; 310; 30; 20 INJECTION, SOLUTION INTRAVENOUS at 12:09

## 2019-02-22 RX ADMIN — MIDAZOLAM 1 MG: 1 INJECTION INTRAMUSCULAR; INTRAVENOUS at 13:56

## 2019-02-22 RX ADMIN — Medication 160 MCG: at 14:50

## 2019-02-22 RX ADMIN — ROCURONIUM BROMIDE 10 MG: 10 INJECTION INTRAVENOUS at 14:42

## 2019-02-22 RX ADMIN — ONDANSETRON HYDROCHLORIDE 4 MG: 2 SOLUTION INTRAMUSCULAR; INTRAVENOUS at 14:42

## 2019-02-22 RX ADMIN — ACETAMINOPHEN 1000 MG: 500 TABLET, FILM COATED ORAL at 13:56

## 2019-02-22 RX ADMIN — OXYCODONE HYDROCHLORIDE AND ACETAMINOPHEN 1 TABLET: 10; 325 TABLET ORAL at 15:50

## 2019-02-22 NOTE — ANESTHESIA POSTPROCEDURE EVALUATION
Patient: Ezekiel Velez    Procedure Summary     Date:  02/22/19 Room / Location:   [facility] /  [facility]    Anesthesia Start:  1440 Anesthesia Stop:  1517    Procedure:  CYSTOSCOPY BILATERAL RETROGRADE PYELOGRAM BLADDER BIOPSY WITH FULGURATION OF 1 CM BLADDER TUMOR (N/A Bladder) Diagnosis:       Malignant neoplasm of posterior wall of urinary bladder (CMS/HCC)      (Malignant neoplasm of posterior wall of urinary bladder (CMS/HCC) [C67.4])    Surgeon:  Cale Covington MD Provider:  Ezekiel Norris CRNA    Anesthesia Type:  general ASA Status:  3          Anesthesia Type: general  Last vitals  BP   130/61 (02/22/19 1717)   Temp   97.7 °F (36.5 °C) (02/22/19 1600)   Pulse   69 (02/22/19 1717)   Resp   16 (02/22/19 1717)     SpO2   94 % (02/22/19 1717)     Post Anesthesia Care and Evaluation    Patient location during evaluation: PACU  Patient participation: complete - patient participated  Level of consciousness: awake and alert  Pain management: adequate  Airway patency: patent  Anesthetic complications: No anesthetic complications  PONV Status: none  Cardiovascular status: acceptable and hemodynamically stable  Respiratory status: acceptable  Hydration status: acceptable    Comments: Blood pressure 130/61, pulse 69, temperature 97.7 °F (36.5 °C), temperature source Temporal, resp. rate 16, SpO2 94 %.    Patient discharged from PACU based upon Geni score. Please see RN notes for further details

## 2019-02-22 NOTE — DISCHARGE INSTRUCTIONS

## 2019-02-22 NOTE — NURSING NOTE
Call to surgery waiting room to update patient's family.  Report to [facility] Ponce for shift relief.

## 2019-02-22 NOTE — ANESTHESIA PREPROCEDURE EVALUATION
Anesthesia Evaluation     Patient summary reviewed and Nursing notes reviewed   no history of anesthetic complications:  NPO Solid Status: > 8 hours  NPO Liquid Status: > 2 hours           Airway   Mallampati: II  TM distance: >3 FB  Neck ROM: full  Dental    (+) edentulous, upper dentures and lower dentures    Pulmonary    (+) a smoker Current, COPD, sleep apnea,   (-) asthma, recent URI  Cardiovascular   Exercise tolerance: good (4-7 METS)    ECG reviewed    (+) hyperlipidemia,   (-) hypertension, past MI, angina, murmur, cardiac stents, CABG      Neuro/Psych  (-) seizures, TIA, CVA  GI/Hepatic/Renal/Endo    (-) GERD, liver disease, no renal disease, diabetes, hypothyroidism, hyperthyroidism    Musculoskeletal     (+) back pain,   (-) neck pain, neck stiffness      ROS comment: Chronic pain   Abdominal    Substance History      OB/GYN          Other   (+) arthritis   history of cancer (Urothelial cancer)                    Anesthesia Plan    ASA 3     general     intravenous induction   Anesthetic plan, all risks, benefits, and alternatives have been provided, discussed and informed consent has been obtained with: patient.

## 2019-02-22 NOTE — OP NOTE
Operative Summary    Ezekiel DIEGO Millay  Date of Procedure: 2/22/2019    Pre-op Diagnosis:   Malignant neoplasm of posterior wall of urinary bladder (CMS/HCC) [C67.4]    Post-op Diagnosis:     Post-Op Diagnosis Codes:     * Malignant neoplasm of posterior wall of urinary bladder (CMS/HCC) [C67.4]    Procedure/CPT® Codes:      Procedure(s):  CYSTOSCOPY   BILATERAL RETROGRADE PYELOGRAM   BIOPSY WITH FULGURATION OF 1 CM BLADDER TUMOR IN POSTERIOR DOME OF BLADDER    Surgeon(s):  Cale Covington MD    Anesthesia: General    Staff:   Circulator: Pearl Jacobs RN; Keyana Brown RN  Scrub Person: Hawa Smart  Assistant: Steven Oro    Indications for procedure:  Urothelial lesion located in the posterior aspect of the dome of the bladder on cystoscopy for surveillance given history of urothelial carcinoma    Findings:   -Cystoscopy findings: Anterior urethra without stricture.  Prostatic urethra moderate bilobar enlargement.  Bladder moderately trabeculated without significant diverticuli.  There is a 1 cm lesion in the posterior aspect of the d dome just to the left of midline.  -Right retrograde pyelogram interpretation: Right ureter is normal in its course and caliber.  Pelvicalyceal system shows no mass.  There is no extrinsic compression or filling defect in either structure.  -Left retrograde pyelogram interpretation:Left ureter is normal in its course and caliber.  Pelvicalyceal system shows no mass.  There is no extrinsic compression or filling defect in either structure.      Procedure details:  After appropriate anesthesia, positioning, prep and drape, timeout protocol was observed.     22 Iraqi cystoscope sheath with 30 degree lens was inserted.  Cystoscopy findings are described above.  Mass is identified in the posterior dome just to the left of midline in the bladder.  Using a cold cup biopsy forcep I was actually able to remove this with 3 separate bites.  I did not worry about getting  muscular tissue with this because this is such a small superficial appearing lesion.  I then fulgurated the mass with a Bugbee electrode that remained as well as the urothelium surrounding it for approximately 2 cm.    Bilateral retrograde pyelograms were performed with a 5 Mohawk cone-tip catheter.  Half-strength contrast was used.    Three-way Louie catheter was placed he started on continuous bladder irrigation.    Estimated Blood Loss: <30mL    Specimens:                ID Type Source Tests Collected by Time   A : bladder dome Tissue Urinary Bladder TISSUE PATHOLOGY EXAM Cale Covington MD 2/22/2019 1502         Drains: none    Complications: none    Plan: Office f/u in 6 months for cystoscopy      (Please note that portions of this note were completed with a voice recognition program.)  Cale Covington MD     Date: 2/22/2019  Time: 3:32 PM

## 2019-02-22 NOTE — ANESTHESIA PROCEDURE NOTES
Airway  Urgency: elective    Airway not difficult    General Information and Staff    Patient location during procedure: OR  CRNA: Ezekiel Norris CRNA    Indications and Patient Condition  Indications for airway management: airway protection    Preoxygenated: yes  Mask difficulty assessment: 1 - vent by mask    Final Airway Details  Final airway type: endotracheal airway      Successful airway: ETT  Cuffed: yes   Successful intubation technique: direct laryngoscopy  Blade: Ambrosio  Blade size: 2  ETT size (mm): 6.0  Cormack-Lehane Classification: grade I - full view of glottis  Placement verified by: chest auscultation and capnometry   Cuff volume (mL): 7  Measured from: lips  Number of attempts at approach: 1

## 2019-02-26 LAB
CYTO UR: NORMAL
LAB AP CASE REPORT: NORMAL
LAB AP DIAGNOSIS COMMENT: NORMAL
PATH REPORT.FINAL DX SPEC: NORMAL
PATH REPORT.GROSS SPEC: NORMAL

## 2019-02-28 ENCOUNTER — HOSPITAL ENCOUNTER (OUTPATIENT)
Dept: CT IMAGING | Age: 79
Discharge: HOME | End: 2019-02-28
Payer: MEDICARE

## 2019-02-28 DIAGNOSIS — G31.84 MILD COGNITIVE IMPAIRMENT, SO STATED: ICD-10-CM

## 2019-02-28 PROCEDURE — 70450 CT HEAD/BRAIN W/O DYE: CPT

## 2019-03-04 ENCOUNTER — TELEPHONE (OUTPATIENT)
Dept: UROLOGY | Facility: CLINIC | Age: 79
End: 2019-03-04

## 2019-03-05 NOTE — TELEPHONE ENCOUNTER
Pt notified of his biopsy results.  While they do show cancer, this is a low-grade noninvasive lesion.  We will continue surveillance cystoscopy as planned on June 27, 2019.

## 2019-03-05 NOTE — TELEPHONE ENCOUNTER
----- Message from Tonya Austin LPN sent at 3/4/2019  1:31 PM CST -----  Regarding: path results  Contact: [phone]  Pt called to get path results. Told him you were in all day clinic but would call as soon as able.

## 2019-03-06 ENCOUNTER — OFFICE VISIT (OUTPATIENT)
Dept: NEUROLOGY | Age: 79
End: 2019-03-06
Payer: MEDICARE

## 2019-03-06 VITALS
SYSTOLIC BLOOD PRESSURE: 120 MMHG | WEIGHT: 153 LBS | HEIGHT: 66 IN | HEART RATE: 60 BPM | DIASTOLIC BLOOD PRESSURE: 65 MMHG | BODY MASS INDEX: 24.59 KG/M2 | OXYGEN SATURATION: 95 %

## 2019-03-06 DIAGNOSIS — Z99.89 CPAP (CONTINUOUS POSITIVE AIRWAY PRESSURE) DEPENDENCE: ICD-10-CM

## 2019-03-06 DIAGNOSIS — G47.33 OBSTRUCTIVE SLEEP APNEA: ICD-10-CM

## 2019-03-06 PROCEDURE — 1036F TOBACCO NON-USER: CPT | Performed by: PHYSICIAN ASSISTANT

## 2019-03-06 PROCEDURE — G8427 DOCREV CUR MEDS BY ELIG CLIN: HCPCS | Performed by: PHYSICIAN ASSISTANT

## 2019-03-06 PROCEDURE — G8484 FLU IMMUNIZE NO ADMIN: HCPCS | Performed by: PHYSICIAN ASSISTANT

## 2019-03-06 PROCEDURE — G8598 ASA/ANTIPLAT THER USED: HCPCS | Performed by: PHYSICIAN ASSISTANT

## 2019-03-06 PROCEDURE — 4040F PNEUMOC VAC/ADMIN/RCVD: CPT | Performed by: PHYSICIAN ASSISTANT

## 2019-03-06 PROCEDURE — 99214 OFFICE O/P EST MOD 30 MIN: CPT | Performed by: PHYSICIAN ASSISTANT

## 2019-03-06 PROCEDURE — 1123F ACP DISCUSS/DSCN MKR DOCD: CPT | Performed by: PHYSICIAN ASSISTANT

## 2019-03-06 PROCEDURE — 1101F PT FALLS ASSESS-DOCD LE1/YR: CPT | Performed by: PHYSICIAN ASSISTANT

## 2019-03-06 PROCEDURE — G8420 CALC BMI NORM PARAMETERS: HCPCS | Performed by: PHYSICIAN ASSISTANT

## 2019-03-19 ENCOUNTER — TRANSCRIBE ORDERS (OUTPATIENT)
Dept: PULMONOLOGY | Facility: CLINIC | Age: 79
End: 2019-03-19

## 2019-03-19 DIAGNOSIS — R91.1 LUNG NODULE: Primary | ICD-10-CM

## 2019-04-20 ENCOUNTER — APPOINTMENT (OUTPATIENT)
Dept: ULTRASOUND IMAGING | Age: 79
End: 2019-04-20
Payer: MEDICARE

## 2019-04-20 ENCOUNTER — HOSPITAL ENCOUNTER (EMERGENCY)
Age: 79
Discharge: HOME | End: 2019-04-20
Attending: EMERGENCY MEDICINE
Payer: MEDICARE

## 2019-04-20 ENCOUNTER — APPOINTMENT (OUTPATIENT)
Dept: CT IMAGING | Age: 79
End: 2019-04-20
Payer: MEDICARE

## 2019-04-20 VITALS
BODY MASS INDEX: 24.59 KG/M2 | HEIGHT: 66 IN | RESPIRATION RATE: 18 BRPM | TEMPERATURE: 97.8 F | WEIGHT: 153 LBS | HEART RATE: 65 BPM | OXYGEN SATURATION: 91 % | SYSTOLIC BLOOD PRESSURE: 127 MMHG | DIASTOLIC BLOOD PRESSURE: 74 MMHG

## 2019-04-20 DIAGNOSIS — N45.3 ORCHITIS AND EPIDIDYMITIS: ICD-10-CM

## 2019-04-20 DIAGNOSIS — R93.89 ABNORMAL CT SCAN: ICD-10-CM

## 2019-04-20 DIAGNOSIS — N39.0 URINARY TRACT INFECTION WITHOUT HEMATURIA, SITE UNSPECIFIED: ICD-10-CM

## 2019-04-20 DIAGNOSIS — R91.1 PULMONARY NODULE: Primary | ICD-10-CM

## 2019-04-20 LAB
ANION GAP SERPL CALCULATED.3IONS-SCNC: 9 MMOL/L (ref 7–19)
BACTERIA: ABNORMAL /HPF
BILIRUBIN URINE: NEGATIVE
BLOOD, URINE: ABNORMAL
BUN BLDV-MCNC: 12 MG/DL (ref 8–23)
CALCIUM SERPL-MCNC: 9.2 MG/DL (ref 8.8–10.2)
CHLORIDE BLD-SCNC: 99 MMOL/L (ref 98–111)
CLARITY: CLEAR
CO2: 29 MMOL/L (ref 22–29)
COLOR: YELLOW
CREAT SERPL-MCNC: 0.7 MG/DL (ref 0.5–1.2)
EPITHELIAL CELLS, UA: 0 /HPF (ref 0–5)
GFR NON-AFRICAN AMERICAN: >60
GLUCOSE BLD-MCNC: 126 MG/DL (ref 74–109)
GLUCOSE URINE: NEGATIVE MG/DL
HCT VFR BLD CALC: 40.7 % (ref 42–52)
HEMOGLOBIN: 13.5 G/DL (ref 14–18)
HYALINE CASTS: 1 /HPF (ref 0–8)
KETONES, URINE: NEGATIVE MG/DL
LEUKOCYTE ESTERASE, URINE: ABNORMAL
MCH RBC QN AUTO: 32.4 PG (ref 27–31)
MCHC RBC AUTO-ENTMCNC: 33.2 G/DL (ref 33–37)
MCV RBC AUTO: 97.6 FL (ref 80–94)
NITRITE, URINE: NEGATIVE
PDW BLD-RTO: 12.6 % (ref 11.5–14.5)
PH UA: 7 (ref 5–8)
PLATELET # BLD: 141 K/UL (ref 130–400)
PMV BLD AUTO: 10.5 FL (ref 9.4–12.4)
POTASSIUM SERPL-SCNC: 4 MMOL/L (ref 3.5–5)
PROTEIN UA: NEGATIVE MG/DL
RBC # BLD: 4.17 M/UL (ref 4.7–6.1)
RBC UA: 4 /HPF (ref 0–4)
SODIUM BLD-SCNC: 137 MMOL/L (ref 136–145)
SPECIFIC GRAVITY UA: 1.02 (ref 1–1.03)
URINE REFLEX TO CULTURE: YES
UROBILINOGEN, URINE: 1 E.U./DL
WBC # BLD: 7.7 K/UL (ref 4.8–10.8)
WBC UA: 63 /HPF (ref 0–5)

## 2019-04-20 PROCEDURE — 85027 COMPLETE CBC AUTOMATED: CPT

## 2019-04-20 PROCEDURE — 74177 CT ABD & PELVIS W/CONTRAST: CPT

## 2019-04-20 PROCEDURE — 96375 TX/PRO/DX INJ NEW DRUG ADDON: CPT

## 2019-04-20 PROCEDURE — 99284 EMERGENCY DEPT VISIT MOD MDM: CPT

## 2019-04-20 PROCEDURE — 6360000002 HC RX W HCPCS: Performed by: EMERGENCY MEDICINE

## 2019-04-20 PROCEDURE — 99285 EMERGENCY DEPT VISIT HI MDM: CPT | Performed by: EMERGENCY MEDICINE

## 2019-04-20 PROCEDURE — 87186 SC STD MICRODIL/AGAR DIL: CPT

## 2019-04-20 PROCEDURE — 87077 CULTURE AEROBIC IDENTIFY: CPT

## 2019-04-20 PROCEDURE — 81001 URINALYSIS AUTO W/SCOPE: CPT

## 2019-04-20 PROCEDURE — 6360000004 HC RX CONTRAST MEDICATION: Performed by: EMERGENCY MEDICINE

## 2019-04-20 PROCEDURE — 76870 US EXAM SCROTUM: CPT

## 2019-04-20 PROCEDURE — 80048 BASIC METABOLIC PNL TOTAL CA: CPT

## 2019-04-20 PROCEDURE — 96374 THER/PROPH/DIAG INJ IV PUSH: CPT

## 2019-04-20 PROCEDURE — 87086 URINE CULTURE/COLONY COUNT: CPT

## 2019-04-20 PROCEDURE — 36415 COLL VENOUS BLD VENIPUNCTURE: CPT

## 2019-04-20 RX ORDER — MORPHINE SULFATE 4 MG/ML
4 INJECTION, SOLUTION INTRAMUSCULAR; INTRAVENOUS ONCE
Status: COMPLETED | OUTPATIENT
Start: 2019-04-20 | End: 2019-04-20

## 2019-04-20 RX ORDER — LEVOFLOXACIN 500 MG/1
500 TABLET, FILM COATED ORAL DAILY
Qty: 21 TABLET | Refills: 0 | Status: SHIPPED | OUTPATIENT
Start: 2019-04-20 | End: 2019-05-11

## 2019-04-20 RX ORDER — ONDANSETRON 2 MG/ML
4 INJECTION INTRAMUSCULAR; INTRAVENOUS ONCE
Status: COMPLETED | OUTPATIENT
Start: 2019-04-20 | End: 2019-04-20

## 2019-04-20 RX ADMIN — IOPAMIDOL 90 ML: 755 INJECTION, SOLUTION INTRAVENOUS at 15:32

## 2019-04-20 RX ADMIN — ONDANSETRON 4 MG: 2 INJECTION INTRAMUSCULAR; INTRAVENOUS at 16:40

## 2019-04-20 RX ADMIN — MORPHINE SULFATE 4 MG: 4 INJECTION INTRAVENOUS at 16:39

## 2019-04-20 ASSESSMENT — ENCOUNTER SYMPTOMS
EYE PAIN: 0
SHORTNESS OF BREATH: 0
ABDOMINAL PAIN: 0
DIARRHEA: 0
VOMITING: 0

## 2019-04-20 ASSESSMENT — PAIN SCALES - GENERAL
PAINLEVEL_OUTOF10: 6
PAINLEVEL_OUTOF10: 8

## 2019-04-20 ASSESSMENT — PAIN DESCRIPTION - LOCATION: LOCATION: SCROTUM

## 2019-04-20 NOTE — ED PROVIDER NOTES
(abdominal aortic aneurysm) (HCC)     Arthritis     Bilateral low back pain without sciatica 1/13/2016    Bilateral shoulder pain 1/13/2016    Blood clotting disorder (HCC)     Cancer ([facility] [address] 09/2017    Bladder CA    Carotid artery stenosis     CPAP (continuous positive airway pressure) dependence     8cm to 20cm    DDD (degenerative disc disease), lumbar     Hx of blood clots     Hyperlipidemia     Obstructive sleep apnea     AHI: 57.6 per PSG, 11/2018         SURGICAL HISTORY       Past Surgical History:   Procedure Laterality Date    BLADDER SURGERY  08/22/2017    Removal of Cancer by Dr. Griselda Benson, LAPAROSCOPIC      july 25, 2017    COLONOSCOPY      HEMORRHOID SURGERY      HERNIA REPAIR      8/2016         CURRENT MEDICATIONS       Previous Medications    ASPIRIN 81 MG TABLET    Take 81 mg by mouth daily    HYDROCODONE-ACETAMINOPHEN (NORCO)  MG PER TABLET    Take 1 tablet by mouth 4 times daily as needed for Pain for up to 30 days. Brittney Hackett Date: 7/6/18    MOMETASONE-FORMOTEROL (DULERA) 200-5 MCG/ACT INHALER    Inhale 2 puffs into the lungs 2 times daily    SIMVASTATIN (ZOCOR) 80 MG TABLET    Take 80 mg by mouth daily       ALLERGIES     Celebrex [celecoxib]    FAMILY HISTORY       Family History   Problem Relation Age of Onset    Cancer Sister           SOCIAL HISTORY       Social History     Socioeconomic History    Marital status:      Spouse name: None    Number of children: None    Years of education: None    Highest education level: None   Occupational History    None   Social Needs    Financial resource strain: None    Food insecurity:     Worry: None     Inability: None    Transportation needs:     Medical: None     Non-medical: None   Tobacco Use    Smoking status: Former Smoker     Packs/day: 0.25     Years: 67.00     Pack years: 16.75     Types: Cigarettes    Smokeless tobacco: Never Used   Substance and Sexual Activity    Alcohol use:  No  Drug use: No    Sexual activity: None   Lifestyle    Physical activity:     Days per week: None     Minutes per session: None    Stress: None   Relationships    Social connections:     Talks on phone: None     Gets together: None     Attends Christian service: None     Active member of club or organization: None     Attends meetings of clubs or organizations: None     Relationship status: None    Intimate partner violence:     Fear of current or ex partner: None     Emotionally abused: None     Physically abused: None     Forced sexual activity: None   Other Topics Concern    None   Social History Narrative    None       SCREENINGS             PHYSICAL EXAM    (up to 7 for level 4, 8 or more for level 5)     ED Triage Vitals   BP Temp Temp Source Pulse Resp SpO2 Height Weight   -- 04/20/19 1348 04/20/19 1348 -- -- -- 04/20/19 1353 04/20/19 1353    97.8 °F (36.6 °C) Oral    5' 6\" (1.676 m) 153 lb (69.4 kg)       Physical Exam   Constitutional: He is oriented to person, place, and time. He appears well-developed. No distress. HENT:   Head: Normocephalic and atraumatic. Eyes: Pupils are equal, round, and reactive to light. No scleral icterus. Neck: Normal range of motion. Neck supple. No JVD present. Cardiovascular: Normal rate, regular rhythm, normal heart sounds and intact distal pulses. Pulmonary/Chest: Effort normal and breath sounds normal. No respiratory distress. Abdominal: Soft. He exhibits no distension. There is no tenderness. Genitourinary: Penis normal. Right testis shows swelling and tenderness. Musculoskeletal: He exhibits no edema or tenderness. Lymphadenopathy: No inguinal adenopathy noted on the right side. Neurological: He is alert and oriented to person, place, and time. Skin: Skin is warm and dry. Psychiatric: He has a normal mood and affect. His behavior is normal.   Vitals reviewed.       DIAGNOSTIC RESULTS     RADIOLOGY:   Non-plain film images such as CT, Ultrasound and MRI are read by the radiologist. Golden Villarreal images are visualized and preliminarily interpreted by the emergency physician with the below findings:    Interpretation per the Radiologist below, if available at the time of this note:    CT ABDOMEN PELVIS W IV CONTRAST Additional Contrast? None   Final Result   1. Acute right-sided epididymitis with bilateral hydroceles greater on   the right. Today's ultrasound of the scrotum and testicles also   suggest right-sided orchitis. 2. Moderate prostate enlargement probably related to BPH. Correlation   with PSA values is recommended. 3. 2 tiny subpleural nodules in the lung bases one on each side, which   appear new. These are likely benign. 4. Evidence previous cholecystectomy. Normal appendix. 5. Scattered cysts are noted in the liver and appear stable and   benign. 6. There are are 2 cysts in the left kidney which appear benign and   unchanged. 7. Fusiform infrarenal abdominal aortic aneurysm with a maximum   diameter of 3.5 cm and no evidence of rupture. There is also ectasia   of the right common iliac artery, measuring 1.5 cm. Signed by Dr Cam Angel on 4/20/2019 3:51 PM      US SCROTUM AND TESTICLES   Final Result   1. No testicular mass is identified. 2. Bilateral hydroceles, greater on the right, and the right hydrocele   has a few thin septations. 3. Bilateral epididymitis, worse on the right. Probable mild   right-sided orchitis as well. There is no evidence of testicular   torsion. Signed by Dr Cam Angel on 4/20/2019 3:31 PM            LABS:  Labs Reviewed   URINE RT REFLEX TO CULTURE - Abnormal; Notable for the following components:       Result Value    Blood, Urine TRACE (*)     Leukocyte Esterase, Urine MODERATE (*)     All other components within normal limits   CBC - Abnormal; Notable for the following components:    RBC 4.17 (*)     Hemoglobin 13.5 (*)     Hematocrit 40.7 (*)     MCV 97.6 (*) MCH 32.4 (*)     All other components within normal limits   BASIC METABOLIC PANEL - Abnormal; Notable for the following components:    Glucose 126 (*)     All other components within normal limits   MICROSCOPIC URINALYSIS - Abnormal; Notable for the following components:    WBC, UA 63 (*)     All other components within normal limits   URINE CULTURE       All other labs were within normal range or not returned as of this dictation. EMERGENCY DEPARTMENT COURSE and DIFFERENTIALDIAGNOSIS/MDM:   Vitals:    Vitals:    04/20/19 1353 04/20/19 1402 04/20/19 1403 04/20/19 1533   BP:  126/80 126/80 134/61   Pulse:   70    Resp:   20    Temp:       TempSrc:       SpO2:  90% 94% 94%   Weight: 153 lb (69.4 kg)      Height: 5' 6\" (1.676 m)          MDM  Patient informed about all of his incidentally noted findings on CT scan. He is aware of the AAA. He said this has been seen previously. I told him to follow-up with his primary care doctor to discuss all of his results from today. Concerning his epididymitis and orchitis and UTI, spoke with on-call urologist, Dr. Juvenal Barahona. He recommended discharging the patient home on 21 days of Levaquin and having the patient follow up in urology clinic. Patient afebrile and nontoxic so Dr. Juvenal Barahona does not feel that admission is warranted at this time. Patient has seen  Dr. Juan Carlos Crane for bladder cancer previously. Had a small mass removed in February of this year via cystoscopy. Given that he has already established care with Dr. Juan Carlos Crane told patient to follow up with Dr. Juan Carlos Crane this week. Told him to return to the ER immediately if he has any change or worsening symptoms at all. Patient agreeable with plan. He is nontoxic on exam and is stable for discharge at this time. CONSULTS:  None    PROCEDURES:  Unless otherwise notedbelow, none     Procedures    FINAL IMPRESSION     1. Pulmonary nodule    2. Abnormal CT scan    3. Urinary tract infection without hematuria, site unspecified    4.  Orchitis and epididymitis          DISPOSITION/PLAN   DISPOSITION Decision To Discharge 04/20/2019 04:40:14 PM      PATIENT REFERRED TO:  @FUP@    DISCHARGE MEDICATIONS:  New Prescriptions    LEVOFLOXACIN (LEVAQUIN) 500 MG TABLET    Take 1 tablet by mouth daily for 21 days          (Please note that portions of this note were completed with a voice recognition program.  Efforts were made to edit the dictations butoccasionally words are mis-transcribed.)    Stacey Baez MD (electronically signed)  AttendingEmergency Physician        Satcey Baez MD  04/20/19 9984

## 2019-04-22 LAB
ORGANISM: ABNORMAL
URINE CULTURE, ROUTINE: ABNORMAL
URINE CULTURE, ROUTINE: ABNORMAL

## 2019-04-23 ENCOUNTER — OFFICE VISIT (OUTPATIENT)
Dept: UROLOGY | Facility: CLINIC | Age: 79
End: 2019-04-23

## 2019-04-23 ENCOUNTER — TELEPHONE (OUTPATIENT)
Dept: UROLOGY | Facility: CLINIC | Age: 79
End: 2019-04-23

## 2019-04-23 VITALS — WEIGHT: 150 LBS | BODY MASS INDEX: 24.11 KG/M2 | HEIGHT: 66 IN | TEMPERATURE: 97.6 F

## 2019-04-23 DIAGNOSIS — N45.1 EPIDIDYMITIS: Primary | ICD-10-CM

## 2019-04-23 LAB
BILIRUB BLD-MCNC: NEGATIVE MG/DL
CLARITY, POC: CLEAR
COLOR UR: YELLOW
GLUCOSE UR STRIP-MCNC: NEGATIVE MG/DL
KETONES UR QL: NEGATIVE
LEUKOCYTE EST, POC: ABNORMAL
NITRITE UR-MCNC: NEGATIVE MG/ML
PH UR: 5.5 [PH] (ref 5–8)
PROT UR STRIP-MCNC: NEGATIVE MG/DL
RBC # UR STRIP: NEGATIVE /UL
SP GR UR: 1.01 (ref 1–1.03)
UROBILINOGEN UR QL: NORMAL

## 2019-04-23 PROCEDURE — 99214 OFFICE O/P EST MOD 30 MIN: CPT | Performed by: NURSE PRACTITIONER

## 2019-04-23 PROCEDURE — 81001 URINALYSIS AUTO W/SCOPE: CPT | Performed by: NURSE PRACTITIONER

## 2019-04-23 RX ORDER — SULFAMETHOXAZOLE AND TRIMETHOPRIM 800; 160 MG/1; MG/1
1 TABLET ORAL 2 TIMES DAILY
Qty: 42 TABLET | Refills: 0 | Status: SHIPPED | OUTPATIENT
Start: 2019-04-23 | End: 2019-05-06

## 2019-04-23 NOTE — PROGRESS NOTES
Mr. Velez is 79 y.o. male    Chief Complaint   Patient presents with   • Testicle Pain       Testicle Pain   The patient's primary symptoms include scrotal swelling and testicular pain (right side). This is a new problem. The current episode started in the past 7 days (saturday). The problem occurs intermittently. The problem has been gradually worsening. The pain is medium. Pertinent negatives include no abdominal pain, chills, dysuria, fever, flank pain, frequency, hematuria, nausea, urgency, urinary retention or vomiting. The testicular pain affects the right testicle. There is swelling in the right testicle. The symptoms are aggravated by activity. He has tried antibiotics (Levaquin since ER on 4/20) for the symptoms. He is sexually active. His past medical history is significant for an inguinal hernia (history of bilateral repair).     The following portions of the patient's history were reviewed and updated as appropriate: allergies, current medications, past family history, past medical history, past social history, past surgical history and problem list.    Review of Systems   Constitutional: Negative for chills and fever.   Gastrointestinal: Negative for abdominal distention, abdominal pain, anal bleeding, blood in stool, nausea and vomiting.   Genitourinary: Positive for scrotal swelling and testicular pain (right side). Negative for difficulty urinating, dysuria, flank pain, frequency, hematuria and urgency.       Current Outpatient Medications:   •  aspirin 81 MG tablet, Take 81 mg by mouth Daily., Disp: , Rfl:   •  HYDROcodone-acetaminophen (NORCO)  MG per tablet, Take 1 tablet by mouth Every 6 (Six) Hours As Needed for Moderate Pain ., Disp: , Rfl:   •  simvastatin (ZOCOR) 20 MG tablet, Take 20 mg by mouth Every Night., Disp: , Rfl:   •  sulfamethoxazole-trimethoprim (BACTRIM DS) 800-160 MG per tablet, Take 1 tablet by mouth 2 (Two) Times a Day for 21 days., Disp: 42 tablet, Rfl: 0    Past  Medical History:   Diagnosis Date   • Arthritis    • Bilateral shoulder pain    • Cancer (CMS/HCC) 08/2017    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   • Chronic low back pain    • Colon polyp    • COPD (chronic obstructive pulmonary disease) (CMS/HCC)    • Degenerative disc disease, lumbar    • Hyperlipidemia    • Sleep apnea with use of continuous positive airway pressure (CPAP)        Past Surgical History:   Procedure Laterality Date   • CHOLECYSTECTOMY WITH INTRAOPERATIVE CHOLANGIOGRAM N/A 7/25/2017    Procedure: CHOLECYSTECTOMY LAPAROSCOPIC AND REMOVAL CYST ON CHEST;  Surgeon: Ina Issa MD;  Location:  [facility] OR;  Service:    • COLONOSCOPY  07/24/2012   • COLONOSCOPY N/A 9/19/2017    Procedure: COLONOSCOPY WITH ANESTHESIA;  Surgeon: Jimmie Tirado MD;  Location:  [facility]COPY;  Service:    • CYSTOSCOPY BLADDER BIOPSY N/A 2/22/2019    Procedure: CYSTOSCOPY BILATERAL RETROGRADE PYELOGRAM BLADDER BIOPSY WITH FULGURATION OF 1 CM BLADDER TUMOR;  Surgeon: Cale Covington MD;  Location: [facility] OR;  Service: Urology   • HEMORRHOIDECTOMY     • HERNIA REPAIR     • TRANSURETHRAL RESECTION OF BLADDER TUMOR Bilateral 8/22/2017    Procedure: CYSTOSCOPY TRANSURETHRAL RESECTION OF BLADDER TUMOR bilateral retrogrades;  Surgeon: Cale Covington MD;  Location: [facility] OR;  Service:        Social History     Socioeconomic History   • Marital status:      Spouse name: Not on file   • Number of children: Not on file   • Years of education: Not on file   • Highest education level: Not on file   Tobacco Use   • Smoking status: Former Smoker     Packs/day: 0.25     Years: 55.00     Pack years: 13.75     Types: Cigarettes   • Smokeless tobacco: Never Used   • Tobacco comment: recently quit   Substance and Sexual Activity   • Alcohol use: No   • Drug use: No   • Sexual activity: Defer       Family History   Problem Relation Age of Onset   • No Known Problems Father    • No Known Problems Mother    • Colon  "cancer Neg Hx    • Colon polyps Neg Hx        Objective    Temp 97.6 °F (36.4 °C)   Ht 167.6 cm (66\")   Wt 68 kg (150 lb)   BMI 24.21 kg/m²     Physical Exam   Constitutional: He is oriented to person, place, and time. He appears well-developed and well-nourished.   HENT:   Head: Normocephalic.   Pulmonary/Chest: Effort normal. No respiratory distress.   Abdominal: He exhibits no distension.   Musculoskeletal: He exhibits no edema.   Neurological: He is alert and oriented to person, place, and time.   Skin: Skin is warm and dry.   Psychiatric: He has a normal mood and affect. His behavior is normal.   Vitals reviewed.      Patient's Body mass index is 24.21 kg/m². BMI is within normal parameters. No follow-up required..      Admission on 02/22/2019, Discharged on 02/22/2019   Component Date Value Ref Range Status   • Case Report 02/22/2019    Final                    Value:Surgical Pathology Report                         Case: [id number]                                  Authorizing Provider:  Cale Covington MD       Collected:           02/22/2019 03:02 PM          Ordering Location:     [facility] OR  Received:            02/25/2019 09:38 AM          Pathologist:           Rojelio Joseph MD                                                     Specimen:    Urinary Bladder, bladder dome                                                             • Final Diagnosis 02/22/2019    Final                    Value:This result contains rich text formatting which cannot be displayed here.   • Comment 02/22/2019    Final                    Value:This result contains rich text formatting which cannot be displayed here.   • Gross Description 02/22/2019    Final                    Value:This result contains rich text formatting which cannot be displayed here.   • Microscopic Description 02/22/2019    Final                    Value:This result contains rich text formatting which cannot be displayed here. "       Results for orders placed or performed in visit on 04/23/19   POC Urinalysis Dipstick, Multipro   Result Value Ref Range    Color Yellow Yellow, Straw, Dark Yellow, Ann    Clarity, UA Clear Clear    Glucose, UA Negative Negative, 1000 mg/dL (3+) mg/dL    Bilirubin Negative Negative    Ketones, UA Negative Negative    Specific Gravity  1.015 1.005 - 1.030    Blood, UA Negative Negative    pH, Urine 5.5 5.0 - 8.0    Protein, POC Negative Negative mg/dL    Urobilinogen, UA Normal Normal    Nitrite, UA Negative Negative    Leukocytes Trace (A) Negative        Assessment/Plan   Assessment and Plan    Ezekiel was seen today for testicle pain.    Diagnoses and all orders for this visit:    Epididymitis  -     POC Urinalysis Dipstick, Multipro  -     sulfamethoxazole-trimethoprim (BACTRIM DS) 800-160 MG per tablet; Take 1 tablet by mouth 2 (Two) Times a Day for 21 days.       Patient presents with outside films and culture results from [facility].  He presented there on 4/20 with right sided testicular swelling.  I have reviewed these records and images.  Scrotal ultrasound consistent with physical exam findings of epididymitis.  Urine culture performed there shows Ecoli resistant to ampicillin.  CT imaging also reflected epididymitis and prostate enlargement in regards to urinary system.  Patient does report a previous knowledge of an inguinal hernia as well as AAA.  He will follow up with general surgery and vascular for these issues.    He was placed on a 21 days course of Levaquin.  I have told the patient about the FDA warning flouroquinolone use that may involve tendons, muscles, nerves, or central nervous system.  I also warned against the newer studies suggesting a higher rate of aortic dissection in vulnerable patients.  Given this information, I have decided to change his antibiotic to Bactrim for the same amount of time.    He will follow up with Dr. Covington as scheduled, sooner if needed.  He has been  instructed to present to ER with sudden onset of fevers, chills, increase in swelling.

## 2019-04-23 NOTE — TELEPHONE ENCOUNTER
----- Message from TERRY Alves sent at 4/23/2019  2:27 PM CDT -----  Please call patient and let him know I would like to change his antibiotic to Bactrim for 3 weeks instead of the levaquin.  It has fewer side effects.

## 2019-05-02 ENCOUNTER — HOSPITAL ENCOUNTER (OUTPATIENT)
Dept: CT IMAGING | Age: 79
Discharge: HOME | End: 2019-05-02
Payer: MEDICARE

## 2019-05-02 DIAGNOSIS — R91.1 LUNG NODULE: ICD-10-CM

## 2019-05-02 PROCEDURE — 71250 CT THORAX DX C-: CPT

## 2019-05-06 ENCOUNTER — OFFICE VISIT (OUTPATIENT)
Dept: PULMONOLOGY | Facility: CLINIC | Age: 79
End: 2019-05-06

## 2019-05-06 VITALS
WEIGHT: 156 LBS | SYSTOLIC BLOOD PRESSURE: 116 MMHG | HEART RATE: 69 BPM | BODY MASS INDEX: 25.07 KG/M2 | HEIGHT: 66 IN | DIASTOLIC BLOOD PRESSURE: 70 MMHG | OXYGEN SATURATION: 98 %

## 2019-05-06 DIAGNOSIS — Z87.891 PERSONAL HISTORY OF NICOTINE DEPENDENCE: ICD-10-CM

## 2019-05-06 DIAGNOSIS — R91.1 LUNG NODULE: Primary | ICD-10-CM

## 2019-05-06 DIAGNOSIS — Z80.1 FAMILY HISTORY OF NEOPLASM OF LUNG: ICD-10-CM

## 2019-05-06 PROBLEM — Z84.89 FAMILY HISTORY OF NEOPLASM OF LUNG: Status: ACTIVE | Noted: 2019-05-06

## 2019-05-06 PROCEDURE — 99214 OFFICE O/P EST MOD 30 MIN: CPT | Performed by: INTERNAL MEDICINE

## 2019-05-06 RX ORDER — SULFAMETHOXAZOLE AND TRIMETHOPRIM 800; 160 MG/1; MG/1
1 TABLET ORAL 2 TIMES DAILY
COMMUNITY
End: 2020-08-11

## 2019-05-06 NOTE — PROGRESS NOTES
Subjective   Ezekiel Velez is a 79 y.o. male.     Background: Pt with multiple lung nodules.  AAA.  2 yr follow up 2019 stable    Chief Complaint   Patient presents with   • other     asymtpomatic; results of cat scan         History of Present Illness   Pt has multiple subcentimeter nodules identified 2017, stable in 2018, and again stable on current imaging. These are asymptomatic in chest continuously with no associated symptoms for 23 months. He has quit smoking now.  He had a sister who  of lung cancer 3 years ago    Medical/Family/Social History   has a past medical history of Arthritis, Bilateral shoulder pain, Cancer (CMS/HCC) (2017), Chronic low back pain, Colon polyp, COPD (chronic obstructive pulmonary disease) (CMS/HCC), Degenerative disc disease, lumbar, Hyperlipidemia, and Sleep apnea with use of continuous positive airway pressure (CPAP).   has a past surgical history that includes Hemorrhoid surgery; Hernia repair; Colonoscopy (2012); cholecystectomy with intraoperative cholangiogram (N/A, 2017); Transurethral resection of bladder tumor (Bilateral, 2017); Colonoscopy (N/A, 2017); and cystoscopy bladder biopsy (N/A, 2019).  family history includes No Known Problems in his father and mother.   reports that he quit smoking about 4 months ago. His smoking use included cigarettes. He has a 41.25 pack-year smoking history. He has never used smokeless tobacco. He reports that he does not drink alcohol or use drugs.  Allergies   Allergen Reactions   • Celebrex [Celecoxib] GI Intolerance     Medications    Current Outpatient Medications:   •  aspirin 81 MG tablet, Take 81 mg by mouth Daily., Disp: , Rfl:   •  HYDROcodone-acetaminophen (NORCO)  MG per tablet, Take 1 tablet by mouth Every 6 (Six) Hours As Needed for Moderate Pain ., Disp: , Rfl:   •  simvastatin (ZOCOR) 20 MG tablet, Take 20 mg by mouth Every Night., Disp: , Rfl:   •  sulfamethoxazole-trimethoprim  "(BACTRIM DS,SEPTRA DS) 800-160 MG per tablet, Take 1 tablet by mouth 2 (Two) Times a Day., Disp: , Rfl:     Review of Systems   Constitutional: Negative for chills and fever.   Cardiovascular: Negative for chest pain.   Gastrointestinal: Negative for nausea and vomiting.     Objective   /70   Pulse 69   Ht 167.6 cm (66\")   Wt 70.8 kg (156 lb)   SpO2 98% Comment: RA  BMI 25.18 kg/m²   Physical Exam   Constitutional: He appears well-developed and well-nourished. He does not appear ill. No distress.   HENT:   Head: Normocephalic and atraumatic.   Nose: Nose normal.   Eyes: Conjunctivae and EOM are normal.   Neck: Neck supple. No JVD present.   Cardiovascular: Normal rate, regular rhythm, S1 normal and S2 normal.   Pulmonary/Chest: Effort normal. No accessory muscle usage. No respiratory distress. He has no wheezes. He has no rales.   Abdominal: Soft. He exhibits no distension. There is no tenderness. There is no guarding.   Musculoskeletal: He exhibits no deformity.   Neurological: He is alert.   Skin: Skin is warm and dry. No rash noted.   Psychiatric: He has a normal mood and affect.     -----------------------------------------------------------------------------------------------  Recent Imaging:      Gumaro Cope Incoming Radiology Results From [facility] - 05/02/2019  8:22 AM CDT  Examination. CT CHEST WO CONTRAST  History: Lung nodules.  DLP: 847 mGycm.  The CT scan of the chest is performed without intravenous contrast  enhancement. The images are acquired in axial plane with subsequent  reconstruction in coronal and sagittal planes.  The comparison is made with the previous study dated 4/27/2018.  The subpleural small nodules in the greater posteriorly in the right  lung apex are stable. A small pleural-based nodule in the right middle  lobe laterally, image #80 in axial plane, is stable. A small semisolid  nodule in the right middle lobe, image #110 in axial plane is stable  and unchanged. An oval " subpleural nodule in the left lower lobe  posteriorly, image #128 in axial plane, is unchanged. Small area of  focal pleural thickening/pleural based nodule in the right lower lobe  laterally, image #136 in axial plane, is unchanged.  A pleural-based nodule/focal pleural thickening in the left lobe  laterally, image #137 in axial plane, is unchanged. Small area of  focal pleural thickening/pleural based nodule in the left upper lobe  anteriorly, image #71 in axial plane, is stable and unchanged.  A very small oval nodule in the lingular segment of the left upper  lobe anteriorly, image #98 in axial plane, is stable and unchanged.  No new nodules are seen.  Moderate asymmetry of the right-sided of the neck soft  tissues/piriform sinuses is due to the medial deviation of the right  internal carotid artery into the posterior pharyngeal wall. The  remaining visualized neck soft tissues are unremarkable. Moderate  fullness of the adrenal gland is seen, right more than left. No  discrete mass is noted.  Fatty infiltrated benign axillary lymph nodes are seen.  Atheromatous changes of the thoracic aorta are seen. No aneurysmal  dilatation. Severe atheromatous changes of coronary arteries are seen.  The remaining cardiomediastinal structures as visualized are  unremarkable. No evidence of lymphadenopathy.  The visualized unenhanced liver show stable low density nodules which  probably represent hepatic cysts. No change in size or shape since the  previous study. The spleen appears normal with a few calcified  granulomas.  The fullness of the adrenal glands bilaterally, left more than the  right are stable. There is a low-density nodule in the left adrenal  gland which is stable. A low-density mass in the left kidney is  reidentified and is incompletely evaluated. The CT density suggest a  cyst.  The images reviewed in bone window show chronic degenerative changes  of the thoracolumbar spine with a mild dextroscoliosis. The  visualized  ribs and sternum appear normal.  Moderately ectatic abdominal aorta are seen with a predominate of 3.8  cm which appears unchanged since the previous study.  IMPRESSION:  The stable small noncalcified nodules in both lungs. No  change in size or shape since June 2017. No new nodules.  The further follow-up depending upon patient's risk assessment and  Fleischner Society follow-up guidelines.  Fleischner Society Recommendations for Management of SOLID Pulmonary  Nodules:  LOW RISK PATIENT:  1.  For a solid nodule less than 6 mm (multiple or solitary), no  follow-up. If suspicious morphology or upper lobe location, consider  12 month follow up.  2.  For a solid nodule between 6 and 8 mm (multiple or solitary), CT  in 6 to 12 months, then consider CT in 18 to 24 months.  3.  For a solid, solitary nodule greater than 8 mm, in 3 months  consider either CT, biopsy, or PET/CT (however, negative PET/CT does  not exclude low-grade malignancy; FDG uptake may be underestimated in  small nodules less than 1 cm, or those close to the diaphragm).  4.  For multiple, solid nodules greater than 8 mm, CT in 3 to 6  months, then consider CT at 18 to 24 months.  HIGH-RISK PATIENT:  1.  For a solid nodule less than 6 mm (multiple or solitary), optional  CT in 12 months.   2.  For a solid, solitary nodule between 6 and 8 mm, CT in 6 to 12  months, then obtain CT in 18 to 24 months.  3.  For multiple, solid nodules between 6 and 8 mm, CT in 3 to 6  months, then obtain CT in 18 to 24 months.  4.  For a solid, solitary nodule greater than 8 mm, in 3 months  consider either CT, biopsy, or PET/CT (however, negative PET/CT does  not exclude low-grade malignancy; FDG uptake may be underestimated in  small nodules less than 1 cm, or those close to the diaphragm).  5.  For multiple, solid nodules greater than 8 mm, CT in 3 to 6  months, then obtain CT at 18 to 24 months. Finding  Signed by Dr Yves Mcneill on 5/2/2019 9:20 AM      Examination. CT CHEST WO CONTRAST  History: Lung nodules.  DLP: 795 mGy.  The CT scan of the chest are performed without intravenous contrast  enhancement.  The images are acquired in axial plane with subsequent reconstruction  in coronal and sagittal planes.  The comparison is made with the previous study dated 6/30/2017.  The previously seen left parasternal superficial subcutaneous nodule  is not seen in this study.  There are persistent mild fibronodular changes of the lung apices.  A small oval nodule in the right middle lobe, image #113 in axial  plane, measures 5 mm in greatest dimension and is unchanged.  A 6 mm nodule in the right lower lobe posteriorly and medially, image  #128 in axial plane, is stable.  A small pleural-based/subpleural nodule in the right upper lobe, 3 mm  in greatest dimension, image #82 in axial plane, is stable.  A small oval nodule in the left upper lobe, image #17 in axial plane,  measures 3 to 4 mm, and is stable.  A small subpleural nodule in the left lower lobe laterally, image #137  in axial plane, measures 3 mm in diameter and is unchanged.  A pleural-based nodule in the right lower lobe laterally, image #137,  measures 4 mm and is stable.  A few small calcified granulomas are seen.  Visualized thyroid gland appears normal with some asymmetry. The right  lobe is larger.  There is no axillary lymphadenopathy.  Severe atheromatous changes of the thoracic aorta are seen. A mild  ectasia of the ascending thoracic aorta is stable. It measures 4 mm in  diameter. Atheromatous changes of coronary arteries are noted. There  is no evidence of mediastinal lymphadenopathy. Calcified granulomas  are seen in the left hilum and aortopulmonic window.  The low-density nodules in the liver are stable. A few calcified  granulomas in the spleen are noted. Moderate fullness of left adrenal  gland is stable. The gallbladder is surgically absent. A low-density  mass in the left kidney is  partially visualized and appears unchanged  in the previous study.  Chronic degenerative changes of the thoracolumbar spine are noted. No  focal bony abnormality.  The stable small noncalcified nodules in the lungs  bilaterally. These probably represent granulomas. Another follow-up  examination in one year may be obtained to ensure stability.  A mild ectasia of the ascending thoracic aorta.  The stable small low-density hepatic nodules may represent hepatic  cysts.  A low-density mass in the left kidney appears stable. This may be  further correlated with sonography.  Signed by Dr Yves Mcneill on 4/27/2018 8:22 AM  ------------------------------------------------------------------------------------------------------  Assessment/Plan   Problem List Items Addressed This Visit        Respiratory    Lung nodule - Primary       Other    Personal history of nicotine dependence    Family history of neoplasm of lung        Patient's Body mass index is 25.18 kg/m². BMI is within normal parameters. No follow-up required..      We will plan to follow up in 1 year. He will be 80 years old then.  Will consider screening ct then if guidelines indicate candidacy.  Otherwise plan 2 year follow up scan for ground glass opacity in 2021       Electronically signed by Derrek Campa MD, 5/6/2019, 11:43 AM

## 2019-06-27 ENCOUNTER — PROCEDURE VISIT (OUTPATIENT)
Dept: UROLOGY | Facility: CLINIC | Age: 79
End: 2019-06-27

## 2019-06-27 DIAGNOSIS — C67.2 MALIGNANT NEOPLASM OF LATERAL WALL OF URINARY BLADDER (HCC): Primary | ICD-10-CM

## 2019-06-27 LAB
BILIRUB BLD-MCNC: NEGATIVE MG/DL
CLARITY, POC: CLEAR
COLOR UR: YELLOW
GLUCOSE UR STRIP-MCNC: NEGATIVE MG/DL
KETONES UR QL: ABNORMAL
LEUKOCYTE EST, POC: NEGATIVE
NITRITE UR-MCNC: NEGATIVE MG/ML
PH UR: 5 [PH] (ref 5–8)
PROT UR STRIP-MCNC: NEGATIVE MG/DL
RBC # UR STRIP: NEGATIVE /UL
SP GR UR: 1.02 (ref 1–1.03)
UROBILINOGEN UR QL: NORMAL

## 2019-06-27 PROCEDURE — 81003 URINALYSIS AUTO W/O SCOPE: CPT | Performed by: UROLOGY

## 2019-06-27 PROCEDURE — 52000 CYSTOURETHROSCOPY: CPT | Performed by: UROLOGY

## 2019-06-27 NOTE — PROGRESS NOTES
CC: I am here for the doctor to look at my bladder    Cystoscopy procedure note  Pre- operative diagnosis:  Bladder cancer surveillance    Post operative diagnosis:  Same and No recurrent bladder tumor    Procedure:  The patient was prepped and draped in a normal sterile fashion.  The urethra was anesthetized with 2% lidocaine jelly.  A well lubricated flexible cystoscope was introduced per urethra.  The anterior urethra is normal in its caliber without evidence of a mass.  There appears to be no contracture at the bladder neck.  The prostatic urethra reveals Bilateral lobe enlargement.   The bladder shows a normal urothelium without evidence of a mass, erythema, nor diverticulum.  There is no evidence of a bladder stone nor foreign body.  The detrusor is moderately trabeculated.  The ureteral orifces are essentially normal in locationn and there is clear efflux of urine.    Patient tolerated the procedure well    Complications: none    Blood loss: minimal    Diagnoses and all orders for this visit:    Malignant neoplasm of lateral wall of urinary bladder (CMS/HCC)  -     POC Urinalysis Dipstick, Multipro          Assessment/Plan                Follow up:    Surveillance cystocopy in 4 months        Final Diagnosis of Initial Bladder Tumor   1.  Bladder tumor, right lateral wall, transurethral resection:  Low-grade papillary urothelial carcinoma, noninvasive.  Muscularis propria not present.     2.  Bladder tumor, transurethral resection:  Low-grade papillary urothelial carcinoma, noninvasive.  Muscularis propria identified with no evidence of tumor involvement.     AJCC pathologic stage:  pTa Nx   Electronically signed by Jon Pires MD on 8/23/2017 at 0938

## 2019-09-10 ENCOUNTER — OFFICE VISIT (OUTPATIENT)
Dept: NEUROLOGY | Age: 79
End: 2019-09-10
Payer: MEDICARE

## 2019-09-10 VITALS
WEIGHT: 161 LBS | DIASTOLIC BLOOD PRESSURE: 70 MMHG | OXYGEN SATURATION: 98 % | HEART RATE: 58 BPM | SYSTOLIC BLOOD PRESSURE: 137 MMHG | BODY MASS INDEX: 25.27 KG/M2 | HEIGHT: 67 IN

## 2019-09-10 DIAGNOSIS — G47.33 OBSTRUCTIVE SLEEP APNEA: Primary | ICD-10-CM

## 2019-09-10 DIAGNOSIS — Z99.89 CPAP (CONTINUOUS POSITIVE AIRWAY PRESSURE) DEPENDENCE: ICD-10-CM

## 2019-09-10 PROCEDURE — G8419 CALC BMI OUT NRM PARAM NOF/U: HCPCS | Performed by: PHYSICIAN ASSISTANT

## 2019-09-10 PROCEDURE — 1123F ACP DISCUSS/DSCN MKR DOCD: CPT | Performed by: PHYSICIAN ASSISTANT

## 2019-09-10 PROCEDURE — G8427 DOCREV CUR MEDS BY ELIG CLIN: HCPCS | Performed by: PHYSICIAN ASSISTANT

## 2019-09-10 PROCEDURE — 4040F PNEUMOC VAC/ADMIN/RCVD: CPT | Performed by: PHYSICIAN ASSISTANT

## 2019-09-10 PROCEDURE — G8598 ASA/ANTIPLAT THER USED: HCPCS | Performed by: PHYSICIAN ASSISTANT

## 2019-09-10 PROCEDURE — 1036F TOBACCO NON-USER: CPT | Performed by: PHYSICIAN ASSISTANT

## 2019-09-10 PROCEDURE — 99213 OFFICE O/P EST LOW 20 MIN: CPT | Performed by: PHYSICIAN ASSISTANT

## 2019-09-10 RX ORDER — HYDROCODONE BITARTRATE AND ACETAMINOPHEN 10; 325 MG/1; MG/1
1 TABLET ORAL EVERY 6 HOURS PRN
COMMUNITY

## 2019-09-10 NOTE — PATIENT INSTRUCTIONS
Patient education: Sleep apnea in adults       INTRODUCTION -- Normally during sleep, air moves through the throat and in and out of the lungs at a regular rhythm. In a person with sleep apnea, air movement is periodically diminished or stopped. There are two types of sleep apnea: obstructive sleep apnea and central sleep apnea. In obstructive sleep apnea, breathing is abnormal because of narrowing or closure of the throat. In central sleep apnea, breathing is abnormal because of a change in the breathing control and rhythm. Sleep apnea is a serious condition that can affect a person's ability to safely perform normal daily activities and can affect long term health. Approximately 25 percent of adults are at risk for sleep apnea of some degree. Men are more commonly affected than women. Other risk factors include middle and older age, being overweight or obese, and having a small mouth and throat. This topic review focuses on the most common type of sleep apnea in adults, obstructive sleep apnea (MELONY). HOW SLEEP APNEA OCCURS -- The throat is surrounded by muscles that control the airway for speaking, swallowing, and breathing. During sleep, these muscles are less active, and this causes the throat to narrow. In most people, this narrowing does not affect breathing. In others, it can cause snoring, sometimes with reduced or completely blocked airflow. A completely blocked airway without airflow is called an obstructive apnea. Partial obstruction with diminished airflow is called a hypopnea. A person may have apnea and hypopnea during sleep. Insufficient breathing due to apnea or hypopnea causes oxygen levels to fall and carbon dioxide to rise. Because the airway is blocked, breathing faster or harder does not help to improve oxygen levels until the airway is reopened. Typically, the obstruction requires the person to awaken to activate the upper airway muscles.  Once the airway is opened, the person then

## 2019-10-31 ENCOUNTER — PROCEDURE VISIT (OUTPATIENT)
Dept: UROLOGY | Facility: CLINIC | Age: 79
End: 2019-10-31

## 2019-10-31 DIAGNOSIS — Z85.51 HISTORY OF BLADDER CANCER: ICD-10-CM

## 2019-10-31 DIAGNOSIS — C67.2 MALIGNANT NEOPLASM OF LATERAL WALL OF URINARY BLADDER (HCC): Primary | ICD-10-CM

## 2019-10-31 PROCEDURE — 81001 URINALYSIS AUTO W/SCOPE: CPT | Performed by: UROLOGY

## 2019-10-31 PROCEDURE — 52000 CYSTOURETHROSCOPY: CPT | Performed by: UROLOGY

## 2019-10-31 NOTE — PROGRESS NOTES
CC: I am here for the doctor to look at my bladder    Cystoscopy procedure note  Pre- operative diagnosis:  Bladder cancer surveillance    Post operative diagnosis:  Same    Procedure:  The patient was prepped and draped in a normal sterile fashion.  The urethra was anesthetized with 2% lidocaine jelly.  A flexible cystoscope was introduced per urethra.      Urethra:  No urethral stricture    Bladder:  There is no evidence of a stone, foreign body or mass within the bladder.  The bladder is minimally trabeculated.  The bladder neck is without contracture. and Normal mucosa    Ureteral orifices:  Normal position bilaterally and Clear efflux bilaterally    Prostate:  lateral lobe hypertrophy    Patient tolerated the procedure well    Complications: none    Blood loss: minimal    There are no diagnoses linked to this encounter.          Follow up:    Routine follow up-he will see Dr. Covington for surveillance cystoscopy in February 2020 or sooner as needed.    Hernando Lerner MD  10/31/2019  3:56 PM    Hx two prior TURBT's as below    Final Diagnosis   1.  Bladder tumor, right lateral wall, transurethral resection:  Low-grade papillary urothelial carcinoma, noninvasive.  Muscularis propria not present.     2.  Bladder tumor, transurethral resection:  Low-grade papillary urothelial carcinoma, noninvasive.  Muscularis propria identified with no evidence of tumor involvement.     AJCC pathologic stage:  pTa Nx   Electronically signed by Jon Pires MD on 8/23/2017 at 0928     Final Diagnosis   Urinary bladder, biopsy of bladder dome: Noninvasive low-grade papillary urothelial carcinoma with severe underlying chronic inflammation.     AJCC STAGE: pTa, pNx   Electronically signed by Rojelio Joseph MD on 2/26/2019 at 1504

## 2019-11-19 ENCOUNTER — OFFICE VISIT (OUTPATIENT)
Dept: VASCULAR SURGERY | Age: 79
End: 2019-11-19
Payer: MEDICARE

## 2019-11-19 ENCOUNTER — HOSPITAL ENCOUNTER (OUTPATIENT)
Dept: VASCULAR LAB | Age: 79
Discharge: HOME | End: 2019-11-19
Payer: MEDICARE

## 2019-11-19 ENCOUNTER — HOSPITAL ENCOUNTER (OUTPATIENT)
Dept: CT IMAGING | Age: 79
Discharge: HOME | End: 2019-11-19
Payer: MEDICARE

## 2019-11-19 ENCOUNTER — TELEPHONE (OUTPATIENT)
Dept: PULMONOLOGY | Facility: CLINIC | Age: 79
End: 2019-11-19

## 2019-11-19 VITALS
TEMPERATURE: 98.4 F | SYSTOLIC BLOOD PRESSURE: 137 MMHG | HEIGHT: 67 IN | WEIGHT: 155 LBS | DIASTOLIC BLOOD PRESSURE: 77 MMHG | HEART RATE: 57 BPM | BODY MASS INDEX: 24.33 KG/M2 | RESPIRATION RATE: 18 BRPM

## 2019-11-19 DIAGNOSIS — I65.23 BILATERAL CAROTID ARTERY STENOSIS: ICD-10-CM

## 2019-11-19 DIAGNOSIS — I71.40 ABDOMINAL AORTIC ANEURYSM (AAA) WITHOUT RUPTURE: ICD-10-CM

## 2019-11-19 DIAGNOSIS — I65.23 BILATERAL CAROTID ARTERY STENOSIS: Primary | ICD-10-CM

## 2019-11-19 PROCEDURE — 99213 OFFICE O/P EST LOW 20 MIN: CPT | Performed by: NURSE PRACTITIONER

## 2019-11-19 PROCEDURE — G8420 CALC BMI NORM PARAMETERS: HCPCS | Performed by: NURSE PRACTITIONER

## 2019-11-19 PROCEDURE — G8427 DOCREV CUR MEDS BY ELIG CLIN: HCPCS | Performed by: NURSE PRACTITIONER

## 2019-11-19 PROCEDURE — 74176 CT ABD & PELVIS W/O CONTRAST: CPT

## 2019-11-19 PROCEDURE — G8484 FLU IMMUNIZE NO ADMIN: HCPCS | Performed by: NURSE PRACTITIONER

## 2019-11-19 PROCEDURE — 1123F ACP DISCUSS/DSCN MKR DOCD: CPT | Performed by: NURSE PRACTITIONER

## 2019-11-19 PROCEDURE — 93880 EXTRACRANIAL BILAT STUDY: CPT

## 2019-11-19 PROCEDURE — 1036F TOBACCO NON-USER: CPT | Performed by: NURSE PRACTITIONER

## 2019-11-19 PROCEDURE — 4040F PNEUMOC VAC/ADMIN/RCVD: CPT | Performed by: NURSE PRACTITIONER

## 2019-11-19 PROCEDURE — G8598 ASA/ANTIPLAT THER USED: HCPCS | Performed by: NURSE PRACTITIONER

## 2019-11-19 RX ORDER — SIMVASTATIN 20 MG
TABLET ORAL
Refills: 1 | COMMUNITY
Start: 2019-09-28

## 2019-11-19 NOTE — TELEPHONE ENCOUNTER
Joanna at [facility] left a message. They faxed a CT of the chest and it shows a questionable RLL infiltrate. They said he is getting over a cold. They are asking if you need to see him? He sees you yearly in May.

## 2019-11-20 NOTE — TELEPHONE ENCOUNTER
I spoke with the patient and he states he is not currently having any breathing problems. He will keep his May appt and call back if he has any problems.

## 2019-11-20 NOTE — TELEPHONE ENCOUNTER
Please see my comment on the scan they sent over, which I saw before I got this telephone encounter with context.  If he is getting better and feels well then we don't need to move that up.  If still sick, not improving, or if he worsens, we should see him

## 2020-02-24 ENCOUNTER — HOSPITAL ENCOUNTER (OUTPATIENT)
Dept: GENERAL RADIOLOGY | Age: 80
Discharge: HOME | End: 2020-02-24
Payer: MEDICARE

## 2020-02-24 PROCEDURE — 74018 RADEX ABDOMEN 1 VIEW: CPT

## 2020-03-25 PROBLEM — M51.16 LUMBAR DISC DISEASE WITH RADICULOPATHY: Status: RESOLVED | Noted: 2017-01-23 | Resolved: 2020-03-24

## 2020-03-25 PROBLEM — M51.16 LUMBAR DISC DISEASE WITH RADICULOPATHY: Status: RESOLVED | Noted: 2017-04-25 | Resolved: 2020-03-24

## 2020-05-12 ENCOUNTER — OFFICE VISIT (OUTPATIENT)
Dept: PULMONOLOGY | Facility: CLINIC | Age: 80
End: 2020-05-12

## 2020-05-12 VITALS
TEMPERATURE: 97.9 F | RESPIRATION RATE: 16 BRPM | HEIGHT: 66 IN | SYSTOLIC BLOOD PRESSURE: 124 MMHG | BODY MASS INDEX: 25.88 KG/M2 | OXYGEN SATURATION: 96 % | DIASTOLIC BLOOD PRESSURE: 70 MMHG | WEIGHT: 161 LBS | HEART RATE: 60 BPM

## 2020-05-12 DIAGNOSIS — R91.1 LUNG NODULE: Primary | ICD-10-CM

## 2020-05-12 DIAGNOSIS — Z87.891 PERSONAL HISTORY OF NICOTINE DEPENDENCE: ICD-10-CM

## 2020-05-12 DIAGNOSIS — Z80.1 FAMILY HISTORY OF NEOPLASM OF LUNG: ICD-10-CM

## 2020-05-12 PROCEDURE — 99213 OFFICE O/P EST LOW 20 MIN: CPT | Performed by: INTERNAL MEDICINE

## 2020-05-12 NOTE — PROGRESS NOTES
"Subjective   Ezekiel Velez is a 80 y.o. male.     Background: Pt with multiple lung nodules.  AAA.  2 yr follow up 5/2019 stable.  new infiltrate identified 11/2019    Chief Complaint   Patient presents with   • \"No problems today\".    follow up nodules    History of Present Illness   Patient follows up regarding the lung nodules.  He has no acute complaints today no fevers chills or sweats.  He staying in and is wearing a mask.  Lung nodules have been present for over 2 years continuously with no aggravating or alleviating factors unchanged, associated with a new infiltrate that was identified on a CT of the chest in November 2019.  He denies infectious symptoms or other pulmonary complaints at this time.    Medical/Family/Social History   has a past medical history of Arthritis, Bilateral shoulder pain, Cancer (CMS/HCC) (08/2017), Chronic low back pain, Colon polyp, COPD (chronic obstructive pulmonary disease) (CMS/[facility]), Degenerative disc disease, lumbar, Hyperlipidemia, and Sleep apnea with use of continuous positive airway pressure (CPAP).   has a past surgical history that includes Hemorrhoid surgery; Hernia repair; Colonoscopy (07/24/2012); cholecystectomy with intraoperative cholangiogram (N/A, 7/25/2017); Transurethral resection of bladder tumor (Bilateral, 8/22/2017); Colonoscopy (N/A, 9/19/2017); and cystoscopy bladder biopsy (N/A, 2/22/2019).  family history includes No Known Problems in his father and mother.   reports that he quit smoking about 16 months ago. His smoking use included cigarettes. He has a 41.25 pack-year smoking history. He has never used smokeless tobacco. He reports that he does not drink alcohol or use drugs.  Allergies   Allergen Reactions   • Celebrex [Celecoxib] GI Intolerance     Medications    Current Outpatient Medications:   •  aspirin 81 MG tablet, Take 81 mg by mouth Daily., Disp: , Rfl:   •  HYDROcodone-acetaminophen (NORCO)  MG per tablet, Take 1 tablet by mouth " "Every 6 (Six) Hours As Needed for Moderate Pain ., Disp: , Rfl:   •  simvastatin (ZOCOR) 20 MG tablet, Take 20 mg by mouth Every Night., Disp: , Rfl:   •  sulfamethoxazole-trimethoprim (BACTRIM DS,SEPTRA DS) 800-160 MG per tablet, Take 1 tablet by mouth 2 (Two) Times a Day., Disp: , Rfl:     Review of Systems   Constitutional: Negative for chills and fever.   Cardiovascular: Negative for chest pain.   Gastrointestinal: Negative for nausea.       Objective   /70   Pulse 60   Temp 97.9 °F (36.6 °C) (Temporal)   Resp 16   Ht 167.6 cm (66\")   Wt 73 kg (161 lb)   SpO2 96% Comment: RA  BMI 25.99 kg/m²   Physical Exam   Constitutional: He appears well-developed and well-nourished. He does not appear ill. No distress.   HENT:   Head: Normocephalic and atraumatic.   Facemask in place   Eyes: Conjunctivae and EOM are normal. No scleral icterus.   Neck: Neck supple.   Cardiovascular: Normal rate, regular rhythm, S1 normal and S2 normal.   Pulmonary/Chest: Effort normal. He has no wheezes. He has no rales.   Abdominal: Soft. He exhibits no distension. There is no tenderness. There is no guarding.   Musculoskeletal: He exhibits no deformity.   Neurological: He is alert.   Skin: Skin is warm and dry. No rash noted.   Psychiatric: He has a normal mood and affect.              -----------------------------------------------------------------------------------------------  Assessment/Plan   Problem List Items Addressed This Visit        Pulmonary Problems    Lung nodule - Primary       Other    Personal history of nicotine dependence    Family history of neoplasm of lung        Patient's Body mass index is 25.99 kg/m². BMI is within normal parameters. No follow-up required..      He appears overall stable.  We will need to repeat imaging regarding the infiltrate that was seen on CT abdomen.  We will recheck that with a plain film.  No associated symptoms or findings on exam.       Electronically signed by Derrek Nolasco " MD Katheryn, 5/12/2020, 14:18

## 2020-05-26 ENCOUNTER — PROCEDURE VISIT (OUTPATIENT)
Dept: UROLOGY | Facility: CLINIC | Age: 80
End: 2020-05-26

## 2020-05-26 DIAGNOSIS — C67.2 MALIGNANT NEOPLASM OF LATERAL WALL OF URINARY BLADDER (HCC): Primary | ICD-10-CM

## 2020-05-26 PROCEDURE — 81003 URINALYSIS AUTO W/O SCOPE: CPT | Performed by: UROLOGY

## 2020-05-26 PROCEDURE — 52000 CYSTOURETHROSCOPY: CPT | Performed by: UROLOGY

## 2020-05-26 NOTE — PROGRESS NOTES
CC: I am here for the doctor to look at my bladder    Cystoscopy procedure note  Pre- operative diagnosis:  Bladder cancer surveillance    Post operative diagnosis:  No recurrent bladder tumor    Procedure:  The patient was prepped and draped in a normal sterile fashion.  The urethra was anesthetized with 2% lidocaine jelly.  A well lubricated flexible cystoscope was introduced per urethra.  The anterior urethra is normal in its caliber without evidence of a mass.  There appears to be no contracture at the bladder neck.  The prostatic urethra reveals Bilateral lobe enlargement.   The bladder shows a normal urothelium without evidence of a mass, erythema, nor diverticulum.  There is no evidence of a bladder stone nor foreign body.  The detrusor is moderately trabeculated.  The ureteral orifces are essentially normal in locationn and there is clear efflux of urine.    Patient tolerated the procedure well    Complications: none    Blood loss: minimal    Diagnoses and all orders for this visit:    Malignant neoplasm of lateral wall of urinary bladder (CMS/HCC)  -     POC Urinalysis Dipstick, Multipro          Assessment/Plan                Follow up:    Routine follow up          Hx two prior TURBT's as below     Final Diagnosis   1.  Bladder tumor, right lateral wall, transurethral resection:  Low-grade papillary urothelial carcinoma, noninvasive.  Muscularis propria not present.     2.  Bladder tumor, transurethral resection:  Low-grade papillary urothelial carcinoma, noninvasive.  Muscularis propria identified with no evidence of tumor involvement.     AJCC pathologic stage:  pTa Nx   Electronically signed by Jon Pires MD on 8/23/2017 at 0928      Final Diagnosis   Urinary bladder, biopsy of bladder dome: Noninvasive low-grade papillary urothelial carcinoma with severe underlying chronic inflammation.     AJCC STAGE: pTa, pNx   Electronically signed by Rojelio Joseph MD on 2/26/2019 at 1504

## 2020-06-24 ENCOUNTER — TRANSCRIBE ORDERS (OUTPATIENT)
Dept: ADMINISTRATIVE | Facility: HOSPITAL | Age: 80
End: 2020-06-24

## 2020-06-24 DIAGNOSIS — Z01.818 PREOP TESTING: Primary | ICD-10-CM

## 2020-06-27 ENCOUNTER — LAB (OUTPATIENT)
Dept: LAB | Facility: HOSPITAL | Age: 80
End: 2020-06-27

## 2020-06-27 PROCEDURE — C9803 HOPD COVID-19 SPEC COLLECT: HCPCS | Performed by: ANESTHESIOLOGY

## 2020-06-27 PROCEDURE — U0003 INFECTIOUS AGENT DETECTION BY NUCLEIC ACID (DNA OR RNA); SEVERE ACUTE RESPIRATORY SYNDROME CORONAVIRUS 2 (SARS-COV-2) (CORONAVIRUS DISEASE [COVID-19]), AMPLIFIED PROBE TECHNIQUE, MAKING USE OF HIGH THROUGHPUT TECHNOLOGIES AS DESCRIBED BY CMS-2020-01-R: HCPCS | Performed by: ANESTHESIOLOGY

## 2020-06-28 LAB
COVID LABCORP PRIORITY: NORMAL
SARS-COV-2 RNA RESP QL NAA+PROBE: NOT DETECTED

## 2020-08-05 ENCOUNTER — TRANSCRIBE ORDERS (OUTPATIENT)
Dept: ADMINISTRATIVE | Facility: HOSPITAL | Age: 80
End: 2020-08-05

## 2020-08-05 DIAGNOSIS — Z01.818 PREOP TESTING: Primary | ICD-10-CM

## 2020-08-08 ENCOUNTER — LAB (OUTPATIENT)
Dept: LAB | Facility: HOSPITAL | Age: 80
End: 2020-08-08

## 2020-08-08 PROCEDURE — C9803 HOPD COVID-19 SPEC COLLECT: HCPCS | Performed by: ANESTHESIOLOGY

## 2020-08-08 PROCEDURE — U0003 INFECTIOUS AGENT DETECTION BY NUCLEIC ACID (DNA OR RNA); SEVERE ACUTE RESPIRATORY SYNDROME CORONAVIRUS 2 (SARS-COV-2) (CORONAVIRUS DISEASE [COVID-19]), AMPLIFIED PROBE TECHNIQUE, MAKING USE OF HIGH THROUGHPUT TECHNOLOGIES AS DESCRIBED BY CMS-2020-01-R: HCPCS | Performed by: ANESTHESIOLOGY

## 2020-08-09 LAB
COVID LABCORP PRIORITY: NORMAL
SARS-COV-2 RNA RESP QL NAA+PROBE: NOT DETECTED

## 2020-08-11 ENCOUNTER — OFFICE VISIT (OUTPATIENT)
Dept: GASTROENTEROLOGY | Facility: CLINIC | Age: 80
End: 2020-08-11

## 2020-08-11 VITALS
OXYGEN SATURATION: 98 % | WEIGHT: 158 LBS | HEIGHT: 67 IN | HEART RATE: 55 BPM | BODY MASS INDEX: 24.8 KG/M2 | SYSTOLIC BLOOD PRESSURE: 112 MMHG | TEMPERATURE: 98 F | DIASTOLIC BLOOD PRESSURE: 62 MMHG

## 2020-08-11 DIAGNOSIS — Z86.010 HISTORY OF ADENOMATOUS POLYP OF COLON: Primary | ICD-10-CM

## 2020-08-11 PROBLEM — Z86.0101 HISTORY OF ADENOMATOUS POLYP OF COLON: Status: ACTIVE | Noted: 2017-07-18

## 2020-08-11 PROCEDURE — S0260 H&P FOR SURGERY: HCPCS | Performed by: NURSE PRACTITIONER

## 2020-08-11 NOTE — PROGRESS NOTES
[facility]    Primary Physician Shital Mendez MD    8/11/2020    Ezekiel Velez   1940      Chief Complaint   Patient presents with   • Colon Cancer Screening     last colon 09/2017- due 09/2020- hx of polyps       Subjective     HPI    Ezekiel Velez is a 80 y.o. male who presents as a referral for preventative maintenance. He has no complaints of nausea or vomiting. No change in bowels. No wt loss. No BRBPR. No melena. No abdominal pain.        Last colonoscopy was 9/2017 noting 6 polyps ( path tubular adenomatous and hyperplastic) and sigmoid diverticulosis.  The patient does have history of colon polyps. The patient does not  have history of colon cancer.   There is not a family history of colon polyps. There is not a family history of colon cancer.           Past Medical History:   Diagnosis Date   • Arthritis    • Bilateral shoulder pain    • Cancer (CMS/HCC) 08/2017    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   • Chronic low back pain    • Colon polyp    • COPD (chronic obstructive pulmonary disease) (CMS/HCC)    • Degenerative disc disease, lumbar    • Hyperlipidemia    • Sleep apnea with use of continuous positive airway pressure (CPAP)        Past Surgical History:   Procedure Laterality Date   • CHOLECYSTECTOMY WITH INTRAOPERATIVE CHOLANGIOGRAM N/A 7/25/2017    Procedure: CHOLECYSTECTOMY LAPAROSCOPIC AND REMOVAL CYST ON CHEST;  Surgeon: Ina Issa MD;  Location: [facility] OR;  Service:    • COLONOSCOPY  07/24/2012   • COLONOSCOPY N/A 9/19/2017    Procedure: COLONOSCOPY WITH ANESTHESIA;  Surgeon: Jimmie Tirado MD;  Location: [facility] ENDOSCOPY;  Service:    • CYSTOSCOPY BLADDER BIOPSY N/A 2/22/2019    Procedure: CYSTOSCOPY BILATERAL RETROGRADE PYELOGRAM BLADDER BIOPSY WITH FULGURATION OF 1 CM BLADDER TUMOR;  Surgeon: Cale Covington MD;  Location: [facility] OR;  Service: Urology   • HEMORRHOIDECTOMY     • HERNIA REPAIR     • TRANSURETHRAL RESECTION OF BLADDER  TUMOR Bilateral 2017    Procedure: CYSTOSCOPY TRANSURETHRAL RESECTION OF BLADDER TUMOR bilateral retrogrades;  Surgeon: Cale Covington MD;  Location: [facility] OR;  Service:        Outpatient Medications Marked as Taking for the 20 encounter (Office Visit) with Shereen Ponce APRN   Medication Sig Dispense Refill   • HYDROcodone-acetaminophen (NORCO)  MG per tablet Take 1 tablet by mouth Every 6 (Six) Hours As Needed for Moderate Pain .     • simvastatin (ZOCOR) 20 MG tablet Take 20 mg by mouth Every Night.         Allergies   Allergen Reactions   • Celebrex [Celecoxib] GI Intolerance       Social History     Socioeconomic History   • Marital status:      Spouse name: Not on file   • Number of children: Not on file   • Years of education: Not on file   • Highest education level: Not on file   Tobacco Use   • Smoking status: Former Smoker     Packs/day: 0.75     Years: 55.00     Pack years: 41.25     Types: Cigarettes     Last attempt to quit: 2019     Years since quittin.6   • Smokeless tobacco: Never Used   • Tobacco comment: recently quit   Substance and Sexual Activity   • Alcohol use: No   • Drug use: No   • Sexual activity: Defer       Family History   Problem Relation Age of Onset   • No Known Problems Father    • No Known Problems Mother    • Colon cancer Neg Hx    • Colon polyps Neg Hx        Review of Systems   Constitutional: Negative for chills, fever and unexpected weight change.   Respiratory: Negative for cough, shortness of breath and wheezing.    Cardiovascular: Negative for chest pain and palpitations.   Gastrointestinal: Negative for abdominal distention, abdominal pain, anal bleeding, blood in stool, constipation, diarrhea, nausea and vomiting.       Objective     Vitals:    20   BP: 112/62   Pulse: 55   Temp: 98 °F (36.7 °C)   SpO2: 98%         20   Weight: 71.7 kg (158 lb)     Body mass index is 24.75 kg/m².    Physical Exam   Constitutional: No  distress.   Cardiovascular: Normal rate, regular rhythm and normal heart sounds.   Pulmonary/Chest: Effort normal and breath sounds normal.   Abdominal: Soft. Bowel sounds are normal. He exhibits no distension. There is no tenderness.   Musculoskeletal: He exhibits no edema.   Neurological: He is alert.   Skin: Skin is warm and dry.   Vitals reviewed.      Imaging Results (Most Recent)     None          Assessment/Plan     Ezekiel was seen today for colon cancer screening.    Diagnoses and all orders for this visit:    History of adenomatous polyp of colon  -     Case Request; Standing  -     Case Request    Other orders  -     Follow Anesthesia Guidelines / Protocol; Future  -     Obtain Informed Consent; Future  -     polyethylene glycol (Golytely) 236 g solution; Take 4,000 mL by mouth 1 (One) Time for 1 dose. Take as directed per instruction sheet.  -     Sod Picosulfate-Mag Ox-Cit Acd (Clenpiq) 10-3.5-12 MG-GM -GM/160ML solution; Take 2 bottles by mouth 1 (One) Time for 1 dose. Take as directed          Plan for colonoscopy.          Body mass index is 24.75 kg/m².    Patient's Body mass index is 24.75 kg/m². BMI is within normal parameters. No follow-up required..      COLONOSCOPY WITH ANESTHESIA (N/A)  All risks, benefits, alternatives, and indications of colonoscopy procedure have been discussed with the patient. Risks to include perforation of the colon requiring possible surgery or colostomy, risk of bleeding from biopsies or removal of colon tissue, possibility of missing a colon polyp or cancer, or adverse drug reaction.  Benefits to include the diagnosis and management of disease of the colon and rectum. Alternatives to include barium enema, radiographic evaluation, lab testing or no intervention. Pt verbalizes understanding and agrees.         TERRY Bajwa      EMR Dragon/transcription disclaimer:  Much of this encounter note is electronic transcription/translation of spoken language to printed  text.  The electronic translation of spoken language may be erroneous, or at times, nonsensical words or phrases may be inadvertently transcribed.  Although I have reviewed the note for such errors, some may still exist.

## 2020-08-18 ENCOUNTER — HOSPITAL ENCOUNTER (OUTPATIENT)
Dept: GENERAL RADIOLOGY | Facility: HOSPITAL | Age: 80
Discharge: HOME | End: 2020-08-18
Admitting: INTERNAL MEDICINE

## 2020-08-18 DIAGNOSIS — R91.1 LUNG NODULE: ICD-10-CM

## 2020-08-18 PROCEDURE — 71046 X-RAY EXAM CHEST 2 VIEWS: CPT

## 2020-08-24 ENCOUNTER — TRANSCRIBE ORDERS (OUTPATIENT)
Dept: ADMINISTRATIVE | Facility: HOSPITAL | Age: 80
End: 2020-08-24

## 2020-08-24 DIAGNOSIS — Z01.818 PRE-OP TESTING: Primary | ICD-10-CM

## 2020-08-29 ENCOUNTER — LAB (OUTPATIENT)
Dept: LAB | Facility: HOSPITAL | Age: 80
End: 2020-08-29

## 2020-08-29 PROCEDURE — C9803 HOPD COVID-19 SPEC COLLECT: HCPCS | Performed by: INTERNAL MEDICINE

## 2020-08-29 PROCEDURE — U0003 INFECTIOUS AGENT DETECTION BY NUCLEIC ACID (DNA OR RNA); SEVERE ACUTE RESPIRATORY SYNDROME CORONAVIRUS 2 (SARS-COV-2) (CORONAVIRUS DISEASE [COVID-19]), AMPLIFIED PROBE TECHNIQUE, MAKING USE OF HIGH THROUGHPUT TECHNOLOGIES AS DESCRIBED BY CMS-2020-01-R: HCPCS | Performed by: INTERNAL MEDICINE

## 2020-08-30 LAB
COVID LABCORP PRIORITY: NORMAL
SARS-COV-2 RNA RESP QL NAA+PROBE: NOT DETECTED

## 2020-09-01 ENCOUNTER — ANESTHESIA EVENT (OUTPATIENT)
Dept: GASTROENTEROLOGY | Facility: HOSPITAL | Age: 80
End: 2020-09-01

## 2020-09-01 ENCOUNTER — ANESTHESIA (OUTPATIENT)
Dept: GASTROENTEROLOGY | Facility: HOSPITAL | Age: 80
End: 2020-09-01

## 2020-09-01 ENCOUNTER — HOSPITAL ENCOUNTER (OUTPATIENT)
Facility: HOSPITAL | Age: 80
Setting detail: HOSPITAL OUTPATIENT SURGERY
Discharge: HOME | End: 2020-09-01
Attending: INTERNAL MEDICINE | Admitting: INTERNAL MEDICINE

## 2020-09-01 VITALS
HEIGHT: 65 IN | WEIGHT: 155 LBS | RESPIRATION RATE: 18 BRPM | OXYGEN SATURATION: 100 % | HEART RATE: 63 BPM | DIASTOLIC BLOOD PRESSURE: 66 MMHG | BODY MASS INDEX: 25.83 KG/M2 | TEMPERATURE: 97.1 F | SYSTOLIC BLOOD PRESSURE: 114 MMHG

## 2020-09-01 DIAGNOSIS — Z86.010 HISTORY OF ADENOMATOUS POLYP OF COLON: ICD-10-CM

## 2020-09-01 PROCEDURE — 45385 COLONOSCOPY W/LESION REMOVAL: CPT | Performed by: INTERNAL MEDICINE

## 2020-09-01 PROCEDURE — 25010000002 PROPOFOL 10 MG/ML EMULSION: Performed by: NURSE ANESTHETIST, CERTIFIED REGISTERED

## 2020-09-01 PROCEDURE — 88305 TISSUE EXAM BY PATHOLOGIST: CPT | Performed by: INTERNAL MEDICINE

## 2020-09-01 RX ORDER — PROPOFOL 10 MG/ML
VIAL (ML) INTRAVENOUS AS NEEDED
Status: DISCONTINUED | OUTPATIENT
Start: 2020-09-01 | End: 2020-09-01 | Stop reason: SURG

## 2020-09-01 RX ORDER — SODIUM CHLORIDE 9 MG/ML
500 INJECTION, SOLUTION INTRAVENOUS CONTINUOUS PRN
Status: DISCONTINUED | OUTPATIENT
Start: 2020-09-01 | End: 2020-09-01 | Stop reason: HOSPADM

## 2020-09-01 RX ORDER — SODIUM CHLORIDE 0.9 % (FLUSH) 0.9 %
10 SYRINGE (ML) INJECTION AS NEEDED
Status: DISCONTINUED | OUTPATIENT
Start: 2020-09-01 | End: 2020-09-01 | Stop reason: HOSPADM

## 2020-09-01 RX ORDER — ONDANSETRON 2 MG/ML
4 INJECTION INTRAMUSCULAR; INTRAVENOUS ONCE AS NEEDED
Status: DISCONTINUED | OUTPATIENT
Start: 2020-09-01 | End: 2020-09-01 | Stop reason: HOSPADM

## 2020-09-01 RX ORDER — LIDOCAINE HYDROCHLORIDE 10 MG/ML
0.5 INJECTION, SOLUTION EPIDURAL; INFILTRATION; INTRACAUDAL; PERINEURAL ONCE AS NEEDED
Status: DISCONTINUED | OUTPATIENT
Start: 2020-09-01 | End: 2020-09-01 | Stop reason: HOSPADM

## 2020-09-01 RX ADMIN — SODIUM CHLORIDE 500 ML: 9 INJECTION, SOLUTION INTRAVENOUS at 08:46

## 2020-09-01 RX ADMIN — LIDOCAINE HYDROCHLORIDE 50 MG: 20 INJECTION, SOLUTION INTRAVENOUS at 09:30

## 2020-09-01 RX ADMIN — PROPOFOL 180 MG: 10 INJECTION, EMULSION INTRAVENOUS at 09:30

## 2020-09-01 NOTE — ANESTHESIA POSTPROCEDURE EVALUATION
"Patient: Ezekiel Velez    Procedure Summary     Date:  09/01/20 Room / Location:  [facility] ENDOSCOPY 5 / [facility]COPY    Anesthesia Start:  0927 Anesthesia Stop:  0951    Procedure:  COLONOSCOPY WITH ANESTHESIA (N/A ) Diagnosis:       History of adenomatous polyp of colon      (History of adenomatous polyp of colon [Z86.010])    Surgeon:  Jimmie Tirado MD Provider:  Jamila Catherine CRNA    Anesthesia Type:  MAC ASA Status:  2          Anesthesia Type: MAC    Vitals  Vitals Value Taken Time   BP     Temp     Pulse 68 9/1/2020  9:51 AM   Resp     SpO2 97 % 9/1/2020  9:51 AM   Vitals shown include unvalidated device data.        Post Anesthesia Care and Evaluation    Patient location during evaluation: PHASE II  Patient participation: complete - patient participated  Level of consciousness: awake and alert  Pain management: adequate  Airway patency: patent  Anesthetic complications: No anesthetic complications  PONV Status: none  Cardiovascular status: acceptable  Respiratory status: acceptable  Hydration status: acceptable    Comments: Blood pressure 128/58, pulse 70, temperature 97.1 °F (36.2 °C), temperature source Temporal, resp. rate 20, height 165.1 cm (65\"), weight 70.3 kg (155 lb), SpO2 95 %.        "

## 2020-09-01 NOTE — ANESTHESIA PREPROCEDURE EVALUATION
Anesthesia Evaluation     Patient summary reviewed   no history of anesthetic complications:  NPO Solid Status: > 8 hours             Airway   Mallampati: II  TM distance: >3 FB  Neck ROM: full  Dental    (+) upper dentures and lower dentures    Pulmonary    (+) sleep apnea on CPAP,   Cardiovascular   Exercise tolerance: good (4-7 METS)    (+) hyperlipidemia,       Neuro/Psych- negative ROS  GI/Hepatic/Renal/Endo - negative ROS     Musculoskeletal     Abdominal    Substance History      OB/GYN          Other                        Anesthesia Plan    ASA 2     MAC       Anesthetic plan, all risks, benefits, and alternatives have been provided, discussed and informed consent has been obtained with: patient.

## 2020-09-02 LAB
CYTO UR: NORMAL
LAB AP CASE REPORT: NORMAL
PATH REPORT.FINAL DX SPEC: NORMAL
PATH REPORT.GROSS SPEC: NORMAL

## 2020-09-10 LAB
ALBUMIN SERPL-MCNC: 4.1 G/DL (ref 3.5–5.2)
ALP BLD-CCNC: 106 U/L (ref 40–130)
ALT SERPL-CCNC: 13 U/L (ref 5–41)
ANION GAP SERPL CALCULATED.3IONS-SCNC: 13 MMOL/L (ref 7–19)
AST SERPL-CCNC: 14 U/L (ref 5–40)
BASOPHILS ABSOLUTE: 0 K/UL (ref 0–0.2)
BASOPHILS RELATIVE PERCENT: 0.8 % (ref 0–1)
BILIRUB SERPL-MCNC: 0.6 MG/DL (ref 0.2–1.2)
BUN BLDV-MCNC: 12 MG/DL (ref 8–23)
CALCIUM SERPL-MCNC: 9.1 MG/DL (ref 8.8–10.2)
CHLORIDE BLD-SCNC: 102 MMOL/L (ref 98–111)
CHOLESTEROL, TOTAL: 150 MG/DL (ref 160–199)
CO2: 28 MMOL/L (ref 22–29)
CREAT SERPL-MCNC: 0.8 MG/DL (ref 0.5–1.2)
EOSINOPHILS ABSOLUTE: 0.1 K/UL (ref 0–0.6)
EOSINOPHILS RELATIVE PERCENT: 2.3 % (ref 0–5)
GFR AFRICAN AMERICAN: >59
GFR NON-AFRICAN AMERICAN: >60
GLUCOSE BLD-MCNC: 108 MG/DL (ref 74–109)
HBA1C MFR BLD: 5.3 % (ref 4–6)
HCT VFR BLD CALC: 40.5 % (ref 42–52)
HDLC SERPL-MCNC: 55 MG/DL (ref 55–121)
HEMOGLOBIN: 13.7 G/DL (ref 14–18)
IMMATURE GRANULOCYTES #: 0 K/UL
LDL CHOLESTEROL CALCULATED: 73 MG/DL
LYMPHOCYTES ABSOLUTE: 1.5 K/UL (ref 1.1–4.5)
LYMPHOCYTES RELATIVE PERCENT: 38.3 % (ref 20–40)
MCH RBC QN AUTO: 32.2 PG (ref 27–31)
MCHC RBC AUTO-ENTMCNC: 33.8 G/DL (ref 33–37)
MCV RBC AUTO: 95.3 FL (ref 80–94)
MONOCYTES ABSOLUTE: 0.4 K/UL (ref 0–0.9)
MONOCYTES RELATIVE PERCENT: 9.9 % (ref 0–10)
NEUTROPHILS ABSOLUTE: 1.9 K/UL (ref 1.5–7.5)
NEUTROPHILS RELATIVE PERCENT: 48.4 % (ref 50–65)
PDW BLD-RTO: 12.4 % (ref 11.5–14.5)
PLATELET # BLD: 136 K/UL (ref 130–400)
PMV BLD AUTO: 11.3 FL (ref 9.4–12.4)
POTASSIUM SERPL-SCNC: 4.1 MMOL/L (ref 3.5–5)
RBC # BLD: 4.25 M/UL (ref 4.7–6.1)
SODIUM BLD-SCNC: 143 MMOL/L (ref 136–145)
TOTAL PROTEIN: 6.9 G/DL (ref 6.6–8.7)
TRIGL SERPL-MCNC: 108 MG/DL (ref 0–149)
WBC # BLD: 3.9 K/UL (ref 4.8–10.8)

## 2020-09-15 ENCOUNTER — OFFICE VISIT (OUTPATIENT)
Dept: NEUROLOGY | Age: 80
End: 2020-09-15
Payer: MEDICARE

## 2020-09-15 VITALS
WEIGHT: 155 LBS | DIASTOLIC BLOOD PRESSURE: 72 MMHG | HEIGHT: 67 IN | HEART RATE: 54 BPM | BODY MASS INDEX: 24.33 KG/M2 | SYSTOLIC BLOOD PRESSURE: 132 MMHG

## 2020-09-15 PROCEDURE — 1036F TOBACCO NON-USER: CPT | Performed by: PHYSICIAN ASSISTANT

## 2020-09-15 PROCEDURE — G8420 CALC BMI NORM PARAMETERS: HCPCS | Performed by: PHYSICIAN ASSISTANT

## 2020-09-15 PROCEDURE — 4040F PNEUMOC VAC/ADMIN/RCVD: CPT | Performed by: PHYSICIAN ASSISTANT

## 2020-09-15 PROCEDURE — G8427 DOCREV CUR MEDS BY ELIG CLIN: HCPCS | Performed by: PHYSICIAN ASSISTANT

## 2020-09-15 PROCEDURE — 99213 OFFICE O/P EST LOW 20 MIN: CPT | Performed by: PHYSICIAN ASSISTANT

## 2020-09-15 PROCEDURE — 1123F ACP DISCUSS/DSCN MKR DOCD: CPT | Performed by: PHYSICIAN ASSISTANT

## 2020-09-15 NOTE — PROGRESS NOTES
[facility] Sleep Follow Up Encounter      Information:   Patient Name: Shyrl Harada  :   1940  Age:   79 y.o. MRN:   [id number]  Account #:  [de-identified]  Today:                9/15/20    Provider:  Noman Butler PA-C    Chief Complaint   Patient presents with    Follow-up    Sleep Apnea        Subjective:   Shyrl Harada is a 79 y.o. male  with a history of newly diagnosed severe MELONY who comes in for a sleep clinic follow up. He was referred for a PSG due to his c/o snoring and excessive daytime somnolence. The PSG, 18 revealed an AHI of 57.6. He is prescribed auto CPAP therapy with a pressure range of 8cm to 20cm. The compliance report indicates that he is averaging 8 hours of CPAP use per day. He averages 8 hours of sleep per night. He reports that consistent PAP use has alleviated the previous MELONY symptoms. He is aware of leaks with the FFM.      Location or symptom:  MELONY  Onset:  PS2018   Timing:  q hs  Severity:  Severe  Associated:  Snoring, witnessed apneas, and excessive daytime somnolence  Alleviated:  CPAP      Objective:     Past Medical History:   Diagnosis Date    AAA (abdominal aortic aneurysm) (HCC)     Arthritis     Bilateral low back pain without sciatica 2016    Bilateral shoulder pain 2016    Blood clotting disorder (HCC)     [facility]    Bladder CA    Carotid artery stenosis     CPAP (continuous positive airway pressure) dependence     8cm to 20cm    DDD (degenerative disc disease), lumbar     Hx of blood clots     Hyperlipidemia     Obstructive sleep apnea     AHI: 57.6 per PSG, 2018       Past Surgical History:   Procedure Laterality Date    BLADDER SURGERY  2017    Removal of Cancer by Dr. Tete Cruz, LAPAROSCOPIC      2017    COLONOSCOPY      HEMORRHOID SURGERY      HERNIA REPAIR      2016       Recent Hospitalizations  ·     Significant Injuries  ·     Family History   Problem Relation Age of Onset    Cancer Sister        Social History  Social History     Tobacco Use   Smoking Status Former Smoker    Packs/day: 0.25    Years: 67.00    Pack years: 16.75    Types: Cigarettes    Last attempt to quit: 2019    Years since quittin.6   Smokeless Tobacco Never Used     Social History     Substance and Sexual Activity   Alcohol Use No     Social History     Substance and Sexual Activity   Drug Use No         Current Outpatient Medications   Medication Sig Dispense Refill    simvastatin (ZOCOR) 20 MG tablet TAKE 1 TABLET BY MOUTH DAILY  1    HYDROcodone-acetaminophen (NORCO)  MG per tablet Take 1 tablet by mouth every 6 hours as needed for Pain.  HYDROcodone-acetaminophen (NORCO)  MG per tablet Take 1 tablet by mouth 4 times daily as needed for Pain for up to 30 days. Largo Snuffer Date: 18 120 tablet 0    mometasone-formoterol (DULERA) 200-5 MCG/ACT inhaler Inhale 2 puffs into the lungs 2 times daily (Patient not taking: Reported on 2019) 120 puff 1    simvastatin (ZOCOR) 80 MG tablet Take 80 mg by mouth daily       No current facility-administered medications for this visit. Allergies:  Celebrex [celecoxib]    REVIEW OF SYSTEMS     Constitutional: []? Fever []? Sweats []? Chills []? Recent Injury   [x]? Denies all unless marked  HENT:[]? Headache  []? Head Injury  []? Sore Throat  []? Ear Pain  []? Dizziness []? Hearing Loss   [x]? Denies all unless marked  Spine:  []? Neck pain  []? Back pain  []? Sciaticia  [x]? Denies all unless marked  Cardiovascular:[]? Chest Pain []? Palpitations []? Heart Disease  [x]? Denies all unless marked  Pulmonary: []? Shortness of Breath []? Cough   [x]? Denies all unless marked  Gastrointestinal:  []? Abdominal Pain  []? Blood in Stool  []? Diarrhea []? Constipation []? Nausea  []? Vomiting  [x]? Denies all unless marked  Genitourinary:  []? Dysuria []? Frequency  []? Incontinence []? Urgency   [x]?  Denies all unless marked  Musculoskeletal: []? Arthralgia  []? Myalgias []? Muscle cramps  []? Muscle twitches   [x]? Denies all unless marked   Extremities:   []? Pain   []? Swelling   [x]? Denies all unless marked  Skin:[]? Rash  []? Color Change  [x]? Denies all unless marked  Neurological:[]? Visual Disturbance []? Double Vision []? Slurred Speech []? Trouble swallowing  []? Vertigo []? Tingling []? Numbness []? Weakness []? Loss of Balance   []? Loss of Consciousness []? Memory Loss []? Seizures  [x]? Denies all unless marked  Psychiatric/Behavioral:[]? Depression []? Anxiety  [x]? Denies all unless marked  Sleep: []? Insomnia []? Sleep Disturbance []? Snoring []? Restless Legs []? Daytime Sleepiness [x]? Sleep Apnea  []? Denies all unless marked      The MA has completed the ROS with the patient. I have reviewed it in its' entirety with the patient and agree with the documentation. PHYSICAL EXAM  /72   Pulse 54   Ht 5' 7\" (1.702 m)   Wt 155 lb (70.3 kg)   BMI 24.28 kg/m²      Constitutional - No acute distress    HEENT- Conjunctiva normal.  No scars, masses, or lesions over external nose or ears, no neck masses noted, no jugular vein distension, no bruit  Cardiac- Regular rate and rhythm  Pulmonary- Clear to auscultation, good expansion, normal effort without use of accessory muscles  Musculoskeletal - No significant wasting of muscles noted, no bony deformities  Extremities - No clubbing, cyanosis or edema  Skin - Warm, dry, and intact. No rash, erythema, or pallor  Psychiatric - Mood, affect, and behavior appear normal      Neurologic:  Extraocular movements are intact without nystagmus. Visual fields are full to confrontation. Facial movements are symmetrical and normal.  Speech is precise. Extremity strength is normal in both uppers and lowers. Deep tendon reflexes are intact and symmetrical.  Rapid alternating movements are unimpaired. Finger-to-nose testing is performed well, without dysmetria.   Gait is normal.    I reviewed

## 2020-09-15 NOTE — PATIENT INSTRUCTIONS
Patient education: Sleep apnea in adults       INTRODUCTION -- Normally during sleep, air moves through the throat and in and out of the lungs at a regular rhythm. In a person with sleep apnea, air movement is periodically diminished or stopped. There are two types of sleep apnea: obstructive sleep apnea and central sleep apnea. In obstructive sleep apnea, breathing is abnormal because of narrowing or closure of the throat. In central sleep apnea, breathing is abnormal because of a change in the breathing control and rhythm. Sleep apnea is a serious condition that can affect a person's ability to safely perform normal daily activities and can affect long term health. Approximately 25 percent of adults are at risk for sleep apnea of some degree. Men are more commonly affected than women. Other risk factors include middle and older age, being overweight or obese, and having a small mouth and throat. This topic review focuses on the most common type of sleep apnea in adults, obstructive sleep apnea (MELONY). HOW SLEEP APNEA OCCURS -- The throat is surrounded by muscles that control the airway for speaking, swallowing, and breathing. During sleep, these muscles are less active, and this causes the throat to narrow. In most people, this narrowing does not affect breathing. In others, it can cause snoring, sometimes with reduced or completely blocked airflow. A completely blocked airway without airflow is called an obstructive apnea. Partial obstruction with diminished airflow is called a hypopnea. A person may have apnea and hypopnea during sleep. Insufficient breathing due to apnea or hypopnea causes oxygen levels to fall and carbon dioxide to rise. Because the airway is blocked, breathing faster or harder does not help to improve oxygen levels until the airway is reopened. Typically, the obstruction requires the person to awaken to activate the upper airway muscles.  Once the airway is opened, the person then takes several deep breaths to catch up on breathing. As the person awakens, he or she may move briefly, snort or snore, and take a deep breath. Less frequently, a person may awaken completely with a sensation of gasping, smothering, or choking. If the person falls back to sleep quickly, he or she will not remember the event. Many people with sleep apnea are unaware of their abnormal breathing in sleep, and all patients underestimate how often their sleep is interrupted. Awakening from sleep causes sleep to be unrefreshing and causes fatigue and daytime sleepiness. Anatomic causes of obstructive sleep apnea --  Most patients have MELONY because of a small upper airway. As the bones of the face and skull develop, some people develop a small lower face, a small mouth, and a tongue that seems too large for the mouth. These features are genetically determined, which explains why MELONY tends to cluster in families. Obesity is another major factor. Tonsil enlargement can be an important cause, especially in children. SLEEP APNEA SYMPTOMS -- The main symptoms of MELONY are loud snoring, fatigue, and daytime sleepiness. However, some people have no symptoms. For example, if the person does not have a bed partner, he or she may not be aware of the snoring. Fatigue and sleepiness have many causes and are often attributed to overwork and increasing age. As a result, a person may be slow to recognize that they have a problem. A bed partner or spouse often prompts the patient to seek medical care. Other symptoms may include one or more of the following:  ?Restless sleep  ? Awakening with choking, gasping, or smothering  ? Morning headaches, dry mouth, or sore throat  ? Waking frequently to urinate  ? Awakening unrested, groggy  ? Low energy, difficulty concentrating, memory impairment    Risk factors -- Certain factors increase the risk of sleep apnea.   ?Increasing age - MELONY occurs at all ages, but it is more common in middle and apnea. Testing is usually performed in a sleep laboratory. A full sleep study is called a polysomnogram. The polysomnogram measures the breathing effort and airflow, blood oxygen level, heart rate and rhythm, duration of the various stages of sleep, body position, and movement of the arms/legs. Home monitoring devices are available that can perform a sleep study. This is a reasonable alternative to conventional testing in a sleep laboratory if the clinician strongly suspects moderate or severe sleep apnea and the patient does not have other illnesses or sleep disorders that may interfere with the results. SLEEP APNEA TREATMENT -- Sleep apnea is best treated by a knowledgeable sleep medicine specialist. The goal of treatment is to maintain an open airway during sleep. Effective treatment will eliminate the symptoms of sleep disturbance; long-term health consequences are also reduced. Most treatments require nightly use. The challenge for the clinician and the patient is to select an effective therapy that is appropriate for the patient's problem and that is acceptable for long term use. Auto-titrating CPAP delivers an amount of PAP that varies during the night. The variation is dependent on event detection software algorithms, which will increase the pressure gradually in response to flow changes until adequate patency is detected. After a period of sustained upper airway patency, the delivered level of pressure gradually decreases until the algorithm identifies recurrent upper airway obstruction, at which point the delivered pressure again increases. The result is that the delivered pressure varies throughout the night, in an effort to provide the lowest pressure that is necessary to maintain upper airway patency. Continuous positive airway pressure (CPAP) -- The most effective treatment for sleep apnea uses air pressure from a mechanical device to keep the upper airway open during sleep.  A CPAP (continuous positive airway pressure)  device uses an air-tight attachment to the nose, typically a mask, connected to a tube and a blower which generates the pressure. Devices that fit comfortably into the nasal opening, rather than over the nose, are also available. CPAP should be used any time the person sleeps (day or night). The CPAP device is usually used for the first time in the sleep lab, where a technician can adjust the pressure and select the best equipment to keep the airway open. Alternatively, an auto device with a self-adjusting pressure feature, provided with proper education and training, can get treatment started without another sleep test. While the treatment may seem uncomfortable, noisy, or bulky at first, most people accept the treatment after experiencing better sleep. However, difficulty with mask comfort and nasal congestion prevent up to 50 percent of people from using the treatment on a regular basis. Continued follow up with a healthcare provider helps to ensure that the treatment is effective and comfortable. Information from the CPAP machine is often used by physicians, therapists, and insurers to track the success of treatment. CPAP can be delivered with different features to improve comfort and solve problems that may come up during treatment. Changes in treatment may be needed if symptoms do not improve or if the persons condition changes, such as a gain or loss of weight. Adjust sleep position -- Adjusting sleep position (to stay off the back) may help improve sleep quality in people who have MELONY when sleeping on the back. However, this is difficult to maintain throughout the night and is rarely an adequate solution. Weight loss -- Weight loss may be helpful for obese or overweight patients. Weight loss may be accomplished with dietary changes, exercise, and/or surgical treatment.  However, it can be difficult to maintain weight loss; the five-year success of non-surgical weight loss is only 5 percent, meaning that 95 percent of people regain lost weight. Avoid alcohol and other sedatives -- Alcohol can worsen sleepiness, potentially increasing the risk of accidents or injury. People with MELONY are often counseled to drink little to no alcohol, even during the daytime. Similarly, people who take anti-anxiety medications or sedatives to sleep should speak with their healthcare provider about the safety of these medications. People with MELONY must notify all healthcare providers, including surgeons, about their condition and the potential risks of being sedated. People with MELONY who are given anesthesia and/or pain medications require special management and close monitoring to reduce the risk of a blocked airway. Dental devices -- A dental device, called an oral appliance or mandibular advancement device, can reposition the jaw (mandible), bringing the tongue and soft palate forward as well. This may relieve obstruction in some people. This treatment is excellent for reducing snoring, although the effect on MELONY is sometimes more limited. As a result, dental devices are best used for mild cases of MELONY when relief of snoring is the main goal. Failure to tolerate and accept CPAP is another indication for dental devices. While dental devices are not as effective as CPAP for MELONY, some patients prefer a dental device to CPAP. Side effects of dental devices are generally minor but may include changes to the bite with prolonged use. Surgical treatment -- Surgery is an alternative therapy for patients who cannot tolerate or do not improve with nonsurgical treatments such as CPAP or oral devices. Surgery can also be used in combination with other nonsurgical treatments. Surgical procedures reshape structures in the upper airways or surgically reposition bone or soft tissue. Uvulopalatopharyngoplasty (UPPP) removes the uvula and excessive tissue in the throat, including the tonsils, if present. Other procedures, such as maxillomandibular advancement (MMA), address both the upper and lower pharyngeal airway more globally. UPPP alone has limited success rates (less than 50 percent) and people can relapse (when MELONY symptoms return after surgery). As a result, this surgery is only recommended in a minority of people and should be considered with caution. MMA may have a higher success rate, particularly in people with abnormal jaw (maxilla and mandible) anatomy, but it is the most complicated procedure. A newer surgical approach, nerve stimulation to protrude the tongue, has promising success rates in very selected people. Tracheostomy creates a permanent opening in the neck. It is reserved for people with severe disease in whom less drastic measures have failed or are inappropriate. Although it is always successful in eliminating obstructive sleep apnea, tracheostomy requires significant lifestyle changes and carries some serious risks (eg, infection, bleeding, blockage). All surgical treatments require discussions about the goals of treatment, the expected outcomes, and potential complications. Hypoglossal nerve stimulator- \"Inspire\" device    PAP treatment failure:  Possible causes of treatment failure include nonadherence or suboptimal adherence, weight gain, an inappropriate level of prescribed positive pressure, or an additional disorder causing sleepiness (eg, narcolepsy) that may require alterations in the therapeutic regimen. A review of medications should also be undertaken since many drugs may lead to sleepiness. Inadequate sleep time may also negate the expected effects from treatment of MELONY. Also, pt's can have persistent hypersomnolence associated with sleep apnea even in the presence of adequate therapy and at those times Provigil or Nuvigil or other stimulants may be indicated.     Once the patient's positive airway pressure therapy has been optimized and symptoms resolved, a regimen of long-term follow-up should be established. Annual visits are reasonable, with more frequent visits in between if new issues arise. The purpose of long-term follow-up is to assess usage and monitor for recurrent MELONY, new side effects, air leakage, and fluctuations in body weight. WHERE TO GET MORE INFORMATION -- Your healthcare provider is the best source of information for questions and concerns related to your medical problem. Organizations  American Sleep Apnea Association  Provides information about sleep apnea to the public, publishes a newsletter, and serves as an advocate for people with the disorder. Esther, [address]   Bhavik@[employer]. org   [email or web address] org   Tel: [phone]   Fax: [facility] that works to [employer] and safety by promoting public understanding of sleep and sleep disorders. Supports sleep-related education, research, and advocacy; produces and distributes educational materials to the public and healthcare professionals; and offers postdoctoral fellowships and grants for sleep researchers. [address]   [email or web address]@[email or web address]. [email or web address]   [email or web address] org   Tel: [phone]   Fax: [phone]    Important information:  Medicare/private insurance CPAP/BiPAP/APAP requirements:  Medicare/private insurance has specific requirements for PAP compliance that must be met during the first 90 days of use to continue coverage for CPAP/BiPAP/APAP  from day 91 and beyond. The policy requires that patients use a PAP device 4 hours per 24 hour period, at least 70% of the time over a 30 day period. This data must be downloaded as a report direct from the PAP devices. This is called a compliance download. Your PAP supplier will assist you in this matter.      Reminder:  Please bring a copy of the compliance download to your night.

## 2020-09-28 ENCOUNTER — TRANSCRIBE ORDERS (OUTPATIENT)
Dept: ADMINISTRATIVE | Facility: HOSPITAL | Age: 80
End: 2020-09-28

## 2020-09-28 DIAGNOSIS — Z01.818 PREOP TESTING: Primary | ICD-10-CM

## 2020-09-28 LAB
BASOPHILS ABSOLUTE: 0 K/UL (ref 0–0.2)
BASOPHILS RELATIVE PERCENT: 0.9 % (ref 0–1)
EOSINOPHILS ABSOLUTE: 0.1 K/UL (ref 0–0.6)
EOSINOPHILS RELATIVE PERCENT: 1.9 % (ref 0–5)
HCT VFR BLD CALC: 41.6 % (ref 42–52)
HEMOGLOBIN: 13.7 G/DL (ref 14–18)
IMMATURE GRANULOCYTES #: 0 K/UL
LYMPHOCYTES ABSOLUTE: 1.7 K/UL (ref 1.1–4.5)
LYMPHOCYTES RELATIVE PERCENT: 40.1 % (ref 20–40)
MCH RBC QN AUTO: 32.4 PG (ref 27–31)
MCHC RBC AUTO-ENTMCNC: 32.9 G/DL (ref 33–37)
MCV RBC AUTO: 98.3 FL (ref 80–94)
MONOCYTES ABSOLUTE: 0.5 K/UL (ref 0–0.9)
MONOCYTES RELATIVE PERCENT: 11.9 % (ref 0–10)
NEUTROPHILS ABSOLUTE: 1.9 K/UL (ref 1.5–7.5)
NEUTROPHILS RELATIVE PERCENT: 44.5 % (ref 50–65)
PDW BLD-RTO: 12.3 % (ref 11.5–14.5)
PLATELET # BLD: 135 K/UL (ref 130–400)
PMV BLD AUTO: 10.7 FL (ref 9.4–12.4)
RBC # BLD: 4.23 M/UL (ref 4.7–6.1)
WBC # BLD: 4.3 K/UL (ref 4.8–10.8)

## 2020-10-12 ENCOUNTER — LAB (OUTPATIENT)
Dept: LAB | Facility: HOSPITAL | Age: 80
End: 2020-10-12

## 2020-10-12 PROCEDURE — U0003 INFECTIOUS AGENT DETECTION BY NUCLEIC ACID (DNA OR RNA); SEVERE ACUTE RESPIRATORY SYNDROME CORONAVIRUS 2 (SARS-COV-2) (CORONAVIRUS DISEASE [COVID-19]), AMPLIFIED PROBE TECHNIQUE, MAKING USE OF HIGH THROUGHPUT TECHNOLOGIES AS DESCRIBED BY CMS-2020-01-R: HCPCS | Performed by: ANESTHESIOLOGY

## 2020-10-12 PROCEDURE — C9803 HOPD COVID-19 SPEC COLLECT: HCPCS | Performed by: ANESTHESIOLOGY

## 2020-10-13 LAB
COVID LABCORP PRIORITY: NORMAL
SARS-COV-2 RNA RESP QL NAA+PROBE: NOT DETECTED

## 2020-10-27 NOTE — PROGRESS NOTES
OP HEMATOLOGY/ONCOLOGY CONSULTATION      Pt Name: Glenn Denney  YOB: 1940  MRN: [id number]  Referring provider: CORNELIUS Bueno  Requesting provider: Dr. Suresh Burciaga  Reason for consultation: Leukopenia, anemia  Date of evaluation: 10/28/2020    History Obtained From:  patient, electronic medical record    CHIEF COMPLAINT:    Chief Complaint   Patient presents with    New Patient     Low WBC & Anemia     HISTORY OF PRESENT ILLNESS:    David Daubs \"Jesus Alberto\" Edgardo Mera is a 79 y.o.  male referred to the clinic by Dr. Tony Young for evaluation of leukopenia and anemia. PMH significant for history of arthritis, hyperlipidemia, lung nodule, histoplasmosis, bladder cancer. Myriam Mendoza denies any known history of thrombosis or blood clotting disorder and is removed from the [address]. Review of CBCs at [facility] and [facility] of hospitals in the [place]:      Review of CBCs at Hospitals in [place]:      Previous abdominal/pelvic CT without contrast 11/19/2019 documented scattered, calcified granulomas in the liver and spleen. Colonoscopy was performed 9/1/2020 by Dr. Floresita Hendrickson. One polyp each in the ascending, proximal transverse and sigmoid colon were negative for high-grade dysplasia. Myriam Mendoza is followed by Dr. Amada Nix regarding history of lung nodule. CBC at presentation on 10/28/2020 shows a WBC of 5.42, Hgb 14.6/.2 and platelet count 57,444. Both WBC and Hgb are within normal limits. Platelets have been intermittently low since 2016 and have continued to decrease over the past year. Myriam Mendoza is a nondrinker. He denies known history of liver disease. Medications include hydrocodone and Zocor. He has taken Zocor for the past 8-9 years. Family history includes lung cancer in his sister. ONCOLOGY HISTORY, superficial bladder cancer  Myriam Mendoza has a history of TURBT x2. TURBT 8/22/2017:  1.  Bladder tumor, right lateral wall, transurethral resection:  Low-grade papillary urothelial carcinoma, noninvasive.   Muscularis propria not present. 2.  Bladder tumor, transurethral resection:  Low-grade papillary urothelial carcinoma, noninvasive. Muscularis propria identified with no evidence of tumor involvement. AJCC pathologic stage:  pTa Nx    TURBT 2019:  Urinary bladder, biopsy of bladder dome: Noninvasive low-grade papillary urothelial carcinoma with severe underlying chronic inflammation. Cystoscopy 2020 by Dr. Eben Lyn was without evidence of mass. Past Medical History:   Diagnosis Date    AAA (abdominal aortic aneurysm) (HCC)     Arthritis     Bilateral low back pain without sciatica 2016    Bilateral shoulder pain 2016    Cancer ([address] 2017    Bladder CA    Carotid artery stenosis     CPAP (continuous positive airway pressure) dependence     8cm to 20cm    DDD (degenerative disc disease), lumbar     Hyperlipidemia     Obstructive sleep apnea     AHI: 57.6 per PSG, 2018     Past Surgical History:   Procedure Laterality Date    BLADDER SURGERY  2017    Removal of Cancer by Dr. Marne Burkitt, LAPAROSCOPIC      2017    COLONOSCOPY      HEMORRHOID SURGERY      HERNIA REPAIR      2016       Current Outpatient Medications:     simvastatin (ZOCOR) 20 MG tablet, TAKE 1 TABLET BY MOUTH DAILY, Disp: , Rfl: 1    HYDROcodone-acetaminophen (NORCO)  MG per tablet, Take 1 tablet by mouth every 6 hours as needed for Pain., Disp: , Rfl:    Allergies:    Allergies   Allergen Reactions    Celebrex [Celecoxib]      Social History     Tobacco Use    Smoking status: Former Smoker     Packs/day: 0.25     Years: 67.00     Pack years: 16.75     Types: Cigarettes     Last attempt to quit: 2019     Years since quittin.7    Smokeless tobacco: Never Used   Substance Use Topics    Alcohol use: No    Drug use: No     Family History   Problem Relation Age of Onset    Cancer Sister      Health Maintenance   Topic Date Due    DTaP/Tdap/Td vaccine (1 - Tdap) 1960  Shingles Vaccine (1 of 2) 01/11/1991    Pneumococcal 65+ years Vaccine (1 of 1 - PPSV23) 01/11/2006    Annual Wellness Visit (AWV)  06/23/2019    Flu vaccine (1) 09/01/2020    Lipid screen  09/10/2021    Hepatitis A vaccine  Aged Out    Hepatitis B vaccine  Aged Out    Hib vaccine  Aged Out    Meningococcal (ACWY) vaccine  Aged Out     Subjective   Review of Systems   Constitutional: Positive for fatigue (mild). Negative for fever. No night sweats   HENT: Negative for dental problem, hearing loss, mouth sores, nosebleeds, sore throat and trouble swallowing. Eyes: Negative for photophobia, discharge, redness and itching. No blurring of vision, no double vision   Respiratory: Negative for cough, shortness of breath and wheezing. No hemoptysis   Cardiovascular: Negative for chest pain, palpitations and leg swelling. Gastrointestinal: Negative for abdominal pain, constipation, diarrhea, nausea and vomiting. Endocrine: Negative for cold intolerance, heat intolerance, polydipsia and polyuria. Genitourinary: Negative for dysuria and hematuria. History of bladder cancer   Musculoskeletal: Positive for arthralgias (Shoulders bilaterally) and back pain (Chronic). Negative for joint swelling and myalgias. Skin: Negative for pallor and rash. Allergic/Immunologic: Negative for environmental allergies and immunocompromised state. Neurological: Negative for seizures, syncope and numbness. Hematological: Negative for adenopathy. Does not bruise/bleed easily. Psychiatric/Behavioral: Negative for agitation, behavioral problems and confusion. The patient is not nervous/anxious. Objective   Physical Exam  Vitals signs reviewed. Constitutional:       General: He is not in acute distress. Appearance: He is well-developed. He is not toxic-appearing or diaphoretic. HENT:      Head: Normocephalic and atraumatic.       Right Ear: External ear normal.      Left Ear: External ear normal.      Nose: Nose normal.      Mouth/Throat:      Mouth: Mucous membranes are moist.   Eyes:      General: No scleral icterus. Right eye: No discharge. Left eye: No discharge. Conjunctiva/sclera: Conjunctivae normal.   Neck:      Musculoskeletal: Neck supple. Trachea: No tracheal deviation. Cardiovascular:      Rate and Rhythm: Normal rate and regular rhythm. Pulmonary:      Effort: Pulmonary effort is normal. No respiratory distress. Breath sounds: Normal breath sounds. No wheezing or rales. Abdominal:      General: Bowel sounds are normal. There is no distension. Palpations: Abdomen is soft. Tenderness: There is no abdominal tenderness. There is no guarding. Comments: No obvious splenomegaly   Genitourinary:     Comments: Exam deferred  Musculoskeletal:         General: No tenderness or deformity. Comments: Normal ROM all four extremities   Lymphadenopathy:      Cervical: No cervical adenopathy. Right cervical: No superficial, deep or posterior cervical adenopathy. Left cervical: No superficial, deep or posterior cervical adenopathy. Upper Body:      Right upper body: No supraclavicular or axillary adenopathy. Left upper body: No supraclavicular or axillary adenopathy. Lower Body: No right inguinal adenopathy. No left inguinal adenopathy. Comments: No bulky palpable cervical, clavicular, axillary or inguinal adenopathies on the left or right. Skin:     General: Skin is warm and dry. Findings: No rash. Neurological:      Mental Status: He is alert and oriented to person, place, and time. Comments: follows commands, non-focal   Psychiatric:         Behavior: Behavior normal. Behavior is cooperative. Thought Content: Thought content normal.         Judgment: Judgment normal.      Comments: Alert and oriented to person, place and time.        Pulse 75   Temp 97.9 °F (36.6 °C)   Ht 5' 7\" (1.702 m)   Wt 162 lb 11.2 oz (73.8 kg)   SpO2 96%   BMI 25.48 kg/m²   Wt Readings from Last 3 Encounters:   10/28/20 162 lb 11.2 oz (73.8 kg)   09/15/20 155 lb (70.3 kg)   11/19/19 155 lb (70.3 kg)     Labs reviewed by me:  CBC 10/28/20: WBC 5.42, Hgb 14.6/.2, platelets 53,624    ASSESSMENT/PLAN:    1. Thrombocytopenia ([address]    2. Fatigue, unspecified type    3. History of bladder cancer    4. Chronic pain of both shoulders      Leukopenia, anemia transient and normal at this time  Thrombocytopenia with platelet count of 54,160  Orders Placed This Encounter   Procedures    US SPLEEN    Miscellaneous Sendout 1    Vitamin B12    Folate    Lactate Dehydrogenase    LIONEL Screen with Reflex    Hepatitis Panel, Acute    HIV Rapid 1&2    Platelet antibodies, direct    Electrophoresis Protein, Serum with Reflex to Immunofixation    Hollis/Lambda Free Lt Chains, Serum Quant    APTT    Protime-INR     History of bladder cancer  S/p TURBT 8/22/2017, 2/22/2019  Cystoscopy 5/26/2020 by Dr. Ronan Carranza was without evidence of mass. Chronic pain of both shoulders  Followed by pain management, CORNELIUS Márquez    Fatigue  Mild    CBC in 2 weeks  Return in about 4 weeks (around 11/25/2020) for follow up with CORNELIUS Jones.       CORNELIUS Blakely  3:38 PM  10/28/2020

## 2020-10-28 ENCOUNTER — TRANSCRIBE ORDERS (OUTPATIENT)
Dept: ADMINISTRATIVE | Facility: HOSPITAL | Age: 80
End: 2020-10-28

## 2020-10-28 ENCOUNTER — HOSPITAL ENCOUNTER (OUTPATIENT)
Dept: INFUSION THERAPY | Age: 80
Discharge: HOME | End: 2020-10-28
Payer: MEDICARE

## 2020-10-28 ENCOUNTER — OFFICE VISIT (OUTPATIENT)
Dept: HEMATOLOGY | Age: 80
End: 2020-10-28
Payer: MEDICARE

## 2020-10-28 VITALS
WEIGHT: 162.7 LBS | TEMPERATURE: 97.9 F | HEIGHT: 67 IN | HEART RATE: 75 BPM | BODY MASS INDEX: 25.54 KG/M2 | OXYGEN SATURATION: 96 %

## 2020-10-28 DIAGNOSIS — D50.9 IRON DEFICIENCY ANEMIA, UNSPECIFIED IRON DEFICIENCY ANEMIA TYPE: ICD-10-CM

## 2020-10-28 DIAGNOSIS — D69.6 THROMBOCYTOPENIA (HCC): ICD-10-CM

## 2020-10-28 DIAGNOSIS — D69.6 THROMBOCYTOPENIA (HCC): Primary | ICD-10-CM

## 2020-10-28 DIAGNOSIS — R53.83 FATIGUE, UNSPECIFIED TYPE: ICD-10-CM

## 2020-10-28 LAB
BASOPHILS ABSOLUTE: 0.02 K/UL (ref 0.01–0.08)
BASOPHILS RELATIVE PERCENT: 0.4 % (ref 0.1–1.2)
EOSINOPHILS ABSOLUTE: 0.1 K/UL (ref 0.04–0.54)
EOSINOPHILS RELATIVE PERCENT: 1.8 % (ref 0.7–7)
FOLATE: 13.5 NG/ML (ref 2.7–20)
HCT VFR BLD CALC: 43.7 % (ref 40.1–51)
HEMOGLOBIN: 14.6 G/DL (ref 13.7–17.5)
LACTATE DEHYDROGENASE: 360 U/L (ref 313–618)
LYMPHOCYTES ABSOLUTE: 1.66 K/UL (ref 1.18–3.74)
LYMPHOCYTES RELATIVE PERCENT: 30.6 % (ref 19.3–53.1)
MCH RBC QN AUTO: 33.5 PG (ref 25.7–32.2)
MCHC RBC AUTO-ENTMCNC: 33.4 G/DL (ref 32.3–36.5)
MCV RBC AUTO: 100.2 FL (ref 79–92.2)
MONOCYTES ABSOLUTE: 0.59 K/UL (ref 0.24–0.82)
MONOCYTES RELATIVE PERCENT: 10.9 % (ref 4.7–12.5)
NEUTROPHILS ABSOLUTE: 3.05 K/UL (ref 1.56–6.13)
NEUTROPHILS RELATIVE PERCENT: 56.3 % (ref 34–71.1)
PDW BLD-RTO: 12.2 % (ref 11.6–14.4)
PLATELET # BLD: 92 K/UL (ref 163–337)
PMV BLD AUTO: 11.3 FL (ref 7.4–10.4)
RBC # BLD: 4.36 M/UL (ref 4.63–6.08)
TSH SERPL DL<=0.05 MIU/L-ACNC: 1.04 UIU/ML (ref 0.47–4.68)
VITAMIN B-12: 205 PG/ML (ref 239–931)
WBC # BLD: 5.42 K/UL (ref 4.23–9.07)

## 2020-10-28 PROCEDURE — 1036F TOBACCO NON-USER: CPT | Performed by: NURSE PRACTITIONER

## 2020-10-28 PROCEDURE — 4040F PNEUMOC VAC/ADMIN/RCVD: CPT | Performed by: NURSE PRACTITIONER

## 2020-10-28 PROCEDURE — G8417 CALC BMI ABV UP PARAM F/U: HCPCS | Performed by: NURSE PRACTITIONER

## 2020-10-28 PROCEDURE — 82607 VITAMIN B-12: CPT

## 2020-10-28 PROCEDURE — G8484 FLU IMMUNIZE NO ADMIN: HCPCS | Performed by: NURSE PRACTITIONER

## 2020-10-28 PROCEDURE — 84443 ASSAY THYROID STIM HORMONE: CPT

## 2020-10-28 PROCEDURE — 1123F ACP DISCUSS/DSCN MKR DOCD: CPT | Performed by: NURSE PRACTITIONER

## 2020-10-28 PROCEDURE — 99212 OFFICE O/P EST SF 10 MIN: CPT

## 2020-10-28 PROCEDURE — 99214 OFFICE O/P EST MOD 30 MIN: CPT | Performed by: NURSE PRACTITIONER

## 2020-10-28 PROCEDURE — 82746 ASSAY OF FOLIC ACID SERUM: CPT

## 2020-10-28 PROCEDURE — 83615 LACTATE (LD) (LDH) ENZYME: CPT

## 2020-10-28 PROCEDURE — 85025 COMPLETE CBC W/AUTO DIFF WBC: CPT

## 2020-10-28 PROCEDURE — G8427 DOCREV CUR MEDS BY ELIG CLIN: HCPCS | Performed by: NURSE PRACTITIONER

## 2020-10-28 ASSESSMENT — ENCOUNTER SYMPTOMS
EYE DISCHARGE: 0
VOMITING: 0
WHEEZING: 0
PHOTOPHOBIA: 0
EYE REDNESS: 0
ABDOMINAL PAIN: 0
TROUBLE SWALLOWING: 0
EYE ITCHING: 0
SORE THROAT: 0
CONSTIPATION: 0
COUGH: 0
SHORTNESS OF BREATH: 0
BACK PAIN: 1
DIARRHEA: 0
NAUSEA: 0

## 2020-10-29 ENCOUNTER — APPOINTMENT (OUTPATIENT)
Dept: ULTRASOUND IMAGING | Facility: HOSPITAL | Age: 80
End: 2020-10-29

## 2020-10-29 RX ORDER — CYANOCOBALAMIN 1000 UG/ML
1000 INJECTION INTRAMUSCULAR; SUBCUTANEOUS ONCE
Status: CANCELLED
Start: 2020-11-10

## 2020-11-04 ENCOUNTER — HOSPITAL ENCOUNTER (OUTPATIENT)
Dept: GENERAL RADIOLOGY | Age: 80
Discharge: HOME | End: 2020-11-04
Payer: MEDICARE

## 2020-11-04 PROCEDURE — 77075 RADEX OSSEOUS SURVEY COMPL: CPT

## 2020-11-05 ENCOUNTER — HOSPITAL ENCOUNTER (OUTPATIENT)
Dept: ULTRASOUND IMAGING | Facility: HOSPITAL | Age: 80
Discharge: HOME | End: 2020-11-05
Admitting: NURSE PRACTITIONER

## 2020-11-05 DIAGNOSIS — D69.6 THROMBOCYTOPENIA (HCC): ICD-10-CM

## 2020-11-05 PROCEDURE — 76705 ECHO EXAM OF ABDOMEN: CPT

## 2020-11-10 ENCOUNTER — TRANSCRIBE ORDERS (OUTPATIENT)
Dept: ADMINISTRATIVE | Facility: HOSPITAL | Age: 80
End: 2020-11-10

## 2020-11-10 ENCOUNTER — HOSPITAL ENCOUNTER (OUTPATIENT)
Dept: INFUSION THERAPY | Age: 80
Discharge: HOME | End: 2020-11-10
Payer: MEDICARE

## 2020-11-10 DIAGNOSIS — D50.9 IRON DEFICIENCY ANEMIA, UNSPECIFIED IRON DEFICIENCY ANEMIA TYPE: Primary | ICD-10-CM

## 2020-11-10 DIAGNOSIS — D69.6 THROMBOCYTOPENIA (HCC): ICD-10-CM

## 2020-11-10 DIAGNOSIS — D51.9 ANEMIA DUE TO VITAMIN B12 DEFICIENCY, UNSPECIFIED B12 DEFICIENCY TYPE: ICD-10-CM

## 2020-11-10 DIAGNOSIS — Z01.818 PREOPERATIVE TESTING: Primary | ICD-10-CM

## 2020-11-10 LAB
BASOPHILS ABSOLUTE: 0.02 K/UL (ref 0.01–0.08)
BASOPHILS RELATIVE PERCENT: 0.3 % (ref 0.1–1.2)
EOSINOPHILS ABSOLUTE: 0.08 K/UL (ref 0.04–0.54)
EOSINOPHILS RELATIVE PERCENT: 1.1 % (ref 0.7–7)
HCT VFR BLD CALC: 40.3 % (ref 40.1–51)
HEMOGLOBIN: 14 G/DL (ref 13.7–17.5)
LYMPHOCYTES ABSOLUTE: 1.66 K/UL (ref 1.18–3.74)
LYMPHOCYTES RELATIVE PERCENT: 22.8 % (ref 19.3–53.1)
MCH RBC QN AUTO: 33.3 PG (ref 25.7–32.2)
MCHC RBC AUTO-ENTMCNC: 34.7 G/DL (ref 32.3–36.5)
MCV RBC AUTO: 95.7 FL (ref 79–92.2)
MONOCYTES ABSOLUTE: 0.59 K/UL (ref 0.24–0.82)
MONOCYTES RELATIVE PERCENT: 8.1 % (ref 4.7–12.5)
NEUTROPHILS ABSOLUTE: 4.93 K/UL (ref 1.56–6.13)
NEUTROPHILS RELATIVE PERCENT: 67.7 % (ref 34–71.1)
PDW BLD-RTO: 11.9 % (ref 11.6–14.4)
PLATELET # BLD: 149 K/UL (ref 163–337)
PMV BLD AUTO: 10.3 FL (ref 7.4–10.4)
RBC # BLD: 4.21 M/UL (ref 4.63–6.08)
WBC # BLD: 7.28 K/UL (ref 4.23–9.07)

## 2020-11-10 PROCEDURE — 85025 COMPLETE CBC W/AUTO DIFF WBC: CPT

## 2020-11-10 PROCEDURE — 96372 THER/PROPH/DIAG INJ SC/IM: CPT

## 2020-11-10 PROCEDURE — 6360000002 HC RX W HCPCS: Performed by: NURSE PRACTITIONER

## 2020-11-10 RX ORDER — CYANOCOBALAMIN 1000 UG/ML
1000 INJECTION INTRAMUSCULAR; SUBCUTANEOUS ONCE
Status: CANCELLED
Start: 2020-11-17

## 2020-11-10 RX ORDER — CYANOCOBALAMIN 1000 UG/ML
1000 INJECTION INTRAMUSCULAR; SUBCUTANEOUS ONCE
Status: COMPLETED | OUTPATIENT
Start: 2020-11-10 | End: 2020-11-10

## 2020-11-10 RX ADMIN — CYANOCOBALAMIN 1000 MCG: 1000 INJECTION, SOLUTION INTRAMUSCULAR; SUBCUTANEOUS at 10:23

## 2020-11-11 ENCOUNTER — APPOINTMENT (OUTPATIENT)
Dept: INFUSION THERAPY | Age: 80
End: 2020-11-11
Payer: MEDICARE

## 2020-11-12 ENCOUNTER — LAB (OUTPATIENT)
Dept: LAB | Facility: HOSPITAL | Age: 80
End: 2020-11-12

## 2020-11-12 DIAGNOSIS — Z01.818 PREOPERATIVE TESTING: ICD-10-CM

## 2020-11-12 PROCEDURE — C9803 HOPD COVID-19 SPEC COLLECT: HCPCS

## 2020-11-12 PROCEDURE — U0003 INFECTIOUS AGENT DETECTION BY NUCLEIC ACID (DNA OR RNA); SEVERE ACUTE RESPIRATORY SYNDROME CORONAVIRUS 2 (SARS-COV-2) (CORONAVIRUS DISEASE [COVID-19]), AMPLIFIED PROBE TECHNIQUE, MAKING USE OF HIGH THROUGHPUT TECHNOLOGIES AS DESCRIBED BY CMS-2020-01-R: HCPCS

## 2020-11-13 LAB
COVID LABCORP PRIORITY: NORMAL
SARS-COV-2 RNA RESP QL NAA+PROBE: NOT DETECTED

## 2020-11-16 PROBLEM — J84.10 LUNG FIBROSIS: Status: ACTIVE | Noted: 2020-11-16

## 2020-11-17 ENCOUNTER — OFFICE VISIT (OUTPATIENT)
Dept: PULMONOLOGY | Facility: CLINIC | Age: 80
End: 2020-11-17

## 2020-11-17 ENCOUNTER — HOSPITAL ENCOUNTER (OUTPATIENT)
Dept: INFUSION THERAPY | Age: 80
Discharge: HOME | End: 2020-11-17
Payer: MEDICARE

## 2020-11-17 VITALS
DIASTOLIC BLOOD PRESSURE: 74 MMHG | WEIGHT: 162 LBS | HEART RATE: 60 BPM | OXYGEN SATURATION: 97 % | SYSTOLIC BLOOD PRESSURE: 122 MMHG | BODY MASS INDEX: 26.99 KG/M2 | HEIGHT: 65 IN | TEMPERATURE: 97.7 F

## 2020-11-17 DIAGNOSIS — R91.8 PULMONARY INFILTRATE: Primary | ICD-10-CM

## 2020-11-17 DIAGNOSIS — Z87.891 PERSONAL HISTORY OF NICOTINE DEPENDENCE: ICD-10-CM

## 2020-11-17 DIAGNOSIS — Z85.51 HISTORY OF BLADDER CANCER: ICD-10-CM

## 2020-11-17 DIAGNOSIS — Z80.1 FAMILY HISTORY OF NEOPLASM OF LUNG: ICD-10-CM

## 2020-11-17 DIAGNOSIS — D51.9 ANEMIA DUE TO VITAMIN B12 DEFICIENCY, UNSPECIFIED B12 DEFICIENCY TYPE: Primary | ICD-10-CM

## 2020-11-17 DIAGNOSIS — J84.10 LUNG FIBROSIS (HCC): ICD-10-CM

## 2020-11-17 PROCEDURE — 6360000002 HC RX W HCPCS: Performed by: NURSE PRACTITIONER

## 2020-11-17 PROCEDURE — 99213 OFFICE O/P EST LOW 20 MIN: CPT | Performed by: INTERNAL MEDICINE

## 2020-11-17 PROCEDURE — 96372 THER/PROPH/DIAG INJ SC/IM: CPT

## 2020-11-17 RX ORDER — CYANOCOBALAMIN 1000 UG/ML
1000 INJECTION INTRAMUSCULAR; SUBCUTANEOUS ONCE
Status: COMPLETED | OUTPATIENT
Start: 2020-11-17 | End: 2020-11-17

## 2020-11-17 RX ORDER — CYANOCOBALAMIN 1000 UG/ML
1000 INJECTION INTRAMUSCULAR; SUBCUTANEOUS ONCE
Status: CANCELLED
Start: 2020-11-24

## 2020-11-17 RX ADMIN — CYANOCOBALAMIN 1000 MCG: 1000 INJECTION, SOLUTION INTRAMUSCULAR at 09:57

## 2020-11-17 NOTE — PROGRESS NOTES
Subjective   Ezekiel Velez is a 80 y.o. male.     Background: Pt with multiple benign lung nodules.  AAA.    new infiltrate identified 11/2019    Chief Complaint   Patient presents with   • Lung Nodule        History of Present Illness   He is feeling well.  Nodule is present continuously in lung for undetermined time, of undetermined severity, with no modifying factors or associated symptoms. He still plays golf 3-4 days per week.  He has had low wbc counts and is seeing Dr. Cleaning's office.    Medical/Family/Social History   has a past medical history of Arthritis, Bilateral shoulder pain, Cancer (CMS/HCC) (08/2017), Chronic low back pain, Colon polyp, COPD (chronic obstructive pulmonary disease) (CMS/[facility]), Degenerative disc disease, lumbar, Hyperlipidemia, and Sleep apnea with use of continuous positive airway pressure (CPAP).   has a past surgical history that includes Hemorrhoid surgery; Hernia repair; Colonoscopy (07/24/2012); cholecystectomy with intraoperative cholangiogram (N/A, 7/25/2017); Transurethral resection of bladder tumor (Bilateral, 8/22/2017); Colonoscopy (N/A, 9/19/2017); cystoscopy bladder biopsy (N/A, 2/22/2019); and Colonoscopy (N/A, 9/1/2020).  family history includes No Known Problems in his father and mother.   reports that he quit smoking about 22 months ago. His smoking use included cigarettes. He has a 41.25 pack-year smoking history. He has never used smokeless tobacco. He reports that he does not drink alcohol or use drugs.  Allergies   Allergen Reactions   • Celebrex [Celecoxib] GI Intolerance     Medications    Current Outpatient Medications:   •  HYDROcodone-acetaminophen (NORCO)  MG per tablet, Take 1 tablet by mouth Every 6 (Six) Hours As Needed for Moderate Pain ., Disp: , Rfl:   •  simvastatin (ZOCOR) 20 MG tablet, Take 20 mg by mouth Every Night., Disp: , Rfl:     Review of Systems   Constitutional: Negative for chills and fever.   Cardiovascular: Negative for chest  "pain.   Gastrointestinal: Negative for nausea and vomiting.     Objective   /74   Pulse 60   Temp 97.7 °F (36.5 °C)   Ht 165.1 cm (65\")   Wt 73.5 kg (162 lb)   SpO2 97% Comment: RA  BMI 26.96 kg/m²      Physical Exam  Constitutional:       General: He is not in acute distress.     Appearance: He is well-developed. He is not diaphoretic.   Eyes:      General: No scleral icterus.  Neck:      Vascular: No JVD.      Trachea: No tracheal deviation.   Cardiovascular:      Rate and Rhythm: Normal rate and regular rhythm.   Pulmonary:      Effort: No respiratory distress.   Abdominal:      Palpations: Abdomen is soft.      Tenderness: There is no abdominal tenderness. There is no guarding.   Skin:     General: Skin is warm and dry.   Psychiatric:         Behavior: Behavior normal.     -----------------------------------------------------------------------------------------------  EXAMINATION:  XR CHEST 2 VW-  8/18/2020 10:08 AM CDT  HISTORY: follow up infiltrate on ct abd 11/2019; R91.1-Solitary  pulmonary nodule  COMPARISON: 6/24/2016.  TECHNIQUE: 2 views were obtained.  FINDINGS:  There is stable biapical scarring. There is minimal bronchial  wall thickening. There is no dense consolidation or effusion. The heart  is normal in size. The thoracic aorta is atheromatous. There are  degenerative changes of the spine.  IMPRESSION:  1. Stable biapical scarring.  2. Stable mild bronchial wall thickening.  3. No dense infiltrate or effusion.  This report was finalized on 08/18/2020 10:20 by Dr. Kendall Smart MD.    Us Spleen    Result Date: 11/5/2020  Impression: 1. Spleen is normal in size.  This report was finalized on 11/05/2020 11:00 by Dr Arsalan Travis, .    -----------------------------------------------------------------------------------------------         -----------------------------------------------------------------------------------------------  Assessment/Plan   Problem List Items Addressed This Visit "        Pulmonary Problems    Lung fibrosis (CMS/HCC)    Overview     apical capping            Other    Personal history of nicotine dependence    Family history of neoplasm of lung    History of bladder cancer      Other Visit Diagnoses     Pulmonary infiltrate    -  Primary        Patient's Body mass index is 26.96 kg/m². BMI is within normal parameters. No follow-up required..      He appears stable  Follow up xray next year.       Electronically signed by Derrek Campa MD, 11/17/2020, 15:09 CST

## 2020-11-24 ENCOUNTER — HOSPITAL ENCOUNTER (OUTPATIENT)
Dept: INFUSION THERAPY | Age: 80
Discharge: HOME | End: 2020-11-24
Payer: MEDICARE

## 2020-11-24 DIAGNOSIS — D51.9 ANEMIA DUE TO VITAMIN B12 DEFICIENCY, UNSPECIFIED B12 DEFICIENCY TYPE: Primary | ICD-10-CM

## 2020-11-24 PROCEDURE — 6360000002 HC RX W HCPCS: Performed by: NURSE PRACTITIONER

## 2020-11-24 PROCEDURE — 96372 THER/PROPH/DIAG INJ SC/IM: CPT

## 2020-11-24 RX ORDER — CYANOCOBALAMIN 1000 UG/ML
1000 INJECTION INTRAMUSCULAR; SUBCUTANEOUS ONCE
Status: COMPLETED | OUTPATIENT
Start: 2020-11-24 | End: 2020-11-24

## 2020-11-24 RX ORDER — CYANOCOBALAMIN 1000 UG/ML
1000 INJECTION INTRAMUSCULAR; SUBCUTANEOUS ONCE
Status: CANCELLED
Start: 2020-12-01

## 2020-11-24 RX ADMIN — CYANOCOBALAMIN 1000 MCG: 1000 INJECTION, SOLUTION INTRAMUSCULAR; SUBCUTANEOUS at 14:19

## 2020-11-25 ENCOUNTER — VIRTUAL VISIT (OUTPATIENT)
Dept: VASCULAR SURGERY | Age: 80
End: 2020-11-25
Payer: MEDICARE

## 2020-11-25 ENCOUNTER — HOSPITAL ENCOUNTER (OUTPATIENT)
Dept: CT IMAGING | Age: 80
Discharge: HOME | End: 2020-11-25
Payer: MEDICARE

## 2020-11-25 PROCEDURE — 74176 CT ABD & PELVIS W/O CONTRAST: CPT

## 2020-11-25 PROCEDURE — 99442 PR PHYS/QHP TELEPHONE EVALUATION 11-20 MIN: CPT | Performed by: NURSE PRACTITIONER

## 2020-11-25 NOTE — PROGRESS NOTES
Lissette Salazar is a 79 y.o. male evaluated via telephone on 11/25/2020. Consent:  He and/or health care decision maker is aware that that he may receive a bill for this telephone service, depending on his insurance coverage, and has provided verbal consent to proceed: Yes    Patient is located at home  Provider is located at [facility]   Also present during call is wife    He has a known history of abdominal aortic aneurysm for 1 - 5 years. He has not had abdominal pain. He has back pain in the lumbar spine and sacral spine region. This pain is unchanged since the last visit. Pain is rated as 5. He presents for follow up of carotid artery stenosis. He has a known history of carotid artery stenosis for 1 - 5 years. His current treatment includes ASA EC daily. He denies a history of CVA. He reports no TIA's, episodes of lateralizing weakness and episodes of amaurosis fugax. Lissette Salazar is a 79 y.o. male with the following [facility]:  Patient Active Problem List    Diagnosis Date Noted    Vitamin B12 deficiency anemia 10/29/2020    Obstructive sleep apnea      AHI: 57.6 per PSG, 11/2018      CPAP (continuous positive airway pressure) dependence      8cm to 20cm      Lumbar disc disease with radiculopathy 05/15/2018    Dyspnea 02/27/2018    Displacement of lumbar disc with radiculopathy 11/09/2017    AAA (abdominal aortic aneurysm) (HCC)     Carotid artery stenosis     Lumbar disc disease 04/13/2016    Bilateral carotid artery stenosis 03/14/2016    Bilateral low back pain without sciatica 01/13/2016    Bilateral shoulder pain 01/13/2016     Current Outpatient Medications   Medication Sig Dispense Refill    simvastatin (ZOCOR) 20 MG tablet TAKE 1 TABLET BY MOUTH DAILY  1    HYDROcodone-acetaminophen (NORCO)  MG per tablet Take 1 tablet by mouth every 6 hours as needed for Pain.        No current facility-administered medications for this visit. Allergies: Celebrex [celecoxib]  Past Medical History:   Diagnosis Date    AAA (abdominal aortic aneurysm) (HCC)     Arthritis     Bilateral low back pain without sciatica 2016    Bilateral shoulder pain 2016    Blood clotting disorder (HCC)     Cancer (HCC) 2017    Bladder CA    Carotid artery stenosis     CPAP (continuous positive airway pressure) dependence     8cm to 20cm    DDD (degenerative disc disease), lumbar     Hx of blood clots     Hyperlipidemia     Obstructive sleep apnea     AHI: 57.6 per PSG, 2018     Past Surgical History:   Procedure Laterality Date    BLADDER SURGERY  2017    Removal of Cancer by Dr. Neela Marquez, LAPAROSCOPIC      2017    COLONOSCOPY      HEMORRHOID SURGERY      HERNIA REPAIR      2016     Family History   Problem Relation Age of Onset    Other Mother         Natural causes   [facility] Cancer Sister         Lung    Other Father         Emphysema     Social History     Tobacco Use    Smoking status: Former Smoker     Packs/day: 0.25     Years: 67.00     Pack years: 16.75     Types: Cigarettes     Last attempt to quit: 2019     Years since quittin.8    Smokeless tobacco: Never Used   Substance Use Topics    Alcohol use: No         Review of Systems    Constitutional - no significant activity change, appetite change, or unexpected weight change. No fever or chills. No diaphoresis or significant fatigue. HENT - no significant rhinorrhea or epistaxis. No tinnitus or significant hearing loss. Eyes - no sudden vision change or amaurosis. Respiratory - no significant shortness of breath, wheezing, or stridor. No apnea, cough, or chest tightness associated with shortness of breath. Cardiovascular - no chest pain, syncope, or significant dizziness. No palpitations or significant leg swelling.  has not had claudication. Gastrointestinal - has not had abdominal swelling or pain. No blood in stool.   No severe constipation, diarrhea, nausea, or vomiting. Genitourinary - No difficulty urinating, dysuria, frequency, or urgency. No flank pain or hematuria. Musculoskeletal - has had back pain, gait disturbance, or myalgia. Skin - no color change, rash, pallor, has not had new wound. Neurologic - no dizziness, facial asymmetry, or light headedness. No seizures. No speech difficulty or lateralizing weakness. Hematologic - no easy bruising or excessive bleeding. Psychiatric - no severe anxiety or nervousness. No confusion. All other review of systems are negative. PHYSICAL EXAMINATION:    [ INSTRUCTIONS:  \"[x]\" Indicates a positive item  \"[]\" Indicates a negative item  -- DELETE ALL ITEMS NOT EXAMINED]    [x] Alert  [x] Oriented to person/place/time    [x] No apparent distress  [] Toxic appearing  [x] Normal Mood  [] Anxious appearing    [] Depressed appearing  [] Confused appearing      [] Poor short term memory  [] Poor long term memory   Memory appears to be intact         Ct -   1. Stable CT of abdomen and pelvis compared with one year earlier,    with a fusiform infrarenal abdominal aortic aneurysm, maximum diameter    of 3.8 cm, with no evidence of rupture. Stable ectasia and dilation of    the right common iliac artery. 2. Benign-appearing cysts are noted in the left kidney. 3. There are scattered small cysts within the liver which appears    stable. 4. Prior cholecystectomy. 5. A few tiny nodules are noted in both lung bases and appear    unchanged. 6. Mild thickening of the left adrenal gland with low-attenuation,    similar to the previous CT and likely related to adenomatous change     Individual images were reviewed. Results were reviewed with the patient. Assessment    1. Bilateral carotid artery stenosis    2.  Abdominal aortic aneurysm (AAA) without rupture ([address]          Plan      Needs carotid u/s  Not dec 1st or second 8th 15  12 months with cvls and theresa  Patient instructed to call or proceed to the emergency room with any symptoms of lateralizing weakness, loss of vision in one eye, or episodes slurred speech. Symptoms of rupture reviewed with the patient including sudden onset severe back pain or abdominal pain. This pain can sometimes radiate into the groin or leg. The patient may experience a feeling of impending doom or death. If this occurs they have been insturcted to call 911 and get to the emergency room telling them you have an aneurysm. Patient has voiced understanding. Strongly encouraged start/continue statin therapy  Recommended no smoking    Documentation:  I communicated with the patient and/or health care decision maker about cvd, aaa. Details of this discussion including any medical advice provided: as above      I affirm this is a Patient Initiated Episode with an Established Patient who has not had a related appointment within my department in the past 7 days or scheduled within the next 24 hours.     Total Time: minutes: 11-20 minutes    Note: not billable if this call serves to triage the patient into an appointment for the relevant concern      [facility]

## 2020-11-28 NOTE — PROGRESS NOTES
OP HEMATOLOGY/ONCOLOGY PROGRESS NOTE      Pt Name: Makenzie Fabian  YOB: 1940  MRN: [id number]  Referring provider: [address] [place] [address]  Date of evaluation: 12/1/2020    History Obtained From:  patient, electronic medical record    CHIEF COMPLAINT:    Chief Complaint   Patient presents with    Follow-up     Thrombocytopenia ([address]      HISTORY OF PRESENT ILLNESS:    Aries Cheese \"Jesus Alberto\" Carol Duran is a 79 y.o.  male returning to the clinic for follow-up regarding leukopenia and anemia. He denies B symptoms. Autumn Sutton has not had any bleeding issues. WBC is improved, 5.48, hemoglobin is normal at 14.9 and platelets 345,417. Autumn Sutton is accompanied by his wife. He is without new complaint. Blood pressure stable, 138/62. Multiple benign lung nodules/fibrosis is followed by Dr. Christel Wesley HISTORY: Leukopenia, anemia  Lavanda Core" Carol Duran was seen in hematology consultation 10/26/2020, referred by Dr. Thuy Milton for evaluation of leukopenia and anemia. PMH significant for history of arthritis, hyperlipidemia, lung nodule, histoplasmosis, bladder cancer. Autumn Sutton denies any known history of thrombosis or blood clotting disorder and is removed from the [address] [place] [address] Road. Review of CBCs at [facility]:      Review of CBCs at Hospitals in [place]:      Previous abdominal/pelvic CT without contrast 11/19/2019 documented scattered, calcified granulomas in the liver and spleen. Colonoscopy was performed 9/1/2020 by Dr. Farida Mendoza. One polyp each in the ascending, proximal transverse and sigmoid colon were negative for high-grade dysplasia. Autumn Sutton is followed by Dr. Macy Hardy regarding history of lung nodule. CBC at presentation on 10/28/2020 showed a WBC of 5.42, Hgb 14.6/.2 and platelet count 73,853. Both WBC and Hgb are within normal limits. Platelets have been intermittently low since 2016 and have continued to decrease over the past year. Autumn Sutton is a nondrinker. He denies known history of liver disease.   Medications papillary urothelial carcinoma with severe underlying chronic inflammation. Cystoscopy 2020 by Dr. Cammie Hunt was without evidence of mass. Past Medical History:   Diagnosis Date    AAA (abdominal aortic aneurysm) (HCC)     Arthritis     Bilateral low back pain without sciatica 2016    Bilateral shoulder pain 2016    Cancer ([address] 2017    Bladder CA    Carotid artery stenosis     CPAP (continuous positive airway pressure) dependence     8cm to 20cm    DDD (degenerative disc disease), lumbar     Hyperlipidemia     Obstructive sleep apnea     AHI: 57.6 per PSG, 2018     Past Surgical History:   Procedure Laterality Date    BLADDER SURGERY  2017    Removal of Cancer by Dr. Mar Degree, LAPAROSCOPIC      2017    COLONOSCOPY      HEMORRHOID SURGERY      HERNIA REPAIR      2016       Current Outpatient Medications:     simvastatin (ZOCOR) 20 MG tablet, TAKE 1 TABLET BY MOUTH DAILY, Disp: , Rfl: 1    HYDROcodone-acetaminophen (NORCO)  MG per tablet, Take 1 tablet by mouth every 6 hours as needed for Pain., Disp: , Rfl:    Allergies:    Allergies   Allergen Reactions    Celebrex [Celecoxib]      Social History     Tobacco Use    Smoking status: Former Smoker     Packs/day: 0.25     Years: 67.00     Pack years: 16.75     Types: Cigarettes     Last attempt to quit: 2019     Years since quittin.8    Smokeless tobacco: Never Used   Substance Use Topics    Alcohol use: No    Drug use: No     Family History   Problem Relation Age of Onset    Other Mother         Natural causes    Cancer Sister         Lung    Other Father         Emphysema     Health Maintenance   Topic Date Due    DTaP/Tdap/Td vaccine (1 - Tdap) 1960    Shingles Vaccine (1 of 2) 1991    Pneumococcal 65+ years Vaccine (1 of 1 - PPSV23) 2006    Annual Wellness Visit (AWV)  2019    Lipid screen  09/10/2021    Flu vaccine  Completed    Hepatitis A vaccine  Aged Out    Hepatitis B vaccine  Aged Out    Hib vaccine  Aged Out    Meningococcal (ACWY) vaccine  Aged Out     Subjective   Review of Systems   Constitutional: Positive for fatigue (minimal). Negative for fever. No night sweats   HENT: Negative for dental problem, hearing loss, mouth sores, nosebleeds, sore throat and trouble swallowing. Eyes: Negative for photophobia, discharge, redness and itching. No blurring of vision, no double vision   Respiratory: Negative for cough, shortness of breath and wheezing. No hemoptysis   Cardiovascular: Negative for chest pain, palpitations and leg swelling. Gastrointestinal: Negative for abdominal pain, constipation, diarrhea, nausea and vomiting. Endocrine: Negative for cold intolerance, heat intolerance, polydipsia and polyuria. Genitourinary: Negative for dysuria and hematuria. History of bladder cancer   Musculoskeletal: Positive for arthralgias (Shoulders bilaterally) and back pain (Chronic). Negative for joint swelling and myalgias. Skin: Negative for pallor and rash. Allergic/Immunologic: Negative for environmental allergies and immunocompromised state. Neurological: Negative for seizures, syncope and numbness. Hematological: Negative for adenopathy. Does not bruise/bleed easily. Psychiatric/Behavioral: Negative for agitation, behavioral problems and confusion. The patient is not nervous/anxious. Objective   /62   Pulse 62   Temp 97.1 °F (36.2 °C) (Temporal)   Ht 5' 7\" (1.702 m)   Wt 164 lb 3.2 oz (74.5 kg)   SpO2 97%   BMI 25.72 kg/m²   Wt Readings from Last 3 Encounters:   12/01/20 164 lb 3.2 oz (74.5 kg)   10/28/20 162 lb 11.2 oz (73.8 kg)   09/15/20 155 lb (70.3 kg)   Physical Exam  Vitals signs reviewed. Constitutional:       General: He is not in acute distress. Appearance: He is well-developed. He is not toxic-appearing or diaphoretic.    HENT:      Head: Normocephalic is cooperative. Thought Content: Thought content normal.         Judgment: Judgment normal.      Comments: Alert and oriented to person, place and time. ASSESSMENT/PLAN:    1. Thrombocytopenia ([address]    2. B12 deficiency    3. History of bladder cancer    4. Chronic pain of both shoulders    5. MGUS (monoclonal gammopathy of unknown significance)       Leukopenia, anemia transient  Thrombocytopenia    CBC 12/01/20: WBC 5.48, Hgb 14.9, platelets 743,423. Normal differentials. Labs 10/28/2020:  · Vitamin B12 level: 205  · Folate: 13.5  · LDH: 360  · LIONEL: Negative  · Hepatitis panel: Negative  · HIV 1&2: non-reactive  · Antiplatelet antibodies: negative  · SPEP: Monoclonal IgA kappa = 0.2, monoclonal IgG kappa = 0.1  · Immunofixation: IgG monoclonal protein with kappa light chain specificity, IgA monoclonal protein with kappa light chain specificity  · Quantitative immunoglobulins: IgG 853, IgA 458 (), IgM 51  · Kappa light chains: 37 (3.3-19.4), lambda light chains 21.0, K/L ratio 1.76  · PTT: 23  · PT/INR: 10.3/1.0    · Spleen ultrasound 11/5/2020 at [facility]: normal spleen size of 10.2 x 9.7 x 4.6 cm    · 24-hour urine for immunoelectrophoresis 11/9/2020: No M-spike. immunofixation not completed due to lack of M protein  · Bone survey 11/4/2020: Negative for suspicious bony lesions    Weekly parenteral B12 injections were initiated 11/10/2020  Today is dose #4. Margarita Bullock is scheduled for 2 additional weekly doses, followed by monthly injections at thereafter. Will monitor platelet count with K27 replacement. Will most likely proceed with bone marrow aspirate and biopsy related to MGUS, which was discussed with patient and his spouse today    History of bladder cancer  S/p TURBT 8/22/2017, 2/22/2019  Cystoscopy 5/26/2020 by Dr. Mary Ann Souza was without evidence of mass.   Keep scheduled follow-up appointments    Benign lung nodules/lung fibrosis  CXR 8/18/2020 at [facility]: Stable, biapical scarring, stable mild bronchial wall thickening. No dense infiltrate or effusion  Followed by Dr. Meryl Ahumada with plans for repeat CXR next year    Chronic pain of both shoulders  Followed by pain management, CORNELIUS Penny    Colonoscopy was completed 9/1/2020 by Dr. Denson Ban  3 benign polyps, one each in the ascending, proximal and sigmoid colon. No orders of the defined types were placed in this encounter. Return in about 4 months (around 4/1/2021) for follow up with CORNELIUS Ochoa.       CORNELIUS Calixto  7:50 PM  12/1/2020

## 2020-12-01 ENCOUNTER — HOSPITAL ENCOUNTER (OUTPATIENT)
Dept: INFUSION THERAPY | Age: 80
Discharge: HOME | End: 2020-12-01
Payer: MEDICARE

## 2020-12-01 ENCOUNTER — OFFICE VISIT (OUTPATIENT)
Dept: HEMATOLOGY | Age: 80
End: 2020-12-01
Payer: MEDICARE

## 2020-12-01 VITALS
DIASTOLIC BLOOD PRESSURE: 62 MMHG | SYSTOLIC BLOOD PRESSURE: 138 MMHG | BODY MASS INDEX: 25.77 KG/M2 | HEIGHT: 67 IN | WEIGHT: 164.2 LBS | HEART RATE: 62 BPM | TEMPERATURE: 97.1 F | OXYGEN SATURATION: 97 %

## 2020-12-01 DIAGNOSIS — D51.9 ANEMIA DUE TO VITAMIN B12 DEFICIENCY, UNSPECIFIED B12 DEFICIENCY TYPE: ICD-10-CM

## 2020-12-01 DIAGNOSIS — D50.9 IRON DEFICIENCY ANEMIA, UNSPECIFIED IRON DEFICIENCY ANEMIA TYPE: Primary | ICD-10-CM

## 2020-12-01 LAB
BASOPHILS ABSOLUTE: 0.02 K/UL (ref 0.01–0.08)
BASOPHILS RELATIVE PERCENT: 0.4 % (ref 0.1–1.2)
EOSINOPHILS ABSOLUTE: 0.11 K/UL (ref 0.04–0.54)
EOSINOPHILS RELATIVE PERCENT: 2 % (ref 0.7–7)
HCT VFR BLD CALC: 43.2 % (ref 40.1–51)
HEMOGLOBIN: 14.9 G/DL (ref 13.7–17.5)
LYMPHOCYTES ABSOLUTE: 2.07 K/UL (ref 1.18–3.74)
LYMPHOCYTES RELATIVE PERCENT: 37.8 % (ref 19.3–53.1)
MCH RBC QN AUTO: 34.3 PG (ref 25.7–32.2)
MCHC RBC AUTO-ENTMCNC: 34.5 G/DL (ref 32.3–36.5)
MCV RBC AUTO: 99.5 FL (ref 79–92.2)
MONOCYTES ABSOLUTE: 0.58 K/UL (ref 0.24–0.82)
MONOCYTES RELATIVE PERCENT: 10.6 % (ref 4.7–12.5)
NEUTROPHILS ABSOLUTE: 2.7 K/UL (ref 1.56–6.13)
NEUTROPHILS RELATIVE PERCENT: 49.2 % (ref 34–71.1)
PDW BLD-RTO: 11.9 % (ref 11.6–14.4)
PLATELET # BLD: 115 K/UL (ref 163–337)
PMV BLD AUTO: 10.9 FL (ref 7.4–10.4)
RBC # BLD: 4.34 M/UL (ref 4.63–6.08)
WBC # BLD: 5.48 K/UL (ref 4.23–9.07)

## 2020-12-01 PROCEDURE — G8484 FLU IMMUNIZE NO ADMIN: HCPCS | Performed by: NURSE PRACTITIONER

## 2020-12-01 PROCEDURE — 99214 OFFICE O/P EST MOD 30 MIN: CPT | Performed by: NURSE PRACTITIONER

## 2020-12-01 PROCEDURE — 4040F PNEUMOC VAC/ADMIN/RCVD: CPT | Performed by: NURSE PRACTITIONER

## 2020-12-01 PROCEDURE — 96372 THER/PROPH/DIAG INJ SC/IM: CPT

## 2020-12-01 PROCEDURE — 1036F TOBACCO NON-USER: CPT | Performed by: NURSE PRACTITIONER

## 2020-12-01 PROCEDURE — G8427 DOCREV CUR MEDS BY ELIG CLIN: HCPCS | Performed by: NURSE PRACTITIONER

## 2020-12-01 PROCEDURE — G8417 CALC BMI ABV UP PARAM F/U: HCPCS | Performed by: NURSE PRACTITIONER

## 2020-12-01 PROCEDURE — 1123F ACP DISCUSS/DSCN MKR DOCD: CPT | Performed by: NURSE PRACTITIONER

## 2020-12-01 PROCEDURE — 6360000002 HC RX W HCPCS: Performed by: NURSE PRACTITIONER

## 2020-12-01 PROCEDURE — 85025 COMPLETE CBC W/AUTO DIFF WBC: CPT

## 2020-12-01 RX ORDER — CYANOCOBALAMIN 1000 UG/ML
1000 INJECTION INTRAMUSCULAR; SUBCUTANEOUS ONCE
Status: CANCELLED
Start: 2020-12-08

## 2020-12-01 RX ORDER — CYANOCOBALAMIN 1000 UG/ML
1000 INJECTION INTRAMUSCULAR; SUBCUTANEOUS ONCE
Status: COMPLETED | OUTPATIENT
Start: 2020-12-01 | End: 2020-12-01

## 2020-12-01 RX ADMIN — CYANOCOBALAMIN 1000 MCG: 1000 INJECTION, SOLUTION INTRAMUSCULAR; SUBCUTANEOUS at 10:51

## 2020-12-01 ASSESSMENT — ENCOUNTER SYMPTOMS
WHEEZING: 0
DIARRHEA: 0
EYE REDNESS: 0
VOMITING: 0
NAUSEA: 0
PHOTOPHOBIA: 0
BACK PAIN: 1
ABDOMINAL PAIN: 0
CONSTIPATION: 0
EYE ITCHING: 0
EYE DISCHARGE: 0
SHORTNESS OF BREATH: 0
COUGH: 0
SORE THROAT: 0
TROUBLE SWALLOWING: 0

## 2020-12-01 NOTE — PATIENT INSTRUCTIONS
Patient Education        Learning About Monoclonal Gammopathy of Unknown Significance (MGUS)  What is MGUS? Monoclonal gammopathy of unknown significance (MGUS) is a blood condition. The blood is made of many kinds of cells, including red blood cells, platelets, and white blood cells. With MGUS, a type of white blood cell called a plasma cell makes too much of the \"M\" (for \"monoclonal\") protein in the blood. Most people with MGUS are fine for many years. MGUS seldom causes symptoms or major health problems. In rare cases, MGUS may turn into multiple myeloma. This is a cancer of plasma cells in the blood that can cause bone weakness. Some people with MGUS may also have a higher risk for osteoporosis, in which bones become thin and weak. MGUS is sometimes seen in younger people, but is more common in people as they get older. It's seen most often in those over the age of 79. How is MGUS diagnosed? MGUS is often found by chance when blood tests are done for other reasons. If you have high levels of a certain protein in your blood, your doctor may order more tests. Blood tests can help identify the protein. The protein is sometimes found in the urine, so you may get a urine test too. Other tests may be done if your doctor thinks you might have a medical problem. In some cases, a bone marrow biopsy may be done to rule out a problem like multiple myeloma. How is it treated? Most people with MGUS don't need any treatment. But you may need regular physical exams, blood tests, and urine tests to make sure that MGUS isn't progressing to a medical problem. Follow-up care is a key part of your treatment and safety. Be sure to make and go to all appointments, and call your doctor if you are having problems. It's also a good idea to know your test results and keep a list of the medicines you take. Where can you learn more? Go to [email or web address]. org and sign in to your [employer] account.  Enter A917 in the [facility] box to learn more about \"Learning About Monoclonal Gammopathy of Unknown Significance (MGUS). \"     If you do not have an account, please click on the \"Sign Up Now\" link. Current as of: November 8, 2019               Content Version: 12.6  © 5656-7084 [employer]. Care instructions adapted under license by [facility] ([facility]). If you have questions about a medical condition or this instruction, always ask your healthcare professional. [address] any warranty or liability for your use of this information.

## 2020-12-03 ENCOUNTER — TELEPHONE (OUTPATIENT)
Dept: VASCULAR SURGERY | Age: 80
End: 2020-12-03

## 2020-12-03 NOTE — TELEPHONE ENCOUNTER
Scheduled patient for 12/7/2020  9:30 Arrival for 10:00 Study. Pt notified and voiced understanding.

## 2020-12-07 ENCOUNTER — HOSPITAL ENCOUNTER (OUTPATIENT)
Dept: VASCULAR LAB | Age: 80
Discharge: HOME | End: 2020-12-07
Payer: MEDICARE

## 2020-12-07 PROCEDURE — 93880 EXTRACRANIAL BILAT STUDY: CPT

## 2020-12-08 ENCOUNTER — HOSPITAL ENCOUNTER (OUTPATIENT)
Dept: INFUSION THERAPY | Age: 80
Discharge: HOME | End: 2020-12-08
Payer: MEDICARE

## 2020-12-08 ENCOUNTER — TELEPHONE (OUTPATIENT)
Dept: VASCULAR SURGERY | Age: 80
End: 2020-12-08

## 2020-12-08 DIAGNOSIS — D51.9 ANEMIA DUE TO VITAMIN B12 DEFICIENCY, UNSPECIFIED B12 DEFICIENCY TYPE: Primary | ICD-10-CM

## 2020-12-08 PROCEDURE — 6360000002 HC RX W HCPCS: Performed by: NURSE PRACTITIONER

## 2020-12-08 PROCEDURE — 96372 THER/PROPH/DIAG INJ SC/IM: CPT

## 2020-12-08 RX ORDER — CYANOCOBALAMIN 1000 UG/ML
1000 INJECTION INTRAMUSCULAR; SUBCUTANEOUS ONCE
Status: CANCELLED
Start: 2020-12-15

## 2020-12-08 RX ORDER — CYANOCOBALAMIN 1000 UG/ML
1000 INJECTION INTRAMUSCULAR; SUBCUTANEOUS ONCE
Status: COMPLETED | OUTPATIENT
Start: 2020-12-08 | End: 2020-12-08

## 2020-12-08 RX ADMIN — CYANOCOBALAMIN 1000 MCG: 1000 INJECTION, SOLUTION INTRAMUSCULAR; SUBCUTANEOUS at 14:08

## 2020-12-08 NOTE — TELEPHONE ENCOUNTER
----- Message from CORNELIUS Santos sent at 12/8/2020  9:58 AM CST -----  Please let him know his study looks good. He will need 1 year with cvls. Please place order and ct abd/pelvis no contrast 1 year.   Please send ct to Dr. Kurt Mills for their review on lung nodules and document

## 2020-12-15 ENCOUNTER — HOSPITAL ENCOUNTER (OUTPATIENT)
Dept: INFUSION THERAPY | Age: 80
Discharge: HOME | End: 2020-12-15
Payer: MEDICARE

## 2020-12-15 DIAGNOSIS — D51.9 ANEMIA DUE TO VITAMIN B12 DEFICIENCY, UNSPECIFIED B12 DEFICIENCY TYPE: Primary | ICD-10-CM

## 2020-12-15 PROCEDURE — 96372 THER/PROPH/DIAG INJ SC/IM: CPT

## 2020-12-15 PROCEDURE — 6360000002 HC RX W HCPCS: Performed by: NURSE PRACTITIONER

## 2020-12-15 RX ORDER — CYANOCOBALAMIN 1000 UG/ML
1000 INJECTION INTRAMUSCULAR; SUBCUTANEOUS ONCE
Status: CANCELLED
Start: 2021-01-15

## 2020-12-15 RX ORDER — CYANOCOBALAMIN 1000 UG/ML
1000 INJECTION INTRAMUSCULAR; SUBCUTANEOUS ONCE
Status: CANCELLED
Start: 2021-01-15 | End: 2021-01-15

## 2020-12-15 RX ORDER — CYANOCOBALAMIN 1000 UG/ML
1000 INJECTION INTRAMUSCULAR; SUBCUTANEOUS ONCE
Status: COMPLETED | OUTPATIENT
Start: 2020-12-15 | End: 2020-12-15

## 2020-12-15 RX ADMIN — CYANOCOBALAMIN 1000 MCG: 1000 INJECTION, SOLUTION INTRAMUSCULAR; SUBCUTANEOUS at 10:06

## 2021-01-15 ENCOUNTER — TRANSCRIBE ORDERS (OUTPATIENT)
Dept: ADMINISTRATIVE | Facility: HOSPITAL | Age: 81
End: 2021-01-15

## 2021-01-15 ENCOUNTER — HOSPITAL ENCOUNTER (OUTPATIENT)
Dept: INFUSION THERAPY | Age: 81
Discharge: HOME | End: 2021-01-15
Payer: MEDICARE

## 2021-01-15 DIAGNOSIS — Z01.818 PREOPERATIVE TESTING: Primary | ICD-10-CM

## 2021-01-15 DIAGNOSIS — D50.9 IRON DEFICIENCY ANEMIA, UNSPECIFIED IRON DEFICIENCY ANEMIA TYPE: ICD-10-CM

## 2021-01-18 ENCOUNTER — LAB (OUTPATIENT)
Dept: LAB | Facility: HOSPITAL | Age: 81
End: 2021-01-18

## 2021-01-18 LAB — SARS-COV-2 ORF1AB RESP QL NAA+PROBE: NOT DETECTED

## 2021-01-18 PROCEDURE — C9803 HOPD COVID-19 SPEC COLLECT: HCPCS | Performed by: ANESTHESIOLOGY

## 2021-01-18 PROCEDURE — U0004 COV-19 TEST NON-CDC HGH THRU: HCPCS | Performed by: ANESTHESIOLOGY

## 2021-01-19 ENCOUNTER — HOSPITAL ENCOUNTER (OUTPATIENT)
Dept: INFUSION THERAPY | Age: 81
Discharge: HOME | End: 2021-01-19
Payer: MEDICARE

## 2021-01-19 DIAGNOSIS — D51.9 ANEMIA DUE TO VITAMIN B12 DEFICIENCY, UNSPECIFIED B12 DEFICIENCY TYPE: ICD-10-CM

## 2021-01-19 DIAGNOSIS — D50.9 IRON DEFICIENCY ANEMIA, UNSPECIFIED IRON DEFICIENCY ANEMIA TYPE: Primary | ICD-10-CM

## 2021-01-19 LAB
BASOPHILS ABSOLUTE: 0.02 K/UL (ref 0.01–0.08)
BASOPHILS RELATIVE PERCENT: 0.4 % (ref 0.1–1.2)
EOSINOPHILS ABSOLUTE: 0.06 K/UL (ref 0.04–0.54)
EOSINOPHILS RELATIVE PERCENT: 1.1 % (ref 0.7–7)
HCT VFR BLD CALC: 40.8 % (ref 40.1–51)
HEMOGLOBIN: 13.8 G/DL (ref 13.7–17.5)
LYMPHOCYTES ABSOLUTE: 1.78 K/UL (ref 1.18–3.74)
LYMPHOCYTES RELATIVE PERCENT: 31.6 % (ref 19.3–53.1)
MCH RBC QN AUTO: 33.9 PG (ref 25.7–32.2)
MCHC RBC AUTO-ENTMCNC: 33.8 G/DL (ref 32.3–36.5)
MCV RBC AUTO: 100.2 FL (ref 79–92.2)
MONOCYTES ABSOLUTE: 0.53 K/UL (ref 0.24–0.82)
MONOCYTES RELATIVE PERCENT: 9.4 % (ref 4.7–12.5)
NEUTROPHILS ABSOLUTE: 3.25 K/UL (ref 1.56–6.13)
NEUTROPHILS RELATIVE PERCENT: 57.5 % (ref 34–71.1)
PDW BLD-RTO: 12 % (ref 11.6–14.4)
PLATELET # BLD: 103 K/UL (ref 163–337)
PMV BLD AUTO: 10.8 FL (ref 7.4–10.4)
RBC # BLD: 4.07 M/UL (ref 4.63–6.08)
WBC # BLD: 5.64 K/UL (ref 4.23–9.07)

## 2021-01-19 PROCEDURE — 6360000002 HC RX W HCPCS: Performed by: NURSE PRACTITIONER

## 2021-01-19 PROCEDURE — 85025 COMPLETE CBC W/AUTO DIFF WBC: CPT

## 2021-01-19 PROCEDURE — 96372 THER/PROPH/DIAG INJ SC/IM: CPT

## 2021-01-19 RX ORDER — CYANOCOBALAMIN 1000 UG/ML
1000 INJECTION INTRAMUSCULAR; SUBCUTANEOUS ONCE
Status: COMPLETED
Start: 2021-01-19 | End: 2021-01-19

## 2021-01-19 RX ORDER — CYANOCOBALAMIN 1000 UG/ML
1000 INJECTION INTRAMUSCULAR; SUBCUTANEOUS ONCE
Status: CANCELLED
Start: 2021-02-01

## 2021-01-19 RX ADMIN — CYANOCOBALAMIN 1000 MCG: 1000 INJECTION, SOLUTION INTRAMUSCULAR; SUBCUTANEOUS at 13:21

## 2021-02-02 ENCOUNTER — PROCEDURE VISIT (OUTPATIENT)
Dept: UROLOGY | Facility: CLINIC | Age: 81
End: 2021-02-02

## 2021-02-02 DIAGNOSIS — C67.8 MALIGNANT NEOPLASM OF OVERLAPPING SITES OF BLADDER (HCC): Primary | ICD-10-CM

## 2021-02-02 PROCEDURE — 99214 OFFICE O/P EST MOD 30 MIN: CPT | Performed by: UROLOGY

## 2021-02-02 PROCEDURE — 52000 CYSTOURETHROSCOPY: CPT | Performed by: UROLOGY

## 2021-02-02 NOTE — PROGRESS NOTES
CC: I am here for the doctor to look at my bladder    Cystoscopy procedure note  Pre- operative diagnosis:  Bladder cancer surveillance    Post operative diagnosis:  Recurrent Bladder Tumors consistent with urothelial carcinoma    Procedure:  The patient was prepped and draped in a normal sterile fashion.  The urethra was anesthetized with 2% lidocaine jelly.  A flexible cystoscope was introduced per urethra.      Urethra:  Normal    Bladder:  He has 4 tumors.  Largest is just about 2 cm on the anterior bladder wall.  He is got about three 1 cm tumors that are located on the dome.    Ureteral orifices:  Normal position bilaterally and Clear efflux bilaterally    Prostate:  lateral lobe hypertrophy    Patient tolerated the procedure well    Complications: none    Blood loss: minimal      Given these findings, I had to evaluate the patient to assess fitness for surgery, formulate and discussa plan, that included alternatives, risks and benefits of management.. This went well beyond the typical description of procedural findings and therefore an additional evaluation and management was required.        HPI  Cystoscopy finding  The patient's cystoscopy today revealed multiple recurrent papillary bladder neoplasms worrisome for urothelial cancer on today's exam that  will require further evaluation. This is a recurrence of a previous diagnosis finding warranting further evaluation. Onset of this is unknown. It was found in the context of cystoscopy being done for an evaluation of prior hx of urothelial carcinoma.  Symptoms include no hematuria, dysuria or flank pain.        The following portions of the patient's history were reviewed and updated as appropriate: allergies, current medications, past family history, past medical history, past social history, past surgical history and problem list.      Review of Systems   Constitutional: Negative for chills and fever.   Gastrointestinal: Negative for abdominal pain, anal  bleeding and blood in stool.   Genitourinary: Negative for dysuria and hematuria.         Current Outpatient Medications:   •  HYDROcodone-acetaminophen (NORCO)  MG per tablet, Take 1 tablet by mouth Every 6 (Six) Hours As Needed for Moderate Pain ., Disp: , Rfl:   •  simvastatin (ZOCOR) 20 MG tablet, Take 20 mg by mouth Every Night., Disp: , Rfl:     Past Medical History:   Diagnosis Date   • Arthritis    • Bilateral shoulder pain    • Cancer (CMS/HCC) 08/2017    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   • Chronic low back pain    • Colon polyp    • COPD (chronic obstructive pulmonary disease) (CMS/HCC)    • Degenerative disc disease, lumbar    • Hyperlipidemia    • Sleep apnea with use of continuous positive airway pressure (CPAP)        Past Surgical History:   Procedure Laterality Date   • CHOLECYSTECTOMY WITH INTRAOPERATIVE CHOLANGIOGRAM N/A 7/25/2017    Procedure: CHOLECYSTECTOMY LAPAROSCOPIC AND REMOVAL CYST ON CHEST;  Surgeon: Ina Issa MD;  Location:  [facility] OR;  Service:    • COLONOSCOPY  07/24/2012   • COLONOSCOPY N/A 9/19/2017    Procedure: COLONOSCOPY WITH ANESTHESIA;  Surgeon: Jimmie Tirado MD;  Location:  [facility] ENDOSCOPY;  Service:    • COLONOSCOPY N/A 9/1/2020    Procedure: COLONOSCOPY WITH ANESTHESIA;  Surgeon: Jimmie Tirado MD;  Location: [facility] ENDOSCOPY;  Service: Gastroenterology;  Laterality: N/A;   • CYSTOSCOPY BLADDER BIOPSY N/A 2/22/2019    Procedure: CYSTOSCOPY BILATERAL RETROGRADE PYELOGRAM BLADDER BIOPSY WITH FULGURATION OF 1 CM BLADDER TUMOR;  Surgeon: Cale Covington MD;  Location: [facility] OR;  Service: Urology   • HEMORRHOIDECTOMY     • HERNIA REPAIR     • TRANSURETHRAL RESECTION OF BLADDER TUMOR Bilateral 8/22/2017    Procedure: CYSTOSCOPY TRANSURETHRAL RESECTION OF BLADDER TUMOR bilateral retrogrades;  Surgeon: Cale Covington MD;  Location: [facility] OR;  Service:        Social History     Socioeconomic History   • Marital status:      Spouse name:  Not on file   • Number of children: Not on file   • Years of education: Not on file   • Highest education level: Not on file   Tobacco Use   • Smoking status: Former Smoker     Packs/day: 0.75     Years: 55.00     Pack years: 41.25     Types: Cigarettes     Quit date:      Years since quittin.0   • Smokeless tobacco: Never Used   • Tobacco comment: recently quit   Substance and Sexual Activity   • Alcohol use: No   • Drug use: No   • Sexual activity: Defer       Family History   Problem Relation Age of Onset   • No Known Problems Father    • No Known Problems Mother    • Colon cancer Neg Hx    • Colon polyps Neg Hx          There were no vitals taken for this visit.      Physical Exam  Neuro: Alert and oriented ×3  Const: Not agitated or distressed; Vital signs are reviewed. No obvious deformities  Pulmonary: No respiratory distress  Skin: Without pallor or diaphoresis  GI: Abdomen is soft and nontender.  No significant distention.  No hernias noted.  : Penis and testicles are normal.        Data  Results for orders placed or performed in visit on 21   POC Urinalysis Dipstick, Multipro    Specimen: Urine   Result Value Ref Range    Color Yellow Yellow, Straw, Dark Yellow, Ann    Clarity, UA Clear Clear    Glucose, UA Negative Negative, 1000 mg/dL (3+) mg/dL    Bilirubin Negative Negative    Ketones, UA Negative Negative    Specific Gravity  1.025 1.005 - 1.030    Blood, UA Negative Negative    pH, Urine 6.5 5.0 - 8.0    Protein, POC Negative Negative mg/dL    Urobilinogen, UA Normal Normal    Nitrite, UA Negative Negative    Leukocytes Negative Negative       Assessment and Plan  Diagnoses and all orders for this visit:    1. Malignant neoplasm of overlapping sites of bladder (CMS/HCC) (Primary)  -     Case Request; Standing  -     ceFAZolin (ANCEF) 2 g in sodium chloride 0.9 % 100 mL IVPB  -     Case Request    Other orders  -     Follow Anesthesia Guidelines / Standing Orders; Future  -      Provide NPO Instructions to Patient; Future  -     Chlorhexidine Skin Prep; Future  -     Follow Anesthesia Guidelines / Standing Orders; Standing  -     Verify NPO Status; Standing  -     Obtain Informed Consent; Standing  -     Verify / Perform Chlorhexidine Skin Prep; Standing      Plan transurethral section bladder tumors.  I will likely fulgurate the 3 small lesions because they are so small and located in the dome.  I talked him about intravesical chemotherapy including the risks but the benefit was also described as having fewer recurrences.  I would recommend that he get intravesical gemcitabine.  We will also do bilateral retrograde pyelograms at the time of the procedure.        (Please note that portions of this note were completed with a voice recognition program.)  Cale Covington MD  02/02/21  10:13 CST

## 2021-02-08 ENCOUNTER — TRANSCRIBE ORDERS (OUTPATIENT)
Dept: ADMINISTRATIVE | Facility: HOSPITAL | Age: 81
End: 2021-02-08

## 2021-02-08 DIAGNOSIS — Z11.59 SCREENING FOR VIRAL DISEASE: Primary | ICD-10-CM

## 2021-02-09 ENCOUNTER — APPOINTMENT (OUTPATIENT)
Dept: PREADMISSION TESTING | Facility: HOSPITAL | Age: 81
End: 2021-02-09

## 2021-02-09 VITALS
OXYGEN SATURATION: 98 % | DIASTOLIC BLOOD PRESSURE: 61 MMHG | RESPIRATION RATE: 16 BRPM | BODY MASS INDEX: 26.82 KG/M2 | HEIGHT: 66 IN | SYSTOLIC BLOOD PRESSURE: 129 MMHG | WEIGHT: 166.89 LBS | HEART RATE: 61 BPM

## 2021-02-09 LAB
ANION GAP SERPL CALCULATED.3IONS-SCNC: 7 MMOL/L (ref 5–15)
BUN SERPL-MCNC: 14 MG/DL (ref 8–23)
BUN/CREAT SERPL: 18.7 (ref 7–25)
CALCIUM SPEC-SCNC: 9 MG/DL (ref 8.6–10.5)
CHLORIDE SERPL-SCNC: 103 MMOL/L (ref 98–107)
CO2 SERPL-SCNC: 31 MMOL/L (ref 22–29)
CREAT SERPL-MCNC: 0.75 MG/DL (ref 0.76–1.27)
DEPRECATED RDW RBC AUTO: 41.4 FL (ref 37–54)
ERYTHROCYTE [DISTWIDTH] IN BLOOD BY AUTOMATED COUNT: 12.1 % (ref 12.3–15.4)
GFR SERPL CREATININE-BSD FRML MDRD: 100 ML/MIN/1.73
GLUCOSE SERPL-MCNC: 98 MG/DL (ref 65–99)
HCT VFR BLD AUTO: 39.1 % (ref 37.5–51)
HGB BLD-MCNC: 13.8 G/DL (ref 13–17.7)
MCH RBC QN AUTO: 32.9 PG (ref 26.6–33)
MCHC RBC AUTO-ENTMCNC: 35.3 G/DL (ref 31.5–35.7)
MCV RBC AUTO: 93.1 FL (ref 79–97)
PLATELET # BLD AUTO: 108 10*3/MM3 (ref 140–450)
PMV BLD AUTO: 12 FL (ref 6–12)
POTASSIUM SERPL-SCNC: 4.7 MMOL/L (ref 3.5–5.2)
RBC # BLD AUTO: 4.2 10*6/MM3 (ref 4.14–5.8)
SODIUM SERPL-SCNC: 141 MMOL/L (ref 136–145)
WBC # BLD AUTO: 4 10*3/MM3 (ref 3.4–10.8)

## 2021-02-09 PROCEDURE — 93005 ELECTROCARDIOGRAM TRACING: CPT

## 2021-02-09 PROCEDURE — 80048 BASIC METABOLIC PNL TOTAL CA: CPT

## 2021-02-09 PROCEDURE — 85027 COMPLETE CBC AUTOMATED: CPT

## 2021-02-09 PROCEDURE — 93010 ELECTROCARDIOGRAM REPORT: CPT | Performed by: INTERNAL MEDICINE

## 2021-02-09 PROCEDURE — 36415 COLL VENOUS BLD VENIPUNCTURE: CPT

## 2021-02-09 NOTE — DISCHARGE INSTRUCTIONS
DAY OF SURGERY INSTRUCTIONS        YOUR SURGEON: ***    PROCEDURE: ***    DATE OF SURGERY: ***    ARRIVAL TIME: AS DIRECTED BY OFFICE    YOU MAY TAKE THE FOLLOWING MEDICATION(S) THE MORNING OF SURGERY WITH A SIP OF WATER: ***    ALL OTHER HOME MEDICATIONS CHECK WITH YOUR DOCTOR    DO NOT TAKE ANY ERECTILE DYSFUNCTION MEDICATIONS (EX:  CIALIS, VIAGRA) 24 HOURS PRIOR TO SURGERY              MANAGING PAIN AFTER SURGERY    We know you are probably wondering what your pain will be like after surgery.  Following surgery it is unrealistic to expect you will not have pain.   Pain is how our bodies let us know that something is wrong or cautions us to be careful.  That said, our goal is to make your pain tolerable.    Methods we may use to treat your pain include (oral or IV medications, PCAs, epidurals, nerve blocks, etc.)   While some procedures require IV pain medications for a short time after surgery, transitioning to pain medications by mouth allows for better management of pain.   Your nurse will encourage you to take oral pain medications whenever possible.  IV medications work almost immediately, but only last a short while.  Taking medications by mouth allows for a more constant level of medication in your blood stream for a longer period of time.      Once your pain is out of control it is harder to get back under control.  It is important you are aware when your next dose of pain medication is due.  If you are admitted, your nurse may write the time of your next dose on the white board in your room to help you remember.      We are interested in your pain and encourage you to inform us about aggravating factors during your visit.   Many times a simple repositioning every few hours can make a big difference.    If your physician says it is okay, do not let your pain prevent you from getting out of bed. Be sure to call your nurse for assistance prior to getting up so you do not fall.      Before surgery, please  decide your tolerable pain goal.  These faces help describe the pain ratings we use on a 0-10 scale.   Be prepared to tell us your goal and whether or not you take pain or anxiety medications at home.      BEFORE YOU COME TO THE HOSPITAL  (Pre-op instructions)  • Do not eat, drink, smoke or chew gum after midnight the night before surgery.  This also includes no mints.  • Morning of surgery take only the medicines you have been instructed with a sip of water unless otherwise instructed  by your physician.  • Do not shave, wear makeup or dark nail polish.  • Remove all jewelry including rings.  • Leave anything you consider valuable at home.  • Leave your suitcase in the car until after your surgery.  • Bring the following with you if applicable:  o Picture ID and insurance, Medicare or Medicaid cards  o Co-pay/deductible required by insurance (cash, check, credit card)  o Copy of advance directive, living will or power-of- documents if not brought to PAT  o CPAP or BIPAP mask and tubing  o Relaxation aids ( book, magazine), etc.  o Hearing aids                                 ON THE DAY OF SURGERY  · On the day of surgery check in at registration located at the main entrance of the hospital.   ? You will be registered and given a beeper with instructions where to wait in the main lobby.  ? When your beeper lights up and vibrates a member of the Outpatient Surgery staff will meet you at the double doors under the stair steps and escort you to your preoperative room.   · You may have cloth compression devices placed on your legs. These help to prevent blood clots and reduce swelling in your legs.  · An IV may be inserted into one of your veins.  · In the operating room, you may be given one or more of the following:  ? A medicine to help you relax (sedative).  ? A medicine to numb the area (local anesthetic).  ? A medicine to make you fall asleep (general anesthetic).  ? A medicine that is injected into an  "area of your body to numb everything below the injection site (regional anesthetic).  · Your surgical site will be marked or identified.  · You may be given an antibiotic through your IV to help prevent infection.  Contact a health care provider if you:  · Develop a fever of more than 100.4°F (38°C) or other feelings of illness during the 48 hours before your surgery.  · Have symptoms that get worse.  Have questions or concerns about your surgery    General Anesthesia/Surgery, Adult  General anesthesia is the use of medicines to make a person \"go to sleep\" (unconscious) for a medical procedure. General anesthesia must be used for certain procedures, and is often recommended for procedures that:  · Last a long time.  · Require you to be still or in an unusual position.  · Are major and can cause blood loss.  The medicines used for general anesthesia are called general anesthetics. As well as making you unconscious for a certain amount of time, these medicines:  · Prevent pain.  · Control your blood pressure.  · Relax your muscles.  Tell a health care provider about:  · Any allergies you have.  · All medicines you are taking, including vitamins, herbs, eye drops, creams, and over-the-counter medicines.  · Any problems you or family members have had with anesthetic medicines.  · Types of anesthetics you have had in the past.  · Any blood disorders you have.  · Any surgeries you have had.  · Any medical conditions you have.  · Any recent upper respiratory, chest, or ear infections.  · Any history of:  ? Heart or lung conditions, such as heart failure, sleep apnea, asthma, or chronic obstructive pulmonary disease (COPD).  ?  service.  ? Depression or anxiety.  · Any tobacco or drug use, including marijuana or alcohol use.  · Whether you are pregnant or may be pregnant.  What are the risks?  Generally, this is a safe procedure. However, problems may occur, including:  · Allergic reaction.  · Lung and heart " problems.  · Inhaling food or liquid from the stomach into the lungs (aspiration).  · Nerve injury.  · Air in the bloodstream, which can lead to stroke.  · Extreme agitation or confusion (delirium) when you wake up from the anesthetic.  · Waking up during your procedure and being unable to move. This is rare.  These problems are more likely to develop if you are having a major surgery or if you have an advanced or serious medical condition. You can prevent some of these complications by answering all of your health care provider's questions thoroughly and by following all instructions before your procedure.  General anesthesia can cause side effects, including:  · Nausea or vomiting.  · A sore throat from the breathing tube.  · Hoarseness.  · Wheezing or coughing.  · Shaking chills.  · Tiredness.  · Body aches.  · Anxiety.  · Sleepiness or drowsiness.  · Confusion or agitation.  RISKS AND COMPLICATIONS OF SURGERY  Your health care provider will discuss possible risks and complications with you before surgery. Common risks and complications include:    · Problems due to the use of anesthetics.  · Blood loss and replacement (does not apply to minor surgical procedures).  · Temporary increase in pain due to surgery.  · Uncorrected pain or problems that the surgery was meant to correct.  · Infection.  · New damage.    What happens before the procedure?    Medicines  Ask your health care provider about:  · Changing or stopping your regular medicines. This is especially important if you are taking diabetes medicines or blood thinners.  · Taking medicines such as aspirin and ibuprofen. These medicines can thin your blood. Do not take these medicines unless your health care provider tells you to take them.  · Taking over-the-counter medicines, vitamins, herbs, and supplements. Do not take these during the week before your procedure unless your health care provider approves them.  General instructions  · Starting 3-6 weeks  before the procedure, do not use any products that contain nicotine or tobacco, such as cigarettes and e-cigarettes. If you need help quitting, ask your health care provider.  · If you brush your teeth on the morning of the procedure, make sure to spit out all of the toothpaste.  · Tell your health care provider if you become ill or develop a cold, cough, or fever.  · If instructed by your health care provider, bring your sleep apnea device with you on the day of your surgery (if applicable).  · Ask your health care provider if you will be going home the same day, the following day, or after a longer hospital stay.  ? Plan to have someone take you home from the hospital or clinic.  ? Plan to have a responsible adult care for you for at least 24 hours after you leave the hospital or clinic. This is important.  What happens during the procedure?  · You will be given anesthetics through both of the following:  ? A mask placed over your nose and mouth.  ? An IV in one of your veins.  · You may receive a medicine to help you relax (sedative).  · After you are unconscious, a breathing tube may be inserted down your throat to help you breathe. This will be removed before you wake up.  · An anesthesia specialist will stay with you throughout your procedure. He or she will:  ? Keep you comfortable and safe by continuing to give you medicines and adjusting the amount of medicine that you get.  ? Monitor your blood pressure, pulse, and oxygen levels to make sure that the anesthetics do not cause any problems.  The procedure may vary among health care providers and hospitals.  What happens after the procedure?  · Your blood pressure, temperature, heart rate, breathing rate, and blood oxygen level will be monitored until the medicines you were given have worn off.  · You will wake up in a recovery area. You may wake up slowly.  · If you feel anxious or agitated, you may be given medicine to help you calm down.  · If you will be  going home the same day, your health care provider may check to make sure you can walk, drink, and urinate.  · Your health care provider will treat any pain or side effects you have before you go home.  · Do not drive for 24 hours if you were given a sedative.  Summary  · General anesthesia is used to keep you still and prevent pain during a procedure.  · It is important to tell your healthcare provider about your medical history and any surgeries you have had, and previous experience with anesthesia.  · Follow your healthcare provider’s instructions about when to stop eating, drinking, or taking certain medicines before your procedure.  · Plan to have someone take you home from the hospital or clinic.  This information is not intended to replace advice given to you by your health care provider. Make sure you discuss any questions you have with your health care provider.  Document Released: 03/26/2009 Document Revised: 08/03/2018 Document Reviewed: 08/03/2018  [employer] Patient Education © 2019 [employer]      Fall Prevention in Hospitals, Adult  As a hospital patient, your condition and the treatments you receive can increase your risk for falls. Some additional risk factors for falls in a hospital include:  · Being in an unfamiliar environment.  · Being on bed rest.  · Your surgery.  · Taking certain medicines.  · Your tubing requirements, such as intravenous (IV) therapy or catheters.  It is important that you learn how to decrease fall risks while at the hospital. Below are important tips that can help prevent falls.  SAFETY TIPS FOR PREVENTING FALLS  Talk about your risk of falling.  · Ask your health care provider why you are at risk for falling. Is it your medicine, illness, tubing placement, or something else?  · Make a plan with your health care provider to keep you safe from falls.  · Ask your health care provider or pharmacist about side effects of your medicines. Some medicines can make you  dizzy or affect your coordination.  Ask for help.  · Ask for help before getting out of bed. You may need to press your call button.  · Ask for assistance in getting safely to the toilet.  · Ask for a walker or cane to be put at your bedside. Ask that most of the side rails on your bed be placed up before your health care provider leaves the room.  · Ask family or friends to sit with you.  · Ask for things that are out of your reach, such as your glasses, hearing aids, telephone, bedside table, or call button.  Follow these tips to avoid falling:  · Stay lying or seated, rather than standing, while waiting for help.  · Wear rubber-soled slippers or shoes whenever you walk in the hospital.  · Avoid quick, sudden movements.  ¨ Change positions slowly.  ¨ Sit on the side of your bed before standing.  ¨ Stand up slowly and wait before you start to walk.  · Let your health care provider know if there is a spill on the floor.  · Pay careful attention to the medical equipment, electrical cords, and tubes around you.  · When you need help, use your call button by your bed or in the bathroom. Wait for one of your health care providers to help you.  · If you feel dizzy or unsure of your footing, return to bed and wait for assistance.  · Avoid being distracted by the TV, telephone, or another person in your room.  · Do not lean or support yourself on rolling objects, such as IV poles or bedside tables.     This information is not intended to replace advice given to you by your health care provider. Make sure you discuss any questions you have with your health care provider.     Document Released: 12/15/2001 Document Revised: 01/08/2016 Document Reviewed: 08/25/2013  [employer] Interactive Patient Education ©2016 [employer]

## 2021-02-10 LAB
QT INTERVAL: 460 MS
QTC INTERVAL: 440 MS

## 2021-02-12 ENCOUNTER — TELEPHONE (OUTPATIENT)
Dept: UROLOGY | Facility: CLINIC | Age: 81
End: 2021-02-12

## 2021-02-12 NOTE — TELEPHONE ENCOUNTER
R/S mr anders for surgery on 02/22 @ 0630 and was given his instruction. Pt was also informed the covid unit would be calling to get him r/s for his covid test prior his surgery.

## 2021-02-17 ENCOUNTER — TRANSCRIBE ORDERS (OUTPATIENT)
Dept: ADMINISTRATIVE | Facility: HOSPITAL | Age: 81
End: 2021-02-17

## 2021-02-17 DIAGNOSIS — Z11.59 SCREENING FOR VIRAL DISEASE: Primary | ICD-10-CM

## 2021-02-19 ENCOUNTER — IMMUNIZATION (OUTPATIENT)
Age: 81
End: 2021-02-19
Payer: MEDICARE

## 2021-02-19 PROCEDURE — 0001A COVID-19, PFIZER VACCINE 30MCG/0.3ML DOSE: CPT | Performed by: FAMILY MEDICINE

## 2021-02-19 PROCEDURE — 91300 COVID-19, PFIZER VACCINE 30MCG/0.3ML DOSE: CPT | Performed by: FAMILY MEDICINE

## 2021-02-20 ENCOUNTER — LAB (OUTPATIENT)
Dept: LAB | Facility: HOSPITAL | Age: 81
End: 2021-02-20

## 2021-02-20 DIAGNOSIS — Z01.818 PRE-OP TESTING: Primary | ICD-10-CM

## 2021-02-20 LAB — SARS-COV-2 ORF1AB RESP QL NAA+PROBE: NOT DETECTED

## 2021-02-20 PROCEDURE — C9803 HOPD COVID-19 SPEC COLLECT: HCPCS

## 2021-02-20 PROCEDURE — U0005 INFEC AGEN DETEC AMPLI PROBE: HCPCS

## 2021-02-20 PROCEDURE — U0004 COV-19 TEST NON-CDC HGH THRU: HCPCS

## 2021-02-22 ENCOUNTER — ANESTHESIA EVENT (OUTPATIENT)
Dept: PERIOP | Facility: HOSPITAL | Age: 81
End: 2021-02-22

## 2021-02-22 ENCOUNTER — ANESTHESIA (OUTPATIENT)
Dept: PERIOP | Facility: HOSPITAL | Age: 81
End: 2021-02-22

## 2021-02-22 ENCOUNTER — HOSPITAL ENCOUNTER (OUTPATIENT)
Facility: HOSPITAL | Age: 81
Setting detail: HOSPITAL OUTPATIENT SURGERY
Discharge: HOME | End: 2021-02-22
Attending: UROLOGY | Admitting: UROLOGY

## 2021-02-22 ENCOUNTER — APPOINTMENT (OUTPATIENT)
Dept: GENERAL RADIOLOGY | Facility: HOSPITAL | Age: 81
End: 2021-02-22

## 2021-02-22 VITALS
RESPIRATION RATE: 16 BRPM | HEART RATE: 51 BPM | TEMPERATURE: 97.8 F | OXYGEN SATURATION: 98 % | DIASTOLIC BLOOD PRESSURE: 76 MMHG | SYSTOLIC BLOOD PRESSURE: 157 MMHG

## 2021-02-22 DIAGNOSIS — C67.8 MALIGNANT NEOPLASM OF OVERLAPPING SITES OF BLADDER (HCC): ICD-10-CM

## 2021-02-22 PROCEDURE — 88305 TISSUE EXAM BY PATHOLOGIST: CPT | Performed by: UROLOGY

## 2021-02-22 PROCEDURE — 25010000002 FENTANYL CITRATE (PF) 100 MCG/2ML SOLUTION: Performed by: NURSE ANESTHETIST, CERTIFIED REGISTERED

## 2021-02-22 PROCEDURE — 25010000002 PROPOFOL 10 MG/ML EMULSION: Performed by: NURSE ANESTHETIST, CERTIFIED REGISTERED

## 2021-02-22 PROCEDURE — 25010000002 GEMCITABINE RECONSTITUTED SOLUTION: Performed by: UROLOGY

## 2021-02-22 PROCEDURE — 52235 CYSTOSCOPY AND TREATMENT: CPT | Performed by: UROLOGY

## 2021-02-22 PROCEDURE — 74420 UROGRAPHY RTRGR +-KUB: CPT

## 2021-02-22 PROCEDURE — 25010000002 IOPAMIDOL 61 % SOLUTION: Performed by: UROLOGY

## 2021-02-22 PROCEDURE — 25010000002 CEFAZOLIN PER 500 MG: Performed by: UROLOGY

## 2021-02-22 PROCEDURE — 25010000002 FENTANYL CITRATE (PF) 100 MCG/2ML SOLUTION: Performed by: ANESTHESIOLOGY

## 2021-02-22 PROCEDURE — 52351 CYSTOURETERO & OR PYELOSCOPE: CPT | Performed by: UROLOGY

## 2021-02-22 PROCEDURE — 25010000002 ONDANSETRON PER 1 MG: Performed by: NURSE ANESTHETIST, CERTIFIED REGISTERED

## 2021-02-22 PROCEDURE — 51720 TREATMENT OF BLADDER LESION: CPT | Performed by: UROLOGY

## 2021-02-22 PROCEDURE — C1758 CATHETER, URETERAL: HCPCS | Performed by: UROLOGY

## 2021-02-22 PROCEDURE — 25010000002 DEXAMETHASONE PER 1 MG: Performed by: ANESTHESIOLOGY

## 2021-02-22 RX ORDER — OXYCODONE AND ACETAMINOPHEN 7.5; 325 MG/1; MG/1
2 TABLET ORAL EVERY 4 HOURS PRN
Status: DISCONTINUED | OUTPATIENT
Start: 2021-02-22 | End: 2021-02-22 | Stop reason: HOSPADM

## 2021-02-22 RX ORDER — SORBITOL 30 G/1000ML
IRRIGANT IRRIGATION AS NEEDED
Status: DISCONTINUED | OUTPATIENT
Start: 2021-02-22 | End: 2021-02-22 | Stop reason: HOSPADM

## 2021-02-22 RX ORDER — ONDANSETRON 2 MG/ML
INJECTION INTRAMUSCULAR; INTRAVENOUS AS NEEDED
Status: DISCONTINUED | OUTPATIENT
Start: 2021-02-22 | End: 2021-02-22 | Stop reason: SURG

## 2021-02-22 RX ORDER — OXYCODONE AND ACETAMINOPHEN 10; 325 MG/1; MG/1
1 TABLET ORAL ONCE AS NEEDED
Status: DISCONTINUED | OUTPATIENT
Start: 2021-02-22 | End: 2021-02-22 | Stop reason: HOSPADM

## 2021-02-22 RX ORDER — LIDOCAINE HYDROCHLORIDE 20 MG/ML
INJECTION, SOLUTION EPIDURAL; INFILTRATION; INTRACAUDAL; PERINEURAL AS NEEDED
Status: DISCONTINUED | OUTPATIENT
Start: 2021-02-22 | End: 2021-02-22 | Stop reason: SURG

## 2021-02-22 RX ORDER — FLUMAZENIL 0.1 MG/ML
0.2 INJECTION INTRAVENOUS AS NEEDED
Status: DISCONTINUED | OUTPATIENT
Start: 2021-02-22 | End: 2021-02-22 | Stop reason: HOSPADM

## 2021-02-22 RX ORDER — LIDOCAINE HYDROCHLORIDE 10 MG/ML
0.5 INJECTION, SOLUTION EPIDURAL; INFILTRATION; INTRACAUDAL; PERINEURAL ONCE AS NEEDED
Status: DISCONTINUED | OUTPATIENT
Start: 2021-02-22 | End: 2021-02-22 | Stop reason: HOSPADM

## 2021-02-22 RX ORDER — SODIUM CHLORIDE, SODIUM LACTATE, POTASSIUM CHLORIDE, CALCIUM CHLORIDE 600; 310; 30; 20 MG/100ML; MG/100ML; MG/100ML; MG/100ML
100 INJECTION, SOLUTION INTRAVENOUS CONTINUOUS
Status: DISCONTINUED | OUTPATIENT
Start: 2021-02-22 | End: 2021-02-22 | Stop reason: HOSPADM

## 2021-02-22 RX ORDER — SODIUM CHLORIDE 0.9 % (FLUSH) 0.9 %
3-10 SYRINGE (ML) INJECTION AS NEEDED
Status: DISCONTINUED | OUTPATIENT
Start: 2021-02-22 | End: 2021-02-22 | Stop reason: HOSPADM

## 2021-02-22 RX ORDER — FENTANYL CITRATE 50 UG/ML
25 INJECTION, SOLUTION INTRAMUSCULAR; INTRAVENOUS
Status: COMPLETED | OUTPATIENT
Start: 2021-02-22 | End: 2021-02-22

## 2021-02-22 RX ORDER — SODIUM CHLORIDE, SODIUM LACTATE, POTASSIUM CHLORIDE, CALCIUM CHLORIDE 600; 310; 30; 20 MG/100ML; MG/100ML; MG/100ML; MG/100ML
1000 INJECTION, SOLUTION INTRAVENOUS CONTINUOUS
Status: DISCONTINUED | OUTPATIENT
Start: 2021-02-22 | End: 2021-02-22 | Stop reason: HOSPADM

## 2021-02-22 RX ORDER — HYDROCODONE BITARTRATE AND ACETAMINOPHEN 5; 325 MG/1; MG/1
1 TABLET ORAL EVERY 6 HOURS PRN
Qty: 8 TABLET | Refills: 0 | Status: SHIPPED | OUTPATIENT
Start: 2021-02-22

## 2021-02-22 RX ORDER — NALOXONE HCL 0.4 MG/ML
0.4 VIAL (ML) INJECTION AS NEEDED
Status: DISCONTINUED | OUTPATIENT
Start: 2021-02-22 | End: 2021-02-22 | Stop reason: HOSPADM

## 2021-02-22 RX ORDER — ACETAMINOPHEN 500 MG
1000 TABLET ORAL ONCE
Status: COMPLETED | OUTPATIENT
Start: 2021-02-22 | End: 2021-02-22

## 2021-02-22 RX ORDER — SODIUM CHLORIDE 0.9 % (FLUSH) 0.9 %
3 SYRINGE (ML) INJECTION AS NEEDED
Status: DISCONTINUED | OUTPATIENT
Start: 2021-02-22 | End: 2021-02-22 | Stop reason: HOSPADM

## 2021-02-22 RX ORDER — CEPHALEXIN 500 MG/1
500 CAPSULE ORAL 2 TIMES DAILY
Qty: 5 CAPSULE | Refills: 0 | Status: SHIPPED | OUTPATIENT
Start: 2021-02-22 | End: 2021-05-28

## 2021-02-22 RX ORDER — DEXAMETHASONE SODIUM PHOSPHATE 4 MG/ML
4 INJECTION, SOLUTION INTRA-ARTICULAR; INTRALESIONAL; INTRAMUSCULAR; INTRAVENOUS; SOFT TISSUE ONCE AS NEEDED
Status: COMPLETED | OUTPATIENT
Start: 2021-02-22 | End: 2021-02-22

## 2021-02-22 RX ORDER — FENTANYL CITRATE 50 UG/ML
INJECTION, SOLUTION INTRAMUSCULAR; INTRAVENOUS AS NEEDED
Status: DISCONTINUED | OUTPATIENT
Start: 2021-02-22 | End: 2021-02-22 | Stop reason: SURG

## 2021-02-22 RX ORDER — LABETALOL HYDROCHLORIDE 5 MG/ML
5 INJECTION, SOLUTION INTRAVENOUS
Status: DISCONTINUED | OUTPATIENT
Start: 2021-02-22 | End: 2021-02-22 | Stop reason: HOSPADM

## 2021-02-22 RX ORDER — PROPOFOL 10 MG/ML
VIAL (ML) INTRAVENOUS AS NEEDED
Status: DISCONTINUED | OUTPATIENT
Start: 2021-02-22 | End: 2021-02-22 | Stop reason: SURG

## 2021-02-22 RX ORDER — MAGNESIUM HYDROXIDE 1200 MG/15ML
LIQUID ORAL AS NEEDED
Status: DISCONTINUED | OUTPATIENT
Start: 2021-02-22 | End: 2021-02-22 | Stop reason: HOSPADM

## 2021-02-22 RX ORDER — SODIUM CHLORIDE 0.9 % (FLUSH) 0.9 %
3 SYRINGE (ML) INJECTION EVERY 12 HOURS SCHEDULED
Status: DISCONTINUED | OUTPATIENT
Start: 2021-02-22 | End: 2021-02-22 | Stop reason: HOSPADM

## 2021-02-22 RX ORDER — BUPIVACAINE HCL/0.9 % NACL/PF 0.1 %
2 PLASTIC BAG, INJECTION (ML) EPIDURAL ONCE
Status: COMPLETED | OUTPATIENT
Start: 2021-02-22 | End: 2021-02-22

## 2021-02-22 RX ORDER — ONDANSETRON 2 MG/ML
4 INJECTION INTRAMUSCULAR; INTRAVENOUS ONCE AS NEEDED
Status: DISCONTINUED | OUTPATIENT
Start: 2021-02-22 | End: 2021-02-22 | Stop reason: HOSPADM

## 2021-02-22 RX ADMIN — ACETAMINOPHEN 1000 MG: 500 TABLET, FILM COATED ORAL at 08:23

## 2021-02-22 RX ADMIN — FENTANYL CITRATE 25 MCG: 50 INJECTION, SOLUTION INTRAMUSCULAR; INTRAVENOUS at 10:10

## 2021-02-22 RX ADMIN — FENTANYL CITRATE 25 MCG: 50 INJECTION, SOLUTION INTRAMUSCULAR; INTRAVENOUS at 10:15

## 2021-02-22 RX ADMIN — FENTANYL CITRATE 25 MCG: 50 INJECTION, SOLUTION INTRAMUSCULAR; INTRAVENOUS at 10:20

## 2021-02-22 RX ADMIN — DEXAMETHASONE SODIUM PHOSPHATE 4 MG: 4 INJECTION, SOLUTION INTRAMUSCULAR; INTRAVENOUS at 08:23

## 2021-02-22 RX ADMIN — PROPOFOL 150 MG: 10 INJECTION, EMULSION INTRAVENOUS at 09:16

## 2021-02-22 RX ADMIN — FENTANYL CITRATE 50 MCG: 50 INJECTION, SOLUTION INTRAMUSCULAR; INTRAVENOUS at 09:33

## 2021-02-22 RX ADMIN — LIDOCAINE HYDROCHLORIDE 100 MG: 20 INJECTION, SOLUTION EPIDURAL; INFILTRATION; INTRACAUDAL; PERINEURAL at 09:16

## 2021-02-22 RX ADMIN — OXYCODONE HYDROCHLORIDE AND ACETAMINOPHEN 2 TABLET: 7.5; 325 TABLET ORAL at 10:21

## 2021-02-22 RX ADMIN — ONDANSETRON HYDROCHLORIDE 4 MG: 2 SOLUTION INTRAMUSCULAR; INTRAVENOUS at 09:49

## 2021-02-22 RX ADMIN — Medication 2 G: at 09:15

## 2021-02-22 RX ADMIN — FENTANYL CITRATE 50 MCG: 50 INJECTION, SOLUTION INTRAMUSCULAR; INTRAVENOUS at 09:22

## 2021-02-22 RX ADMIN — FENTANYL CITRATE 25 MCG: 50 INJECTION, SOLUTION INTRAMUSCULAR; INTRAVENOUS at 10:25

## 2021-02-22 RX ADMIN — SODIUM CHLORIDE, POTASSIUM CHLORIDE, SODIUM LACTATE AND CALCIUM CHLORIDE 1000 ML: 600; 310; 30; 20 INJECTION, SOLUTION INTRAVENOUS at 06:58

## 2021-02-22 NOTE — ANESTHESIA PROCEDURE NOTES
Airway  Urgency: elective    Date/Time: 2/22/2021 9:17 AM  Airway not difficult    General Information and Staff    Patient location during procedure: OR  CRNA: Samuel Becker CRNA    Indications and Patient Condition  Indications for airway management: airway protection    Preoxygenated: yes  Mask difficulty assessment: 1 - vent by mask    Final Airway Details  Final airway type: supraglottic airway      Successful airway: classic and I-gel  Size 5    Number of attempts at approach: 1  Assessment: lips, teeth, and gum same as pre-op

## 2021-02-22 NOTE — ANESTHESIA PREPROCEDURE EVALUATION
Anesthesia Evaluation     Patient summary reviewed and Nursing notes reviewed   no history of anesthetic complications:  NPO Solid Status: > 8 hours  NPO Liquid Status: > 8 hours           Airway   Mallampati: II  TM distance: >3 FB  Neck ROM: full  Dental    (+) edentulous, upper dentures and lower dentures    Pulmonary    (+) a smoker Former, COPD, sleep apnea,   (-) asthma, recent URI  Cardiovascular   Exercise tolerance: good (4-7 METS)    ECG reviewed    (+) hyperlipidemia,   (-) hypertension, past MI, angina, murmur, cardiac stents, CABG      Neuro/Psych  (-) seizures, TIA, CVA  GI/Hepatic/Renal/Endo    (-) GERD, liver disease, no renal disease, diabetes    Musculoskeletal     (+) back pain,   (-) neck pain, neck stiffness      ROS comment: Chronic pain   Abdominal    Substance History      OB/GYN          Other   arthritis,    history of cancer (Urothelial cancer)                      Anesthesia Plan    ASA 3     general     intravenous induction     Anesthetic plan, all risks, benefits, and alternatives have been provided, discussed and informed consent has been obtained with: patient.

## 2021-02-22 NOTE — ANESTHESIA POSTPROCEDURE EVALUATION
Patient: Ezekiel Velez    Procedure Summary     Date: 02/22/21 Room / Location:  [facility] /  [facility]    Anesthesia Start: 0914 Anesthesia Stop: 0959    Procedure: CYSTOSCOPY TRANSURETHRAL RESECTION OF BLADDER TUMOR, GEMCITABINE INTRAVESICAL INSTILLATION & BILATERAL RETROGRADE URETEROPYELOGRAMS (N/A Bladder) Diagnosis:       Malignant neoplasm of overlapping sites of bladder (CMS/HCC)      (Malignant neoplasm of overlapping sites of bladder (CMS/HCC) [C67.8])    Surgeon: Cale Covington MD Provider: Samuel Becker CRNA    Anesthesia Type: general ASA Status: 3          Anesthesia Type: general    Vitals  Vitals Value Taken Time   /62 02/22/21 1055   Temp 97.8 °F (36.6 °C) 02/22/21 1055   Pulse 58 02/22/21 1058   Resp 16 02/22/21 1055   SpO2 96 % 02/22/21 1058   Vitals shown include unvalidated device data.        Post Anesthesia Care and Evaluation    Patient location during evaluation: PACU  Patient participation: complete - patient participated  Level of consciousness: awake and alert  Pain management: adequate  Airway patency: patent  Anesthetic complications: No anesthetic complications    Cardiovascular status: acceptable  Respiratory status: acceptable  Hydration status: acceptable    Comments: Blood pressure 153/75, pulse 61, temperature 97.8 °F (36.6 °C), temperature source Temporal, resp. rate 16, SpO2 90 %.    Pt discharged from PACU based on alex score >8

## 2021-02-23 LAB
LAB AP CASE REPORT: NORMAL
PATH REPORT.FINAL DX SPEC: NORMAL
PATH REPORT.GROSS SPEC: NORMAL

## 2021-02-25 ENCOUNTER — HOSPITAL ENCOUNTER (OUTPATIENT)
Dept: INFUSION THERAPY | Age: 81
Discharge: HOME | End: 2021-02-25
Payer: MEDICARE

## 2021-02-25 DIAGNOSIS — D51.9 ANEMIA DUE TO VITAMIN B12 DEFICIENCY, UNSPECIFIED B12 DEFICIENCY TYPE: ICD-10-CM

## 2021-02-25 DIAGNOSIS — D50.9 IRON DEFICIENCY ANEMIA, UNSPECIFIED IRON DEFICIENCY ANEMIA TYPE: Primary | ICD-10-CM

## 2021-02-25 LAB
BASOPHILS ABSOLUTE: 0.01 K/UL (ref 0.01–0.08)
BASOPHILS RELATIVE PERCENT: 0.2 % (ref 0.1–1.2)
EOSINOPHILS ABSOLUTE: 0.07 K/UL (ref 0.04–0.54)
EOSINOPHILS RELATIVE PERCENT: 1.6 % (ref 0.7–7)
HCT VFR BLD CALC: 38.4 % (ref 40.1–51)
HEMOGLOBIN: 13 G/DL (ref 13.7–17.5)
LYMPHOCYTES ABSOLUTE: 1.6 K/UL (ref 1.18–3.74)
LYMPHOCYTES RELATIVE PERCENT: 35.7 % (ref 19.3–53.1)
MCH RBC QN AUTO: 33.9 PG (ref 25.7–32.2)
MCHC RBC AUTO-ENTMCNC: 33.9 G/DL (ref 32.3–36.5)
MCV RBC AUTO: 100.3 FL (ref 79–92.2)
MONOCYTES ABSOLUTE: 0.14 K/UL (ref 0.24–0.82)
MONOCYTES RELATIVE PERCENT: 3.1 % (ref 4.7–12.5)
NEUTROPHILS ABSOLUTE: 2.66 K/UL (ref 1.56–6.13)
NEUTROPHILS RELATIVE PERCENT: 59.4 % (ref 34–71.1)
PDW BLD-RTO: 11.9 % (ref 11.6–14.4)
PLATELET # BLD: 101 K/UL (ref 163–337)
PMV BLD AUTO: 10.7 FL (ref 7.4–10.4)
RBC # BLD: 3.83 M/UL (ref 4.63–6.08)
WBC # BLD: 4.48 K/UL (ref 4.23–9.07)

## 2021-02-25 PROCEDURE — 6360000002 HC RX W HCPCS: Performed by: NURSE PRACTITIONER

## 2021-02-25 PROCEDURE — 96372 THER/PROPH/DIAG INJ SC/IM: CPT

## 2021-02-25 PROCEDURE — 85025 COMPLETE CBC W/AUTO DIFF WBC: CPT

## 2021-02-25 RX ORDER — CYANOCOBALAMIN 1000 UG/ML
1000 INJECTION INTRAMUSCULAR; SUBCUTANEOUS ONCE
Status: CANCELLED
Start: 2021-03-01

## 2021-02-25 RX ORDER — CYANOCOBALAMIN 1000 UG/ML
1000 INJECTION INTRAMUSCULAR; SUBCUTANEOUS ONCE
Status: COMPLETED
Start: 2021-02-25 | End: 2021-02-25

## 2021-02-25 RX ADMIN — CYANOCOBALAMIN 1000 MCG: 1000 INJECTION, SOLUTION INTRAMUSCULAR; SUBCUTANEOUS at 15:06

## 2021-03-01 ENCOUNTER — TELEPHONE (OUTPATIENT)
Dept: UROLOGY | Facility: CLINIC | Age: 81
End: 2021-03-01

## 2021-03-01 NOTE — TELEPHONE ENCOUNTER
I did discuss his pathology report with him.   Tissue Pathology Exam (02/22/2021 08:41)  He needs to follow-up in 6 months with a cystoscopy.  Cale Covington MD  3/1/2021  12:53 CST

## 2021-03-01 NOTE — TELEPHONE ENCOUNTER
----- Message from Jefferson Fletcher MA sent at 3/1/2021 12:23 PM CST -----  Regarding: Path Results  Pt called, he had TURBT on 02/22. He was wanting to know his Path Results. His number is . Thank you!

## 2021-03-12 ENCOUNTER — IMMUNIZATION (OUTPATIENT)
Age: 81
End: 2021-03-12
Payer: MEDICARE

## 2021-03-12 ENCOUNTER — HOSPITAL ENCOUNTER (OUTPATIENT)
Dept: INFUSION THERAPY | Age: 81
Discharge: HOME | End: 2021-03-12
Payer: MEDICARE

## 2021-03-12 DIAGNOSIS — D69.6 THROMBOCYTOPENIA (HCC): Primary | ICD-10-CM

## 2021-03-12 DIAGNOSIS — D51.9 ANEMIA DUE TO VITAMIN B12 DEFICIENCY, UNSPECIFIED B12 DEFICIENCY TYPE: ICD-10-CM

## 2021-03-12 DIAGNOSIS — D50.9 IRON DEFICIENCY ANEMIA, UNSPECIFIED IRON DEFICIENCY ANEMIA TYPE: ICD-10-CM

## 2021-03-12 LAB
ALBUMIN SERPL-MCNC: 3.8 G/DL (ref 3.5–5.2)
ALP BLD-CCNC: 90 U/L (ref 40–130)
ALT SERPL-CCNC: 17 U/L (ref 21–72)
ANION GAP SERPL CALCULATED.3IONS-SCNC: 7 MMOL/L (ref 7–19)
AST SERPL-CCNC: 21 U/L (ref 17–59)
BASOPHILS ABSOLUTE: 0.02 K/UL (ref 0.01–0.08)
BASOPHILS RELATIVE PERCENT: 0.5 % (ref 0.1–1.2)
BILIRUB SERPL-MCNC: 0.6 MG/DL (ref 0.2–1.3)
BUN BLDV-MCNC: 11 MG/DL (ref 9–20)
CALCIUM SERPL-MCNC: 9.2 MG/DL (ref 8.4–10.2)
CHLORIDE BLD-SCNC: 101 MMOL/L (ref 98–111)
CO2: 33 MMOL/L (ref 22–29)
CREAT SERPL-MCNC: 0.8 MG/DL (ref 0.6–1.2)
EOSINOPHILS ABSOLUTE: 0.08 K/UL (ref 0.04–0.54)
EOSINOPHILS RELATIVE PERCENT: 1.8 % (ref 0.7–7)
GFR NON-AFRICAN AMERICAN: >60
GLOBULIN: 2.5 G/DL
GLUCOSE BLD-MCNC: 121 MG/DL (ref 74–106)
HCT VFR BLD CALC: 37.3 % (ref 40.1–51)
HEMOGLOBIN: 13.1 G/DL (ref 13.7–17.5)
LYMPHOCYTES ABSOLUTE: 1.75 K/UL (ref 1.18–3.74)
LYMPHOCYTES RELATIVE PERCENT: 40.3 % (ref 19.3–53.1)
MCH RBC QN AUTO: 33.4 PG (ref 25.7–32.2)
MCHC RBC AUTO-ENTMCNC: 35.1 G/DL (ref 32.3–36.5)
MCV RBC AUTO: 95.2 FL (ref 79–92.2)
MONOCYTES ABSOLUTE: 0.53 K/UL (ref 0.24–0.82)
MONOCYTES RELATIVE PERCENT: 12.2 % (ref 4.7–12.5)
NEUTROPHILS ABSOLUTE: 1.96 K/UL (ref 1.56–6.13)
NEUTROPHILS RELATIVE PERCENT: 45.2 % (ref 34–71.1)
PDW BLD-RTO: 12.4 % (ref 11.6–14.4)
PLATELET # BLD: 245 K/UL (ref 163–337)
PMV BLD AUTO: 9.7 FL (ref 7.4–10.4)
POTASSIUM SERPL-SCNC: 4 MMOL/L (ref 3.5–5.1)
RBC # BLD: 3.92 M/UL (ref 4.63–6.08)
SODIUM BLD-SCNC: 141 MMOL/L (ref 137–145)
TOTAL PROTEIN: 6.3 G/DL (ref 6.3–8.2)
WBC # BLD: 4.34 K/UL (ref 4.23–9.07)

## 2021-03-12 PROCEDURE — 80053 COMPREHEN METABOLIC PANEL: CPT

## 2021-03-12 PROCEDURE — 96372 THER/PROPH/DIAG INJ SC/IM: CPT

## 2021-03-12 PROCEDURE — 91300 COVID-19, PFIZER VACCINE 30MCG/0.3ML DOSE: CPT | Performed by: FAMILY MEDICINE

## 2021-03-12 PROCEDURE — 85025 COMPLETE CBC W/AUTO DIFF WBC: CPT

## 2021-03-12 PROCEDURE — 0002A PR IMM ADMN SARSCOV2 30MCG/0.3ML DIL RECON 2ND DOSE: CPT | Performed by: FAMILY MEDICINE

## 2021-03-12 RX ORDER — CYANOCOBALAMIN 1000 UG/ML
1000 INJECTION INTRAMUSCULAR; SUBCUTANEOUS ONCE
Status: DISCONTINUED
Start: 2021-03-12 | End: 2021-03-14 | Stop reason: HOSPADM

## 2021-03-12 RX ORDER — CYANOCOBALAMIN 1000 UG/ML
INJECTION INTRAMUSCULAR; SUBCUTANEOUS
Status: DISCONTINUED
Start: 2021-03-12 | End: 2021-03-13 | Stop reason: HOSPADM

## 2021-03-12 RX ORDER — CYANOCOBALAMIN 1000 UG/ML
1000 INJECTION INTRAMUSCULAR; SUBCUTANEOUS ONCE
Status: CANCELLED
Start: 2021-04-05

## 2021-03-22 ENCOUNTER — TRANSCRIBE ORDERS (OUTPATIENT)
Dept: ADMINISTRATIVE | Facility: HOSPITAL | Age: 81
End: 2021-03-22

## 2021-03-22 DIAGNOSIS — Z01.818 PREOP TESTING: Primary | ICD-10-CM

## 2021-03-25 ENCOUNTER — LAB (OUTPATIENT)
Dept: LAB | Facility: HOSPITAL | Age: 81
End: 2021-03-25

## 2021-03-25 LAB — SARS-COV-2 ORF1AB RESP QL NAA+PROBE: NOT DETECTED

## 2021-03-25 PROCEDURE — C9803 HOPD COVID-19 SPEC COLLECT: HCPCS | Performed by: ANESTHESIOLOGY

## 2021-03-25 PROCEDURE — U0004 COV-19 TEST NON-CDC HGH THRU: HCPCS | Performed by: ANESTHESIOLOGY

## 2021-03-25 PROCEDURE — U0005 INFEC AGEN DETEC AMPLI PROBE: HCPCS | Performed by: ANESTHESIOLOGY

## 2021-04-09 ENCOUNTER — HOSPITAL ENCOUNTER (OUTPATIENT)
Dept: INFUSION THERAPY | Age: 81
Discharge: HOME | End: 2021-04-09
Payer: MEDICARE

## 2021-04-09 ENCOUNTER — OFFICE VISIT (OUTPATIENT)
Dept: HEMATOLOGY | Age: 81
End: 2021-04-09
Payer: MEDICARE

## 2021-04-09 VITALS
HEIGHT: 66 IN | WEIGHT: 161.1 LBS | DIASTOLIC BLOOD PRESSURE: 70 MMHG | SYSTOLIC BLOOD PRESSURE: 130 MMHG | OXYGEN SATURATION: 95 % | HEART RATE: 70 BPM | TEMPERATURE: 98.4 F | BODY MASS INDEX: 25.89 KG/M2

## 2021-04-09 DIAGNOSIS — D72.819 LEUKOPENIA, UNSPECIFIED TYPE: ICD-10-CM

## 2021-04-09 DIAGNOSIS — E53.8 B12 DEFICIENCY: Primary | ICD-10-CM

## 2021-04-09 DIAGNOSIS — R53.83 FATIGUE, UNSPECIFIED TYPE: ICD-10-CM

## 2021-04-09 DIAGNOSIS — D51.9 ANEMIA DUE TO VITAMIN B12 DEFICIENCY, UNSPECIFIED B12 DEFICIENCY TYPE: ICD-10-CM

## 2021-04-09 DIAGNOSIS — Z85.51 HISTORY OF BLADDER CANCER: ICD-10-CM

## 2021-04-09 DIAGNOSIS — D50.9 IRON DEFICIENCY ANEMIA, UNSPECIFIED IRON DEFICIENCY ANEMIA TYPE: Primary | ICD-10-CM

## 2021-04-09 DIAGNOSIS — D47.2 MGUS (MONOCLONAL GAMMOPATHY OF UNKNOWN SIGNIFICANCE): ICD-10-CM

## 2021-04-09 DIAGNOSIS — D69.6 THROMBOCYTOPENIA (HCC): ICD-10-CM

## 2021-04-09 DIAGNOSIS — E53.8 B12 DEFICIENCY: ICD-10-CM

## 2021-04-09 LAB
BASOPHILS ABSOLUTE: 0.03 K/UL (ref 0.01–0.08)
BASOPHILS RELATIVE PERCENT: 0.6 % (ref 0.1–1.2)
EOSINOPHILS ABSOLUTE: 0.11 K/UL (ref 0.04–0.54)
EOSINOPHILS RELATIVE PERCENT: 2.2 % (ref 0.7–7)
FOLATE: 9 NG/ML (ref 2.7–20)
HCT VFR BLD CALC: 38.4 % (ref 40.1–51)
HEMOGLOBIN: 12.4 G/DL (ref 13.7–17.5)
LYMPHOCYTES ABSOLUTE: 1.65 K/UL (ref 1.18–3.74)
LYMPHOCYTES RELATIVE PERCENT: 32.9 % (ref 19.3–53.1)
MCH RBC QN AUTO: 32.5 PG (ref 25.7–32.2)
MCHC RBC AUTO-ENTMCNC: 32.3 G/DL (ref 32.3–36.5)
MCV RBC AUTO: 100.5 FL (ref 79–92.2)
MONOCYTES ABSOLUTE: 0.59 K/UL (ref 0.24–0.82)
MONOCYTES RELATIVE PERCENT: 11.8 % (ref 4.7–12.5)
NEUTROPHILS ABSOLUTE: 2.63 K/UL (ref 1.56–6.13)
NEUTROPHILS RELATIVE PERCENT: 52.5 % (ref 34–71.1)
PDW BLD-RTO: 12.9 % (ref 11.6–14.4)
PLATELET # BLD: 174 K/UL (ref 163–337)
PMV BLD AUTO: 10.3 FL (ref 7.4–10.4)
RBC # BLD: 3.82 M/UL (ref 4.63–6.08)
VITAMIN B-12: 989 PG/ML (ref 239–931)
WBC # BLD: 5.01 K/UL (ref 4.23–9.07)

## 2021-04-09 PROCEDURE — 96372 THER/PROPH/DIAG INJ SC/IM: CPT

## 2021-04-09 PROCEDURE — 82746 ASSAY OF FOLIC ACID SERUM: CPT

## 2021-04-09 PROCEDURE — 99214 OFFICE O/P EST MOD 30 MIN: CPT | Performed by: NURSE PRACTITIONER

## 2021-04-09 PROCEDURE — 6360000002 HC RX W HCPCS: Performed by: NURSE PRACTITIONER

## 2021-04-09 PROCEDURE — G8427 DOCREV CUR MEDS BY ELIG CLIN: HCPCS | Performed by: NURSE PRACTITIONER

## 2021-04-09 PROCEDURE — 4040F PNEUMOC VAC/ADMIN/RCVD: CPT | Performed by: NURSE PRACTITIONER

## 2021-04-09 PROCEDURE — 1123F ACP DISCUSS/DSCN MKR DOCD: CPT | Performed by: NURSE PRACTITIONER

## 2021-04-09 PROCEDURE — 1036F TOBACCO NON-USER: CPT | Performed by: NURSE PRACTITIONER

## 2021-04-09 PROCEDURE — G8417 CALC BMI ABV UP PARAM F/U: HCPCS | Performed by: NURSE PRACTITIONER

## 2021-04-09 PROCEDURE — 85025 COMPLETE CBC W/AUTO DIFF WBC: CPT

## 2021-04-09 PROCEDURE — 82607 VITAMIN B-12: CPT

## 2021-04-09 RX ORDER — CYANOCOBALAMIN 1000 UG/ML
1000 INJECTION INTRAMUSCULAR; SUBCUTANEOUS ONCE
Status: CANCELLED
Start: 2021-05-03

## 2021-04-09 RX ORDER — CYANOCOBALAMIN 1000 UG/ML
1000 INJECTION INTRAMUSCULAR; SUBCUTANEOUS ONCE
Status: COMPLETED
Start: 2021-04-09 | End: 2021-04-09

## 2021-04-09 RX ORDER — DIAPER,BRIEF,INFANT-TODD,DISP
EACH MISCELLANEOUS
Qty: 1 TUBE | Refills: 1 | Status: SHIPPED | OUTPATIENT
Start: 2021-04-09 | End: 2021-04-16

## 2021-04-09 RX ADMIN — CYANOCOBALAMIN 1000 MCG: 1000 INJECTION, SOLUTION INTRAMUSCULAR; SUBCUTANEOUS at 13:24

## 2021-04-09 ASSESSMENT — ENCOUNTER SYMPTOMS
CONSTIPATION: 0
DIARRHEA: 0
WHEEZING: 0
COUGH: 0
NAUSEA: 0
ABDOMINAL PAIN: 0
EYE DISCHARGE: 0
SORE THROAT: 0
SHORTNESS OF BREATH: 0
EYE ITCHING: 0
TROUBLE SWALLOWING: 0
VOMITING: 0
BACK PAIN: 1

## 2021-04-09 NOTE — PROGRESS NOTES
Progress Note      Pt Name: Marylee Beech  YOB: 1940  MRN: [id number]    Date of evaluation: 4/9/2021  History Obtained From:  Patient, EMR    Portions of this note have been copied forward, however, changed to reflect the most current clinical status of this patient. Chief Complaint   Patient presents with    Other     leukopenia, anemia     HISTORY OF PRESENT ILLNESS:    Nathan Sanchez \"Jesus Alberto\" Warden Reed is an [de-identified] y.o.  gentleman returning to the clinic for follow-up regarding leukopenia and anemia. Previous evaluation included findings of MGUS. He denies B symptoms. Guanakito Villalba has not had any bleeding issues. CBC is stable including a WBC of 5.01 with normal differentials. Hgb 12.4/.5 and platelet count 126,002. Guanakito Villalba has a history of bladder cancer. Guanakito Villalba is followed by Dr. 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 Cystoscopy 2/2/2021 by Dr. Vel Mercado documented four bladder tumors. The largest was 2 cm on the anterior bladder wall with an additional three 1 cm tumors that are located on the dome. Cystoscopy,TURBT and gemcitabine intravesical instillation on was performed 2/22/2021 by Dr. Vel Mercado. The 2 cm anterior bladder wall tumor was biopsied. After near complete removal, the remaining portion of that bladder tumor, as well as the other 2 previously mentioned fulgurated. Their location was concerning with regard to using transurethral resection. 2000 mg gemcitabine was placed intravesically. 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 Plans are for repeat cystoscopy in 6 months. Posttreatment hematuria is resolved. Guanakito Villalba reports onset of rash to bilateral upper extremities and facial erythema not long following cystoscopy. There is no pruritus. History of multiple, benign lung nodules/fibrosis is managed by Dr. Blaine Kruger.   Infrarenal AAA K/L ratio 1.76  · PTT: 23  · PT/INR: 10.3/1.0     · Spleen ultrasound 11/5/2020 at [facility]: normal spleen size of 10.2 x 9.7 x 4.6 cm     · 24-hour urine for immunoelectrophoresis 11/9/2020: No M-spike. immunofixation not completed due to lack of M protein  · Bone survey 11/4/2020: Negative for suspicious bony lesions    MMA was elevated at 2164 on 11/10/2020. Weekly parenteral B12 injections x6 were initiated 11/10/2020, to be followed by monthly injections at thereafter. Will monitor platelet count with Y19 replacement.     Will most likely proceed with bone marrow aspirate and biopsy related to MGUS, which was discussed with patient and his spouse at follow-up appointment on 12/1/2020     TREATMENT SUMMARY:  1. Weekly parenteral B12 injections x4 initiated 11/10/2020, monthly thereafter.      ONCOLOGY HISTORY, superficial bladder cancer  Ruby Lugo has a history of TURBT x2.     TURBT 8/22/2017:  1.  Bladder tumor, right lateral wall, transurethral resection:  Low-grade papillary urothelial carcinoma, noninvasive. Muscularis propria not present. 2.  Bladder tumor, transurethral resection:  Low-grade papillary urothelial carcinoma, noninvasive. Muscularis propria identified with no evidence of tumor involvement. AJCC pathologic stage:  pTa Nx     TURBT 2/22/2019:  Urinary bladder, biopsy of bladder dome: Noninvasive low-grade papillary urothelial carcinoma with severe underlying chronic inflammation. Cystoscopy 5/26/2020 by Dr. Elizabeth Miller was without evidence of mass. Cystoscopy 2/2/2021 by Dr. Morales Carl documented four bladder tumors. The largest was 2 cm on the anterior bladder wall with an additional three 1 cm tumors that are located on the dome. Cystoscopy,TURBT and gemcitabine intravesical instillation on was performed 2/22/2021 by Dr. Morales Carl. The 2 cm anterior superficial bladder wall tumor was biopsied.   After near complete removal, the largest lesion as well as the other 2 previously mentioned lesions were fulgurated. 2000 mg gemcitabine was placed intravesically. 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 Plans are for repeat cystoscopy in 6 months. Past Medical History:   Diagnosis Date    AAA (abdominal aortic aneurysm) (HCC)     Arthritis     Bilateral low back pain without sciatica 2016    Bilateral shoulder pain 2016    Blood clotting disorder (HCC)     Cancer ([address] 2017    Bladder CA    Carotid artery stenosis     CPAP (continuous positive airway pressure) dependence     8cm to 20cm    DDD (degenerative disc disease), lumbar     Hx of blood clots     Hyperlipidemia     Obstructive sleep apnea     AHI: 57.6 per PSG, 2018     Past Surgical History:   Procedure Laterality Date    BLADDER SURGERY  2017    Removal of Cancer by Dr. Sinai Castano, LAPAROSCOPIC      2017    COLONOSCOPY      HEMORRHOID SURGERY      HERNIA REPAIR      2016     Current Outpatient Medications   Medication Sig Dispense Refill    hydrocortisone (ALA-TUYET) 1 % cream Apply topically 2 times daily as needed to upper extremities . 1 Tube 1    simvastatin (ZOCOR) 20 MG tablet TAKE 1 TABLET BY MOUTH DAILY  1    HYDROcodone-acetaminophen (NORCO)  MG per tablet Take 1 tablet by mouth every 6 hours as needed for Pain. No current facility-administered medications for this visit.        Allergies   Allergen Reactions    Celebrex [Celecoxib]      Social History     Tobacco Use    Smoking status: Former Smoker     Packs/day: 0.25     Years: 67.00     Pack years: 16.75     Types: Cigarettes     Quit date: 2019     Years since quittin.1    Smokeless tobacco: Never Used   Substance Use Topics    Alcohol use: No    Drug use: No     Family History   Problem Relation Age of Onset    Other Mother         Natural causes    Cancer Sister         Lung    Cardiovascular:      Rate and Rhythm: Normal rate and regular rhythm. Pulmonary:      Effort: Pulmonary effort is normal. No respiratory distress. Breath sounds: Normal breath sounds. No wheezing or rales. Abdominal:      General: Bowel sounds are normal. There is no distension. Palpations: Abdomen is soft. Tenderness: There is no abdominal tenderness. There is no guarding. Genitourinary:     Comments: Exam deferred  Musculoskeletal:         General: No tenderness or deformity. Comments: Normal ROM all four extremities   Lymphadenopathy:      Cervical:      Right cervical: No superficial or deep cervical adenopathy. Left cervical: No superficial or deep cervical adenopathy. Upper Body:      Right upper body: No supraclavicular adenopathy. Left upper body: No supraclavicular adenopathy. Comments:      Skin:     General: Skin is warm and dry. Findings: Erythema (face) and rash (macular rash BUE) present. Neurological:      Mental Status: He is alert and oriented to person, place, and time. Comments: follows commands, non-focal   Psychiatric:         Behavior: Behavior normal. Behavior is cooperative. Thought Content: Thought content normal.         Judgment: Judgment normal.      Comments: Alert and oriented to person, place and time. Vitals:    04/09/21 1226   BP: 130/70   Pulse: 70   Temp: 98.4 °F (36.9 °C)   SpO2: 95%   Weight: 161 lb 1.6 oz (73.1 kg)   Height: 5' 6\" (1.676 m)      Wt Readings from Last 3 Encounters:   04/09/21 161 lb 1.6 oz (73.1 kg)   12/01/20 164 lb 3.2 oz (74.5 kg)   10/28/20 162 lb 11.2 oz (73.8 kg)     LABS:  CBC 4/9/2021:  Lab Results   Component Value Date    WBC 5.01 04/09/2021    HGB 12.4 (L) 04/09/2021    HCT 38.4 (L) 04/09/2021    .5 (H) 04/09/2021     04/09/2021     ASSESSMENT/PLAN:    1. B12 deficiency    2. History of bladder cancer    3. Thrombocytopenia ([address]    4.  MGUS (monoclonal gammopathy of unknown significance)    5. Fatigue, unspecified type       Leukopenia, anemia transient  Thrombocytopenia      Labs 10/28/2020:  · Vitamin B12 level: 205  · Folate: 13.5  · LDH: 360  · LIONEL: Negative  · Hepatitis panel: Negative  · HIV 1&2: non-reactive  · Antiplatelet antibodies: negative  · SPEP: Monoclonal IgA kappa = 0.2, monoclonal IgG kappa = 0.1  · Immunofixation: IgG monoclonal protein with kappa light chain specificity, IgA monoclonal protein with kappa light chain specificity  · Quantitative immunoglobulins: IgG 853, IgA 458 (), IgM 51  · Kappa light chains: 37 (3.3-19.4), lambda light chains 21.0, K/L ratio 1.76  · PTT: 23  · PT/INR: 10.3/1.0     · Spleen ultrasound 11/5/2020 at [facility]": normal spleen size of 10.2 x 9.7 x 4.6 cm     · 24-hour urine for immunoelectrophoresis 11/9/2020: No M-spike. immunofixation not completed due to lack of M protein  · Bone survey 11/4/2020: Negative for suspicious bony lesions     WBC normal 5.01 normal differentials  Mild anemia with hemoglobin 12.4, . Platelets normal 275,683    Continue B12 replacements as noted below  Repeat labs regarding MGUS today, consider BMAB     Vitamin B12 deficiency  Vitamin B12 level was 205 on 10/28/2020 with elevated MMA of 2164 on 11/10/2020  B12 injection delivered today and will be continued monthly     History of bladder cancer  S/p TURBT 8/22/2017, 2/22/2019    Cystoscopy 2/2/2021 by Dr. Royal Dunbar documented four bladder tumors. The largest was 2 cm on the anterior bladder wall with an additional three 1 cm tumors that are located on the dome. Cystoscopy,TURBT and gemcitabine intravesical instillation on was performed 2/22/2021 by Dr. Royal Dunbar. The 2 cm anterior superficial bladder wall tumor was biopsied. After near complete removal, the largest lesion as well as the other 2 previously mentioned lesions were fulgurated. 2000 mg gemcitabine was placed intravesically.     Pathology of the anterior wall bladder tumor biopsy

## 2021-04-14 ENCOUNTER — TRANSCRIBE ORDERS (OUTPATIENT)
Dept: ADMINISTRATIVE | Facility: HOSPITAL | Age: 81
End: 2021-04-14

## 2021-04-14 DIAGNOSIS — Z01.818 PREOP TESTING: Primary | ICD-10-CM

## 2021-04-17 ENCOUNTER — LAB (OUTPATIENT)
Dept: LAB | Facility: HOSPITAL | Age: 81
End: 2021-04-17

## 2021-04-17 LAB — SARS-COV-2 ORF1AB RESP QL NAA+PROBE: NOT DETECTED

## 2021-04-17 PROCEDURE — C9803 HOPD COVID-19 SPEC COLLECT: HCPCS | Performed by: ANESTHESIOLOGY

## 2021-04-17 PROCEDURE — U0005 INFEC AGEN DETEC AMPLI PROBE: HCPCS | Performed by: ANESTHESIOLOGY

## 2021-04-17 PROCEDURE — U0004 COV-19 TEST NON-CDC HGH THRU: HCPCS | Performed by: ANESTHESIOLOGY

## 2021-04-27 ENCOUNTER — OFFICE VISIT (OUTPATIENT)
Dept: HEMATOLOGY | Age: 81
End: 2021-04-27
Payer: MEDICARE

## 2021-04-27 ENCOUNTER — HOSPITAL ENCOUNTER (OUTPATIENT)
Dept: INFUSION THERAPY | Age: 81
Discharge: HOME | End: 2021-04-27
Payer: MEDICARE

## 2021-04-27 VITALS
DIASTOLIC BLOOD PRESSURE: 70 MMHG | SYSTOLIC BLOOD PRESSURE: 126 MMHG | HEART RATE: 50 BPM | BODY MASS INDEX: 25.62 KG/M2 | OXYGEN SATURATION: 94 % | WEIGHT: 159.4 LBS | HEIGHT: 66 IN | TEMPERATURE: 97.5 F

## 2021-04-27 DIAGNOSIS — E53.8 B12 DEFICIENCY: ICD-10-CM

## 2021-04-27 DIAGNOSIS — D53.9 MACROCYTIC ANEMIA: ICD-10-CM

## 2021-04-27 DIAGNOSIS — D47.2 MGUS (MONOCLONAL GAMMOPATHY OF UNKNOWN SIGNIFICANCE): Primary | ICD-10-CM

## 2021-04-27 DIAGNOSIS — D72.819 LEUKOPENIA, UNSPECIFIED TYPE: ICD-10-CM

## 2021-04-27 DIAGNOSIS — D50.9 IRON DEFICIENCY ANEMIA, UNSPECIFIED IRON DEFICIENCY ANEMIA TYPE: ICD-10-CM

## 2021-04-27 LAB
BASOPHILS ABSOLUTE: 0.01 K/UL (ref 0.01–0.08)
BASOPHILS RELATIVE PERCENT: 0.2 % (ref 0.1–1.2)
EOSINOPHILS ABSOLUTE: 0.13 K/UL (ref 0.04–0.54)
EOSINOPHILS RELATIVE PERCENT: 2.4 % (ref 0.7–7)
HCT VFR BLD CALC: 38.1 % (ref 40.1–51)
HEMOGLOBIN: 12.5 G/DL (ref 13.7–17.5)
LYMPHOCYTES ABSOLUTE: 1.6 K/UL (ref 1.18–3.74)
LYMPHOCYTES RELATIVE PERCENT: 29.3 % (ref 19.3–53.1)
MCH RBC QN AUTO: 32.2 PG (ref 25.7–32.2)
MCHC RBC AUTO-ENTMCNC: 32.8 G/DL (ref 32.3–36.5)
MCV RBC AUTO: 98.2 FL (ref 79–92.2)
MONOCYTES ABSOLUTE: 0.48 K/UL (ref 0.24–0.82)
MONOCYTES RELATIVE PERCENT: 8.8 % (ref 4.7–12.5)
NEUTROPHILS ABSOLUTE: 3.25 K/UL (ref 1.56–6.13)
NEUTROPHILS RELATIVE PERCENT: 59.3 % (ref 34–71.1)
PDW BLD-RTO: 12.5 % (ref 11.6–14.4)
PLATELET # BLD: 207 K/UL (ref 163–337)
PMV BLD AUTO: 10 FL (ref 7.4–10.4)
RBC # BLD: 3.88 M/UL (ref 4.63–6.08)
WBC # BLD: 5.47 K/UL (ref 4.23–9.07)

## 2021-04-27 PROCEDURE — 99999 PR OFFICE/OUTPT VISIT,PROCEDURE ONLY: CPT | Performed by: NURSE PRACTITIONER

## 2021-04-27 PROCEDURE — 38222 DX BONE MARROW BX & ASPIR: CPT | Performed by: NURSE PRACTITIONER

## 2021-04-27 PROCEDURE — 85025 COMPLETE CBC W/AUTO DIFF WBC: CPT

## 2021-04-27 NOTE — PROGRESS NOTES
Pt Name: Shiraz Yao  YOB: 1940  MRN: [id number]    Date of procedure: 2021    Procedure Note:  Bone Marrow Aspirate and Biopsy    Indication:  Leukopenia, Anemia, MGUS    Site: Right iliac crest    Informed consent was obtained by Shiraz Yao. Time out was taken. Shiraz Yao was placed in the left lateral decubitus position. Shiraz Yao was prepped and draped in sterile fashion. 5 mL of 1% Lidocaine was used for local anesthetic of the skin and right periosteum. A bone marrow aspirate and biopsy was obtained and sent for surgical pathology review, flow cytometry, and chromosome analysis. The total blood loss was less than 3 mL. The skin was cleaned and a sterile bandage was placed on the entrance site. The patient tolerated the procedure without complication. Return in about 2 weeks (around 2021) for follow up with CORNELIUS Montanez with B12 injection (review BMAB results).     Labs 2021:  · Vitamin B12: 989  · MMA: 347 (N)  · Folate: 9.0  · SPEP: M-spike 0.1  · Immunofixation: IgG Monoclonal protein with kappa light chain specificity  · Ig, IgA 516 (61 - 437), IgM 74  · Kappa light chain: 36.1 (3.3 - 19.4), lambda light chain 18.3, K/L ratio 1.97 (0.26 - 1.65)  · B2M: 2.0      [address], APRN    21  9:06 AM

## 2021-04-27 NOTE — PATIENT INSTRUCTIONS
harmful.     · If you think your pain medicine is making you sick to your stomach:  ? Take your medicine after meals (unless your doctor has told you not to). ? Ask your doctor for a different pain medicine.     · If your doctor prescribed antibiotics, take them as directed. Do not stop taking them just because you feel better. Ice    · Put ice or a cold pack on the biopsy site for 10 to 20 minutes at a time. Put a thin cloth between the ice and your skin. Follow-up care is a key part of your treatment and safety. Be sure to make and go to all appointments, and call your doctor if you are having problems. It's also a good idea to know your test results and keep a list of the medicines you take. When should you call for help? Call 911 anytime you think you may need emergency care. For example, call if:    · You passed out (lost consciousness). Call your doctor now or seek immediate medical care if:    · You have signs of infection, such as:  ? Increased pain, swelling, warmth, or redness. ? Red streaks leading from the biopsy site. ? Pus draining from the biopsy site. ? Swollen lymph nodes in your neck, armpits, or groin. ? A fever. Watch closely for any changes in your health, and be sure to contact your doctor if:    · You are not getting better as expected. Where can you learn more? Go to https://rocket staffpeClutch.iList. org and sign in to your [employer] account. Enter E148 in the [facility] box to learn more about \"Bone Marrow Aspiration and Biopsy: What to Expect at Home. \"     If you do not have an account, please click on the \"Sign Up Now\" link. Current as of: September 23, 2020               Content Version: 12.8  © 8824-8938 [employer]. Care instructions adapted under license by [facility]  If you have questions about a medical condition or this instruction, always ask your healthcare professional. Hilda Ramos disclaims any warranty or liability for your use of this information.

## 2021-05-07 NOTE — PROGRESS NOTES
count with B12 replacement.     Bone marrow aspirate and biopsy 4/27/2021:  · normocellular marrow (20-25%) with 12% involvement of bone marrow cellularity by clonal plasma cells. No dysplasia, no increase in blasts. · FLOW cytometry: Polytypic B-cells, monotypic kappa-restricted plasma cell population (0.2%) of total events, no T-cell aberrant antigenic expression  · FISH study: Negative  · Cytogenetics: Loss of Y chromosome, which is an age-related phenomenon and is of no known diagnostic or prognostic significance    Findings consistent with smoldering myeloma. PET scan requested to complement baseline staging evaluation.     TREATMENT SUMMARY:  1. Weekly parenteral B12 injections x4 initiated 11/10/2020, monthly thereafter.      ONCOLOGY HISTORY, superficial bladder cancer  Zackary Burnett has a history of TURBT x2.     TURBT 8/22/2017:  1.  Bladder tumor, right lateral wall, transurethral resection:  Low-grade papillary urothelial carcinoma, noninvasive. Muscularis propria not present. 2.  Bladder tumor, transurethral resection:  Low-grade papillary urothelial carcinoma, noninvasive. Muscularis propria identified with no evidence of tumor involvement. AJCC pathologic stage:  pTa Nx     TURBT 2/22/2019:  Urinary bladder, biopsy of bladder dome: Noninvasive low-grade papillary urothelial carcinoma with severe underlying chronic inflammation. Cystoscopy 5/26/2020 by Dr. Rachid Chamberlain was without evidence of mass. Cystoscopy 2/2/2021 by Dr. Gab Ríos documented four bladder tumors. The largest was 2 cm on the anterior bladder wall with an additional three 1 cm tumors that are located on the dome. Cystoscopy,TURBT and gemcitabine intravesical instillation on was performed 2/22/2021 by Dr. Gab Ríos. The 2 cm anterior superficial bladder wall tumor was biopsied. After near complete removal, the largest lesion as well as the other 2 previously mentioned lesions were fulgurated.    2000 mg gemcitabine was placed intravesically. 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 Plans are for repeat cystoscopy in 6 months. Past Medical History:   Diagnosis Date    AAA (abdominal aortic aneurysm) (HCC)     Arthritis     Bilateral low back pain without sciatica 2016    Bilateral shoulder pain 2016    Blood clotting disorder (HCC)     Cancer ([address] 2017    Bladder CA    Carotid artery stenosis     CPAP (continuous positive airway pressure) dependence     8cm to 20cm    DDD (degenerative disc disease), lumbar     Hx of blood clots     Hyperlipidemia     Obstructive sleep apnea     AHI: 57.6 per PS[facility], 2018     Past Surgical History:   Procedure Laterality Date    BLADDER SURGERY  2017    Removal of Cancer by Dr. Pramod Patel, LAPAROSCOPIC      2017    COLONOSCOPY      HEMORRHOID SURGERY      HERNIA REPAIR      2016     Current Outpatient Medications   Medication Sig Dispense Refill    simvastatin (ZOCOR) 20 MG tablet TAKE 1 TABLET BY MOUTH DAILY  1    HYDROcodone-acetaminophen (NORCO)  MG per tablet Take 1 tablet by mouth every 6 hours as needed for Pain. No current facility-administered medications for this visit. Allergies   Allergen Reactions    Celebrex [Celecoxib]      Social History     Tobacco Use    Smoking status: Former Smoker     Packs/day: 0.25     Years: 67.00     Pack years: 16.75     Types: Cigarettes     Quit date: 2019     Years since quittin.2    Smokeless tobacco: Never Used   Substance Use Topics    Alcohol use: No    Drug use: No     Family History   Problem Relation Age of Onset    Other Mother         Natural causes    Cancer Sister         Lung    Other Father         Emphysema       Review of Systems   Constitutional: Positive for fatigue. Negative for fever.    HENT: Negative for dental problem, hearing loss, mouth sores, nosebleeds, sore throat and trouble swallowing. Eyes: Negative for discharge and itching. Respiratory: Negative for cough, shortness of breath and wheezing. No hemoptysis   Cardiovascular: Negative for chest pain, palpitations and leg swelling. Gastrointestinal: Negative for abdominal pain, constipation, diarrhea, nausea and vomiting. Endocrine: Negative for cold intolerance and heat intolerance. Genitourinary: Negative for dysuria and hematuria. History of bladder cancer, with recent cystoscopy   Musculoskeletal: Positive for arthralgias (Shoulders bilaterally) and back pain (Chronic). Negative for joint swelling and myalgias. Skin: Negative for pallor and rash. Allergic/Immunologic: Negative for environmental allergies and immunocompromised state. Neurological: Negative for seizures, syncope and numbness. Hematological: Negative for adenopathy. Does not bruise/bleed easily. Psychiatric/Behavioral: Negative for agitation, behavioral problems and confusion. The patient is not nervous/anxious. Physical Exam  Vitals signs reviewed. Constitutional:       General: He is not in acute distress. Appearance: He is well-developed. He is not toxic-appearing or diaphoretic. Comments: Wearing a facial mask. HENT:      Head: Normocephalic and atraumatic. Right Ear: External ear normal.      Left Ear: External ear normal.      Nose: Nose normal.      Mouth/Throat:      Mouth: Mucous membranes are moist.   Eyes:      General: No scleral icterus. Right eye: No discharge. Left eye: No discharge. Conjunctiva/sclera: Conjunctivae normal.   Neck:      Musculoskeletal: Neck supple. No muscular tenderness. Trachea: No tracheal deviation. Cardiovascular:      Rate and Rhythm: Normal rate and regular rhythm. Pulmonary:      Effort: Pulmonary effort is normal. No respiratory distress. Breath sounds: Normal breath sounds. No wheezing or rales. Abdominal:      General: Bowel sounds are normal. There is no distension. Palpations: Abdomen is soft. Tenderness: There is no abdominal tenderness. There is no guarding. Genitourinary:     Comments: Exam deferred  Musculoskeletal:         General: No tenderness or deformity. Comments: Normal ROM all four extremities   Lymphadenopathy:      Cervical:      Right cervical: No superficial or deep cervical adenopathy. Left cervical: No superficial or deep cervical adenopathy. Upper Body:      Right upper body: No supraclavicular adenopathy. Left upper body: No supraclavicular adenopathy. Comments:      Skin:     General: Skin is warm and dry. Findings: No erythema or rash. Neurological:      Mental Status: He is alert and oriented to person, place, and time. Comments: follows commands, non-focal   Psychiatric:         Behavior: Behavior normal. Behavior is cooperative. Thought Content: Thought content normal.         Judgment: Judgment normal.      Comments: Alert and oriented to person, place and time. Vitals:    05/11/21 0928   BP: 114/80   Pulse: 67   Temp: 97.5 °F (36.4 °C)   TempSrc: Temporal   SpO2: 96%   Height: 5' 6\" (1.676 m)      Wt Readings from Last 3 Encounters:   04/27/21 159 lb 6.4 oz (72.3 kg)   04/09/21 161 lb 1.6 oz (73.1 kg)   12/01/20 164 lb 3.2 oz (74.5 kg)     LABS:  CBC 5/11/2021:  Lab Results   Component Value Date    WBC 4.32 05/11/2021    HGB 13.2 (L) 05/11/2021    HCT 40.2 05/11/2021    MCV 98.8 (H) 05/11/2021    PLT 83 (L) 05/11/2021     ASSESSMENT/PLAN:    1. Smoldering multiple myeloma (HCC)    2. Leukopenia, unspecified type    3. Thrombocytopenia ([address]    4. B12 deficiency    5. History of bladder cancer       Smoldering Multiple Myeloma  Bone marrow aspirate and biopsy 4/27/2021:  · normocellular marrow (20-25%) with 12% involvement of bone marrow cellularity biclonal plasma cells.   No dysplasia, no increase in blasts. · FLOW cytometry: Polytypic B-cells, monotypic kappa-restricted plasma cell population (0.2%) of total events, no T-cell aberrant antigenic expression  · FISH study: Negative  · Cytogenetics: Loss of Y chromosome, which is an age-related phenomenon and is of no known diagnostic or prognostic significance    Labs:    Immunofixation: IgG monoclonal protein with kappa light chain specificity  CMP 3/12/2021: creatinine 0.8, GFR> 60, Ca+ 9.2, TP 6.3    CRP was 11 on 4/27/2021    Serologic, urologic and imaging study are consistent with smoldering myeloma. PET scan requested to assist with staging    Findings discussed in detail with Africa Villarreal and his wife. All questions were answered. Leukopenia, anemia transient - stable  Thrombocytopenia  WBC 4.32, ANC 2.14  Platelets 52,437 via fingerstick specimen, repeated to be 123,000 via peripheral blood draw  Platelets via citrate tube were similar, thus clumping not suggested. Continue B12 injections, today and monthly     Vitamin B12 deficiency  Vitamin B12 level was 205 on 10/28/2020 with elevated MMA of 2164 on 11/10/2020  B12 level was improved at 989 on 4/9/2021 and MMA normalized at 347  Continue monthly B12 injections     History of bladder cancer  S/p TURBT 8/22/2017, 2/22/2019, 2/2/2021    Cystoscopy 2/2/2021 by Dr. Michele Gar documented four bladder tumors. The largest was 2 cm on the anterior bladder wall with an additional three 1 cm tumors that are located on the dome. Cystoscopy,TURBT and gemcitabine intravesical instillation on was performed 2/22/2021 by Dr. Michele Gar. The 2 cm anterior superficial bladder wall tumor was biopsied. After near complete removal, the largest lesion as well as the other 2 previously mentioned lesions were fulgurated. 2000 mg gemcitabine was placed intravesically. 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     Plans are for repeat cystoscopy in 6 months per  8/2021).    Colonoscopy was completed 9/1/2020 by Dr. Rosenda Gipson showed 3 benign polyps, one each in the ascending, proximal and sigmoid colon. Orders Placed This Encounter - Labs prior to return in 3 months   Procedures    PET  FULL BODY - next available    Beta 2 Microglobulin, Serum    Electrophoresis Protein, Serum with Reflex to Immunofixation    Immunoglobulins, Quantitative    [place]/Lambda Free Lt Chains, Serum Quant    Comprehensive Metabolic Panel     RTC RN every 4 weeks for B12 injection and CBC. Return in about 3 months (around 8/11/2021) for follow up with CORNELIUS Pedersen with B12 injection  (NO CBC DAY OF APPT) . I have seen, examined and reviewed patient medication list, appropriate labs and imaging studies. Relevant medical records and other physician notes have been reviewed. I answered all questions to the best of my knowledge and to the patient's satisfaction. Dictated utilizing Dragon transcription software. I, Umm Sanchez am scribing for CORNELIUS Foster.  Electronically signed by Umm Sanchez on 5/7/2021 at 10:57 am           CORNELIUS Foster  11:18 AM  5/11/2021

## 2021-05-11 ENCOUNTER — OFFICE VISIT (OUTPATIENT)
Dept: HEMATOLOGY | Age: 81
End: 2021-05-11
Payer: MEDICARE

## 2021-05-11 ENCOUNTER — HOSPITAL ENCOUNTER (OUTPATIENT)
Dept: INFUSION THERAPY | Age: 81
Discharge: HOME | End: 2021-05-11
Payer: MEDICARE

## 2021-05-11 VITALS
SYSTOLIC BLOOD PRESSURE: 114 MMHG | HEART RATE: 67 BPM | BODY MASS INDEX: 25.73 KG/M2 | DIASTOLIC BLOOD PRESSURE: 80 MMHG | HEIGHT: 66 IN | OXYGEN SATURATION: 96 % | TEMPERATURE: 97.5 F

## 2021-05-11 DIAGNOSIS — D69.6 THROMBOCYTOPENIA (HCC): ICD-10-CM

## 2021-05-11 DIAGNOSIS — Z85.51 HISTORY OF BLADDER CANCER: ICD-10-CM

## 2021-05-11 DIAGNOSIS — D47.2 SMOLDERING MULTIPLE MYELOMA: Primary | ICD-10-CM

## 2021-05-11 DIAGNOSIS — E53.8 B12 DEFICIENCY: ICD-10-CM

## 2021-05-11 DIAGNOSIS — D50.9 IRON DEFICIENCY ANEMIA, UNSPECIFIED IRON DEFICIENCY ANEMIA TYPE: Primary | ICD-10-CM

## 2021-05-11 DIAGNOSIS — D72.819 LEUKOPENIA, UNSPECIFIED TYPE: ICD-10-CM

## 2021-05-11 DIAGNOSIS — D51.9 ANEMIA DUE TO VITAMIN B12 DEFICIENCY, UNSPECIFIED B12 DEFICIENCY TYPE: ICD-10-CM

## 2021-05-11 LAB
BASOPHILS ABSOLUTE: 0.01 K/UL (ref 0.01–0.08)
BASOPHILS RELATIVE PERCENT: 0.2 % (ref 0.1–1.2)
EOSINOPHILS ABSOLUTE: 0.12 K/UL (ref 0.04–0.54)
EOSINOPHILS RELATIVE PERCENT: 2.8 % (ref 0.7–7)
HCT VFR BLD CALC: 40.2 % (ref 40.1–51)
HEMOGLOBIN: 13.2 G/DL (ref 13.7–17.5)
LYMPHOCYTES ABSOLUTE: 1.56 K/UL (ref 1.18–3.74)
LYMPHOCYTES RELATIVE PERCENT: 36.1 % (ref 19.3–53.1)
MCH RBC QN AUTO: 32.4 PG (ref 25.7–32.2)
MCHC RBC AUTO-ENTMCNC: 32.8 G/DL (ref 32.3–36.5)
MCV RBC AUTO: 98.8 FL (ref 79–92.2)
MONOCYTES ABSOLUTE: 0.49 K/UL (ref 0.24–0.82)
MONOCYTES RELATIVE PERCENT: 11.3 % (ref 4.7–12.5)
NEUTROPHILS ABSOLUTE: 2.14 K/UL (ref 1.56–6.13)
NEUTROPHILS RELATIVE PERCENT: 49.6 % (ref 34–71.1)
PDW BLD-RTO: 12.6 % (ref 11.6–14.4)
PLATELET # BLD: 83 K/UL (ref 163–337)
PMV BLD AUTO: 11.6 FL (ref 7.4–10.4)
RBC # BLD: 4.07 M/UL (ref 4.63–6.08)
WBC # BLD: 4.32 K/UL (ref 4.23–9.07)

## 2021-05-11 PROCEDURE — G8427 DOCREV CUR MEDS BY ELIG CLIN: HCPCS | Performed by: NURSE PRACTITIONER

## 2021-05-11 PROCEDURE — 85025 COMPLETE CBC W/AUTO DIFF WBC: CPT

## 2021-05-11 PROCEDURE — 1036F TOBACCO NON-USER: CPT | Performed by: NURSE PRACTITIONER

## 2021-05-11 PROCEDURE — 99213 OFFICE O/P EST LOW 20 MIN: CPT

## 2021-05-11 PROCEDURE — 4040F PNEUMOC VAC/ADMIN/RCVD: CPT | Performed by: NURSE PRACTITIONER

## 2021-05-11 PROCEDURE — 6360000002 HC RX W HCPCS: Performed by: NURSE PRACTITIONER

## 2021-05-11 PROCEDURE — 1123F ACP DISCUSS/DSCN MKR DOCD: CPT | Performed by: NURSE PRACTITIONER

## 2021-05-11 PROCEDURE — G8417 CALC BMI ABV UP PARAM F/U: HCPCS | Performed by: NURSE PRACTITIONER

## 2021-05-11 PROCEDURE — 96372 THER/PROPH/DIAG INJ SC/IM: CPT

## 2021-05-11 PROCEDURE — 99214 OFFICE O/P EST MOD 30 MIN: CPT | Performed by: NURSE PRACTITIONER

## 2021-05-11 RX ORDER — CYANOCOBALAMIN 1000 UG/ML
1000 INJECTION INTRAMUSCULAR; SUBCUTANEOUS ONCE
Status: COMPLETED
Start: 2021-05-11 | End: 2021-05-11

## 2021-05-11 RX ORDER — CYANOCOBALAMIN 1000 UG/ML
1000 INJECTION INTRAMUSCULAR; SUBCUTANEOUS ONCE
Status: CANCELLED
Start: 2021-06-07

## 2021-05-11 RX ADMIN — CYANOCOBALAMIN 1000 MCG: 1000 INJECTION, SOLUTION INTRAMUSCULAR; SUBCUTANEOUS at 10:09

## 2021-05-11 ASSESSMENT — ENCOUNTER SYMPTOMS
DIARRHEA: 0
TROUBLE SWALLOWING: 0
COUGH: 0
SORE THROAT: 0
VOMITING: 0
EYE ITCHING: 0
SHORTNESS OF BREATH: 0
CONSTIPATION: 0
BACK PAIN: 1
WHEEZING: 0
NAUSEA: 0
EYE DISCHARGE: 0
ABDOMINAL PAIN: 0

## 2021-05-11 NOTE — PATIENT INSTRUCTIONS
Patient Education        Multiple Myeloma: Care Instructions  Overview     Multiple myeloma is cancer in plasma cells, which are a type of white blood cell. The cancer cells crowd out normal cells in the bone marrow, causing problems like anemia and bleeding. The cancer cells can also weaken bone and form tumors in the bone or other areas. Healthy plasma cells make antibodies that help fight infection. But myeloma plasma cells make abnormal antibodies. This can make it hard for your body to fight infection and can cause problems with the kidneys. Multiple myeloma that isn't causing symptoms may not need treatment right away. When symptoms are present, most people will have several types of treatments. These may include:  · Radiation therapy. · Surgery. · Chemotherapy. · Medicines, such as corticosteroids. · Targeted therapy. · Immunotherapy. · Stem cell transplants. Follow-up care is a key part of your treatment and safety. Be sure to make and go to all appointments, and call your doctor if you are having problems. It's also a good idea to know your test results and keep a list of the medicines you take. How can you care for yourself at home? · Tell your doctor if you are experiencing new pain, or pain that interferes with your daily activities. Do not try to \"tough it out. \"  · Take your medicines exactly as prescribed. Call your doctor if you think you are having a problem with your medicine. You may get medicine for nausea and vomiting if you have these side effects. · Eat healthy food. If you do not feel like eating, try to eat food that has protein and extra calories to keep up your strength and prevent weight loss. Drink liquid meal replacements for extra calories and protein. Try to eat your main meal early. · Get some physical activity every day, but do not get too tired. Keep doing the hobbies you enjoy as your energy allows. · Take steps to control your stress and workload.  Learn relaxation techniques. ? Share your feelings. Stress and tension affect our emotions. By expressing your feelings to others, you may be able to understand and cope with them. ? Consider joining a support group. Talking about a problem with your spouse, a good friend, or other people with similar problems is a good way to reduce tension and stress. ? Express yourself through art. Try writing, crafts, dance, or art to relieve stress. Some dance, writing, or art groups may be available just for people who have cancer. ? Be kind to your body and mind. Getting enough sleep, eating a healthy diet, and taking time to do things you enjoy can contribute to an overall feeling of balance in your life and can help reduce stress. ? Get help if you need it. Discuss your concerns with your doctor or counselor. · If you are vomiting or have diarrhea:  ? Drink plenty of fluids to prevent dehydration. Choose water and other caffeine-free clear liquids until you feel better. If you have kidney, heart, or liver disease and have to limit fluids, talk with your doctor before you increase the amount of fluids you drink. ? When you are able to eat, try clear soups, mild foods, and liquids until all symptoms are gone for 12 to 48 hours. Other good choices include dry toast, crackers, cooked cereal, and gelatin dessert, such as Jell-O.  · If you have not already done so, prepare a list of advance directives. Advance directives are instructions to your doctor and family members about what kind of care you want if you become unable to speak or express yourself. When should you call for help? Call 911 anytime you think you may need emergency care. For example, call if:    · You passed out (lost consciousness).    Call your doctor now or seek immediate medical care if:    · You have a fever.     · You have abnormal bleeding.     · You have new or worse pain.     · You think you have an infection.     · You have new symptoms, such as a cough, belly pain, vomiting, diarrhea, or a rash.     · You have signs of a blood clot, such as:  ? Pain in your calf, back of the knee, thigh, or groin. ? Redness and swelling in your leg or groin. Watch closely for changes in your health, and be sure to contact your doctor if:    · You are much more tired than usual.     · You have swollen glands in your armpits, groin, or neck.     · You do not get better as expected. Current as of: December 17, 2020               Content Version: 12.8  © 2006-2021 [employer]. Care instructions adapted under license by [facility] [place] ([facility]). If you have questions about a medical condition or this instruction, always ask your healthcare professional. [address] any warranty or liability for your use of this information.

## 2021-05-18 ENCOUNTER — HOSPITAL ENCOUNTER (OUTPATIENT)
Dept: NUCLEAR MEDICINE | Age: 81
Discharge: HOME | End: 2021-05-20
Payer: MEDICARE

## 2021-05-18 DIAGNOSIS — D47.2 SMOLDERING MULTIPLE MYELOMA: ICD-10-CM

## 2021-05-18 LAB
GLUCOSE BLD-MCNC: 102 MG/DL (ref 70–99)
PERFORMED ON: ABNORMAL

## 2021-05-18 PROCEDURE — 78813 PET IMAGE FULL BODY: CPT

## 2021-05-18 PROCEDURE — 3430000000 HC RX DIAGNOSTIC RADIOPHARMACEUTICAL: Performed by: NURSE PRACTITIONER

## 2021-05-18 PROCEDURE — 82947 ASSAY GLUCOSE BLOOD QUANT: CPT

## 2021-05-18 PROCEDURE — A9552 F18 FDG: HCPCS | Performed by: NURSE PRACTITIONER

## 2021-05-18 RX ORDER — FLUDEOXYGLUCOSE F 18 200 MCI/ML
10 INJECTION, SOLUTION INTRAVENOUS
Status: COMPLETED | OUTPATIENT
Start: 2021-05-18 | End: 2021-05-18

## 2021-05-18 RX ADMIN — FLUDEOXYGLUCOSE F 18 10 MILLICURIE: 200 INJECTION, SOLUTION INTRAVENOUS at 13:41

## 2021-05-19 DIAGNOSIS — R94.8 ABNORMAL POSITRON EMISSION TOMOGRAPHY (PET) SCAN: Primary | ICD-10-CM

## 2021-05-28 ENCOUNTER — OFFICE VISIT (OUTPATIENT)
Dept: GASTROENTEROLOGY | Facility: CLINIC | Age: 81
End: 2021-05-28

## 2021-05-28 VITALS
WEIGHT: 162 LBS | SYSTOLIC BLOOD PRESSURE: 138 MMHG | BODY MASS INDEX: 26.03 KG/M2 | OXYGEN SATURATION: 95 % | HEIGHT: 66 IN | HEART RATE: 55 BPM | DIASTOLIC BLOOD PRESSURE: 68 MMHG | TEMPERATURE: 97.7 F

## 2021-05-28 DIAGNOSIS — R93.3 ABNORMAL FINDING ON GI TRACT IMAGING: Primary | ICD-10-CM

## 2021-05-28 DIAGNOSIS — Z86.010 HISTORY OF ADENOMATOUS POLYP OF COLON: ICD-10-CM

## 2021-05-28 PROCEDURE — 99213 OFFICE O/P EST LOW 20 MIN: CPT | Performed by: NURSE PRACTITIONER

## 2021-05-28 RX ORDER — POLYETHYLENE GLYCOL 3350, SODIUM SULFATE ANHYDROUS, SODIUM BICARBONATE, SODIUM CHLORIDE, POTASSIUM CHLORIDE 236; 22.74; 6.74; 5.86; 2.97 G/4L; G/4L; G/4L; G/4L; G/4L
4 POWDER, FOR SOLUTION ORAL ONCE
Qty: 4000 ML | Refills: 0 | Status: SHIPPED | OUTPATIENT
Start: 2021-05-28 | End: 2021-05-28

## 2021-05-28 NOTE — PROGRESS NOTES
Chief Complaint:   Chief Complaint   Patient presents with   • Abnormal Imaging         Patient ID: Ezekiel Velez is a 81 y.o. male     History of Present Illness: This is a very pleasant 81-year-old male who is here today with referral for abnormal findings on imaging.    The patient carries a history of leukopenia and anemia.  Noted to have chronic, intermittent thrombocytopenia.  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 Covington.  PET scan 5/18/2021.  Performed revealed some increased soft tissue density in the left inguinal canal with increased FDG uptake.  Additionally there was focal increased uptake in the terminal ileum without underlying mass identified.  If adenopathy appreciated.  The patient's last colonoscopy was performed on 9/1/2020 for personal history of adenomatous polyps.  Findings of polyp in the ascending colon, 2 polyps in proximal transverse colon, 5 mm polyp in sigmoid colon, diverticulosis in sigmoid colon.  Patient does carry history of adenomatous polyps.  Study Result    Narrative & Impression   ULTRASOUND SPLEEN     REASON FOR EXAM: THROMBOCYTOPENIA; D69.6-Thrombocytopenia, unspecified       COMPARISON: NONE.      TECHNIQUE: Multiple longitudinal and transverse real-time sonographic  images of the spleen are obtained.      FINDINGS:      Spleen measures 10.2 x 9.7 x 4.6 cm. Estimated volume 240 cc.     IMPRESSION:  1. Spleen is normal in size.     This report was finalized on 11/05/2020 11:00 by Dr Arsalan Travis, .         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   • Carotid artery stenosis    • Chronic low back pain    • COPD (chronic obstructive pulmonary disease) (CMS/HCC)    • Degenerative disc disease, lumbar    • Diverticulosis    • History of adenomatous polyp of colon    •  Hyperlipidemia    • Sleep apnea with use of continuous positive airway pressure (CPAP)    • Type 2 diabetes mellitus (CMS/HCC)        Past Surgical History:   Procedure Laterality Date   • CHOLECYSTECTOMY WITH INTRAOPERATIVE CHOLANGIOGRAM N/A 7/25/2017    Procedure: CHOLECYSTECTOMY LAPAROSCOPIC AND REMOVAL CYST ON CHEST;  Surgeon: Ina Issa MD;  Location:  [facility] OR;  Service:    • COLONOSCOPY  07/24/2012    Nine polyps removed; Repeat 3 years   • COLONOSCOPY N/A 9/19/2017    One diminutive polyp in ascending colon-Complete resection-Polyp tissue not retrieved; Two 4-5mm polyps at hepatic flexure; One 6mm polyp in proximal transverse colon; One 7mm polyp in distal transverse colon-Complete resection-polyp tissue not retrieved; One 5mm polyp in proximal descending colon-Complete resection-Polyp tissue not retrieved; Diverticulosis in sigmoid colon; Repeat 3 years   • COLONOSCOPY N/A 9/1/2020    One 5mm tubular adenomatous polyp in the ascending colon; Two 5-6mm tubular adenomatous polyps in the proximal transverse colon; One 5mm tubular adenomatous polyp in the sigmoid colon; Diverticulosis in the sigmoid colon; The examination was otherwise normal on direct and retroflexion views; Repeat 3 years   • CYSTOSCOPY BLADDER BIOPSY N/A 2/22/2019    Procedure: CYSTOSCOPY BILATERAL RETROGRADE PYELOGRAM BLADDER BIOPSY WITH FULGURATION OF 1 CM BLADDER TUMOR;  Surgeon: Cale Covington MD;  Location:  [facility] OR;  Service: Urology   • HEMORRHOIDECTOMY     • INGUINAL HERNIA REPAIR Bilateral    • TRANSURETHRAL RESECTION OF BLADDER TUMOR Bilateral 8/22/2017    Procedure: CYSTOSCOPY TRANSURETHRAL RESECTION OF BLADDER TUMOR bilateral retrogrades;  Surgeon: Cale Covington MD;  Location:  [facility] OR;  Service:    • TRANSURETHRAL RESECTION OF BLADDER TUMOR N/A 2/22/2021    Procedure: CYSTOSCOPY TRANSURETHRAL RESECTION OF BLADDER TUMOR, GEMCITABINE INTRAVESICAL INSTILLATION & BILATERAL RETROGRADE URETEROPYELOGRAMS;  Surgeon:  "Cale Covington MD;  Location: [facility] OR;  Service: Urology;  Laterality: N/A;         Current Outpatient Medications:   •  Cyanocobalamin 1000 MCG/ML kit, Inject 1 mL as directed Every 14 (Fourteen) Days., Disp: , Rfl:   •  HYDROcodone-acetaminophen (NORCO) 5-325 MG per tablet, Take 1 tablet by mouth Every 6 (Six) Hours As Needed (Pain)., Disp: 8 tablet, Rfl: 0  •  simvastatin (ZOCOR) 20 MG tablet, Take 20 mg by mouth Every Night., Disp: , Rfl:   •  cephalexin (Keflex) 500 MG capsule, Take 1 capsule by mouth 2 (Two) Times a Day., Disp: 5 capsule, Rfl: 0  •  polyethylene glycol (Golytely) 236 g solution, Take 4,000 mL by mouth 1 (One) Time for 1 dose. As directed by instructions provided at office, Disp: 4000 mL, Rfl: 0    Allergies   Allergen Reactions   • Celebrex [Celecoxib] GI Intolerance       Social History     Socioeconomic History   • Marital status:      Spouse name: Not on file   • Number of children: Not on file   • Years of education: Not on file   • Highest education level: Not on file   Tobacco Use   • Smoking status: Former Smoker     Packs/day: 0.75     Years: 55.00     Pack years: 41.25     Types: Cigarettes     Quit date: 2019     Years since quittin.4   • Smokeless tobacco: Never Used   • Tobacco comment: recently quit   Vaping Use   • Vaping Use: Never used   Substance and Sexual Activity   • Alcohol use: No   • Drug use: No   • Sexual activity: Defer       Family History   Problem Relation Age of Onset   • No Known Problems Father    • No Known Problems Mother    • Colon cancer Neg Hx    • Colon polyps Neg Hx        Vitals:    21 0956   BP: 138/68   Pulse: 55   Temp: 97.7 °F (36.5 °C)   SpO2: 95%   Weight: 73.5 kg (162 lb)   Height: 167.6 cm (66\")       Review of Systems:    General:    Present -feeling well   Skin:    Not Present-Rash   HEENT:     Not Present-Acute visual changes or Acute hearing changes   Neck :    Not Present- swollen glands   Genitourinary:      Not " Present- burning, frequency, urgency hematuria, dysuria,   Cardiovascular:   Not Present-chest pain, palpitations, or pressure   Respiratory:   Not Present- shortness of breath or cough   Gastrointestinal:  Musculoskeletal:  Neurological:  Psychiatric:   Present as mentioned in the HP    Not Present. Recent gait disturbances.    Not Present-Seizures and weakness in extremities.    Not Present- Anxiety or Depression.       Physical Exam:    General Appearance:    Alert, cooperative, in no acute distress   Psych:    Mood appropriate    Eyes:          conjunctivae and sclerae normal, no   icterus, no pallor   ENMT:    Ears appear intact with no abnormalities noted oral mucosa moist   Neck:   No adenopathy, supple, trachea midline, no thyromegaly, no   carotid bruit, no JVD    Cardiovascular:    Regular rhythm and normal rate, normal S1 and S2, no            murmur, no gallop, no rub, no click   Gastrointestinal:     Inspection normal.  Normal bowel sounds, no masses, no organomegaly, soft round non-tender, non-distended, no guarding, no rebound or tenderness. No hepatosplenomegaly.   Skin:   No bleeding, bruising or rash   Neurologic:   nonfocal       Lab Results - Last 18 Months   Lab Units 03/12/21  1457 02/09/21  1151 09/10/20  0934   GLUCOSE mg/dL 121* 98 108   BUN mg/dL 11 14 12   CREATININE mg/dL 0.8 0.75* 0.8   SODIUM mmol/L 141 141 143   POTASSIUM mmol/L 4.0 4.7 4.1   CHLORIDE mmol/L 101 103 102   TOTAL CO2 mmol/L 33*  --  28   CO2 mmol/L  --  31.0*  --    TOTAL PROTEIN g/dL 6.3  --  6.9   ALBUMIN g/dL 3.8  --  4.1   ALT (SGPT) U/L 17*  --  13   AST (SGOT) U/L 21  --  14   ALK PHOS U/L 90  --  106   BILIRUBIN mg/dL 0.6  --  0.6   GLOBULIN g/dL 2.5  --   --        Lab Results - Last 18 Months   Lab Units 05/11/21  1009 04/27/21  0940 04/09/21  1248 03/12/21  1452 02/25/21  1454 02/09/21  1151   HEMOGLOBIN g/dL 13.2* 12.5* 12.4* 13.1* 13.0* 13.8   HEMATOCRIT % 40.2 38.1* 38.4* 37.3* 38.4* 39.1   MCV fL 98.8*  98.2* 100.5* 95.2* 100.3* 93.1   WBC K/uL 4.32 5.47 5.01 4.34 4.48 4.00   RDW % 12.6 12.5 12.9 12.4 11.9 12.1*   MPV fL 11.6* 10.0 10.3 9.7 10.7* 12.0   PLATELETS K/uL 83* 207 174 245 101* 108*       Lab Results - Last 18 Months   Lab Units 04/09/21  1347 10/28/20  1244   TSH uIU/mL  --  1.040   FOLATE ng/mL 9 13.5        Assessment and Plan:  Assessment/Plan   Diagnoses and all orders for this visit:    1. Abnormal finding on GI tract imaging (Primary)  -     Case Request; Standing  -     Case Request    2. History of adenomatous polyp of colon  -     Case Request; Standing  -     Case Request    Other orders  -     Follow Anesthesia Guidelines / Protocol; Future  -     Obtain Informed Consent; Future  -     Implement Anesthesia Orders Day of Procedure; Standing  -     Obtain Informed Consent; Standing  -     Verify bowel prep was successful; Standing  -     polyethylene glycol (Golytely) 236 g solution; Take 4,000 mL by mouth 1 (One) Time for 1 dose. As directed by instructions provided at office  Dispense: 4000 mL; Refill: 0      Schedule patient for colonoscopy as soon as possible.       There are no Patient Instructions on file for this visit.    Next follow-up appointment      The risks, benefits, and alternatives of colonoscopy were reviewed with the patient today.  Risks including perforation of the colon possibly requiring surgery or colostomy.  Additional risks include risk of bleeding from biopsies or removal of colon tissue.  There is also the risk of a drug reaction or problems with anesthesia.  This will be discussed with the further by the anesthesia team on the day of the procedure.  Lastly there is a possibility of missing a colon polyp or cancer.  The benefits include the diagnosis and management of disease of the colon and rectum.  Alternatives to colonoscopy include barium enema, laboratory testing, radiographic evaluation, or no intervention.  The patient verbalizes understanding and  agrees.      SHANNAN Dragon/Transcription disclaimer:  Much of this encounter note is an electronic transcription/translation of spoken language to printed text. The electronic translation of spoken language may permit erroneous, or at times, nonsensical words or phrases to be inadvertently transcribed; although I have reviewed the note for such errors, some may still exist.

## 2021-06-03 ENCOUNTER — HOSPITAL ENCOUNTER (OUTPATIENT)
Dept: INFUSION THERAPY | Age: 81
Discharge: HOME | End: 2021-06-03
Payer: MEDICARE

## 2021-06-03 DIAGNOSIS — D51.9 ANEMIA DUE TO VITAMIN B12 DEFICIENCY, UNSPECIFIED B12 DEFICIENCY TYPE: Primary | ICD-10-CM

## 2021-06-03 PROCEDURE — 6360000002 HC RX W HCPCS: Performed by: NURSE PRACTITIONER

## 2021-06-03 PROCEDURE — 96372 THER/PROPH/DIAG INJ SC/IM: CPT

## 2021-06-03 RX ORDER — CYANOCOBALAMIN 1000 UG/ML
1000 INJECTION INTRAMUSCULAR; SUBCUTANEOUS ONCE
Status: COMPLETED
Start: 2021-06-03 | End: 2021-06-03

## 2021-06-03 RX ORDER — CYANOCOBALAMIN 1000 UG/ML
1000 INJECTION INTRAMUSCULAR; SUBCUTANEOUS ONCE
Status: CANCELLED
Start: 2021-06-07

## 2021-06-03 RX ADMIN — CYANOCOBALAMIN 1000 MCG: 1000 INJECTION, SOLUTION INTRAMUSCULAR; SUBCUTANEOUS at 10:37

## 2021-06-10 ENCOUNTER — HOSPITAL ENCOUNTER (OUTPATIENT)
Facility: HOSPITAL | Age: 81
Setting detail: HOSPITAL OUTPATIENT SURGERY
Discharge: HOME | End: 2021-06-10
Attending: INTERNAL MEDICINE | Admitting: INTERNAL MEDICINE

## 2021-06-10 ENCOUNTER — ANESTHESIA (OUTPATIENT)
Dept: GASTROENTEROLOGY | Facility: HOSPITAL | Age: 81
End: 2021-06-10

## 2021-06-10 ENCOUNTER — ANESTHESIA EVENT (OUTPATIENT)
Dept: GASTROENTEROLOGY | Facility: HOSPITAL | Age: 81
End: 2021-06-10

## 2021-06-10 VITALS
TEMPERATURE: 98.2 F | WEIGHT: 159 LBS | BODY MASS INDEX: 25.55 KG/M2 | OXYGEN SATURATION: 96 % | RESPIRATION RATE: 18 BRPM | HEART RATE: 60 BPM | HEIGHT: 66 IN | SYSTOLIC BLOOD PRESSURE: 127 MMHG | DIASTOLIC BLOOD PRESSURE: 78 MMHG

## 2021-06-10 DIAGNOSIS — R93.3 ABNORMAL FINDING ON GI TRACT IMAGING: ICD-10-CM

## 2021-06-10 DIAGNOSIS — Z86.010 HISTORY OF ADENOMATOUS POLYP OF COLON: ICD-10-CM

## 2021-06-10 PROCEDURE — 45385 COLONOSCOPY W/LESION REMOVAL: CPT | Performed by: INTERNAL MEDICINE

## 2021-06-10 PROCEDURE — 25010000002 PROPOFOL 10 MG/ML EMULSION: Performed by: NURSE ANESTHETIST, CERTIFIED REGISTERED

## 2021-06-10 PROCEDURE — 88305 TISSUE EXAM BY PATHOLOGIST: CPT | Performed by: INTERNAL MEDICINE

## 2021-06-10 RX ORDER — SODIUM CHLORIDE 0.9 % (FLUSH) 0.9 %
10 SYRINGE (ML) INJECTION AS NEEDED
Status: DISCONTINUED | OUTPATIENT
Start: 2021-06-10 | End: 2021-06-10 | Stop reason: HOSPADM

## 2021-06-10 RX ORDER — PROPOFOL 10 MG/ML
VIAL (ML) INTRAVENOUS AS NEEDED
Status: DISCONTINUED | OUTPATIENT
Start: 2021-06-10 | End: 2021-06-10 | Stop reason: SURG

## 2021-06-10 RX ORDER — ONDANSETRON 2 MG/ML
4 INJECTION INTRAMUSCULAR; INTRAVENOUS ONCE AS NEEDED
Status: DISCONTINUED | OUTPATIENT
Start: 2021-06-10 | End: 2021-06-10 | Stop reason: HOSPADM

## 2021-06-10 RX ORDER — SODIUM CHLORIDE 9 MG/ML
500 INJECTION, SOLUTION INTRAVENOUS CONTINUOUS PRN
Status: DISCONTINUED | OUTPATIENT
Start: 2021-06-10 | End: 2021-06-10 | Stop reason: HOSPADM

## 2021-06-10 RX ORDER — LIDOCAINE HYDROCHLORIDE 20 MG/ML
INJECTION, SOLUTION EPIDURAL; INFILTRATION; INTRACAUDAL; PERINEURAL AS NEEDED
Status: DISCONTINUED | OUTPATIENT
Start: 2021-06-10 | End: 2021-06-10 | Stop reason: SURG

## 2021-06-10 RX ADMIN — LIDOCAINE HYDROCHLORIDE 100 MG: 20 INJECTION, SOLUTION EPIDURAL; INFILTRATION; INTRACAUDAL; PERINEURAL at 12:32

## 2021-06-10 RX ADMIN — SODIUM CHLORIDE 500 ML: 9 INJECTION, SOLUTION INTRAVENOUS at 11:20

## 2021-06-10 RX ADMIN — PROPOFOL 100 MG: 10 INJECTION, EMULSION INTRAVENOUS at 12:32

## 2021-06-10 RX ADMIN — PROPOFOL 50 MG: 10 INJECTION, EMULSION INTRAVENOUS at 12:37

## 2021-06-10 NOTE — ANESTHESIA PREPROCEDURE EVALUATION
Anesthesia Evaluation     Patient summary reviewed and Nursing notes reviewed   no history of anesthetic complications:  NPO Solid Status: > 8 hours  NPO Liquid Status: > 8 hours           Airway   Mallampati: II  TM distance: >3 FB  Neck ROM: full  Dental    (+) edentulous, upper dentures and lower dentures    Pulmonary    (+) a smoker Former, COPD, sleep apnea on CPAP,   (-) asthma, recent URI  Cardiovascular   Exercise tolerance: good (4-7 METS)    ECG reviewed    (+) hyperlipidemia,   (-) hypertension, past MI, angina, murmur, cardiac stents, CABG      Neuro/Psych  (-) seizures, TIA, CVA  GI/Hepatic/Renal/Endo    (-) GERD, liver disease, no renal disease, diabetes    Musculoskeletal     (+) back pain,   (-) neck pain, neck stiffness      ROS comment: Chronic pain   Abdominal    Substance History      OB/GYN          Other   arthritis,    history of cancer (Urothelial cancer)                      Anesthesia Plan    ASA 3     MAC       Anesthetic plan, all risks, benefits, and alternatives have been provided, discussed and informed consent has been obtained with: patient.

## 2021-06-10 NOTE — ANESTHESIA POSTPROCEDURE EVALUATION
Patient: Ezekiel ALDRIDGE    Procedure Summary     Date: 06/10/21 Room / Location: [facility] ENDOSCOPY 4 / [facility]    Anesthesia Start: 1230 Anesthesia Stop: 1250    Procedure: COLONOSCOPY WITH ANESTHESIA (N/A ) Diagnosis:       Abnormal finding on GI tract imaging      History of adenomatous polyp of colon      (Abnormal finding on GI tract imaging [R93.3])      (History of adenomatous polyp of colon [Z86.010])    Surgeons: Jimmie Tirado MD Provider: BART Hennessy CRNA    Anesthesia Type: MAC ASA Status: 3          Anesthesia Type: MAC    Vitals  Vitals Value Taken Time   BP     Temp     Pulse 61 06/10/21 1252   Resp     SpO2 99 % 06/10/21 1252   Vitals shown include unvalidated device data.        Post Anesthesia Care and Evaluation    Patient location during evaluation: PACU  Patient participation: complete - patient participated  Level of consciousness: awake and alert  Pain score: 0  Pain management: adequate  Airway patency: patent  Anesthetic complications: No anesthetic complications    Cardiovascular status: acceptable and stable  Respiratory status: acceptable and unassisted  Hydration status: acceptable

## 2021-07-08 ENCOUNTER — HOSPITAL ENCOUNTER (OUTPATIENT)
Dept: INFUSION THERAPY | Age: 81
Discharge: HOME | End: 2021-07-08
Payer: MEDICARE

## 2021-07-08 DIAGNOSIS — D51.9 ANEMIA DUE TO VITAMIN B12 DEFICIENCY, UNSPECIFIED B12 DEFICIENCY TYPE: Primary | ICD-10-CM

## 2021-07-08 PROCEDURE — 96372 THER/PROPH/DIAG INJ SC/IM: CPT

## 2021-07-08 PROCEDURE — 6360000002 HC RX W HCPCS: Performed by: NURSE PRACTITIONER

## 2021-07-08 RX ORDER — CYANOCOBALAMIN 1000 UG/ML
1000 INJECTION INTRAMUSCULAR; SUBCUTANEOUS ONCE
Status: CANCELLED
Start: 2021-08-02

## 2021-07-08 RX ORDER — CYANOCOBALAMIN 1000 UG/ML
1000 INJECTION INTRAMUSCULAR; SUBCUTANEOUS ONCE
Status: COMPLETED
Start: 2021-07-08 | End: 2021-07-08

## 2021-07-08 RX ADMIN — CYANOCOBALAMIN 1000 MCG: 1000 INJECTION, SOLUTION INTRAMUSCULAR; SUBCUTANEOUS at 12:07

## 2021-07-28 ENCOUNTER — HOSPITAL ENCOUNTER (OUTPATIENT)
Dept: MRI IMAGING | Age: 81
Discharge: HOME | End: 2021-07-28
Payer: MEDICARE

## 2021-07-28 DIAGNOSIS — M51.16 INTERVERTEBRAL DISC DISORDERS WITH RADICULOPATHY, LUMBAR REGION: ICD-10-CM

## 2021-07-28 PROCEDURE — 72148 MRI LUMBAR SPINE W/O DYE: CPT

## 2021-07-29 ENCOUNTER — HOSPITAL ENCOUNTER (OUTPATIENT)
Dept: INFUSION THERAPY | Age: 81
Discharge: HOME | End: 2021-07-29
Payer: MEDICARE

## 2021-07-29 DIAGNOSIS — D50.9 IRON DEFICIENCY ANEMIA, UNSPECIFIED IRON DEFICIENCY ANEMIA TYPE: ICD-10-CM

## 2021-07-29 DIAGNOSIS — D47.2 SMOLDERING MULTIPLE MYELOMA: ICD-10-CM

## 2021-07-29 LAB
ALBUMIN SERPL-MCNC: 4.2 G/DL (ref 3.5–5.2)
ALP BLD-CCNC: 106 U/L (ref 40–130)
ALT SERPL-CCNC: 19 U/L (ref 21–72)
ANION GAP SERPL CALCULATED.3IONS-SCNC: 6 MMOL/L (ref 7–19)
AST SERPL-CCNC: 29 U/L (ref 17–59)
BASOPHILS ABSOLUTE: 0.01 K/UL (ref 0.01–0.08)
BASOPHILS RELATIVE PERCENT: 0.3 % (ref 0.1–1.2)
BILIRUB SERPL-MCNC: 0.9 MG/DL (ref 0.2–1.3)
BUN BLDV-MCNC: 13 MG/DL (ref 9–20)
CALCIUM SERPL-MCNC: 9.3 MG/DL (ref 8.4–10.2)
CHLORIDE BLD-SCNC: 100 MMOL/L (ref 98–111)
CO2: 32 MMOL/L (ref 22–29)
CREAT SERPL-MCNC: 0.9 MG/DL (ref 0.6–1.2)
EOSINOPHILS ABSOLUTE: 0.02 K/UL (ref 0.04–0.54)
EOSINOPHILS RELATIVE PERCENT: 0.6 % (ref 0.7–7)
GFR NON-AFRICAN AMERICAN: >60
GLOBULIN: 3.1 G/DL
GLUCOSE BLD-MCNC: 112 MG/DL (ref 74–106)
HCT VFR BLD CALC: 42.1 % (ref 40.1–51)
HEMOGLOBIN: 14.1 G/DL (ref 13.7–17.5)
LYMPHOCYTES ABSOLUTE: 1.16 K/UL (ref 1.18–3.74)
LYMPHOCYTES RELATIVE PERCENT: 35.3 % (ref 19.3–53.1)
MCH RBC QN AUTO: 32.3 PG (ref 25.7–32.2)
MCHC RBC AUTO-ENTMCNC: 33.5 G/DL (ref 32.3–36.5)
MCV RBC AUTO: 96.3 FL (ref 79–92.2)
MONOCYTES ABSOLUTE: 0.58 K/UL (ref 0.24–0.82)
MONOCYTES RELATIVE PERCENT: 17.6 % (ref 4.7–12.5)
NEUTROPHILS ABSOLUTE: 1.52 K/UL (ref 1.56–6.13)
NEUTROPHILS RELATIVE PERCENT: 46.2 % (ref 34–71.1)
PDW BLD-RTO: 12.3 % (ref 11.6–14.4)
PLATELET # BLD: 121 K/UL (ref 163–337)
PMV BLD AUTO: 10.6 FL (ref 7.4–10.4)
POTASSIUM SERPL-SCNC: 3.8 MMOL/L (ref 3.5–5.1)
RBC # BLD: 4.37 M/UL (ref 4.63–6.08)
SODIUM BLD-SCNC: 138 MMOL/L (ref 137–145)
TOTAL PROTEIN: 7.4 G/DL (ref 6.3–8.2)
WBC # BLD: 3.29 K/UL (ref 4.23–9.07)

## 2021-07-29 PROCEDURE — 80053 COMPREHEN METABOLIC PANEL: CPT

## 2021-07-29 PROCEDURE — 85025 COMPLETE CBC W/AUTO DIFF WBC: CPT

## 2021-08-05 ENCOUNTER — HOSPITAL ENCOUNTER (OUTPATIENT)
Dept: INFUSION THERAPY | Age: 81
Discharge: HOME | End: 2021-08-05
Payer: MEDICARE

## 2021-08-05 ENCOUNTER — OFFICE VISIT (OUTPATIENT)
Dept: HEMATOLOGY | Age: 81
End: 2021-08-05
Payer: MEDICARE

## 2021-08-05 VITALS
BODY MASS INDEX: 25.71 KG/M2 | HEART RATE: 53 BPM | HEIGHT: 66 IN | OXYGEN SATURATION: 95 % | DIASTOLIC BLOOD PRESSURE: 74 MMHG | SYSTOLIC BLOOD PRESSURE: 118 MMHG | WEIGHT: 160 LBS

## 2021-08-05 DIAGNOSIS — D72.819 LEUKOPENIA, UNSPECIFIED TYPE: ICD-10-CM

## 2021-08-05 DIAGNOSIS — R94.8 ABNORMAL GASTROINTESTINAL PET SCAN: ICD-10-CM

## 2021-08-05 DIAGNOSIS — E53.8 B12 DEFICIENCY: ICD-10-CM

## 2021-08-05 DIAGNOSIS — D51.9 ANEMIA DUE TO VITAMIN B12 DEFICIENCY, UNSPECIFIED B12 DEFICIENCY TYPE: Primary | ICD-10-CM

## 2021-08-05 DIAGNOSIS — D69.6 THROMBOCYTOPENIA (HCC): ICD-10-CM

## 2021-08-05 DIAGNOSIS — D47.2 SMOLDERING MULTIPLE MYELOMA: Primary | ICD-10-CM

## 2021-08-05 DIAGNOSIS — Z85.51 HISTORY OF BLADDER CANCER: ICD-10-CM

## 2021-08-05 PROCEDURE — G8417 CALC BMI ABV UP PARAM F/U: HCPCS | Performed by: NURSE PRACTITIONER

## 2021-08-05 PROCEDURE — 99211 OFF/OP EST MAY X REQ PHY/QHP: CPT

## 2021-08-05 PROCEDURE — 4040F PNEUMOC VAC/ADMIN/RCVD: CPT | Performed by: NURSE PRACTITIONER

## 2021-08-05 PROCEDURE — 99214 OFFICE O/P EST MOD 30 MIN: CPT | Performed by: NURSE PRACTITIONER

## 2021-08-05 PROCEDURE — 1123F ACP DISCUSS/DSCN MKR DOCD: CPT | Performed by: NURSE PRACTITIONER

## 2021-08-05 PROCEDURE — 1036F TOBACCO NON-USER: CPT | Performed by: NURSE PRACTITIONER

## 2021-08-05 PROCEDURE — G8427 DOCREV CUR MEDS BY ELIG CLIN: HCPCS | Performed by: NURSE PRACTITIONER

## 2021-08-05 PROCEDURE — 6360000002 HC RX W HCPCS: Performed by: NURSE PRACTITIONER

## 2021-08-05 PROCEDURE — 96372 THER/PROPH/DIAG INJ SC/IM: CPT

## 2021-08-05 RX ORDER — CYANOCOBALAMIN 1000 UG/ML
1000 INJECTION INTRAMUSCULAR; SUBCUTANEOUS ONCE
Status: CANCELLED
Start: 2021-09-06

## 2021-08-05 RX ORDER — CYANOCOBALAMIN 1000 UG/ML
1000 INJECTION INTRAMUSCULAR; SUBCUTANEOUS ONCE
Status: COMPLETED
Start: 2021-08-05 | End: 2021-08-05

## 2021-08-05 RX ADMIN — CYANOCOBALAMIN 1000 MCG: 1000 INJECTION, SOLUTION INTRAMUSCULAR; SUBCUTANEOUS at 12:15

## 2021-08-05 NOTE — PROGRESS NOTES
Progress Note      Pt Name: Ming Penn  YOB: 1940  MRN: [id number]    Date of evaluation: 8/5/2021  History Obtained From:  Patient, EMR    Portions of this note have been copied forward, however, changed to reflect the most current clinical status of this patient. Chief Complaint   Patient presents with    Follow-up     Smoldering multiple myeloma ([address]     HISTORY OF PRESENT ILLNESS:    Tuyet Almanzar \"Jesus Alberto\" Nayeli Payne is an [de-identified] y.o.  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 Katharina Claude  · History of multiple benign lung nodules/fibrosis, managed by Dr. Michael Paz  · AAA, followed by CORNEILUS Kwok    Matt Calderon was found to have smoldering myeloma on Bone marrow aspirate and biopsy during hematology evaluation. He returns to the clinic to review serology and PET scan to complete the evaluation, which will be delineated in the assessment/plan below. .    Regarding recurrent, papillary bladder neoplasm documented 2/2/2021, Matt Calderon was treated with TURBT and intravesical instillation of gemcitabine 2/22/2021 by Dr. Myrna Colón. 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  Plans are for repeat cystoscopy 9/2021. Jesus Alberto's main complaint is of right hip discomfort/sciatica. He is followed by Dr. Marie Robledo and receives injections for low back pain every 3 months. He states this discomfort feels different and he will either be following up with Dr. Marie Robledo or Mayo Monroy in this regard. Blood pressure is stable, 118/74.   Matt Calderon reports hyperlipidemia to be stable as well, with primary care needs managed by Dr. Can Castañeda.       HEMATOLOGY HISTORY: Leukopenia, anemia  Tuyet Almanzar \"Jesus Alberto\" Nayeli Payne was seen in hematology consultation 10/26/2020, referred by Dr. Josh Houser for evaluation of leukopenia and anemia.     PMH significant for history of arthritis, hyperlipidemia, lung nodule, histoplasmosis, bladder cancer. King Satinder denies any known history of thrombosis or blood clotting and this is removed from the [address] in [facility].     Review of CBCs at [facility]:       Review of CBCs at [facility]:       Previous abdominal/pelvic CT without contrast 11/19/2019 documented scattered, calcified granulomas in the liver and spleen.     Colonoscopy was performed 9/1/2020 by Dr. Parvin Waller. One polyp each in the ascending, proximal transverse and sigmoid colon were negative for high-grade dysplasia. King Satinder is followed by Dr. Reilly Mcfarland regarding history of lung nodule.     CBC at presentation on 10/28/2020 showed a WBC of 5.42, Hgb 14.6/.2 and platelet count 52,903. Both WBC and Hgb are within normal limits. Platelets have been intermittently low since 2016 and have continued to decrease over the past year.     King Satinder is a nondrinker. He denies known history of liver disease. Medications include hydrocodone and Zocor. He has taken Zocor for the past 8-9 years.     Family history includes lung cancer in his sister.     Labs 10/28/2020:  · Vitamin B12 level: 205  · Folate: 13.5  · TSH: 1.04  · LDH: 360  · LIONEL: Negative  · Hepatitis panel: Negative  · HIV 1&2: non-reactive  · Antiplatelet antibodies: negative  · SPEP: Monoclonal IgA kappa = 0.2, monoclonal IgG kappa = 0.1  · Immunofixation: IgG monoclonal protein with kappa light chain specificity, IgA monoclonal protein with kappa light chain specificity  · Quantitative immunoglobulins: IgG 853, IgA 458 (), IgM 51  · Kappa light chains: 37 (3.3-19.4), lambda light chains 21.0, K/L ratio 1.76  · PTT: 23  · PT/INR: 10.3/1.0     · Spleen ultrasound 11/5/2020 at [facility]: normal spleen size of 10.2 x 9.7 x 4.6 cm     · 24-hour urine for immunoelectrophoresis 11/9/2020: No M-spike.  immunofixation not completed due to lack of M protein  · Bone survey 11/4/2020: Negative for suspicious bony lesions    MMA was elevated at 2164 on 11/10/2020. Weekly parenteral B12 injections x6 were initiated 11/10/2020, to be followed by monthly injections at thereafter. Will monitor platelet count with M12 replacement.     Bone marrow aspirate and biopsy 4/27/2021:  · normocellular marrow (20-25%) with 12% involvement of bone marrow cellularity by clonal plasma cells. No dysplasia, no increase in blasts. · FLOW cytometry: Polytypic B-cells, monotypic kappa-restricted plasma cell population (0.2%) of total events, no T-cell aberrant antigenic expression  · FISH study: Negative  · Cytogenetics: Loss of Y chromosome, which is an age-related phenomenon and is of no known diagnostic or prognostic significance    Findings consistent with smoldering myeloma. PET scan requested to complement baseline staging evaluation.     TREATMENT SUMMARY:  1. Weekly parenteral B12 injections x4 initiated 11/10/2020, monthly thereafter.      ONCOLOGY HISTORY, superficial bladder cancer  Minda Matthews has a history of TURBT x2.     TURBT 8/22/2017:  1.  Bladder tumor, right lateral wall, transurethral resection:  Low-grade papillary urothelial carcinoma, noninvasive. Muscularis propria not present. 2.  Bladder tumor, transurethral resection:  Low-grade papillary urothelial carcinoma, noninvasive. Muscularis propria identified with no evidence of tumor involvement. AJCC pathologic stage:  pTa Nx     TURBT 2/22/2019:  Urinary bladder, biopsy of bladder dome: Noninvasive low-grade papillary urothelial carcinoma with severe underlying chronic inflammation. Cystoscopy 5/26/2020 by Dr. Drake Evans was without evidence of mass. Cystoscopy 2/2/2021 by Dr. Sherly Castrejon documented four bladder tumors. The largest was 2 cm on the anterior bladder wall with an additional three 1 cm tumors that are located on the dome.     Cystoscopy,TURBT and gemcitabine intravesical instillation on was performed 2021 by Dr. Elenita Loredo. The 2 cm anterior superficial bladder wall tumor was biopsied. After near complete removal, the largest lesion as well as the other 2 previously mentioned lesions were fulgurated. 2000 mg gemcitabine was placed intravesically. 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 Plans are for repeat cystoscopy in 6 months. Past Medical History:   Diagnosis Date    AAA (abdominal aortic aneurysm) (HCC)     Arthritis     Bilateral low back pain without sciatica 2016    Bilateral shoulder pain 2016    Cancer ([address] 2017    Bladder CA    Carotid artery stenosis     CPAP (continuous positive airway pressure) dependence     8cm to 20cm    DDD (degenerative disc disease), lumbar     Hyperlipidemia     Obstructive sleep apnea     AHI: 57.6 per PSG, 2018     Past Surgical History:   Procedure Laterality Date    BLADDER SURGERY  2017    Removal of Cancer by Dr. Cinthia Ramirez, LAPAROSCOPIC      2017    COLONOSCOPY      HEMORRHOID SURGERY      HERNIA REPAIR      2016     Current Outpatient Medications   Medication Sig Dispense Refill    simvastatin (ZOCOR) 20 MG tablet TAKE 1 TABLET BY MOUTH DAILY  1    HYDROcodone-acetaminophen (NORCO)  MG per tablet Take 1 tablet by mouth every 6 hours as needed for Pain. No current facility-administered medications for this visit.       Allergies   Allergen Reactions    Celebrex [Celecoxib]      Social History     Tobacco Use    Smoking status: Former Smoker     Packs/day: 0.25     Years: 67.00     Pack years: 16.75     Types: Cigarettes     Quit date: 2019     Years since quittin.5    Smokeless tobacco: Never Used   Vaping Use    Vaping Use: Never used   Substance Use Topics    Alcohol use: No    Drug use: No     Family History   Problem Relation Age of Onset    Other Mother         Natural causes    Cancer Sister         Lung    Other Father         Emphysema       Review of Systems   Constitutional: Positive for fatigue. Negative for fever. HENT: Negative for dental problem, hearing loss, mouth sores, nosebleeds, sore throat and trouble swallowing. Eyes: Negative for discharge and itching. Respiratory: Negative for cough, shortness of breath and wheezing. No hemoptysis   Cardiovascular: Negative for chest pain, palpitations and leg swelling. Gastrointestinal: Negative for abdominal pain, constipation, diarrhea, nausea and vomiting. Endocrine: Negative for cold intolerance and heat intolerance. Genitourinary: Negative for dysuria and hematuria. History of bladder cancer, with recent cystoscopy   Musculoskeletal: Positive for arthralgias (Right hip, sciatica) and back pain (Chronic). Negative for joint swelling and myalgias. Skin: Negative for pallor and rash. Allergic/Immunologic: Negative for environmental allergies and immunocompromised state. Neurological: Negative for seizures, syncope and numbness. Hematological: Negative for adenopathy. Bruises/bleeds easily. Psychiatric/Behavioral: Negative for agitation, behavioral problems and confusion. The patient is not nervous/anxious. Physical Exam  Vitals reviewed. Constitutional:       General: He is not in acute distress. Appearance: He is well-developed. He is not toxic-appearing or diaphoretic. Comments: Wearing a facial mask. HENT:      Head: Normocephalic and atraumatic. Right Ear: External ear normal.      Left Ear: External ear normal.      Nose: Nose normal.      Mouth/Throat:      Mouth: Mucous membranes are moist.   Eyes:      General: No scleral icterus. Right eye: No discharge. Left eye: No discharge. Conjunctiva/sclera: Conjunctivae normal.   Neck:      Trachea: No tracheal deviation.    Cardiovascular: Rate and Rhythm: Normal rate and regular rhythm. Pulmonary:      Effort: Pulmonary effort is normal. No respiratory distress. Breath sounds: Normal breath sounds. No wheezing or rales. Abdominal:      General: Bowel sounds are normal. There is no distension. Palpations: Abdomen is soft. Tenderness: There is no abdominal tenderness. There is no guarding. Genitourinary:     Comments: Exam deferred  Musculoskeletal:         General: No tenderness or deformity. Cervical back: Neck supple. No muscular tenderness. Comments: Normal ROM all four extremities   Lymphadenopathy:      Cervical:      Right cervical: No superficial or deep cervical adenopathy. Left cervical: No superficial or deep cervical adenopathy. Upper Body:      Right upper body: No supraclavicular adenopathy. Left upper body: No supraclavicular adenopathy. Comments:      Skin:     General: Skin is warm and dry. Findings: No erythema or rash. Neurological:      Mental Status: He is alert and oriented to person, place, and time. Comments: follows commands, non-focal   Psychiatric:         Behavior: Behavior normal. Behavior is cooperative. Thought Content: Thought content normal.         Judgment: Judgment normal.      Comments: Alert and oriented to person, place and time. Vitals:    08/05/21 1130   BP: 118/74   Pulse: 53   SpO2: 95%   Weight: 160 lb (72.6 kg)   Height: 5' 6\" (1.676 m)      Wt Readings from Last 3 Encounters:   08/05/21 160 lb (72.6 kg)   04/27/21 159 lb 6.4 oz (72.3 kg)   04/09/21 161 lb 1.6 oz (73.1 kg)     LABS:  CBC 8/5/2021:  Lab Results   Component Value Date    WBC 3.29 (L) 07/29/2021    HGB 14.1 07/29/2021    HCT 42.1 07/29/2021    MCV 96.3 (H) 07/29/2021     (L) 07/29/2021     ASSESSMENT/PLAN:    1. Smoldering multiple myeloma ([facility] [address]    2. B12 deficiency    3. History of bladder cancer    4.  Thrombocytopenia (HCC)    5. Leukopenia, unspecified type    6. Abnormal gastrointestinal PET scan       Smoldering Multiple Myeloma  Bone marrow aspirate and biopsy 4/27/2021:  · normocellular marrow (20-25%) with 12% involvement of bone marrow cellularity biclonal plasma cells. No dysplasia, no increase in blasts. · FLOW cytometry: Polytypic B-cells, monotypic kappa-restricted plasma cell population (0.2%) of total events, no T-cell aberrant antigenic expression  · FISH study: Negative  · Cytogenetics: Loss of Y chromosome, which is an age-related phenomenon and is of no known diagnostic or prognostic significance    Labs: Additional labs 7/29/2021:  · CMP: Creatinine: 0.9, GFR: >60, calcium: 9.3, total protein: 7.4  · SPEP: Monoclonal IgG Kappa = 0.1 G/DL. Due to the small quantity of monoclonal IgA Kappa, unable to quantitate the M-spike   · Immunofixation: IgA monoclonal protein with kappa light chain specificity and IgG monoclonal protein with kappa light chain specificity    PET scan 5/18/2021 at [facility]:  · NO abnormal osseous uptake  · NO hypermetabolic pulmonary nodules  · Soft tissue density in the left inguinal canal with mild uptake of 2.78  · Focal increased uptake in the terminal ileum, without underlying mass identified, SUV 8.55, possibly an underlying inflammatory process    Colonoscopy 6/10/2021 by Dr. Abel Davenport revealed two 3-4 mm benign polyps. Specifically, the terminal ileum appeared normal.  Yoly Barger has a known history of bilateral inguinal hernias, with repair    Findings discussed in detail with Yoly Barger and his wife. All questions were answered.     Leukopenia, anemia transient - stable  Thrombocytopenia -stable  · WBC 3.29, ANC 1.52  · Hgb 14.1, MCV 96.3  · Platelets 919,026  Continue B12 injections, today and monthly     Vitamin B12 deficiency  Vitamin B12 level was 205 on 10/28/2020 with elevated MMA of 2164 on 11/10/2020  Continue monthly B12 injections, likely discontinue at next follow-up     History of bladder cancer  S/p TURBT 8/22/2017, 2/22/2019, 2/2/2021    Cystoscopy 2/2/2021 by Dr. Brando Adams documented four bladder tumors. The largest was 2 cm on the anterior bladder wall with an additional three 1 cm tumors that are located on the dome. Cystoscopy,TURBT and gemcitabine intravesical instillation on was performed 2/22/2021 by Dr. Brando Adams. The 2 cm anterior superficial bladder wall tumor was biopsied. After near complete removal, the largest lesion as well as the other 2 previously mentioned lesions were fulgurated. 2000 mg gemcitabine was placed intravesically. 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 Plans are for repeat cystoscopy 9/2/2021. Orders Placed This Encounter   Procedures    CBC Auto Differential    Comprehensive Metabolic Panel    Vitamin B12    Methylmalonic Acid, Serum    Electrophoresis Protein, Serum without Reflex to Immunofixation    Immunoglobulins, Quantitative    [place]/Lambda Free Lt Chains, Serum Quant     RTC RN every 4 weeks for B12 injection and CBC. Return in about 4 months (around 12/5/2021) for follow up with CORNELIUS Louis with B12 injection. I have seen, examined and reviewed patient medication list, appropriate labs and imaging studies. Relevant medical records and other physician notes have been reviewed. I answered all questions to the best of my knowledge and to the patient's satisfaction. Dictated utilizing Dragon transcription software. I, Rosalba Roberts am scribing for CORNELIUS Foster.  Electronically signed by Rosalba Roberts on 8/7/2021 at 8:36 am         CORNELIUS Foster  11:14 AM  8/7/2021

## 2021-08-07 ASSESSMENT — ENCOUNTER SYMPTOMS
BACK PAIN: 1
WHEEZING: 0
VOMITING: 0
ABDOMINAL PAIN: 0
SHORTNESS OF BREATH: 0
EYE ITCHING: 0
DIARRHEA: 0
NAUSEA: 0
SORE THROAT: 0
CONSTIPATION: 0
EYE DISCHARGE: 0
COUGH: 0
TROUBLE SWALLOWING: 0

## 2021-08-31 LAB
ALBUMIN SERPL-MCNC: 4 G/DL (ref 3.5–5.2)
ALP BLD-CCNC: 106 U/L (ref 40–130)
ALT SERPL-CCNC: 13 U/L (ref 5–41)
ANION GAP SERPL CALCULATED.3IONS-SCNC: 10 MMOL/L (ref 7–19)
AST SERPL-CCNC: 14 U/L (ref 5–40)
BASOPHILS ABSOLUTE: 0 K/UL (ref 0–0.2)
BASOPHILS RELATIVE PERCENT: 0.5 % (ref 0–1)
BILIRUB SERPL-MCNC: 0.8 MG/DL (ref 0.2–1.2)
BUN BLDV-MCNC: 11 MG/DL (ref 8–23)
CALCIUM SERPL-MCNC: 9.3 MG/DL (ref 8.8–10.2)
CHLORIDE BLD-SCNC: 104 MMOL/L (ref 98–111)
CHOLESTEROL, TOTAL: 155 MG/DL (ref 160–199)
CO2: 28 MMOL/L (ref 22–29)
CREAT SERPL-MCNC: 0.8 MG/DL (ref 0.5–1.2)
CREATININE URINE: 221.9 MG/DL (ref 4.2–622)
EOSINOPHILS ABSOLUTE: 0.1 K/UL (ref 0–0.6)
EOSINOPHILS RELATIVE PERCENT: 2.3 % (ref 0–5)
GFR AFRICAN AMERICAN: >59
GFR NON-AFRICAN AMERICAN: >60
GLUCOSE BLD-MCNC: 109 MG/DL (ref 74–109)
HBA1C MFR BLD: 5.6 % (ref 4–6)
HCT VFR BLD CALC: 40.9 % (ref 42–52)
HDLC SERPL-MCNC: 55 MG/DL (ref 55–121)
HEMOGLOBIN: 13.6 G/DL (ref 14–18)
IMMATURE GRANULOCYTES #: 0 K/UL
LDL CHOLESTEROL CALCULATED: 79 MG/DL
LYMPHOCYTES ABSOLUTE: 1.5 K/UL (ref 1.1–4.5)
LYMPHOCYTES RELATIVE PERCENT: 32.8 % (ref 20–40)
MCH RBC QN AUTO: 32.7 PG (ref 27–31)
MCHC RBC AUTO-ENTMCNC: 33.3 G/DL (ref 33–37)
MCV RBC AUTO: 98.3 FL (ref 80–94)
MICROALBUMIN UR-MCNC: <1.2 MG/DL (ref 0–19)
MICROALBUMIN/CREAT UR-RTO: NORMAL MG/G
MONOCYTES ABSOLUTE: 0.5 K/UL (ref 0–0.9)
MONOCYTES RELATIVE PERCENT: 10.2 % (ref 0–10)
NEUTROPHILS ABSOLUTE: 2.4 K/UL (ref 1.5–7.5)
NEUTROPHILS RELATIVE PERCENT: 53.7 % (ref 50–65)
PDW BLD-RTO: 12.7 % (ref 11.5–14.5)
PLATELET # BLD: 130 K/UL (ref 130–400)
PMV BLD AUTO: 11.2 FL (ref 9.4–12.4)
POTASSIUM SERPL-SCNC: 4 MMOL/L (ref 3.5–5)
RBC # BLD: 4.16 M/UL (ref 4.7–6.1)
SODIUM BLD-SCNC: 142 MMOL/L (ref 136–145)
TOTAL PROTEIN: 7 G/DL (ref 6.6–8.7)
TRIGL SERPL-MCNC: 103 MG/DL (ref 0–149)
WBC # BLD: 4.4 K/UL (ref 4.8–10.8)

## 2021-09-01 NOTE — PROGRESS NOTES
CC: I am here for the doctor to look at my bladder    Cystoscopy procedure note  Pre- operative diagnosis:  Bladder cancer surveillance    Post operative diagnosis:  No recurrent bladder tumor    Procedure:  The patient was prepped and draped in a normal sterile fashion.  The urethra was anesthetized with 2% lidocaine jelly.  A well lubricated flexible cystoscope was introduced per urethra.  The anterior urethra is normal in its caliber without evidence of a mass.  There appears to be no contracture at the bladder neck.  The prostatic urethra reveals Bilateral lobe enlargement.   The bladder shows a normal urothelium without evidence of a mass, erythema, nor diverticulum.  There is no evidence of a bladder stone nor foreign body.  The detrusor is moderately trabeculated.  The ureteral orifces are essentially normal in locationn and there is clear efflux of urine.    Patient tolerated the procedure well    Complications: none    Blood loss: minimal    Diagnoses and all orders for this visit:    1. Malignant neoplasm of overlapping sites of bladder (CMS/HCC) (Primary)  -     POC Urinalysis Dipstick, Multipro          Assessment/Plan                Follow up:    Surveillance cystocopy in 6 months              Hx three prior TURBT's as below     Final Diagnosis   1.  Bladder tumor, right lateral wall, transurethral resection:  Low-grade papillary urothelial carcinoma, noninvasive.  Muscularis propria not present.     2.  Bladder tumor, transurethral resection:  Low-grade papillary urothelial carcinoma, noninvasive.  Muscularis propria identified with no evidence of tumor involvement.     AJCC pathologic stage:  pTa Nx   Electronically signed by Jon Pires MD on 8/23/2017 at 0928      Final Diagnosis   Urinary bladder, biopsy of bladder dome: Noninvasive low-grade papillary urothelial carcinoma with severe underlying chronic inflammation.     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

## 2021-09-02 ENCOUNTER — PROCEDURE VISIT (OUTPATIENT)
Dept: UROLOGY | Facility: CLINIC | Age: 81
End: 2021-09-02

## 2021-09-02 DIAGNOSIS — C67.8 MALIGNANT NEOPLASM OF OVERLAPPING SITES OF BLADDER (HCC): Primary | ICD-10-CM

## 2021-09-02 LAB
BILIRUB BLD-MCNC: ABNORMAL MG/DL
CLARITY, POC: CLEAR
COLOR UR: YELLOW
GLUCOSE UR STRIP-MCNC: NEGATIVE MG/DL
KETONES UR QL: ABNORMAL
LEUKOCYTE EST, POC: NEGATIVE
NITRITE UR-MCNC: NEGATIVE MG/ML
PH UR: 5 [PH] (ref 5–8)
PROT UR STRIP-MCNC: NEGATIVE MG/DL
RBC # UR STRIP: NEGATIVE /UL
SP GR UR: 1.02 (ref 1–1.03)
UROBILINOGEN UR QL: NORMAL

## 2021-09-02 PROCEDURE — 81001 URINALYSIS AUTO W/SCOPE: CPT | Performed by: UROLOGY

## 2021-09-02 PROCEDURE — 52000 CYSTOURETHROSCOPY: CPT | Performed by: UROLOGY

## 2021-09-21 ENCOUNTER — OFFICE VISIT (OUTPATIENT)
Dept: NEUROLOGY | Age: 81
End: 2021-09-21
Payer: MEDICARE

## 2021-09-21 VITALS
DIASTOLIC BLOOD PRESSURE: 76 MMHG | BODY MASS INDEX: 25.71 KG/M2 | WEIGHT: 160 LBS | SYSTOLIC BLOOD PRESSURE: 117 MMHG | OXYGEN SATURATION: 96 % | HEART RATE: 53 BPM | HEIGHT: 66 IN

## 2021-09-21 DIAGNOSIS — Z99.89 CPAP (CONTINUOUS POSITIVE AIRWAY PRESSURE) DEPENDENCE: ICD-10-CM

## 2021-09-21 DIAGNOSIS — G47.33 OBSTRUCTIVE SLEEP APNEA: Primary | ICD-10-CM

## 2021-09-21 PROCEDURE — 4040F PNEUMOC VAC/ADMIN/RCVD: CPT | Performed by: PHYSICIAN ASSISTANT

## 2021-09-21 PROCEDURE — 99213 OFFICE O/P EST LOW 20 MIN: CPT | Performed by: PHYSICIAN ASSISTANT

## 2021-09-21 PROCEDURE — 1036F TOBACCO NON-USER: CPT | Performed by: PHYSICIAN ASSISTANT

## 2021-09-21 PROCEDURE — 1123F ACP DISCUSS/DSCN MKR DOCD: CPT | Performed by: PHYSICIAN ASSISTANT

## 2021-09-21 PROCEDURE — G8427 DOCREV CUR MEDS BY ELIG CLIN: HCPCS | Performed by: PHYSICIAN ASSISTANT

## 2021-09-21 PROCEDURE — G8417 CALC BMI ABV UP PARAM F/U: HCPCS | Performed by: PHYSICIAN ASSISTANT

## 2021-09-21 NOTE — PROGRESS NOTES
[facility] Neurology and Sleep [facility]  [address] [place] [address] Drive, [address]  Willam Martinez  Phone [phone]  Fax [phone]       [facility] Sleep Follow Up Encounter      Information:   Patient Name: Lea Blanca  :   1940  Age:   [de-identified] y.o. MRN:   [id number]  Account #:  [de-identified]  Today:                22    Provider:  Lindsey Edmond PA-C    Chief Complaint   Patient presents with    Sleep Apnea     follow up         Subjective:   Lea Blanca is a [de-identified] y.o. male  with a history of severe MELONY who comes in for a sleep clinic follow up. He was referred for a PSG due to his c/o snoring and excessive daytime somnolence. The PSG, 18 revealed an AHI of 57.6. He is prescribed auto CPAP therapy with a pressure range of 8cm to 20cm. The compliance report indicates that he is averaging 8 hours of CPAP use per day. He reports that consistent PAP use has alleviated the previous MELONY symptoms.  He is using a nasal mask.     Location or symptom:  MELONY  Onset:  PS2018   Timing:  q hs  Severity:  Severe  Associated:  Snoring, witnessed apneas, and excessive daytime somnolence  Alleviated:  CPAP      Objective:     Past Medical History:   Diagnosis Date    AAA (abdominal aortic aneurysm) (HCC)     Arthritis     Bilateral low back pain without sciatica 2016    Bilateral shoulder pain 2016    Cancer ([facility] [address] 2017    Bladder CA    Carotid artery stenosis     CPAP (continuous positive airway pressure) dependence     8cm to 20cm    DDD (degenerative disc disease), lumbar     Hyperlipidemia     Obstructive sleep apnea     AHI: 57.6 per PSG, 2018       Past Surgical History:   Procedure Laterality Date    BLADDER SURGERY  2017    Removal of Cancer by Dr. Kamini Noel, LAPAROSCOPIC      2017    COLONOSCOPY      HEMORRHOID SURGERY      HERNIA REPAIR      2016       Recent Hospitalizations  ·     Significant Injuries  ·     Family History   Problem Relation Age of Onset    Other Mother         Natural causes   Ardyth Moritz Cancer Sister         Lung    Other Father         Emphysema       Social History  Social History     Tobacco Use   Smoking Status Former Smoker    Packs/day: 0.25    Years: 67.00    Pack years: 16.75    Types: Cigarettes    Quit date: 2019    Years since quittin.6   Smokeless Tobacco Never Used     Social History     Substance and Sexual Activity   Alcohol Use No     Social History     Substance and Sexual Activity   Drug Use No         Current Outpatient Medications   Medication Sig Dispense Refill    simvastatin (ZOCOR) 20 MG tablet TAKE 1 TABLET BY MOUTH DAILY  1    HYDROcodone-acetaminophen (NORCO)  MG per tablet Take 1 tablet by mouth every 6 hours as needed for Pain. No current facility-administered medications for this visit. Allergies:  Celebrex [celecoxib]    REVIEW OF SYSTEMS     Constitutional: []? Fever []? Sweats []? Chills []? Recent Injury   [x]? Denies all unless marked  HENT:[]? Headache  []? Head Injury  []? Sore Throat  []? Ear Pain  []? Dizziness []? Hearing Loss   [x]? Denies all unless marked  Musculoskeletal: []? Arthralgia  []? Myalgias []? Muscle cramps  []? Muscle twitches   [x]? Denies all unless marked   Spine:  []? Neck pain  []? Back pain  []? Sciaticia  [x]? Denies all unless marked  Neurological:[]? Visual Disturbance []? Double Vision []? Slurred Speech []? Trouble swallowing  []? Vertigo []? Tingling []? Numbness []? Weakness []? Loss of Balance   []? Loss of Consciousness []? Memory Loss []? Seizures  [x]? Denies all unless marked  Psychiatric/Behavioral:[]? Depression []? Anxiety  [x]? Denies all unless marked  Sleep: []? Insomnia []? Sleep Disturbance []? Snoring []? Restless Legs []? Daytime Sleepiness [x]? Sleep Apnea  []? Denies all unless marked    The MA has completed the ROS with the patient. I have reviewed it in its' entirety with the patient and agree with the documentation. PHYSICAL EXAM  /76 (Site: Left Upper Arm, Position: Sitting)   Pulse 53   Ht 5' 6\" (1.676 m)   Wt 160 lb (72.6 kg)   SpO2 96%   BMI 25.82 kg/m²      Constitutional   Alert in NAD, well developed, pleasant and cooperative with exam; body habitus normal as indicated by BMI  HEENT- Conjunctiva normal.  No scars, masses, or lesions over external nose or ears, hearing intact, no neck masses noted, no jugular vein distension, no bruit  Cardiac- Regular rate and rhythm  Pulmonary- Clear to auscultation, good expansion, normal effort without use of accessory muscles  Musculoskeletal  No significant wasting of muscles noted, no bony deformities  Extremities - No clubbing, cyanosis or edema  Skin  Warm, dry, and intact. No rash, erythema, or pallor  Psychiatric  Mood, affect, and behavior appear normal      Neurologic:  Extraocular movements are intact without nystagmus. PERRL. Visual fields are full to confrontation. Facial movements are symmetrical and normal.  Speech is precise. Extremity strength is normal in both uppers and lowers. Deep tendon reflexes are intact and symmetrical.  Rapid alternating movements are unimpaired. Finger-to-nose testing is performed well, without dysmetria. Gait is normal.    I reviewed the following studies:       []  :  Clinical laboratory test results     []  :  Radiology reports                    [x]  :  Review and summarization of medical records and/or obtain medical records        []  :  Previous/recent polysomnogram report(s)     []  :  Jasper Sleepiness Scale       [x]  :  Compliance download: The auto CPAP is set at a pressure range of 8cm to 20cm. Compliance download shows that he uses device: 100% of the time;  percentage of days with usage >=4 hours: 100%. AHI: 0.8    Assessment:       ICD-10-CM    1. Obstructive sleep apnea  G47.33    2.  CPAP (continuous positive airway pressure) dependence  Z99.89           []  :  Stable     []  :  Improved [x]  :  Well controlled              []  :  Resolving     []  :  Resolved     []  :  Inadequately controlled     []  :  Worsening     []  :  Additional workup planned    Patient is compliant and benefiting from therapy as indicated by compliance evaluation and patient report. Plan:     No orders of the defined types were placed in this encounter. 1.   Previously or presently advised of the etiology,  pathophysiology, diagnosis, treatment options, and risks of untreated MELONY. Risks may include, but are not limited to  hypertension, coronary artery disease, atrial fibrillation, CHF, diabetes, stroke, weight gain, impaired cognition, daytime somnolence, and motor vehicle accidents. Advised to abstain from driving or operating heavy machinery when drowsy and the use of respiratory suppressants. Discussed diagnostic studies and potential treatment plan. 2.  Will evaluate for PAP clinical benefit and and compliance during a 30 day period within the preceding 90 days PRN. 3.  The following educational material has been included in this visit after visit summary for your review: MELONY/PAP guidelines-Discussed with the patient and all questions fully answered. 4.  Continue CPAP, but follow up with DME supplier: [employer] device, possible recall on your device. 5.  Order-supplies-[facility]  6.   Follow up in 1 year

## 2021-09-21 NOTE — LETTER
[facility] Neurology and Sleep Medicine  [address] [place] [address] Drive, [address]  [facility], [facility] [address]  Phone [phone]  Fax [phone]             Re:  Lily Walsh    22  :  1940  Address: [address]  [address]       Replinishible PAP Supplies, 1 year supply  Item HPCPS Code Frequency   Mask of choice  or  1 per 3 months   Nasal Mask cushion/pillows  or  2 per 30 days   Full Face Mask Interface  1 per 30 days   Headgear  1 per 6 months   Tubing, length of choice  or  1 per 3 months   Water Chamber  1 per 6 months   Chinstrap  1 per 6 months   Disposable Filters  2 per 30 days   Reusable Filters  1 per 6 months     Diagnoses:  Obstructive sleep apnea (G47.33)  Length of Need: Lifetime, 99    Ordering Provider: Chloe Jones PA-C  NPI:  [id number]        Signature: [unfilled]        Date: 2021      Electronically Signed by Chloe Jones PA-C  on 2021 at 8:32 AM

## 2021-09-30 ENCOUNTER — HOSPITAL ENCOUNTER (OUTPATIENT)
Dept: INFUSION THERAPY | Age: 81
Discharge: HOME | End: 2021-09-30
Payer: MEDICARE

## 2021-09-30 VITALS
HEART RATE: 70 BPM | SYSTOLIC BLOOD PRESSURE: 110 MMHG | BODY MASS INDEX: 26.36 KG/M2 | WEIGHT: 163.3 LBS | DIASTOLIC BLOOD PRESSURE: 62 MMHG | OXYGEN SATURATION: 97 %

## 2021-09-30 DIAGNOSIS — D51.9 ANEMIA DUE TO VITAMIN B12 DEFICIENCY, UNSPECIFIED B12 DEFICIENCY TYPE: ICD-10-CM

## 2021-09-30 DIAGNOSIS — D50.9 IRON DEFICIENCY ANEMIA, UNSPECIFIED IRON DEFICIENCY ANEMIA TYPE: Primary | ICD-10-CM

## 2021-09-30 LAB
BASOPHILS ABSOLUTE: 0.02 K/UL (ref 0.01–0.08)
BASOPHILS RELATIVE PERCENT: 0.4 % (ref 0.1–1.2)
EOSINOPHILS ABSOLUTE: 0.08 K/UL (ref 0.04–0.54)
EOSINOPHILS RELATIVE PERCENT: 1.5 % (ref 0.7–7)
HCT VFR BLD CALC: 39.7 % (ref 40.1–51)
HEMOGLOBIN: 13.5 G/DL (ref 13.7–17.5)
LYMPHOCYTES ABSOLUTE: 1.07 K/UL (ref 1.18–3.74)
LYMPHOCYTES RELATIVE PERCENT: 20.7 % (ref 19.3–53.1)
MCH RBC QN AUTO: 33.7 PG (ref 25.7–32.2)
MCHC RBC AUTO-ENTMCNC: 34 G/DL (ref 32.3–36.5)
MCV RBC AUTO: 99 FL (ref 79–92.2)
MONOCYTES ABSOLUTE: 0.54 K/UL (ref 0.24–0.82)
MONOCYTES RELATIVE PERCENT: 10.4 % (ref 4.7–12.5)
NEUTROPHILS ABSOLUTE: 3.47 K/UL (ref 1.56–6.13)
NEUTROPHILS RELATIVE PERCENT: 67 % (ref 34–71.1)
PDW BLD-RTO: 12.3 % (ref 11.6–14.4)
PLATELET # BLD: 117 K/UL (ref 163–337)
PMV BLD AUTO: 10.9 FL (ref 7.4–10.4)
RBC # BLD: 4.01 M/UL (ref 4.63–6.08)
WBC # BLD: 5.18 K/UL (ref 4.23–9.07)

## 2021-09-30 PROCEDURE — 85025 COMPLETE CBC W/AUTO DIFF WBC: CPT

## 2021-09-30 PROCEDURE — 6360000002 HC RX W HCPCS: Performed by: NURSE PRACTITIONER

## 2021-09-30 PROCEDURE — 96372 THER/PROPH/DIAG INJ SC/IM: CPT

## 2021-09-30 RX ORDER — CYANOCOBALAMIN 1000 UG/ML
1000 INJECTION INTRAMUSCULAR; SUBCUTANEOUS ONCE
Status: CANCELLED
Start: 2021-10-04

## 2021-09-30 RX ORDER — CYANOCOBALAMIN 1000 UG/ML
1000 INJECTION INTRAMUSCULAR; SUBCUTANEOUS ONCE
Status: COMPLETED
Start: 2021-09-30 | End: 2021-09-30

## 2021-09-30 RX ADMIN — CYANOCOBALAMIN 1000 MCG: 1000 INJECTION, SOLUTION INTRAMUSCULAR; SUBCUTANEOUS at 13:43

## 2021-10-28 ENCOUNTER — HOSPITAL ENCOUNTER (OUTPATIENT)
Dept: INFUSION THERAPY | Age: 81
Discharge: HOME | End: 2021-10-28
Payer: MEDICARE

## 2021-10-28 DIAGNOSIS — D51.9 ANEMIA DUE TO VITAMIN B12 DEFICIENCY, UNSPECIFIED B12 DEFICIENCY TYPE: Primary | ICD-10-CM

## 2021-10-28 DIAGNOSIS — E53.8 B12 DEFICIENCY: ICD-10-CM

## 2021-10-28 DIAGNOSIS — D47.2 SMOLDERING MULTIPLE MYELOMA: ICD-10-CM

## 2021-10-28 DIAGNOSIS — D69.6 THROMBOCYTOPENIA (HCC): ICD-10-CM

## 2021-10-28 DIAGNOSIS — D72.819 LEUKOPENIA, UNSPECIFIED TYPE: ICD-10-CM

## 2021-10-28 LAB
ALBUMIN SERPL-MCNC: 3.9 G/DL (ref 3.5–5.2)
ALP BLD-CCNC: 101 U/L (ref 40–130)
ALT SERPL-CCNC: 15 U/L (ref 21–72)
ANION GAP SERPL CALCULATED.3IONS-SCNC: 8 MMOL/L (ref 7–19)
AST SERPL-CCNC: 19 U/L (ref 17–59)
BASOPHILS ABSOLUTE: 0.01 K/UL (ref 0.01–0.08)
BASOPHILS RELATIVE PERCENT: 0.2 % (ref 0.1–1.2)
BILIRUB SERPL-MCNC: 1.1 MG/DL (ref 0.2–1.3)
BUN BLDV-MCNC: 13 MG/DL (ref 9–20)
CALCIUM SERPL-MCNC: 9.1 MG/DL (ref 8.4–10.2)
CHLORIDE BLD-SCNC: 104 MMOL/L (ref 98–111)
CO2: 30 MMOL/L (ref 22–29)
CREAT SERPL-MCNC: 0.8 MG/DL (ref 0.6–1.2)
EOSINOPHILS ABSOLUTE: 0.07 K/UL (ref 0.04–0.54)
EOSINOPHILS RELATIVE PERCENT: 1.7 % (ref 0.7–7)
GFR NON-AFRICAN AMERICAN: >60
GLOBULIN: 2.9 G/DL
GLUCOSE BLD-MCNC: 132 MG/DL (ref 74–106)
HCT VFR BLD CALC: 39.7 % (ref 40.1–51)
HEMOGLOBIN: 13.6 G/DL (ref 13.7–17.5)
LYMPHOCYTES ABSOLUTE: 0.97 K/UL (ref 1.18–3.74)
LYMPHOCYTES RELATIVE PERCENT: 24.1 % (ref 19.3–53.1)
MCH RBC QN AUTO: 33.4 PG (ref 25.7–32.2)
MCHC RBC AUTO-ENTMCNC: 34.3 G/DL (ref 32.3–36.5)
MCV RBC AUTO: 97.5 FL (ref 79–92.2)
MONOCYTES ABSOLUTE: 0.44 K/UL (ref 0.24–0.82)
MONOCYTES RELATIVE PERCENT: 10.9 % (ref 4.7–12.5)
NEUTROPHILS ABSOLUTE: 2.54 K/UL (ref 1.56–6.13)
NEUTROPHILS RELATIVE PERCENT: 63.1 % (ref 34–71.1)
PDW BLD-RTO: 12.1 % (ref 11.6–14.4)
PLATELET # BLD: 117 K/UL (ref 163–337)
PMV BLD AUTO: 10.9 FL (ref 7.4–10.4)
POTASSIUM SERPL-SCNC: 3.8 MMOL/L (ref 3.5–5.1)
RBC # BLD: 4.07 M/UL (ref 4.63–6.08)
SODIUM BLD-SCNC: 142 MMOL/L (ref 137–145)
TOTAL PROTEIN: 6.8 G/DL (ref 6.3–8.2)
WBC # BLD: 4.03 K/UL (ref 4.23–9.07)

## 2021-10-28 PROCEDURE — 80053 COMPREHEN METABOLIC PANEL: CPT

## 2021-10-28 PROCEDURE — 85025 COMPLETE CBC W/AUTO DIFF WBC: CPT

## 2021-10-28 PROCEDURE — 6360000002 HC RX W HCPCS: Performed by: NURSE PRACTITIONER

## 2021-10-28 PROCEDURE — 96372 THER/PROPH/DIAG INJ SC/IM: CPT

## 2021-10-28 RX ORDER — CYANOCOBALAMIN 1000 UG/ML
1000 INJECTION INTRAMUSCULAR; SUBCUTANEOUS ONCE
Status: CANCELLED
Start: 2021-11-01

## 2021-10-28 RX ORDER — CYANOCOBALAMIN 1000 UG/ML
1000 INJECTION INTRAMUSCULAR; SUBCUTANEOUS ONCE
Status: COMPLETED
Start: 2021-10-28 | End: 2021-10-28

## 2021-10-28 RX ADMIN — CYANOCOBALAMIN 1000 MCG: 1000 INJECTION, SOLUTION INTRAMUSCULAR; SUBCUTANEOUS at 13:16

## 2021-11-16 ENCOUNTER — OFFICE VISIT (OUTPATIENT)
Dept: PULMONOLOGY | Facility: CLINIC | Age: 81
End: 2021-11-16

## 2021-11-16 VITALS
BODY MASS INDEX: 26.36 KG/M2 | OXYGEN SATURATION: 98 % | HEIGHT: 66 IN | DIASTOLIC BLOOD PRESSURE: 84 MMHG | HEART RATE: 60 BPM | SYSTOLIC BLOOD PRESSURE: 132 MMHG | WEIGHT: 164 LBS

## 2021-11-16 DIAGNOSIS — J84.10 LUNG FIBROSIS (HCC): Primary | ICD-10-CM

## 2021-11-16 PROCEDURE — 99213 OFFICE O/P EST LOW 20 MIN: CPT | Performed by: INTERNAL MEDICINE

## 2021-11-16 NOTE — PROGRESS NOTES
"Background:  Pt with multiple benign lung nodules.  AAA.    new infiltrate identified 11/2019   Chief Complaint  Lung Nodule    Subjective    History of Present Illness       Ezekiel ALDRIDGE presents to [facility].  He reports no new complaints.  He has been playing golf.  He has lung scarring which we are following.  No acute complaints     Objective     Vital Signs:   /84   Pulse 60   Ht 167.6 cm (66\")   Wt 74.4 kg (164 lb)   SpO2 98% Comment: RA  BMI 26.47 kg/m²   Physical Exam  Constitutional:       Appearance: Normal appearance. He is not ill-appearing or diaphoretic.   Eyes:      Extraocular Movements: Extraocular movements intact.   Pulmonary:      Effort: Pulmonary effort is normal. No respiratory distress.      Breath sounds: No wheezing, rhonchi or rales.   Skin:     Findings: No erythema or rash.   Neurological:      Mental Status: He is alert.        Result Review  Data Reviewed:{ Labs  Result Review  Imaging  Media :23}     XR Chest 2 View (08/18/2020 10:10)  1. Stable biapical scarring.  2. Stable mild bronchial wall thickening.  3. No dense infiltrate or effusion.                Assessment and Plan  {CC Problem List  Visit Diagnosis  ROS  Review (Popup)  Health Maintenance  Quality  BestPractice  Medications  SmartSets  SnapShot Encounters  Media :23}   Problem List Items Addressed This Visit        Pulmonary Problems    Lung fibrosis (HCC) - Primary    Overview     apical capping         Relevant Orders    XR Chest 2 View      he appears stable.  Will follow up cxr and reassess next year    Follow Up {Instructions Charge Capture  Follow-up Communications :23}   Return in about 1 year (around 11/16/2022).  Patient was given instructions and counseling regarding his condition or for health maintenance advice. Please see specific information pulled into the AVS if appropriate.    Electronically signed by Derrek Campa, " MD, 11/16/2021, 14:42 CST

## 2021-11-24 DIAGNOSIS — D47.2 SMOLDERING MULTIPLE MYELOMA: Primary | ICD-10-CM

## 2021-11-29 NOTE — PROGRESS NOTES
Progress Note      Pt Name: Melissa Elizalde  YOB: 1940  MRN: [id number]    Date of evaluation: 12/2/2021  History Obtained From:  Patient, EMR    Portions of this note have been copied forward, however, changed to reflect the most current clinical status of this patient. Chief Complaint   Patient presents with    Follow-up     Smoldering multiple myeloma ([address]     HISTORY OF PRESENT ILLNESS:    Bret Solis \"Jesus Alberto\" Azucena Peck is an [de-identified] y.o.  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 Guzman Oh  · History of multiple benign lung nodules/fibrosis, managed by Dr. Addy Mohan  · AAA, followed by CORNELIUS Shaikh    [facility] returns to the clinic for continued hematology surveillance regarding smoldering multiple myeloma. He has been continuing monthly B12 injections. He is without new complaint in this regard. [facility] denies B symptoms. He denies bleeding issues, though does have easy bruising. Regarding recurrent, papillary bladder neoplasm, cystoscopy by Dr. Carroll Pollock 9/2/2021 was negative. [facility] denies new or worsening respiratory complaint.       HEMATOLOGY HISTORY: Smoldering multiple myeloma  Bret Solis \"Jesus Alberto\" Azucena Peck was seen in hematology consultation 10/26/2020, referred by Dr. Ren Redd for evaluation of leukopenia and anemia.     PMH significant for history of arthritis, hyperlipidemia, lung nodule, histoplasmosis, bladder cancer.    [facility] denies any known history of thrombosis or blood clotting and this is removed from the [address] in [facility].     Review of CBCs at [facility]:       Review of CBCs at [facility]:       Previous abdominal/pelvic CT without contrast 11/19/2019 documented scattered, calcified granulomas in the liver and spleen.     Colonoscopy was performed 9/1/2020 by Dr. Seema Parham. One polyp each in the ascending, proximal transverse and sigmoid colon were negative for high-grade dysplasia. Sandrita Kuhn is followed by Dr. Lia Alvarenga regarding history of lung nodule.     CBC at presentation on 10/28/2020 showed a WBC of 5.42, Hgb 14.6/.2 and platelet count 92,726. Both WBC and Hgb are within normal limits. Platelets have been intermittently low since 2016 and have continued to decrease over the past year.     Sandrita Kuhn is a nondrinker. He denies known history of liver disease. Medications include hydrocodone and Zocor. He has taken Zocor for the past 8-9 years.     Family history includes lung cancer in his sister.     Labs 10/28/2020:  · Vitamin B12 level: 205  · Folate: 13.5  · TSH: 1.04  · LDH: 360  · LIONEL: Negative  · Hepatitis panel: Negative  · HIV 1&2: non-reactive  · Antiplatelet antibodies: negative  · SPEP: Monoclonal IgA kappa = 0.2, monoclonal IgG kappa = 0.1  · Immunofixation: IgG monoclonal protein with kappa light chain specificity, IgA monoclonal protein with kappa light chain specificity  · Quantitative immunoglobulins: IgG 853, IgA 458 (), IgM 51  · Kappa light chains: 37 (3.3-19.4), lambda light chains 21.0, K/L ratio 1.76  · PTT: 23  · PT/INR: 10.3/1.0     · Spleen ultrasound 11/5/2020 at [facility]: normal spleen size of 10.2 x 9.7 x 4.6 cm     · 24-hour urine for immunoelectrophoresis 11/9/2020: No M-spike. immunofixation not completed due to lack of M protein  · Bone survey 11/4/2020: Negative for suspicious bony lesions    MMA was elevated at 2164 on 11/10/2020. Weekly parenteral B12 injections x6 were initiated 11/10/2020, to be followed by monthly injections at thereafter.    Will monitor platelet count with I39 replacement.     Bone marrow aspirate and biopsy 4/27/2021:  · normocellular marrow (20-25%) with 12% involvement of bone marrow cellularity by clonal plasma cells. No dysplasia, no increase in blasts. · FLOW cytometry: Polytypic B-cells, monotypic kappa-restricted plasma cell population (0.2%) of total events, no T-cell aberrant antigenic expression  · FISH study: Negative  · Cytogenetics: Loss of Y chromosome, which is an age-related phenomenon and is of no known diagnostic or prognostic significance    PET scan 5/18/2021 at [facility]:  · NO abnormal osseous uptake  · NO hypermetabolic pulmonary nodules  · Soft tissue density in the left inguinal canal with mild uptake of 2.78  · Focal increased uptake in the terminal ileum, without underlying mass identified, SUV 8.55, possibly an underlying inflammatory process    Colonoscopy 6/10/2021 by Dr. Natasha Briscoe revealed two 3-4 mm benign polyps. Specifically, the terminal ileum appeared normal.  Hussain Clarke has a known history of bilateral inguinal hernias, with repair     Findings consistent with smoldering myeloma. TREATMENT SUMMARY:  1. Weekly parenteral B12 injections x4 initiated 11/10/2020, monthly thereafter.      ONCOLOGY HISTORY, superficial bladder cancer  Hussain Clarke has a history of TURBT x2.     TURBT 8/22/2017:  1.  Bladder tumor, right lateral wall, transurethral resection:  Low-grade papillary urothelial carcinoma, noninvasive. Muscularis propria not present. 2.  Bladder tumor, transurethral resection:  Low-grade papillary urothelial carcinoma, noninvasive. Muscularis propria identified with no evidence of tumor involvement. AJCC pathologic stage:  pTa Nx     TURBT 2/22/2019:  Urinary bladder, biopsy of bladder dome: Noninvasive low-grade papillary urothelial carcinoma with severe underlying chronic inflammation. Cystoscopy 5/26/2020 by Dr. Jorge Urbina was without evidence of mass. Cystoscopy 2/2/2021 by Dr. Deep Louise documented four bladder tumors.   The largest was 2 cm on the anterior bladder wall with an additional three 1 cm tumors that are located on the dome. Cystoscopy,TURBT and gemcitabine intravesical instillation on was performed 2/22/2021 by Dr. Chelsea Phillips. The 2 cm anterior superficial bladder wall tumor was biopsied. After near complete removal, the largest lesion as well as the other 2 previously mentioned lesions were fulgurated. 2000 mg gemcitabine was placed intravesically. 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 Cystoscopy 9/2/2021 by Dr. Chelsea Phillips was negative. Plans are for repeat cystoscopy in 6 months. Past Medical History:   Diagnosis Date    AAA (abdominal aortic aneurysm) (HCC)     Arthritis     Bilateral low back pain without sciatica 1/13/2016    Bilateral shoulder pain 1/13/2016    Cancer ([address] 09/2017    Bladder CA    Carotid artery stenosis     CPAP (continuous positive airway pressure) dependence     8cm to 20cm    DDD (degenerative disc disease), lumbar     Hyperlipidemia     Obstructive sleep apnea     AHI: 57.6 per PSG, 11/2018     Past Surgical History:   Procedure Laterality Date    BLADDER SURGERY  08/22/2017    Removal of Cancer by Dr. Anuel Saul, LAPAROSCOPIC      july 25, 2017    COLONOSCOPY      HEMORRHOID SURGERY      HERNIA REPAIR      8/2016     Current Outpatient Medications   Medication Sig Dispense Refill    cyanocobalamin ([facility] VITAMIN B12) 1000 MCG tablet Take 1 tablet by mouth daily 30 tablet 12    simvastatin (ZOCOR) 20 MG tablet TAKE 1 TABLET BY MOUTH DAILY  1    HYDROcodone-acetaminophen (NORCO)  MG per tablet Take 1 tablet by mouth every 6 hours as needed for Pain. No current facility-administered medications for this visit.       Allergies   Allergen Reactions    Celebrex [Celecoxib]      Social History     Tobacco Use    Smoking status: Former Smoker     Packs/day: 0.25     Years: 67.00     Pack years: 16.75     Types: Cigarettes     Quit date: 2019     Years since quittin.8    Smokeless tobacco: Never Used   Vaping Use    Vaping Use: Never used   Substance Use Topics    Alcohol use: No    Drug use: No     Family History   Problem Relation Age of Onset    Other Mother         Natural causes    Cancer Sister         Lung    Other Father         Emphysema       Review of Systems   Constitutional: Positive for fatigue. Negative for fever. HENT: Negative for dental problem, hearing loss, mouth sores, nosebleeds, sore throat and trouble swallowing. Eyes: Negative for discharge and itching. Respiratory: Negative for cough, shortness of breath and wheezing. No hemoptysis   Cardiovascular: Negative for chest pain, palpitations and leg swelling. Gastrointestinal: Negative for abdominal pain, constipation, diarrhea, nausea and vomiting. Endocrine: Negative for cold intolerance and heat intolerance. Genitourinary: Negative for dysuria and hematuria. History of bladder cancer, with recent cystoscopy   Musculoskeletal: Positive for arthralgias (Right hip, sciatica) and back pain (Chronic). Negative for joint swelling and myalgias. Skin: Negative for pallor and rash. Allergic/Immunologic: Negative for environmental allergies and immunocompromised state. Neurological: Negative for seizures, syncope and numbness. Hematological: Negative for adenopathy. Bruises/bleeds easily. Psychiatric/Behavioral: Negative for agitation, behavioral problems and confusion. The patient is not nervous/anxious. Physical Exam  Vitals reviewed. Constitutional:       General: He is not in acute distress. Appearance: He is well-developed. He is not toxic-appearing or diaphoretic. Comments: Wearing a facial mask. HENT:      Head: Normocephalic and atraumatic.       Right Ear: External ear normal.      Left Ear: External ear normal.      Nose: Nose normal.      Mouth/Throat:      Mouth: Mucous membranes are moist.   Eyes: General: No scleral icterus. Right eye: No discharge. Left eye: No discharge. Conjunctiva/sclera: Conjunctivae normal.   Neck:      Trachea: No tracheal deviation. Cardiovascular:      Rate and Rhythm: Normal rate and regular rhythm. Pulmonary:      Effort: Pulmonary effort is normal. No respiratory distress. Breath sounds: Normal breath sounds. No wheezing or rales. Chest:   Breasts:      Right: No supraclavicular adenopathy. Left: No supraclavicular adenopathy. Abdominal:      General: Bowel sounds are normal. There is no distension. Palpations: Abdomen is soft. Tenderness: There is no abdominal tenderness. There is no guarding. Genitourinary:     Comments: Exam deferred  Musculoskeletal:         General: No tenderness or deformity. Cervical back: Neck supple. No muscular tenderness. Comments: Normal ROM all four extremities   Lymphadenopathy:      Cervical:      Right cervical: No superficial or deep cervical adenopathy. Left cervical: No superficial or deep cervical adenopathy. Upper Body:      Right upper body: No supraclavicular adenopathy. Left upper body: No supraclavicular adenopathy. Comments:      Skin:     General: Skin is warm and dry. Findings: No erythema or rash. Neurological:      Mental Status: He is alert and oriented to person, place, and time. Comments: follows commands, non-focal   Psychiatric:         Behavior: Behavior normal. Behavior is cooperative. Thought Content: Thought content normal.         Judgment: Judgment normal.      Comments: Alert and oriented to person, place and time.        Vitals:    12/02/21 1316   BP: 130/76   Pulse: 87   SpO2: 96%   Weight: 163 lb (73.9 kg)   Height: 5' 6\" (1.676 m)      Wt Readings from Last 3 Encounters:   12/02/21 163 lb (73.9 kg)   09/30/21 163 lb 4.8 oz (74.1 kg)   09/21/21 160 lb (72.6 kg)     LABS:  CBC 12/2/2021:  Lab Results   Component Value Date    WBC 4.29 2021    HGB 13.9 2021    HCT 40.7 2021    MCV 98.1 (H) 2021     (L) 2021    LYMPHOPCT 33.3 2021    RBC 4.15 (L) 2021    MCH 33.5 (H) 2021    MCHC 34.2 2021    RDW 12.1 2021     Labs 10/28/2021  · CMP: creatinine: 0.8. GFR: >60, calcium: 9.1, Total Protein: 6.8  · SPEP: m-spike: 0.1  · Ig, IgA: 476, IgM: 56  · Kappa light chain: 34.8, Lambda light chain: 21.3, K/L ratio: 1.63  · MMA: 473    ASSESSMENT/PLAN:    1. Smoldering multiple myeloma ([facility]    2. B12 deficiency    3. Leukopenia, unspecified type    4. Thrombocytopenia ([facility]    5. History of bladder cancer       Smoldering Multiple Myeloma  Labs:      Serology stable, continue observation    Leukopenia, anemia transient - stable  Thrombocytopenia -stable  · WBC 4.29, ANC 2.29  · Hgb 13.9, MCV 98.1  · Platelets 399,500    Negative Bone marrow aspirate and biopsy as noted above    Vitamin B12 deficiency  Vitamin B12 level was 205 on 10/28/2020 with elevated MMA of 2164 on 11/10/2020  And monthly B12 injection is delivered today and will be discontinued moving forward  Rx B12 1000 mcg daily  Follow-up B12 level     History of bladder cancer  S/p TURBT 2017, 2019, 2021, 2021    Cystoscopy 2021 by Dr. Nabor Quezada documented four bladder tumors. The largest was 2 cm on the anterior bladder wall with an additional three 1 cm tumors that are located on the dome. Cystoscopy,TURBT and gemcitabine intravesical instillation on was performed 2021 by Dr. Nabor Quezada. The 2 cm anterior superficial bladder wall tumor was biopsied. After near complete removal, the largest lesion as well as the other 2 previously mentioned lesions were fulgurated. 2000 mg gemcitabine was placed intravesically. 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     cystoscopy 2021 by  Zainab Stewart was without evidence of bladder mass. Plans are for repeat cystoscopy in 6 months    Health Maintenance  · PSA: As per urology  · Colonoscopy 6/10/2021 by Dr. Stefano Morris revealed two 3-4 mm benign polyps. Specifically, the terminal ileum appeared normal (h/o abn PET)    Orders Placed This Encounter   Procedures    CBC Auto Differential    Comprehensive Metabolic Panel    [place]/Lambda Free Lt Chains, Serum Quant    Immunoglobulins, Quantitative    Electrophoresis Protein, Serum without Reflex to Immunofixation    Beta 2 Microglobulin, Serum    Vitamin B12    Methylmalonic Acid, Serum       Return in about 4 months (around 4/2/2022) for follow up with CORNELIUS Chase (NO CBC DAY OF APT). I have seen, examined and reviewed this patient medication list, appropriate labs and imaging studies. I reviewed relevant medical records and others physicians notes. I discussed the plan of care with the patient. I answered all questions to the patients satisfaction. I have also reviewed the chief complaint (CC) and part of the history (History of Present Illness (HPI), Past Family Social History [facility]), or Review of Systems (ROS) and made changes when appropriated. Dictated utilizing Dragon transcription software.          CORNELIUS Foreman  11:48 AM  12/5/2021

## 2021-12-02 ENCOUNTER — HOSPITAL ENCOUNTER (OUTPATIENT)
Dept: GENERAL RADIOLOGY | Facility: HOSPITAL | Age: 81
Discharge: HOME | End: 2021-12-02
Admitting: INTERNAL MEDICINE

## 2021-12-02 ENCOUNTER — OFFICE VISIT (OUTPATIENT)
Dept: HEMATOLOGY | Age: 81
End: 2021-12-02
Payer: MEDICARE

## 2021-12-02 ENCOUNTER — HOSPITAL ENCOUNTER (OUTPATIENT)
Dept: INFUSION THERAPY | Age: 81
Discharge: HOME | End: 2021-12-02
Payer: MEDICARE

## 2021-12-02 VITALS
DIASTOLIC BLOOD PRESSURE: 76 MMHG | SYSTOLIC BLOOD PRESSURE: 130 MMHG | HEART RATE: 87 BPM | OXYGEN SATURATION: 96 % | WEIGHT: 163 LBS | BODY MASS INDEX: 26.2 KG/M2 | HEIGHT: 66 IN

## 2021-12-02 DIAGNOSIS — Z85.51 HISTORY OF BLADDER CANCER: ICD-10-CM

## 2021-12-02 DIAGNOSIS — E53.8 B12 DEFICIENCY: ICD-10-CM

## 2021-12-02 DIAGNOSIS — D51.9 ANEMIA DUE TO VITAMIN B12 DEFICIENCY, UNSPECIFIED B12 DEFICIENCY TYPE: ICD-10-CM

## 2021-12-02 DIAGNOSIS — J84.10 LUNG FIBROSIS (HCC): ICD-10-CM

## 2021-12-02 DIAGNOSIS — D69.6 THROMBOCYTOPENIA (HCC): ICD-10-CM

## 2021-12-02 DIAGNOSIS — D72.819 LEUKOPENIA, UNSPECIFIED TYPE: ICD-10-CM

## 2021-12-02 DIAGNOSIS — D47.2 SMOLDERING MULTIPLE MYELOMA: Primary | ICD-10-CM

## 2021-12-02 LAB
BASOPHILS ABSOLUTE: 0.02 K/UL (ref 0.01–0.08)
BASOPHILS RELATIVE PERCENT: 0.5 % (ref 0.1–1.2)
EOSINOPHILS ABSOLUTE: 0.06 K/UL (ref 0.04–0.54)
EOSINOPHILS RELATIVE PERCENT: 1.4 % (ref 0.7–7)
HCT VFR BLD CALC: 40.7 % (ref 40.1–51)
HEMOGLOBIN: 13.9 G/DL (ref 13.7–17.5)
LYMPHOCYTES ABSOLUTE: 1.43 K/UL (ref 1.18–3.74)
LYMPHOCYTES RELATIVE PERCENT: 33.3 % (ref 19.3–53.1)
MCH RBC QN AUTO: 33.5 PG (ref 25.7–32.2)
MCHC RBC AUTO-ENTMCNC: 34.2 G/DL (ref 32.3–36.5)
MCV RBC AUTO: 98.1 FL (ref 79–92.2)
MONOCYTES ABSOLUTE: 0.49 K/UL (ref 0.24–0.82)
MONOCYTES RELATIVE PERCENT: 11.4 % (ref 4.7–12.5)
NEUTROPHILS ABSOLUTE: 2.29 K/UL (ref 1.56–6.13)
NEUTROPHILS RELATIVE PERCENT: 53.4 % (ref 34–71.1)
PDW BLD-RTO: 12.1 % (ref 11.6–14.4)
PLATELET # BLD: 106 K/UL (ref 163–337)
PMV BLD AUTO: 11.6 FL (ref 7.4–10.4)
RBC # BLD: 4.15 M/UL (ref 4.63–6.08)
WBC # BLD: 4.29 K/UL (ref 4.23–9.07)

## 2021-12-02 PROCEDURE — 36415 COLL VENOUS BLD VENIPUNCTURE: CPT

## 2021-12-02 PROCEDURE — 71046 X-RAY EXAM CHEST 2 VIEWS: CPT

## 2021-12-02 PROCEDURE — 4040F PNEUMOC VAC/ADMIN/RCVD: CPT | Performed by: NURSE PRACTITIONER

## 2021-12-02 PROCEDURE — 96372 THER/PROPH/DIAG INJ SC/IM: CPT

## 2021-12-02 PROCEDURE — G8484 FLU IMMUNIZE NO ADMIN: HCPCS | Performed by: NURSE PRACTITIONER

## 2021-12-02 PROCEDURE — 99214 OFFICE O/P EST MOD 30 MIN: CPT | Performed by: NURSE PRACTITIONER

## 2021-12-02 PROCEDURE — 85025 COMPLETE CBC W/AUTO DIFF WBC: CPT

## 2021-12-02 PROCEDURE — G8427 DOCREV CUR MEDS BY ELIG CLIN: HCPCS | Performed by: NURSE PRACTITIONER

## 2021-12-02 PROCEDURE — G8417 CALC BMI ABV UP PARAM F/U: HCPCS | Performed by: NURSE PRACTITIONER

## 2021-12-02 PROCEDURE — 1123F ACP DISCUSS/DSCN MKR DOCD: CPT | Performed by: NURSE PRACTITIONER

## 2021-12-02 PROCEDURE — 99212 OFFICE O/P EST SF 10 MIN: CPT

## 2021-12-02 PROCEDURE — 6360000002 HC RX W HCPCS: Performed by: NURSE PRACTITIONER

## 2021-12-02 PROCEDURE — 1036F TOBACCO NON-USER: CPT | Performed by: NURSE PRACTITIONER

## 2021-12-02 RX ORDER — CYANOCOBALAMIN 1000 UG/ML
1000 INJECTION INTRAMUSCULAR; SUBCUTANEOUS ONCE
Start: 2021-12-06

## 2021-12-02 RX ORDER — CYANOCOBALAMIN 1000 UG/ML
1000 INJECTION INTRAMUSCULAR; SUBCUTANEOUS ONCE
Status: COMPLETED
Start: 2021-12-02 | End: 2021-12-02

## 2021-12-02 RX ADMIN — CYANOCOBALAMIN 1000 MCG: 1000 INJECTION, SOLUTION INTRAMUSCULAR; SUBCUTANEOUS at 13:54

## 2021-12-05 ASSESSMENT — ENCOUNTER SYMPTOMS
COUGH: 0
ABDOMINAL PAIN: 0
CONSTIPATION: 0
SHORTNESS OF BREATH: 0
VOMITING: 0
TROUBLE SWALLOWING: 0
NAUSEA: 0
EYE DISCHARGE: 0
DIARRHEA: 0
SORE THROAT: 0
WHEEZING: 0
EYE ITCHING: 0
BACK PAIN: 1

## 2021-12-10 ENCOUNTER — HOSPITAL ENCOUNTER (OUTPATIENT)
Dept: CT IMAGING | Age: 81
Discharge: HOME | End: 2021-12-10
Payer: MEDICARE

## 2021-12-10 ENCOUNTER — HOSPITAL ENCOUNTER (OUTPATIENT)
Dept: VASCULAR LAB | Age: 81
Discharge: HOME | End: 2021-12-10
Payer: MEDICARE

## 2021-12-10 DIAGNOSIS — I65.23 BILATERAL CAROTID ARTERY STENOSIS: ICD-10-CM

## 2021-12-10 DIAGNOSIS — I71.40 ABDOMINAL AORTIC ANEURYSM (AAA) WITHOUT RUPTURE: ICD-10-CM

## 2021-12-10 PROCEDURE — 74176 CT ABD & PELVIS W/O CONTRAST: CPT

## 2021-12-10 PROCEDURE — 93880 EXTRACRANIAL BILAT STUDY: CPT

## 2021-12-17 ENCOUNTER — OFFICE VISIT (OUTPATIENT)
Dept: VASCULAR SURGERY | Age: 81
End: 2021-12-17
Payer: MEDICARE

## 2021-12-17 VITALS
BODY MASS INDEX: 25.71 KG/M2 | HEIGHT: 66 IN | TEMPERATURE: 98.3 F | WEIGHT: 160 LBS | DIASTOLIC BLOOD PRESSURE: 82 MMHG | HEART RATE: 61 BPM | OXYGEN SATURATION: 98 % | SYSTOLIC BLOOD PRESSURE: 140 MMHG

## 2021-12-17 DIAGNOSIS — I65.23 BILATERAL CAROTID ARTERY STENOSIS: ICD-10-CM

## 2021-12-17 DIAGNOSIS — I71.40 AAA (ABDOMINAL AORTIC ANEURYSM) WITHOUT RUPTURE: Primary | ICD-10-CM

## 2021-12-17 PROCEDURE — G8417 CALC BMI ABV UP PARAM F/U: HCPCS | Performed by: NURSE PRACTITIONER

## 2021-12-17 PROCEDURE — 4040F PNEUMOC VAC/ADMIN/RCVD: CPT | Performed by: NURSE PRACTITIONER

## 2021-12-17 PROCEDURE — 1036F TOBACCO NON-USER: CPT | Performed by: NURSE PRACTITIONER

## 2021-12-17 PROCEDURE — G8427 DOCREV CUR MEDS BY ELIG CLIN: HCPCS | Performed by: NURSE PRACTITIONER

## 2021-12-17 PROCEDURE — 1123F ACP DISCUSS/DSCN MKR DOCD: CPT | Performed by: NURSE PRACTITIONER

## 2021-12-17 PROCEDURE — 99214 OFFICE O/P EST MOD 30 MIN: CPT | Performed by: NURSE PRACTITIONER

## 2021-12-17 PROCEDURE — G8484 FLU IMMUNIZE NO ADMIN: HCPCS | Performed by: NURSE PRACTITIONER

## 2021-12-17 NOTE — PROGRESS NOTES
Taryn Velez (:  1940) is a [de-identified] y.o. male,Established patient, here for evaluation of the following chief complaint(s):  Follow-up (Carotid and CT follow up )            SUBJECTIVE/OBJECTIVE:  He has a known history of abdominal aortic aneurysm for 1 - 5 years. He has not had abdominal pain. He has back pain in the lumbar spine and sacral spine region. This pain is unchanged since the last visit. Pain is rated as 3. He presents for follow up of carotid artery stenosis. He has a known history of carotid artery stenosis for 1 - 5 years. His current treatment includes ASA EC daily. He denies a history of CVA. He reports no TIA's, episodes of lateralizing weakness and episodes of amaurosis fugax. Taryn Velez is a [de-identified] y.o. male with the following history as recorded in [facility]:  Patient Active Problem List    Diagnosis Date Noted    Vitamin B12 deficiency anemia 10/29/2020    Obstructive sleep apnea     CPAP (continuous positive airway pressure) dependence     Lumbar disc disease with radiculopathy 05/15/2018    Dyspnea 2018    Displacement of lumbar disc with radiculopathy 2017    AAA [facility]) ([facility]     Carotid artery stenosis     Lumbar disc disease 2016    Bilateral carotid artery stenosis 2016    Bilateral low back pain without sciatica 2016    Bilateral shoulder pain 2016     Current Outpatient Medications   Medication Sig Dispense Refill    cyanocobalamin ([facility] VITAMIN B12) 1000 MCG tablet Take 1 tablet by mouth daily 30 tablet 12    simvastatin (ZOCOR) 20 MG tablet TAKE 1 TABLET BY MOUTH DAILY  1    HYDROcodone-acetaminophen (NORCO)  MG per tablet Take 1 tablet by mouth every 6 hours as needed for Pain. No current facility-administered medications for this visit.      Allergies: Celebrex [celecoxib]  Past Medical History:   Diagnosis Date    [address] (abdominal aortic [facility]) [address]     Arthritis     Bilateral low back pain without sciatica 2016    Bilateral shoulder pain 2016    Cancer ([address] 2017    Bladder CA    Carotid artery stenosis     CPAP (continuous positive airway pressure) dependence     8cm to 20cm    DDD (degenerative disc disease), lumbar     Hyperlipidemia     Obstructive sleep apnea     AHI: 57.6 per PSG, 2018     Past Surgical History:   Procedure Laterality Date    BLADDER SURGERY  2017    Removal of Cancer by Dr. Floridalma Beck  2021    Dr. Beba Kilpatrick- date approxamate     CHOLECYSTECTOMY, LAPAROSCOPIC      2017    COLONOSCOPY      HEMORRHOID SURGERY      HERNIA REPAIR      2016     Family History   Problem Relation Age of Onset    Other Mother         Natural causes    Cancer Sister         Lung    Other Father         Emphysema     Social History     Tobacco Use    Smoking status: Former Smoker     Packs/day: 0.25     Years: 67.00     Pack years: 16.75     Types: Cigarettes     Quit date: 2019     Years since quittin.8    Smokeless tobacco: Never Used   Substance Use Topics    Alcohol use: No       ROS  Eyes  no sudden vision change or amaurosis. Respiratory  no significant shortness of breath,  Cardiovascular  no chest pain or syncope. No  significant leg swelling. no claudication. Musculoskeletal  no gait disturbance  Skin  no new wound. Neurologic   No speech difficulty or lateralizing weakness. All other review of systems are negative. Physical Exam    BP (!) 140/82 (Site: Left Upper Arm, Position: Sitting, Cuff Size: Medium Adult)   Pulse 61   Temp 98.3 °F (36.8 °C) (Temporal)   Ht 5' 6\" (1.676 m)   Wt 160 lb (72.6 kg)   SpO2 98%   BMI 25.82 kg/m²       Neck- carotid pulses 2+ to palpation with no bruit  Cardiovascular  Regular rate and rhythm. Pulmonary  effort appears normal.  No respiratory distress. Lungs - Breath sounds normal. No wheezes or rales.     GI - Abdomen  soft, non tender, bowel sounds X 4 quadrants. No guarding or rebound tenderness. No distension or palpable mass. Extremities -  Radial and brachial pulses are 2+ to palpation bilaterally. Right femoral pulse: present 2+; Right popliteal pulse: absent Right DP: absent; Right PT absent; Left femoral pulse: present 2+; Left popliteal pulse: absent; Left DP: absent; Left PT: absent No cyanosis, clubbing, or significant edema. No signs atheroembolic event. Neurologic  alert and oriented X 3. Physiologic. Face symmetric. Skin  warm, dry, and intact. no wound  Psychiatric  mood, affect, and behavior appear normal.  Judgment and thought processes appear normal.    CT -   1. Fusiform infrarenal AAA measuring up to 4.1 cm in diameter,   previously 3.8 cm. 2. Left renal cysts. 3. Stable small left adrenal adenoma. 4. Spinal scoliotic curvature with advanced spondylosis. 5. Colonic diverticulosis. Individual films reviewed: Yes. These results were reviewed with the patient. Disease process is chronic with progression of size    Doppler results:    Right CCA/ICA <50% stenotic  Left CCA/ICA <50% stenotic  Right verterbral artery flow is antegrade  Left verterbral artery flow is antegrade  Individual velocities reviewed: Yes. Results were reviewed with the patient. Disease process is stable and chronic    Reviewed previous studies including: carotid u/s  Individual images were reviewed. I agree with the findings  Results were discussed with the patient. ASSESSMENT/PLAN:  1. AAA (abdominal aortic aneurysm) without rupture ([address]  2.  Bilateral carotid artery stenosis        Discussed management of carotid u/s which includes:  continue asa to reduce risk of TIA/CVA, to reduce risk of arterial thrombosis and to decrease rate of plaque buildup  Strongly encourage start/continue statin therapy -   Recommend no smoking   Proceed with 6 months with ct        Symptoms of rupture reviewed with the patient including sudden onset severe back pain or abdominal pain. This pain can sometimes radiate into the groin or leg. The patient may experience a feeling of impending doom or death. If this occurs they have been insturcted to call 911 and get to the emergency room telling them you have an aneurysm. Patient has voiced understanding. An electronic signature was used to authenticate this note.     --CORNELIUS Saenz

## 2022-03-02 NOTE — PROGRESS NOTES
CC: I am here for the doctor to look at my bladder    Cystoscopy procedure note  Pre- operative diagnosis:  Bladder cancer surveillance    Post operative diagnosis:  Same    Hx three prior TURBT's as below     Final Diagnosis   1.  Bladder tumor, right lateral wall, transurethral resection:  Low-grade papillary urothelial carcinoma, noninvasive.  Muscularis propria not present.     2.  Bladder tumor, transurethral resection:  Low-grade papillary urothelial carcinoma, noninvasive.  Muscularis propria identified with no evidence of tumor involvement.     AJCC pathologic stage:  pTa Nx   Electronically signed by Jon Pires MD on 8/23/2017 at 0928      Final Diagnosis   Urinary bladder, biopsy of bladder dome: Noninvasive low-grade papillary urothelial carcinoma with severe underlying chronic inflammation.     AJCC STAGE: pTa, pNx   Electronically signed by Rojelio Joseph MD on 2/26/2019 at 1504      Final Diagnosis   Bladder tumor, anterior wall, biopsy:  Papillary urothelial neoplasm of low malignant potential (PUNLMP)   Electronically signed by Jon Pires MD on 2/23/2021 at 1221               Procedure:  The patient was prepped and draped in a normal sterile fashion.  The urethra was anesthetized with 2% lidocaine jelly.  A flexible cystoscope was introduced per urethra.      The anterior urethra is without evidence of stricture or significant mass. The prostatic urethra shows mild to moderate lateral lobe enlargement. The bladder has a 1 cm anterior bladder wall lesion.  I think this is probably either a low-grade carcinoma or a (PUNLMP)papillary urothelial tumor with minimal atypia and thickened urothelium    Patient tolerated the procedure well    Complications: none    Blood loss: minimal    Diagnoses and all orders for this visit:    1. Lesion of urinary bladder (Primary)  -     Case Request; Standing  -     COVID PRE-OP / PRE-PROCEDURE SCREENING ORDER (NO ISOLATION) - Swab, Nasopharynx;  Future  -     Case Request    2. Malignant neoplasm of overlapping sites of bladder (HCC)  -     POC Urinalysis Dipstick, Multipro    Other orders  -     Follow Anesthesia Guidelines / Standing Orders; Future  -     Provide NPO Instructions to Patient; Future  -     Chlorhexidine Skin Prep; Future      This is such a small lesion I would not want to give him mitomycin due to the risk of irritative voiding symptoms.  As mentioned above it may well be benign.            Cale Covington MD  3/4/2022  09:50 CST

## 2022-03-03 ENCOUNTER — PROCEDURE VISIT (OUTPATIENT)
Dept: UROLOGY | Facility: CLINIC | Age: 82
End: 2022-03-03

## 2022-03-03 DIAGNOSIS — N32.9 LESION OF URINARY BLADDER: Primary | ICD-10-CM

## 2022-03-03 DIAGNOSIS — C67.8 MALIGNANT NEOPLASM OF OVERLAPPING SITES OF BLADDER: ICD-10-CM

## 2022-03-03 LAB
BILIRUB BLD-MCNC: ABNORMAL MG/DL
CLARITY, POC: CLEAR
COLOR UR: YELLOW
GLUCOSE UR STRIP-MCNC: NEGATIVE MG/DL
KETONES UR QL: ABNORMAL
LEUKOCYTE EST, POC: NEGATIVE
NITRITE UR-MCNC: NEGATIVE MG/ML
PH UR: 5 [PH] (ref 5–8)
PROT UR STRIP-MCNC: NEGATIVE MG/DL
RBC # UR STRIP: NEGATIVE /UL
SP GR UR: 1.03 (ref 1–1.03)
UROBILINOGEN UR QL: NORMAL

## 2022-03-03 PROCEDURE — 81001 URINALYSIS AUTO W/SCOPE: CPT | Performed by: UROLOGY

## 2022-03-03 PROCEDURE — 52000 CYSTOURETHROSCOPY: CPT | Performed by: UROLOGY

## 2022-03-22 ENCOUNTER — PRE-ADMISSION TESTING (OUTPATIENT)
Dept: PREADMISSION TESTING | Facility: HOSPITAL | Age: 82
End: 2022-03-22

## 2022-03-22 ENCOUNTER — LAB (OUTPATIENT)
Dept: LAB | Facility: HOSPITAL | Age: 82
End: 2022-03-22

## 2022-03-22 VITALS
OXYGEN SATURATION: 98 % | HEIGHT: 65 IN | DIASTOLIC BLOOD PRESSURE: 79 MMHG | SYSTOLIC BLOOD PRESSURE: 153 MMHG | RESPIRATION RATE: 16 BRPM | WEIGHT: 167.55 LBS | BODY MASS INDEX: 27.92 KG/M2 | HEART RATE: 60 BPM

## 2022-03-22 DIAGNOSIS — N32.9 LESION OF URINARY BLADDER: ICD-10-CM

## 2022-03-22 LAB
ANION GAP SERPL CALCULATED.3IONS-SCNC: 6 MMOL/L (ref 5–15)
BUN SERPL-MCNC: 12 MG/DL (ref 8–23)
BUN/CREAT SERPL: 14.1 (ref 7–25)
CALCIUM SPEC-SCNC: 9.4 MG/DL (ref 8.6–10.5)
CHLORIDE SERPL-SCNC: 103 MMOL/L (ref 98–107)
CO2 SERPL-SCNC: 31 MMOL/L (ref 22–29)
CREAT SERPL-MCNC: 0.85 MG/DL (ref 0.76–1.27)
DEPRECATED RDW RBC AUTO: 44 FL (ref 37–54)
EGFRCR SERPLBLD CKD-EPI 2021: 87.3 ML/MIN/1.73
ERYTHROCYTE [DISTWIDTH] IN BLOOD BY AUTOMATED COUNT: 12.2 % (ref 12.3–15.4)
GLUCOSE SERPL-MCNC: 101 MG/DL (ref 65–99)
HCT VFR BLD AUTO: 40.4 % (ref 37.5–51)
HGB BLD-MCNC: 13.6 G/DL (ref 13–17.7)
MCH RBC QN AUTO: 32.8 PG (ref 26.6–33)
MCHC RBC AUTO-ENTMCNC: 33.7 G/DL (ref 31.5–35.7)
MCV RBC AUTO: 97.3 FL (ref 79–97)
PLATELET # BLD AUTO: 127 10*3/MM3 (ref 140–450)
PMV BLD AUTO: 11 FL (ref 6–12)
POTASSIUM SERPL-SCNC: 4.3 MMOL/L (ref 3.5–5.2)
RBC # BLD AUTO: 4.15 10*6/MM3 (ref 4.14–5.8)
SARS-COV-2 ORF1AB RESP QL NAA+PROBE: NOT DETECTED
SODIUM SERPL-SCNC: 140 MMOL/L (ref 136–145)
WBC NRBC COR # BLD: 3.77 10*3/MM3 (ref 3.4–10.8)

## 2022-03-22 PROCEDURE — 36415 COLL VENOUS BLD VENIPUNCTURE: CPT

## 2022-03-22 PROCEDURE — 93010 ELECTROCARDIOGRAM REPORT: CPT | Performed by: INTERNAL MEDICINE

## 2022-03-22 PROCEDURE — 93005 ELECTROCARDIOGRAM TRACING: CPT

## 2022-03-22 PROCEDURE — 85027 COMPLETE CBC AUTOMATED: CPT

## 2022-03-22 PROCEDURE — U0005 INFEC AGEN DETEC AMPLI PROBE: HCPCS

## 2022-03-22 PROCEDURE — C9803 HOPD COVID-19 SPEC COLLECT: HCPCS

## 2022-03-22 PROCEDURE — U0004 COV-19 TEST NON-CDC HGH THRU: HCPCS

## 2022-03-22 PROCEDURE — 80048 BASIC METABOLIC PNL TOTAL CA: CPT

## 2022-03-22 RX ORDER — LANOLIN ALCOHOL/MO/W.PET/CERES
1000 CREAM (GRAM) TOPICAL DAILY
COMMUNITY

## 2022-03-22 NOTE — DISCHARGE INSTRUCTIONS
Before you come to the hospital      Do not eat, drink, smoke, or chew gum after midnight the night before surgery. This includes no mints.    Arrival time: AS DIRECTED BY OFFICE     Only one family member or friend will be allowed per patient      YOU MAY TAKE THE FOLLOWING MEDICATION(S) THE MORNING OF SURGERY WITH A SIP OF WATER: NONE         ALL OTHER HOME MEDICATION CHECK WITH YOUR PHYSICIAN                            (especially if you are taking diabetes medicines or blood thinners)     Do not take any Erectile Dysfunction medications (EX: CIALIS, VIAGRA) 24 hours prior to surgery      If you were given and instructed to use a germ- killing soap, use as directed the night before surgery and the morning of surgery before coming to the hospital.                 MANAGING PAIN AFTER SURGERY    We know you are probably wondering what your pain will be like after surgery.  Following surgery it is unrealistic to expect you will not have pain.   Pain is how our bodies let us know that something is wrong or cautions us to be careful.  That said, our goal is to make your pain tolerable.    Methods we may use to treat your pain include (oral or IV medications, PCAs, epidurals, nerve blocks, etc.)   While some procedures require IV pain medications for a short time after surgery, transitioning to pain medications by mouth allows for better management of pain.   Your nurse will encourage you to take oral pain medications whenever possible.  IV medications work almost immediately, but only last a short while.  Taking medications by mouth allows for a more constant level of medication in your blood stream for a longer period of time.      Once your pain is out of control it is harder to get back under control.  It is important you are aware when your next dose of pain medication is due.  If you are admitted, your nurse may write the time of your next dose on the white board in your room to help you remember.      We are  interested in your pain and encourage you to inform us about aggravating factors during your visit.   Many times a simple repositioning every few hours can make a big difference.    If your physician says it is okay, do not let your pain prevent you from getting out of bed. Be sure to call your nurse for assistance prior to getting up so you do not fall.      Before surgery, please decide your tolerable pain goal.  These faces help describe the pain ratings we use on a 0-10 scale.   Be prepared to tell us your goal and whether or not you take pain or anxiety medications at home.

## 2022-03-25 ENCOUNTER — ANESTHESIA EVENT (OUTPATIENT)
Dept: PERIOP | Facility: HOSPITAL | Age: 82
End: 2022-03-25

## 2022-03-25 ENCOUNTER — HOSPITAL ENCOUNTER (OUTPATIENT)
Facility: HOSPITAL | Age: 82
Setting detail: HOSPITAL OUTPATIENT SURGERY
Discharge: HOME | End: 2022-03-25
Attending: UROLOGY | Admitting: UROLOGY

## 2022-03-25 ENCOUNTER — ANESTHESIA (OUTPATIENT)
Dept: PERIOP | Facility: HOSPITAL | Age: 82
End: 2022-03-25

## 2022-03-25 VITALS
OXYGEN SATURATION: 93 % | RESPIRATION RATE: 16 BRPM | TEMPERATURE: 97.5 F | HEART RATE: 53 BPM | DIASTOLIC BLOOD PRESSURE: 85 MMHG | SYSTOLIC BLOOD PRESSURE: 153 MMHG

## 2022-03-25 DIAGNOSIS — N32.9 LESION OF BLADDER: Primary | ICD-10-CM

## 2022-03-25 DIAGNOSIS — N32.9 LESION OF URINARY BLADDER: ICD-10-CM

## 2022-03-25 LAB
QT INTERVAL: 462 MS
QTC INTERVAL: 421 MS

## 2022-03-25 PROCEDURE — 88305 TISSUE EXAM BY PATHOLOGIST: CPT | Performed by: UROLOGY

## 2022-03-25 PROCEDURE — 25010000002 ONDANSETRON PER 1 MG: Performed by: NURSE ANESTHETIST, CERTIFIED REGISTERED

## 2022-03-25 PROCEDURE — 25010000002 FENTANYL CITRATE (PF) 100 MCG/2ML SOLUTION: Performed by: NURSE ANESTHETIST, CERTIFIED REGISTERED

## 2022-03-25 PROCEDURE — 52234 CYSTOSCOPY AND TREATMENT: CPT | Performed by: UROLOGY

## 2022-03-25 PROCEDURE — 25010000002 PROPOFOL 10 MG/ML EMULSION: Performed by: NURSE ANESTHETIST, CERTIFIED REGISTERED

## 2022-03-25 PROCEDURE — S0260 H&P FOR SURGERY: HCPCS | Performed by: UROLOGY

## 2022-03-25 RX ORDER — CEPHALEXIN 500 MG/1
500 CAPSULE ORAL 2 TIMES DAILY
Qty: 5 CAPSULE | Refills: 0 | Status: SHIPPED | OUTPATIENT
Start: 2022-03-25 | End: 2022-11-16

## 2022-03-25 RX ORDER — MAGNESIUM HYDROXIDE 1200 MG/15ML
LIQUID ORAL AS NEEDED
Status: DISCONTINUED | OUTPATIENT
Start: 2022-03-25 | End: 2022-03-25 | Stop reason: HOSPADM

## 2022-03-25 RX ORDER — SODIUM CHLORIDE 0.9 % (FLUSH) 0.9 %
3 SYRINGE (ML) INJECTION EVERY 12 HOURS SCHEDULED
Status: DISCONTINUED | OUTPATIENT
Start: 2022-03-25 | End: 2022-03-25 | Stop reason: HOSPADM

## 2022-03-25 RX ORDER — DROPERIDOL 2.5 MG/ML
0.62 INJECTION, SOLUTION INTRAMUSCULAR; INTRAVENOUS ONCE AS NEEDED
Status: DISCONTINUED | OUTPATIENT
Start: 2022-03-25 | End: 2022-03-25 | Stop reason: HOSPADM

## 2022-03-25 RX ORDER — FENTANYL CITRATE 50 UG/ML
25 INJECTION, SOLUTION INTRAMUSCULAR; INTRAVENOUS
Status: DISCONTINUED | OUTPATIENT
Start: 2022-03-25 | End: 2022-03-25 | Stop reason: HOSPADM

## 2022-03-25 RX ORDER — OXYCODONE AND ACETAMINOPHEN 7.5; 325 MG/1; MG/1
2 TABLET ORAL EVERY 4 HOURS PRN
Status: DISCONTINUED | OUTPATIENT
Start: 2022-03-25 | End: 2022-03-25 | Stop reason: HOSPADM

## 2022-03-25 RX ORDER — SORBITOL 30 G/1000ML
IRRIGANT IRRIGATION AS NEEDED
Status: DISCONTINUED | OUTPATIENT
Start: 2022-03-25 | End: 2022-03-25 | Stop reason: HOSPADM

## 2022-03-25 RX ORDER — LABETALOL HYDROCHLORIDE 5 MG/ML
5 INJECTION, SOLUTION INTRAVENOUS
Status: DISCONTINUED | OUTPATIENT
Start: 2022-03-25 | End: 2022-03-25 | Stop reason: HOSPADM

## 2022-03-25 RX ORDER — SODIUM CHLORIDE, SODIUM LACTATE, POTASSIUM CHLORIDE, CALCIUM CHLORIDE 600; 310; 30; 20 MG/100ML; MG/100ML; MG/100ML; MG/100ML
100 INJECTION, SOLUTION INTRAVENOUS CONTINUOUS
Status: DISCONTINUED | OUTPATIENT
Start: 2022-03-25 | End: 2022-03-25 | Stop reason: HOSPADM

## 2022-03-25 RX ORDER — FLUMAZENIL 0.1 MG/ML
0.2 INJECTION INTRAVENOUS AS NEEDED
Status: DISCONTINUED | OUTPATIENT
Start: 2022-03-25 | End: 2022-03-25 | Stop reason: HOSPADM

## 2022-03-25 RX ORDER — SODIUM CHLORIDE, SODIUM LACTATE, POTASSIUM CHLORIDE, CALCIUM CHLORIDE 600; 310; 30; 20 MG/100ML; MG/100ML; MG/100ML; MG/100ML
1000 INJECTION, SOLUTION INTRAVENOUS CONTINUOUS
Status: DISCONTINUED | OUTPATIENT
Start: 2022-03-25 | End: 2022-03-25 | Stop reason: HOSPADM

## 2022-03-25 RX ORDER — PROPOFOL 10 MG/ML
VIAL (ML) INTRAVENOUS AS NEEDED
Status: DISCONTINUED | OUTPATIENT
Start: 2022-03-25 | End: 2022-03-25 | Stop reason: SURG

## 2022-03-25 RX ORDER — ONDANSETRON 2 MG/ML
INJECTION INTRAMUSCULAR; INTRAVENOUS AS NEEDED
Status: DISCONTINUED | OUTPATIENT
Start: 2022-03-25 | End: 2022-03-25 | Stop reason: SURG

## 2022-03-25 RX ORDER — ACETAMINOPHEN 500 MG
1000 TABLET ORAL ONCE
Status: COMPLETED | OUTPATIENT
Start: 2022-03-25 | End: 2022-03-25

## 2022-03-25 RX ORDER — FENTANYL CITRATE 50 UG/ML
INJECTION, SOLUTION INTRAMUSCULAR; INTRAVENOUS AS NEEDED
Status: DISCONTINUED | OUTPATIENT
Start: 2022-03-25 | End: 2022-03-25 | Stop reason: SURG

## 2022-03-25 RX ORDER — ONDANSETRON 2 MG/ML
4 INJECTION INTRAMUSCULAR; INTRAVENOUS ONCE AS NEEDED
Status: DISCONTINUED | OUTPATIENT
Start: 2022-03-25 | End: 2022-03-25 | Stop reason: HOSPADM

## 2022-03-25 RX ORDER — NALOXONE HCL 0.4 MG/ML
0.4 VIAL (ML) INJECTION AS NEEDED
Status: DISCONTINUED | OUTPATIENT
Start: 2022-03-25 | End: 2022-03-25 | Stop reason: HOSPADM

## 2022-03-25 RX ORDER — IBUPROFEN 600 MG/1
600 TABLET ORAL ONCE AS NEEDED
Status: DISCONTINUED | OUTPATIENT
Start: 2022-03-25 | End: 2022-03-25 | Stop reason: HOSPADM

## 2022-03-25 RX ORDER — SODIUM CHLORIDE 0.9 % (FLUSH) 0.9 %
3-10 SYRINGE (ML) INJECTION AS NEEDED
Status: DISCONTINUED | OUTPATIENT
Start: 2022-03-25 | End: 2022-03-25 | Stop reason: HOSPADM

## 2022-03-25 RX ORDER — LIDOCAINE HYDROCHLORIDE 10 MG/ML
0.5 INJECTION, SOLUTION EPIDURAL; INFILTRATION; INTRACAUDAL; PERINEURAL ONCE AS NEEDED
Status: DISCONTINUED | OUTPATIENT
Start: 2022-03-25 | End: 2022-03-25 | Stop reason: HOSPADM

## 2022-03-25 RX ORDER — SODIUM CHLORIDE 0.9 % (FLUSH) 0.9 %
3 SYRINGE (ML) INJECTION AS NEEDED
Status: DISCONTINUED | OUTPATIENT
Start: 2022-03-25 | End: 2022-03-25 | Stop reason: HOSPADM

## 2022-03-25 RX ORDER — OXYCODONE AND ACETAMINOPHEN 10; 325 MG/1; MG/1
1 TABLET ORAL ONCE AS NEEDED
Status: DISCONTINUED | OUTPATIENT
Start: 2022-03-25 | End: 2022-03-25 | Stop reason: HOSPADM

## 2022-03-25 RX ADMIN — OXYCODONE HYDROCHLORIDE AND ACETAMINOPHEN 2 TABLET: 7.5; 325 TABLET ORAL at 10:28

## 2022-03-25 RX ADMIN — FENTANYL CITRATE 100 MCG: 50 INJECTION, SOLUTION INTRAMUSCULAR; INTRAVENOUS at 09:30

## 2022-03-25 RX ADMIN — PROPOFOL 100 MG: 10 INJECTION, EMULSION INTRAVENOUS at 09:30

## 2022-03-25 RX ADMIN — SODIUM CHLORIDE, POTASSIUM CHLORIDE, SODIUM LACTATE AND CALCIUM CHLORIDE 1000 ML: 600; 310; 30; 20 INJECTION, SOLUTION INTRAVENOUS at 08:23

## 2022-03-25 RX ADMIN — CEFAZOLIN SODIUM 2 G: 1 INJECTION, POWDER, FOR SOLUTION INTRAMUSCULAR; INTRAVENOUS at 09:38

## 2022-03-25 RX ADMIN — ONDANSETRON 4 MG: 2 INJECTION INTRAMUSCULAR; INTRAVENOUS at 09:53

## 2022-03-25 RX ADMIN — ACETAMINOPHEN 1000 MG: 500 TABLET, FILM COATED ORAL at 08:23

## 2022-03-25 NOTE — ANESTHESIA PREPROCEDURE EVALUATION
Anesthesia Evaluation     Patient summary reviewed and Nursing notes reviewed   no history of anesthetic complications:  NPO Solid Status: > 8 hours  NPO Liquid Status: > 8 hours           Airway   Mallampati: I  TM distance: >3 FB  Neck ROM: full  No difficulty expected  Dental    (+) edentulous    Pulmonary    (+) a smoker Former, COPD, sleep apnea on CPAP,   Cardiovascular   Exercise tolerance: good (4-7 METS)    (+) hyperlipidemia,       Neuro/Psych  (-) seizures  GI/Hepatic/Renal/Endo    (-) liver disease, no renal disease, diabetes    Musculoskeletal     Abdominal    Substance History      OB/GYN          Other   arthritis,    history of cancer (bladder) active                    Anesthesia Plan    ASA 3     general     intravenous induction     Anesthetic plan, all risks, benefits, and alternatives have been provided, discussed and informed consent has been obtained with: patient.        CODE STATUS:

## 2022-03-25 NOTE — ANESTHESIA POSTPROCEDURE EVALUATION
Patient: Ezekiel ALDRIDGE    Procedure Summary     Date: 03/25/22 Room / [facility]  [facility]    Anesthesia Start: 0928 Anesthesia Stop: 1011    Procedure: CYSTOSCOPY TRANSURETHRAL RESECTION OF BLADDER TUMOR (N/A Bladder) Diagnosis:       Lesion of urinary bladder      (Lesion of urinary bladder [N32.9])    Surgeons: Cale Covington MD Provider: Trino Godoy CRNA    Anesthesia Type: general ASA Status: 3          Anesthesia Type: general    Vitals  Vitals Value Taken Time   /68 03/25/22 1034   Temp 97.5 °F (36.4 °C) 03/25/22 1022   Pulse 64 03/25/22 1034   Resp 18 03/25/22 1022   SpO2 95 % 03/25/22 1034   Vitals shown include unvalidated device data.        Post Anesthesia Care and Evaluation    Patient location during evaluation: PACU  Patient participation: complete - patient participated  Level of consciousness: awake and alert  Pain management: adequate  Airway patency: patent  Anesthetic complications: No anesthetic complications  PONV Status: none  Cardiovascular status: acceptable and hemodynamically stable  Respiratory status: acceptable  Hydration status: acceptable    Comments: Blood pressure 167/83, pulse 69, temperature 97.5 °F (36.4 °C), resp. rate 18, SpO2 97 %.    Patient discharged from PACU based upon Geni score. Please see RN notes for further details

## 2022-03-25 NOTE — H&P
Urology H&P    Mr. ALDRIDGE is 82 y.o. male    CHIEF COMPLAINT: Bladder tumor    HPI  82-year-old white male with an anterior bladder wall lesion.  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    Review of Systems  Review  of systems  Constitutional: Negative for chills and fever.   Gastrointestinal: Negative for abdominal pain, anal bleeding and blood in stool.   Genitourinary: Negative for flank pain and hematuria.      Medications Prior to Admission   Medication Sig Dispense Refill Last Dose   • HYDROcodone-acetaminophen (NORCO) 5-325 MG per tablet Take 1 tablet by mouth Every 6 (Six) Hours As Needed (Pain). 8 tablet 0    • simvastatin (ZOCOR) 20 MG tablet Take 20 mg by mouth Every Night.      • vitamin B-12 (CYANOCOBALAMIN) 1000 MCG tablet Take 1,000 mcg by mouth Daily.            Current Facility-Administered Medications:   •  ceFAZolin (ANCEF) in SWFI 2 g/20ml IV PUSH syringe, 2 g, Intravenous, Once, Cale Covington MD  •  lactated ringers infusion 1,000 mL, 1,000 mL, Intravenous, Continuous, Cale Covington MD  •  sodium chloride 0.9 % flush 3 mL, 3 mL, Intravenous, PRN, Cale Covington MD    Past Medical History:   Diagnosis Date   • AAA (abdominal aortic aneurysm) (HCC)    • Arthritis    • Bilateral shoulder pain    • Cancer (HCC) 08/2017    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   • Carotid artery stenosis    • Chronic low back pain    • COPD (chronic  obstructive pulmonary disease) (HCC)    • Degenerative disc disease, lumbar    • Diverticulosis    • History of adenomatous polyp of colon    • Hyperlipidemia    • Multiple myeloma (HCC)    • Sleep apnea with use of continuous positive airway pressure (CPAP)        Past Surgical History:   Procedure Laterality Date   • CHOLECYSTECTOMY WITH INTRAOPERATIVE CHOLANGIOGRAM N/A 7/25/2017    Procedure: CHOLECYSTECTOMY LAPAROSCOPIC AND REMOVAL CYST ON CHEST;  Surgeon: Ina Issa MD;  Location: [facility] OR;  Service:    • COLONOSCOPY  07/24/2012    Nine polyps removed; Repeat 3 years   • COLONOSCOPY N/A 9/19/2017    One diminutive polyp in ascending colon-Complete resection-Polyp tissue not retrieved; Two 4-5mm polyps at hepatic flexure; One 6mm polyp in proximal transverse colon; One 7mm polyp in distal transverse colon-Complete resection-polyp tissue not retrieved; One 5mm polyp in proximal descending colon-Complete resection-Polyp tissue not retrieved; Diverticulosis in sigmoid colon; Repeat 3 years   • COLONOSCOPY N/A 9/1/2020    One 5mm tubular adenomatous polyp in the ascending colon; Two 5-6mm tubular adenomatous polyps in the proximal transverse colon; One 5mm tubular adenomatous polyp in the sigmoid colon; Diverticulosis in the sigmoid colon; The examination was otherwise normal on direct and retroflexion views; Repeat 3 years   • COLONOSCOPY N/A 6/10/2021    Procedure: COLONOSCOPY WITH ANESTHESIA;  Surgeon: Jimmie Tirado MD;  Location: [facility] ENDOSCOPY;  Service: Gastroenterology;  Laterality: N/A;  pre hx colon polyp  post   dr patsy mims   • CYSTOSCOPY BLADDER BIOPSY N/A 2/22/2019    Procedure: CYSTOSCOPY BILATERAL RETROGRADE PYELOGRAM BLADDER BIOPSY WITH FULGURATION OF 1 CM BLADDER TUMOR;  Surgeon: Cale Covington MD;  Location: [facility] OR;  Service: Urology   • HEMORRHOIDECTOMY     • INGUINAL HERNIA REPAIR Bilateral    • TRANSURETHRAL RESECTION OF BLADDER TUMOR Bilateral 8/22/2017    Procedure:  CYSTOSCOPY TRANSURETHRAL RESECTION OF BLADDER TUMOR bilateral retrogrades;  Surgeon: Cale Covington MD;  Location:  [facility] OR;  Service:    • TRANSURETHRAL RESECTION OF BLADDER TUMOR N/A 2/22/2021    Procedure: CYSTOSCOPY TRANSURETHRAL RESECTION OF BLADDER TUMOR, GEMCITABINE INTRAVESICAL INSTILLATION & BILATERAL RETROGRADE URETEROPYELOGRAMS;  Surgeon: Cale Covington MD;  Location:  [facility] OR;  Service: Urology;  Laterality: N/A;       Social History     Socioeconomic History   • Marital status:    Tobacco Use   • Smoking status: Former Smoker     Packs/day: 0.75     Years: 55.00     Pack years: 41.25     Types: Cigarettes     Quit date: 2019     Years since quitting: 3.2   • Smokeless tobacco: Never Used   • Tobacco comment: recently quit   Vaping Use   • Vaping Use: Never used   Substance and Sexual Activity   • Alcohol use: No   • Drug use: No   • Sexual activity: Defer       Family History   Problem Relation Age of Onset   • No Known Problems Father    • No Known Problems Mother    • Colon cancer Neg Hx    • Colon polyps Neg Hx        /76 (BP Location: Left arm, Patient Position: Sitting)   Pulse 63   Temp 97.7 °F (36.5 °C) (Temporal)   Resp 16   SpO2 95%     Physical Exam  GI: Soft. The patient exhibits no distension and no mass. There is no tenderness. There is no rebound and no guarding. No hernia.   : No CVA tenderness over kidneys.  Bladder not palpably distended.  Psychiatric:  normal mood and affect. Not agitated.         Lab Results   Component Value Date    GLUCOSE 101 (H) 03/22/2022    BUN 12 03/22/2022    CREATININE 0.85 03/22/2022    EGFRIFNONA >60 10/28/2021    EGFRIFAFRI >59 08/31/2021    BCR 14.1 03/22/2022    CO2 31.0 (H) 03/22/2022    CALCIUM 9.4 03/22/2022    ALBUMIN 3.9 10/28/2021    AST 19 10/28/2021    ALT 15 (L) 10/28/2021     Lab Results   Component Value Date    GLUCOSE 101 (H) 03/22/2022    CALCIUM 9.4 03/22/2022     03/22/2022    K 4.3 03/22/2022    CO2 31.0  (H) 03/22/2022     03/22/2022    BUN 12 03/22/2022    CREATININE 0.85 03/22/2022    EGFRIFAFRI >59 08/31/2021    EGFRIFNONA >60 10/28/2021    BCR 14.1 03/22/2022    ANIONGAP 6.0 03/22/2022     Lab Results   Component Value Date    WBC 3.77 03/22/2022    HGB 13.6 03/22/2022    HCT 40.4 03/22/2022    MCV 97.3 (H) 03/22/2022     (L) 03/22/2022     No results found for: PSA  No results found for: URINECX  Brief Urine Lab Results  (Last result in the past 365 days)      Color   Clarity   Blood   Leuk Est   Nitrite   Protein   CREAT   Urine HCG        03/03/22 0836 Yellow   Clear   Negative   Negative   Negative   Negative                   Imaging Results (Last 7 Days)     ** No results found for the last 168 hours. **          Assessment and Plan  Bladder lesion  History of urothelial carcinoma    -Plan cystoscopy with biopsy and fulguration of less than 2 cm lesion.      (Please note that portions of this note were completed with a voice recognition program.)  Cale Covington MD  03/25/22  07:34 CDT

## 2022-03-25 NOTE — OP NOTE
Operative Summary    Ezekiel JOHNATHON MILLAY  Date of Procedure: 3/25/2022    Pre-op Diagnosis:   Lesion of urinary bladder [N32.9]    Post-op Diagnosis:     Post-Op Diagnosis Codes:     * Lesion of urinary bladder [N32.9]    Procedure/CPT® Codes:      Procedure(s):  CYSTOSCOPY WITH BIOPSY AND FULGURATION BLADDER LESION 1x1  cm     Surgeon(s):  Cale Covington MD    Anesthesia: General    Staff:   Circulator: Sue Yuan RN; Baldev Chu RN  Scrub Person: Ghassan Barajas; Sol Castillo    Indications for procedure:  Bladder lesion identified on surveillance cystoscopy    Findings:   1 x 1 cm lesion anterior bladder wall, small and papillary    Procedure details:  After appropriate anesthesia, positioning, prep and drape, timeout protocol was observed.     Bladder is inspected with 30 and 70 degree lens.  Anterior lesion is all that is noted.  He has moderate by lobar enlargement.  Bladder is moderately trabeculated.  Using the Taughber forceps I was able to biopsy this lesion which essentially removed it in its entirety.  I did fulgurate the area around the biopsy specimen.  I used the Taughber forceps to burn the biopsy site itself.  We did get a good sample.  Three-way catheter was put in at the end and he started on continuous bladder irrigation.    Estimated Blood Loss: Less than 30 mL    Specimens:                Specimens     ID Source Type Tests Collected By Collected At Frozen?    A Urinary Bladder Tissue · TISSUE PATHOLOGY EXAM   Cale Covington MD 3/25/22 0805     Description: ANTERIOR BLADDER WALL LESION            Drains: 18 [place] Lou[place] catheter to be removed in recovery room    Complications: none    Plan: Patient will follow up in office dependent upon pathology results.  If this shows another low malignant potential or low-grade lesion we will cystoscope him again in a year.      (Please note that portions of this note were completed with a voice recognition program.)  Cale Covington MD      Date: 3/25/2022  Time: 10:12 CDT

## 2022-03-25 NOTE — ANESTHESIA PROCEDURE NOTES
Airway    General Information and Staff    CRNA: Trino Godoy, CRNA    Indications and Patient Condition  Indications for airway management: CNS depression    Preoxygenated: yes  Mask difficulty assessment: 0 - not attempted    Final Airway Details  Final airway type: supraglottic airway      Successful airway: I-gel  Size 4    Number of attempts at approach: 1  Assessment: lips, teeth, and gum same as pre-op and atraumatic intubation    Additional Comments  Atraumatic Insertion

## 2022-03-29 NOTE — PROGRESS NOTES
Patient notified that his biopsy showed your urothelial papilloma.  This is no evidence of cancer.  He does have a prior history of urothelial carcinoma which makes me feel like we should again do cystoscopy in 1 year.  I will ask my staff to set this up

## 2022-04-05 ENCOUNTER — HOSPITAL ENCOUNTER (OUTPATIENT)
Dept: INFUSION THERAPY | Age: 82
Discharge: HOME | End: 2022-04-05
Payer: MEDICARE

## 2022-04-05 DIAGNOSIS — E53.8 B12 DEFICIENCY: ICD-10-CM

## 2022-04-05 DIAGNOSIS — D47.2 SMOLDERING MULTIPLE MYELOMA: ICD-10-CM

## 2022-04-05 LAB
ALBUMIN SERPL-MCNC: 4 G/DL (ref 3.5–5.2)
ALP BLD-CCNC: 98 U/L (ref 40–130)
ALT SERPL-CCNC: 17 U/L (ref 21–72)
ANION GAP SERPL CALCULATED.3IONS-SCNC: 3 MMOL/L (ref 7–19)
AST SERPL-CCNC: 23 U/L (ref 17–59)
BASOPHILS ABSOLUTE: 0.02 K/UL (ref 0.01–0.08)
BASOPHILS RELATIVE PERCENT: 0.4 % (ref 0.1–1.2)
BILIRUB SERPL-MCNC: 0.7 MG/DL (ref 0.2–1.3)
BUN BLDV-MCNC: 10 MG/DL (ref 9–20)
CALCIUM SERPL-MCNC: 9.3 MG/DL (ref 8.4–10.2)
CHLORIDE BLD-SCNC: 106 MMOL/L (ref 98–111)
CO2: 31 MMOL/L (ref 22–29)
CREAT SERPL-MCNC: 0.8 MG/DL (ref 0.6–1.2)
EOSINOPHILS ABSOLUTE: 0.07 K/UL (ref 0.04–0.54)
EOSINOPHILS RELATIVE PERCENT: 1.5 % (ref 0.7–7)
GFR NON-AFRICAN AMERICAN: >60
GLOBULIN: 3.2 G/DL
GLUCOSE BLD-MCNC: 90 MG/DL (ref 74–106)
HCT VFR BLD CALC: 40.7 % (ref 40.1–51)
HEMOGLOBIN: 13.6 G/DL (ref 13.7–17.5)
LYMPHOCYTES ABSOLUTE: 1.62 K/UL (ref 1.18–3.74)
LYMPHOCYTES RELATIVE PERCENT: 35.5 % (ref 19.3–53.1)
MCH RBC QN AUTO: 32.5 PG (ref 25.7–32.2)
MCHC RBC AUTO-ENTMCNC: 33.4 G/DL (ref 32.3–36.5)
MCV RBC AUTO: 97.1 FL (ref 79–92.2)
MONOCYTES ABSOLUTE: 0.42 K/UL (ref 0.24–0.82)
MONOCYTES RELATIVE PERCENT: 9.2 % (ref 4.7–12.5)
NEUTROPHILS ABSOLUTE: 2.43 K/UL (ref 1.56–6.13)
NEUTROPHILS RELATIVE PERCENT: 53.4 % (ref 34–71.1)
PDW BLD-RTO: 12.1 % (ref 11.6–14.4)
PLATELET # BLD: 122 K/UL (ref 163–337)
PMV BLD AUTO: 10.3 FL (ref 7.4–10.4)
POTASSIUM SERPL-SCNC: 4.5 MMOL/L (ref 3.5–5.1)
RBC # BLD: 4.19 M/UL (ref 4.63–6.08)
SODIUM BLD-SCNC: 140 MMOL/L (ref 137–145)
TOTAL PROTEIN: 7.2 G/DL (ref 6.3–8.2)
WBC # BLD: 4.56 K/UL (ref 4.23–9.07)

## 2022-04-05 PROCEDURE — 80053 COMPREHEN METABOLIC PANEL: CPT

## 2022-04-05 PROCEDURE — 85025 COMPLETE CBC W/AUTO DIFF WBC: CPT

## 2022-04-05 PROCEDURE — 36415 COLL VENOUS BLD VENIPUNCTURE: CPT | Performed by: NURSE PRACTITIONER

## 2022-04-05 PROCEDURE — 36415 COLL VENOUS BLD VENIPUNCTURE: CPT

## 2022-04-10 LAB — METHYLMALONIC ACID: 0.23 UMOL/L (ref 0–0.4)

## 2022-04-11 NOTE — PROGRESS NOTES
Progress Note      Pt Name: Laura Yang  YOB: 1940  MRN: [id number]    Date of evaluation: 4/12/2022  History Obtained From:  Patient, EMR    Portions of this note have been copied forward, however, changed to reflect the most current clinical status of this patient. Chief Complaint   Patient presents with    Follow-up     Smoldering multiple myeloma ([address]     HISTORY OF PRESENT ILLNESS:    Patricia Rios \"Jesus Alberto\" Raimundo Meza is an [de-identified] y.o.  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 Jessee Nix  · History of multiple benign lung nodules/fibrosis, managed by Dr. Roselyn Murrell  · AAA, followed by CORNELIUS Kendrick has been monitored conservatively from a hematology standpoint. He has not required intervention for smoldering myeloma originally diagnosed 1 year ago in April, 2021. He continues oral B12 supplementation for history of deficiency. He denies new complaint or changes in health history aside from recent cystoscopy. Bladder lesion biopsy was negative for malignancy. Recall 1 year, due 3/2023 by Dr. Rocio Sánchez. Constipation exacerbated by analgesia is chronic and stable according to Chela Pascal. Blood pressure and weight are stable. He remains active, awaiting warmer weather to resume playing golf.   Chela Pascal is accompanied by his wife.       HEMATOLOGY HISTORY: Smoldering multiple myeloma  Patricia Rios \"Jesus Alberto\" Raimundo Meza was seen in hematology consultation 10/26/2020, referred by Dr. Shanell Wellington for evaluation of leukopenia and anemia.     PMH significant for history of arthritis, hyperlipidemia, lung nodule, histoplasmosis, bladder cancer. Angelica Brizuela denies any known history of thrombosis or blood clotting and this is removed from the [address] in [facility].     Review of CBCs at [facility]:       Review of CBCs at [facility]":       Previous abdominal/pelvic CT without contrast 11/19/2019 documented scattered, calcified granulomas in the liver and spleen.     Colonoscopy was performed 9/1/2020 by Dr. Daxa Renteria. One polyp each in the ascending, proximal transverse and sigmoid colon were negative for high-grade dysplasia. Angelica Brizuela is followed by Dr. Aurora Castillo regarding history of lung nodule.     CBC at presentation on 10/28/2020 showed a WBC of 5.42, Hgb 14.6/.2 and platelet count 90,012. Both WBC and Hgb are within normal limits. Platelets have been intermittently low since 2016 and have continued to decrease over the past year.     Angelica Brizuela is a nondrinker. He denies known history of liver disease. Medications include hydrocodone and Zocor. He has taken Zocor for the past 8-9 years.     Family history includes lung cancer in his sister.     Labs 10/28/2020:  · Vitamin B12 level: 205  · Folate: 13.5  · TSH: 1.04  · LDH: 360  · LIONEL: Negative  · Hepatitis panel: Negative  · HIV 1&2: non-reactive  · Antiplatelet antibodies: negative  · SPEP: Monoclonal IgA kappa = 0.2, monoclonal IgG kappa = 0.1  · Immunofixation: IgG monoclonal protein with kappa light chain specificity, IgA monoclonal protein with kappa light chain specificity  · Quantitative immunoglobulins: IgG 853, IgA 458 (), IgM 51  · Kappa light chains: 37 (3.3-19.4), lambda light chains 21.0, K/L ratio 1.76  · PTT: 23  · PT/INR: 10.3/1.0     · Spleen ultrasound 11/5/2020 at \[facility]\"": normal spleen size of 10.2 x 9.7 x 4.6 cm     · 24-hour urine for immunoelectrophoresis 11/9/2020: No M-spike. immunofixation not completed due to lack of M protein  · Bone survey 11/4/2020: Negative for suspicious bony lesions    MMA was elevated at 2164 on 11/10/2020.  Weekly parenteral B12 injections x6 were initiated 11/10/2020, to be followed by monthly injections at thereafter. Will monitor platelet count with Y29 replacement.     Bone marrow aspirate and biopsy 4/27/2021:  · normocellular marrow (20-25%) with 12% involvement of bone marrow cellularity by clonal plasma cells. No dysplasia, no increase in blasts. · FLOW cytometry: Polytypic B-cells, monotypic kappa-restricted plasma cell population (0.2%) of total events, no T-cell aberrant antigenic expression  · FISH study: Negative  · Cytogenetics: Loss of Y chromosome, which is an age-related phenomenon and is of no known diagnostic or prognostic significance    PET scan 5/18/2021 at [address]:  · NO abnormal osseous uptake  · NO hypermetabolic pulmonary nodules  · Soft tissue density in the left inguinal canal with mild uptake of 2.78  · Focal increased uptake in the terminal ileum, without underlying mass identified, SUV 8.55, possibly an underlying inflammatory process    Colonoscopy 6/10/2021 by Dr. Fam Rea revealed two 3-4 mm benign polyps. Specifically, the terminal ileum appeared normal.  Minda Matthews has a known history of bilateral inguinal hernias, with repair     Findings consistent with smoldering myeloma. TREATMENT SUMMARY:  1. Weekly parenteral B12 injections x4 initiated 11/10/2020, monthly thereafter.      ONCOLOGY HISTORY, superficial bladder cancer  Minda Matthews has a history of TURBT x2.     TURBT 8/22/2017:  1.  Bladder tumor, right lateral wall, transurethral resection:  Low-grade papillary urothelial carcinoma, noninvasive. Muscularis propria not present. 2.  Bladder tumor, transurethral resection:  Low-grade papillary urothelial carcinoma, noninvasive. Muscularis propria identified with no evidence of tumor involvement. AJCC pathologic stage:  pTa Nx     TURBT 2/22/2019:  Urinary bladder, biopsy of bladder dome: Noninvasive low-grade papillary urothelial carcinoma with severe underlying chronic inflammation.     Cystoscopy 5/26/2020 by Dr. Drake Evans was without evidence of mass. Cystoscopy 2/2/2021 by Dr. Michel Webber documented four bladder tumors. The largest was 2 cm on the anterior bladder wall with an additional three 1 cm tumors that are located on the dome. Cystoscopy,TURBT and gemcitabine intravesical instillation on was performed 2/22/2021 by Dr. Michel Webber. The 2 cm anterior superficial bladder wall tumor was biopsied. After near complete removal, the largest lesion as well as the other 2 previously mentioned lesions were fulgurated. 2000 mg gemcitabine was placed intravesically. 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 Cystoscopy 9/2/2021 by Dr. Michel Webber was negative. Cystoscopy, TURBT 3/25/2022 for anterior bladder wall lesion: Pathology revealed Urothelial papilloma. Repeat cytoscopy 1 year (3/2024)    Past Medical History:   Diagnosis Date    AAA (abdominal aortic aneurysm) (HCC)     Arthritis     Bilateral low back pain without sciatica 1/13/2016    Bilateral shoulder pain 1/13/2016    Cancer [address] [address] 09/2017    Bladder CA    Carotid artery stenosis     CPAP (continuous positive airway pressure) dependence     8cm to 20cm    DDD (degenerative disc disease), lumbar     Hyperlipidemia     Obstructive sleep apnea     AHI: 57.6 per PSG, 11/2018     Past Surgical History:   Procedure Laterality Date    BLADDER SURGERY  08/22/2017    Removal of Cancer by Dr. Banerjee Ash  02/2021    Dr. Michel Webber- date approxamate     CHOLECYSTECTOMY, LAPAROSCOPIC      july 25, 2017    COLONOSCOPY      HEMORRHOID SURGERY      HERNIA REPAIR      8/2016     Current Outpatient Medications   Medication Sig Dispense Refill    simvastatin (ZOCOR) 20 MG tablet TAKE 1 TABLET BY MOUTH DAILY  1    HYDROcodone-acetaminophen (NORCO)  MG per tablet Take 1 tablet by mouth every 6 hours as needed for Pain.       cyanocobalamin ([facility] VITAMIN B12) 1000 MCG tablet Take 1 tablet by mouth daily 30 tablet 12     No current facility-administered medications for this visit. Allergies   Allergen Reactions    Celebrex [Celecoxib]      Social History     Tobacco Use    Smoking status: Former Smoker     Packs/day: 0.25     Years: 67.00     Pack years: 16.75     Types: Cigarettes     Quit date: 02/2019     Years since quitting: 3.1    Smokeless tobacco: Never Used   Vaping Use    Vaping Use: Never used   Substance Use Topics    Alcohol use: No    Drug use: No     Family History   Problem Relation Age of Onset    Other Mother         Natural causes    Cancer Sister         Lung    Other Father         Emphysema       Review of Systems   Constitutional: Positive for fatigue. Negative for fever. HENT: Negative for dental problem, hearing loss, mouth sores, nosebleeds, sore throat and trouble swallowing. Eyes: Negative for discharge and itching. Respiratory: Negative for cough, shortness of breath and wheezing. No hemoptysis   Cardiovascular: Negative for chest pain, palpitations and leg swelling. Gastrointestinal: Positive for constipation (chronic). Negative for abdominal pain, diarrhea, nausea and vomiting. Endocrine: Negative for cold intolerance and heat intolerance. Genitourinary: Negative for dysuria and hematuria. History of bladder cancer, with recent cystoscopy   Musculoskeletal: Positive for arthralgias (Right hip, sciatica) and back pain (Chronic). Negative for joint swelling and myalgias. Skin: Negative for pallor and rash. Allergic/Immunologic: Negative for environmental allergies and immunocompromised state. Neurological: Negative for seizures, syncope and numbness. Hematological: Negative for adenopathy. Bruises/bleeds easily. Psychiatric/Behavioral: Negative for agitation, behavioral problems and confusion. The patient is not nervous/anxious. Physical Exam  Vitals reviewed.    Constitutional:       General: He is not in acute distress. Appearance: He is well-developed. He is not toxic-appearing or diaphoretic. Comments: Wearing a facial mask. HENT:      Head: Normocephalic and atraumatic. Right Ear: External ear normal.      Left Ear: External ear normal.      Nose: Nose normal.      Mouth/Throat:      Mouth: Mucous membranes are moist.   Eyes:      General: No scleral icterus. Right eye: No discharge. Left eye: No discharge. Conjunctiva/sclera: Conjunctivae normal.   Neck:      Trachea: No tracheal deviation. Cardiovascular:      Rate and Rhythm: Normal rate and regular rhythm. Pulmonary:      Effort: Pulmonary effort is normal. No respiratory distress. Breath sounds: Normal breath sounds. No wheezing or rales. Chest:   Breasts:      Right: No supraclavicular adenopathy. Left: No supraclavicular adenopathy. Abdominal:      General: Bowel sounds are normal. There is no distension. Palpations: Abdomen is soft. Tenderness: There is no abdominal tenderness. There is no guarding. Genitourinary:     Comments: Exam deferred  Musculoskeletal:         General: No tenderness or deformity. Cervical back: Neck supple. No muscular tenderness. Comments: Normal ROM all four extremities   Lymphadenopathy:      Cervical:      Right cervical: No superficial or deep cervical adenopathy. Left cervical: No superficial or deep cervical adenopathy. Upper Body:      Right upper body: No supraclavicular adenopathy. Left upper body: No supraclavicular adenopathy. Comments:      Skin:     General: Skin is warm and dry. Findings: No erythema or rash. Neurological:      Mental Status: He is alert and oriented to person, place, and time. Comments: follows commands, non-focal   Psychiatric:         Behavior: Behavior normal. Behavior is cooperative. Thought Content:  Thought content normal.         Judgment: Judgment normal. Comments: Alert and oriented to person, place and time. Vitals:    04/12/22 1340   BP: 130/70   Pulse: 84   SpO2: 98%   Weight: 166 lb 11.2 oz (75.6 kg)   Height: 5' 6\" (1.676 m)      Wt Readings from Last 3 Encounters:   04/12/22 166 lb 11.2 oz (75.6 kg)   12/17/21 160 lb (72.6 kg)   12/02/21 163 lb (73.9 kg)     LABS:  CBC 4/12/2022:  Lab Results   Component Value Date    WBC 4.56 04/05/2022    HGB 13.6 (L) 04/05/2022    HCT 40.7 04/05/2022    MCV 97.1 (H) 04/05/2022     (L) 04/05/2022    LYMPHOPCT 35.5 04/05/2022    RBC 4.19 (L) 04/05/2022    MCH 32.5 (H) 04/05/2022    MCHC 33.4 04/05/2022    RDW 12.1 04/05/2022     Labs 4/5/2022  · CBC: WBC: 4.56, RBC: 4.19, Hgb: 13.6/MCV: 32.5, Hct: 40.7, Platelets: 224,561  · CMP: ALT: 17, otherwise unremarkable  · MMA: 0.23    B12: not completed  SPEP: Not completed  Light Chains: not completed  B2M: Not completed    ASSESSMENT:    1. Smoldering multiple myeloma ([facility] [address]    2. B12 deficiency    3. History of bladder cancer       Lab Results   Component Value Date    WBC 4.56 04/05/2022    HGB 13.6 (L) 04/05/2022    HCT 40.7 04/05/2022    MCV 97.1 (H) 04/05/2022     (L) 04/05/2022    LYMPHOPCT 35.5 04/05/2022    RBC 4.19 (L) 04/05/2022    MCH 32.5 (H) 04/05/2022    MCHC 33.4 04/05/2022    RDW 12.1 04/05/2022     Smoldering Multiple Myeloma  Labs:      Myeloma studies were requested 4/5/2022, not completed and will be drawn today  Serology has been stable, continue observation    Thrombocytopenia chronic, stable  Likely multifactorial including both smoldering myeloma and B12 deficiency  Platelets improved, 396,902      Negative Bone marrow aspirate and biopsy as noted above    Vitamin B12 deficiency  Vitamin B12 level was 205 on 10/28/2020 with elevated MMA of 2164 on 11/10/2020  Jesus Alberto received B12 injections briefly, though preferred oral supplementation.     Continue B12 1000 mcg po daily  B12 level today was requested 4/5/2022, not completed and will be drawn today     History of bladder cancer  S/p TURBT 8/22/2017, 2/22/2019, 2/2/2021, 9/2/2021, 3/25/2022    Cystoscopy 2/2/2021 by Dr. Angelo Huffman documented four bladder tumors. The largest was 2 cm on the anterior bladder wall with an additional three 1 cm tumors that are located on the dome. Cystoscopy,TURBT and gemcitabine intravesical instillation on was performed 2/22/2021 by Dr. Angelo Huffman. The 2 cm anterior superficial bladder wall tumor was biopsied. After near complete removal, the largest lesion as well as the other 2 previously mentioned lesions were fulgurated. 2000 mg gemcitabine was placed intravesically. 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 cystoscopy 9/2/2021 by Dr. Angelo Huffman was without evidence of bladder mass. Plans are for repeat cystoscopy in 6 months    Cystoscopy, TURBT 3/25/2022 for anterior bladder wall lesion: Pathology revealed Urothelial papilloma. Health Maintenance  · PSA: As per urology  · Colonoscopy 6/10/2021 by Dr. Anais Diehl revealed two 3-4 mm benign polyps. Specifically, the terminal ileum appeared normal (h/o abn PET)    PLAN:  Continue oral B12 supplement  Repeat B12 level today  Repeat myeloma markers today - follow-up results  Repeat myeloma markers prior to return in 4 months  If serology remain stable in 4 months, will extend appointments to every 6 months for continued observation    Orders Placed This Encounter   Procedures    Vitamin B12    MONOCLONAL PROTEIN AND FLC, SERUM    Beta 2 Microglobulin, Serum    CBC with Auto Differential    Comprehensive Metabolic Panel    Beta 2 Microglobulin, Serum    MONOCLONAL PROTEIN AND FLC, SERUM       Return in about 4 months (around 8/12/2022) for follow up with CORNELIUS Parkinson (NO CBC DAY OF APT). I have seen, examined and reviewed this patient medication list, appropriate labs and imaging studies.  I reviewed relevant medical records and others physicians notes. I discussed the plan of care with the patient. I answered all questions to the patients satisfaction. I have also reviewed the chief complaint (CC) and part of the history (History of Present Illness (HPI), Past Family Social History [facility]), or Review of Systems (ROS) and made changes when appropriated. Office note/cystoscopy by Dr. Angelo Huffman reviewed, summarized as noted above. Dictated utilizing Dragon transcription software.          Gabe Sullivan, CORNELIUS  2:13 PM  4/12/2022

## 2022-04-12 ENCOUNTER — HOSPITAL ENCOUNTER (OUTPATIENT)
Dept: INFUSION THERAPY | Age: 82
Discharge: HOME | End: 2022-04-12
Payer: MEDICARE

## 2022-04-12 ENCOUNTER — OFFICE VISIT (OUTPATIENT)
Dept: HEMATOLOGY | Age: 82
End: 2022-04-12
Payer: MEDICARE

## 2022-04-12 VITALS
SYSTOLIC BLOOD PRESSURE: 130 MMHG | HEART RATE: 84 BPM | DIASTOLIC BLOOD PRESSURE: 70 MMHG | WEIGHT: 166.7 LBS | BODY MASS INDEX: 26.79 KG/M2 | OXYGEN SATURATION: 98 % | HEIGHT: 66 IN

## 2022-04-12 DIAGNOSIS — E53.8 B12 DEFICIENCY: ICD-10-CM

## 2022-04-12 DIAGNOSIS — Z85.51 HISTORY OF BLADDER CANCER: ICD-10-CM

## 2022-04-12 DIAGNOSIS — D47.2 SMOLDERING MULTIPLE MYELOMA: ICD-10-CM

## 2022-04-12 DIAGNOSIS — D47.2 SMOLDERING MULTIPLE MYELOMA: Primary | ICD-10-CM

## 2022-04-12 PROCEDURE — 4040F PNEUMOC VAC/ADMIN/RCVD: CPT | Performed by: NURSE PRACTITIONER

## 2022-04-12 PROCEDURE — G8417 CALC BMI ABV UP PARAM F/U: HCPCS | Performed by: NURSE PRACTITIONER

## 2022-04-12 PROCEDURE — 1036F TOBACCO NON-USER: CPT | Performed by: NURSE PRACTITIONER

## 2022-04-12 PROCEDURE — 99214 OFFICE O/P EST MOD 30 MIN: CPT | Performed by: NURSE PRACTITIONER

## 2022-04-12 PROCEDURE — 36415 COLL VENOUS BLD VENIPUNCTURE: CPT | Performed by: NURSE PRACTITIONER

## 2022-04-12 PROCEDURE — 99212 OFFICE O/P EST SF 10 MIN: CPT

## 2022-04-12 PROCEDURE — G8427 DOCREV CUR MEDS BY ELIG CLIN: HCPCS | Performed by: NURSE PRACTITIONER

## 2022-04-12 PROCEDURE — 1123F ACP DISCUSS/DSCN MKR DOCD: CPT | Performed by: NURSE PRACTITIONER

## 2022-04-12 ASSESSMENT — ENCOUNTER SYMPTOMS
VOMITING: 0
TROUBLE SWALLOWING: 0
EYE DISCHARGE: 0
COUGH: 0
EYE ITCHING: 0
DIARRHEA: 0
ABDOMINAL PAIN: 0
BACK PAIN: 1
SORE THROAT: 0
CONSTIPATION: 1
NAUSEA: 0
WHEEZING: 0
SHORTNESS OF BREATH: 0

## 2022-04-14 LAB — BETA-2 MICROGLOBULIN: 2.2 MG/L

## 2022-04-16 LAB
+IMM: ABNORMAL
ALBUMIN SERPL-MCNC: 3.77 G/DL (ref 3.75–5.01)
ALPHA-1-GLOBULIN: 0.33 G/DL (ref 0.19–0.46)
ALPHA-2-GLOBULIN: 0.75 G/DL (ref 0.48–1.05)
BETA GLOBULIN: 0.97 G/DL (ref 0.48–1.1)
GAMMA GLOBULIN: 0.97 G/DL (ref 0.62–1.51)
IGA: 470 MG/DL (ref 68–408)
IGG: 888 MG/DL (ref 768–1632)
IGM: 51 MG/DL (ref 35–263)
KAPPA FREE LIGHT CHAINS QNT: 34.47 MG/L (ref 3.3–19.4)
KAPPA/LAMBDA FREE LIGHT CHAIN RATIO: 1.62 (ref 0.26–1.65)
LAMBDA FREE LIGHT CHAINS QNT: 21.29 MG/L (ref 5.71–26.3)
SPE/IFE INTERPRETATION: ABNORMAL
TOTAL PROTEIN: 6.8 G/DL (ref 6.3–8.2)

## 2022-06-21 ENCOUNTER — HOSPITAL ENCOUNTER (OUTPATIENT)
Dept: CT IMAGING | Age: 82
Discharge: HOME | End: 2022-06-21
Payer: MEDICARE

## 2022-06-21 ENCOUNTER — OFFICE VISIT (OUTPATIENT)
Dept: VASCULAR SURGERY | Age: 82
End: 2022-06-21
Payer: MEDICARE

## 2022-06-21 VITALS
TEMPERATURE: 97.6 F | DIASTOLIC BLOOD PRESSURE: 67 MMHG | SYSTOLIC BLOOD PRESSURE: 120 MMHG | OXYGEN SATURATION: 97 % | HEART RATE: 59 BPM

## 2022-06-21 DIAGNOSIS — I71.40 AAA (ABDOMINAL AORTIC ANEURYSM) WITHOUT RUPTURE: Primary | ICD-10-CM

## 2022-06-21 DIAGNOSIS — I65.23 BILATERAL CAROTID ARTERY STENOSIS: ICD-10-CM

## 2022-06-21 DIAGNOSIS — I71.40 AAA (ABDOMINAL AORTIC ANEURYSM) WITHOUT RUPTURE: ICD-10-CM

## 2022-06-21 DIAGNOSIS — I73.9 PVD (PERIPHERAL VASCULAR DISEASE) (HCC): ICD-10-CM

## 2022-06-21 LAB
GFR AFRICAN AMERICAN: >60
GFR NON-AFRICAN AMERICAN: >60
PERFORMED ON: NORMAL
POC CREATININE: 1 MG/DL (ref 0.3–1.3)
POC SAMPLE TYPE: NORMAL

## 2022-06-21 PROCEDURE — G8417 CALC BMI ABV UP PARAM F/U: HCPCS | Performed by: NURSE PRACTITIONER

## 2022-06-21 PROCEDURE — 74174 CTA ABD&PLVS W/CONTRAST: CPT

## 2022-06-21 PROCEDURE — 82565 ASSAY OF CREATININE: CPT

## 2022-06-21 PROCEDURE — 6360000004 HC RX CONTRAST MEDICATION: Performed by: NURSE PRACTITIONER

## 2022-06-21 PROCEDURE — 74174 CTA ABD&PLVS W/CONTRAST: CPT | Performed by: RADIOLOGY

## 2022-06-21 PROCEDURE — G8427 DOCREV CUR MEDS BY ELIG CLIN: HCPCS | Performed by: NURSE PRACTITIONER

## 2022-06-21 PROCEDURE — 1123F ACP DISCUSS/DSCN MKR DOCD: CPT | Performed by: NURSE PRACTITIONER

## 2022-06-21 PROCEDURE — 99213 OFFICE O/P EST LOW 20 MIN: CPT | Performed by: NURSE PRACTITIONER

## 2022-06-21 PROCEDURE — 1036F TOBACCO NON-USER: CPT | Performed by: NURSE PRACTITIONER

## 2022-06-21 RX ADMIN — IOPAMIDOL 70 ML: 755 INJECTION, SOLUTION INTRAVENOUS at 10:40

## 2022-06-22 NOTE — PROGRESS NOTES
Sergio De Souza (:  1940) is a 81 y.o. male,Established patient, here for evaluation of the following chief complaint(s):  Follow-up (6 month CT )            SUBJECTIVE/OBJECTIVE:  He has a known history of abdominal aortic aneurysm for 1 - 5 years. He has not had abdominal pain. He has not had back pain in the cervical spine, thoracic spine, lumbar spine and sacral spine region. This pain is unchanged since the last visit. Pain is rated as 0. Sergio De Souza is a 81 y.o. male with the [facility]:  Patient Active Problem List    Diagnosis Date Noted    Vitamin B12 deficiency anemia 10/29/2020    Obstructive sleep apnea     CPAP (continuous positive airway pressure) dependence     Lumbar disc disease with radiculopathy 05/15/2018    Dyspnea 2018    Displacement of lumbar disc with radiculopathy 2017    AAA (abdominal aortic aneurysm) (HCC)     Carotid artery stenosis     Lumbar disc disease 2016    Bilateral carotid artery stenosis 2016    Bilateral low back pain without sciatica 2016    Bilateral shoulder pain 2016     Current Outpatient Medications   Medication Sig Dispense Refill    cyanocobalamin ([facility] VITAMIN B12) 1000 MCG tablet Take 1 tablet by mouth daily 30 tablet 12    simvastatin (ZOCOR) 20 MG tablet TAKE 1 TABLET BY MOUTH DAILY  1    HYDROcodone-acetaminophen (NORCO)  MG per tablet Take 1 tablet by mouth every 6 hours as needed for Pain. No current facility-administered medications for this visit.      Allergies: Celebrex [celecoxib]  Past Medical History:   Diagnosis Date    AAA (abdominal aortic aneurysm) (HCC)     Arthritis     Bilateral low back pain without sciatica 2016    Bilateral shoulder pain 2016    Cancer ([facility] [address] 2017    Bladder CA    Carotid artery stenosis     CPAP (continuous positive airway pressure) dependence     8cm to 20cm    DDD (degenerative disc disease), lumbar     Hyperlipidemia     Obstructive sleep apnea     AHI: 57.6 per PSG, 11/2018     Past Surgical History:   Procedure Laterality Date    BLADDER SURGERY  08/22/2017    Removal of Cancer by Dr. Fauzia Barba  02/2021    Dr. Kerwin Todd- date approxamate     CHOLECYSTECTOMY, LAPAROSCOPIC      july 25, 2017    COLONOSCOPY      HEMORRHOID SURGERY      HERNIA REPAIR      8/2016     Family History   Problem Relation Age of Onset    Other Mother         Natural causes    Cancer Sister         Lung    Other Father         Emphysema     Social History     Tobacco Use    Smoking status: Former Smoker     Packs/day: 0.25     Years: 67.00     Pack years: 16.75     Types: Cigarettes     Quit date: 02/2019     Years since quitting: 3.3    Smokeless tobacco: Never Used   Substance Use Topics    Alcohol use: No       ROS  Eyes - no sudden vision change or amaurosis. Respiratory - no significant shortness of breath,  Cardiovascular - no chest pain or syncope. No  significant leg swelling.  has claudication. Musculoskeletal - no gait disturbance  Skin - no new wound. Neurologic -  No speech difficulty or lateralizing weakness. All other review of systems are negative. Physical Exam    /67 (Site: Left Upper Arm, Position: Sitting, Cuff Size: Medium Adult)   Pulse 59   Temp 97.6 °F (36.4 °C)   SpO2 97%       Neck- carotid pulses 2+ to palpation with no bruit  Cardiovascular - Regular rate and rhythm. Pulmonary - effort appears normal.  No respiratory distress. Lungs - Breath sounds normal. No wheezes or rales. GI - Abdomen - soft, non tender, bowel sounds X 4 quadrants. No guarding or rebound tenderness. No distension or palpable mass. Extremities - Radial and brachial pulses are 2+ to palpation bilaterally. Right femoral pulse: present 2+; Right popliteal pulse: absent Right DP: absent; Right PT absent; Left femoral pulse: present 2+; Left popliteal pulse: absent;  Left DP: absent; Left PT: absent No cyanosis, clubbing, or significant edema. No signs atheroembolic event. Neurologic - alert and oriented X 3. Physiologic. Face symmetric. Skin - warm, dry, and intact. no wound  Psychiatric - mood, affect, and behavior appear normal.  Judgment and thought processes appear normal.      CT -   1.  No significant interval change when compared to prior exam considering the differences in technique. 2. Stable mid abdominal infrarenal aortic aneurysm measuring up to 4.1 cm in AP diameter with mixed atherosclerotic plaques in its walls and in the walls of aortic branches       after I individually reviewed the images, I see not change from the previous study  Individual films reviewed:  Yes. These results have been reviewed with the patient. Disease process is stable and chronic      Reviewed previous studies including: carotid u/s and CT scan  Individual images were reviewed. I agree with the findings  Results were discussed with the patient. ASSESSMENT/PLAN:  1. AAA (abdominal aortic aneurysm) without rupture (HCC)  -     CT ABDOMEN PELVIS WO CONTRAST Additional Contrast? None; Future  2. Bilateral carotid artery stenosis  -     VL DUP CAROTID BILATERAL; Future  3. PVD (peripheral vascular disease) (HCC)  -     VL LOWER EXTREMITY ARTERIAL SEGMENTAL PRESSURES W PPG; Future        Discussed management of carotid u/s and CT scan which includes:  continue asa and zocor to reduce risk of TIA/CVA, to reduce risk of arterial thrombosis and to decrease rate of plaque buildup  Strongly encourage start/continue statin therapy -   Recommend no smoking  Proceed with 6 months with ct,, theresa, and cvls  Symptoms of rupture reviewed with the patient including sudden onset severe back pain or abdominal pain. This pain can sometimes radiate into the groin or leg. The patient may experience a feeling of impending doom or death.   If this occurs they have been insturcted to call 911 and get to the emergency room telling them you have an aneurysm. Patient has voiced understanding. An electronic signature was used to authenticate this note.     --Marii Collins, APRN

## 2022-08-09 DIAGNOSIS — D47.2 SMOLDERING MULTIPLE MYELOMA: ICD-10-CM

## 2022-08-09 LAB
ALP BLD-CCNC: 108 U/L (ref 40–130)
ANION GAP SERPL CALCULATED.3IONS-SCNC: 7 MMOL/L (ref 7–19)
BASOPHILS ABSOLUTE: 0.01 K/UL (ref 0.01–0.08)
BASOPHILS RELATIVE PERCENT: 0.3 % (ref 0.1–1.2)
CALCIUM SERPL-MCNC: 9.1 MG/DL (ref 8.4–10.2)
CHLORIDE BLD-SCNC: 102 MMOL/L (ref 98–111)
CO2: 32 MMOL/L (ref 22–29)
EOSINOPHILS ABSOLUTE: 0.06 K/UL (ref 0.04–0.54)
EOSINOPHILS RELATIVE PERCENT: 1.6 % (ref 0.7–7)
HCT VFR BLD CALC: 41.4 % (ref 40.1–51)
HEMOGLOBIN: 13.5 G/DL (ref 13.7–17.5)
LYMPHOCYTES ABSOLUTE: 1.34 K/UL (ref 1.18–3.74)
LYMPHOCYTES RELATIVE PERCENT: 34.6 % (ref 19.3–53.1)
MCH RBC QN AUTO: 32.1 PG (ref 25.7–32.2)
MCHC RBC AUTO-ENTMCNC: 32.6 G/DL (ref 32.3–36.5)
MCV RBC AUTO: 98.3 FL (ref 79–92.2)
MONOCYTES ABSOLUTE: 0.37 K/UL (ref 0.24–0.82)
MONOCYTES RELATIVE PERCENT: 9.6 % (ref 4.7–12.5)
NEUTROPHILS ABSOLUTE: 2.08 K/UL (ref 1.56–6.13)
NEUTROPHILS RELATIVE PERCENT: 53.6 % (ref 34–71.1)
PDW BLD-RTO: 12 % (ref 11.6–14.4)
PLATELET # BLD: 127 K/UL (ref 163–337)
PMV BLD AUTO: 11.1 FL (ref 7.4–10.4)
RBC # BLD: 4.21 M/UL (ref 4.63–6.08)
TOTAL PROTEIN: 6.4 G/DL (ref 6.3–8.2)
WBC # BLD: 3.87 K/UL (ref 4.23–9.07)

## 2022-08-09 PROCEDURE — 36415 COLL VENOUS BLD VENIPUNCTURE: CPT | Performed by: NURSE PRACTITIONER

## 2022-08-11 LAB — BETA-2 MICROGLOBULIN: 2.2 MG/L

## 2022-08-13 LAB
+IMM: ABNORMAL
ALBUMIN SERPL-MCNC: 3.53 G/DL (ref 3.75–5.01)
ALPHA-1-GLOBULIN: 0.34 G/DL (ref 0.19–0.46)
ALPHA-2-GLOBULIN: 0.71 G/DL (ref 0.48–1.05)
BETA GLOBULIN: 0.89 G/DL (ref 0.48–1.1)
GAMMA GLOBULIN: 0.93 G/DL (ref 0.62–1.51)
IGA: 449 MG/DL (ref 68–408)
IGG: 812 MG/DL (ref 768–1632)
IGM: 50 MG/DL (ref 35–263)
KAPPA FREE LIGHT CHAINS QNT: 34.23 MG/L (ref 3.3–19.4)
KAPPA/LAMBDA FREE LIGHT CHAIN RATIO: 1.67 (ref 0.26–1.65)
LAMBDA FREE LIGHT CHAINS QNT: 20.54 MG/L (ref 5.71–26.3)
SPE/IFE INTERPRETATION: ABNORMAL
TOTAL PROTEIN: 6.4 G/DL (ref 6.3–8.2)

## 2022-08-15 NOTE — PROGRESS NOTES
Progress Note      Pt Name: Tory Gerber  YOB: 1940  MRN: [id number]    Date of evaluation: 8/16/2022  History Obtained From:  Patient, EMR    Portions of this note have been copied forward, however, changed to reflect the most current clinical status of this patient. Chief Complaint   Patient presents with    Follow-up     Smoldering multiple myeloma ([address]     HISTORY OF PRESENT ILLNESS:    Marcel Joy \"Jesus Alberto\" Amparo Love is an [de-identified] y.o.  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 Kristen Martinez  History of multiple benign lung nodules/fibrosis, managed by Dr. Sole FISHER, followed by CORNELIUS Pelayo    Lynn Arciniega has been monitored conservatively from a hematology standpoint. He has not required intervention for smoldering myeloma originally diagnosed 1 year ago in April, 2021. He continues oral B12 supplementation for history of deficiency. He denies new complaint or changes in health history aside from recent cystoscopy. Bladder lesion biopsy was negative for malignancy. Recall 1 year, due 3/2023 by Dr. Joe Castañeda. Constipation exacerbated by analgesia is chronic and stable according to Lynn Arciniega. Blood pressure and weight are stable. He remains active, awaiting warmer weather to resume playing golf. Lynn Arciniega is accompanied by his wife. HEMATOLOGY HISTORY: Smoldering multiple myeloma  Marcel Joy \"Jesus Alberto\" Amparo Love was seen in hematology consultation 10/26/2020, referred by Dr. Capri Awad for evaluation of leukopenia and anemia. PMH significant for history of arthritis, hyperlipidemia, lung nodule, histoplasmosis, bladder cancer. Marcin Sena denies any known history of thrombosis or blood clotting and this is removed from the [address] in [facility]. Review of CBCs at [facility]:       Review of CBCs at [facility]:       Previous abdominal/pelvic CT without contrast 11/19/2019 documented scattered, calcified granulomas in the liver and spleen. Colonoscopy was performed 9/1/2020 by Dr. Lexy Mcghee. One polyp each in the ascending, proximal transverse and sigmoid colon were negative for high-grade dysplasia. Geryl Plan is followed by Dr. Reese Ordoñez regarding history of lung nodule. CBC at presentation on 10/28/2020 showed a WBC of 5.42, Hgb 14.6/.2 and platelet count 44,698. Both WBC and Hgb are within normal limits. Platelets have been intermittently low since 2016 and have continued to decrease over the past year. Marcin Sena is a nondrinker. He denies known history of liver disease. Medications include hydrocodone and Zocor. He has taken Zocor for the past 8-9 years. Family history includes lung cancer in his sister. Labs 10/28/2020:  Vitamin B12 level: 205  Folate: 13.5  TSH: 1.04  LDH: 360  LIONEL: Negative  Hepatitis panel: Negative  HIV 1&2: non-reactive  Antiplatelet antibodies: negative  SPEP: Monoclonal IgA kappa = 0.2, monoclonal IgG kappa = 0.1  Immunofixation: IgG monoclonal protein with kappa light chain specificity, IgA monoclonal protein with kappa light chain specificity  Quantitative immunoglobulins: IgG 853, IgA 458 (), IgM 51  Kappa light chains: 37 (3.3-19.4), lambda light chains 21.0, K/L ratio 1.76  PTT: 23  PT/INR: 10.3/1.0     Spleen ultrasound 11/5/2020 at [facility]: normal spleen size of 10.2 x 9.7 x 4.6 cm     24-hour urine for immunoelectrophoresis 11/9/2020: No M-spike. immunofixation not completed due to lack of M protein  Bone survey 11/4/2020: Negative for suspicious bony lesions    MMA was elevated at 2164 on 11/10/2020.  Weekly parenteral B12 injections x6 were initiated 11/10/2020, to be followed by monthly injections at thereafter. Will monitor platelet count with T84 replacement. Bone marrow aspirate and biopsy 4/27/2021:  normocellular marrow (20-25%) with 12% involvement of bone marrow cellularity by clonal plasma cells. No dysplasia, no increase in blasts. FLOW cytometry: Polytypic B-cells, monotypic kappa-restricted plasma cell population (0.2%) of total events, no T-cell aberrant antigenic expression  FISH study: Negative  Cytogenetics: Loss of Y chromosome, which is an age-related phenomenon and is of no known diagnostic or prognostic significance    PET scan 5/18/2021 at [facility]:  NO abnormal osseous uptake  NO hypermetabolic pulmonary nodules  Soft tissue density in the left inguinal canal with mild uptake of 2.78  Focal increased uptake in the terminal ileum, without underlying mass identified, SUV 8.55, possibly an underlying inflammatory process    Colonoscopy 6/10/2021 by Dr. Brianna Dumas revealed two 3-4 mm benign polyps. Specifically, the terminal ileum appeared normal.  Bud Rivera has a known history of bilateral inguinal hernias, with repair     Findings consistent with smoldering myeloma. TREATMENT SUMMARY:  Weekly parenteral B12 injections x4 initiated 11/10/2020, monthly thereafter. ONCOLOGY HISTORY, superficial bladder cancer  Bud Rivera has a history of TURBT x2. TURBT 8/22/2017:  1. Bladder tumor, right lateral wall, transurethral resection:  Low-grade papillary urothelial carcinoma, noninvasive. Muscularis propria not present. 2.  Bladder tumor, transurethral resection:  Low-grade papillary urothelial carcinoma, noninvasive. Muscularis propria identified with no evidence of tumor involvement. AJCC pathologic stage:  pTa Nx     TURBT 2/22/2019:  Urinary bladder, biopsy of bladder dome: Noninvasive low-grade papillary urothelial carcinoma with severe underlying chronic inflammation. Cystoscopy 5/26/2020 by Dr. Carter Curry was without evidence of mass.     Cystoscopy 2/2/2021 by Dr. Andrei Vang documented four bladder tumors. The largest was 2 cm on the anterior bladder wall with an additional three 1 cm tumors that are located on the dome. Cystoscopy,TURBT and gemcitabine intravesical instillation on was performed 2/22/2021 by Dr. Serafin White. The 2 cm anterior superficial bladder wall tumor was biopsied. After near complete removal, the largest lesion as well as the other 2 previously mentioned lesions were fulgurated. 2000 mg gemcitabine was placed intravesically. 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 Cystoscopy 9/2/2021 by Dr. Serafin White was negative. Cystoscopy, TURBT 3/25/2022 for anterior bladder wall lesion: Pathology revealed Urothelial papilloma. Repeat cytoscopy 1 year (3/2024)    Past Medical History:   Diagnosis Date    AAA (abdominal aortic aneurysm) (HCC)     Arthritis     Bilateral low back pain without sciatica 1/13/2016    Bilateral shoulder pain 1/13/2016    Cancer ([address] 09/2017    Bladder CA    Carotid artery stenosis     CPAP (continuous positive airway pressure) dependence     8cm to 20cm    DDD (degenerative disc disease), lumbar     Hyperlipidemia     Obstructive sleep apnea     AHI: 57.6 per PSG, 11/2018     Past Surgical History:   Procedure Laterality Date    BLADDER SURGERY  08/22/2017    Removal of Cancer by Dr. Faye Roger  02/2021    Dr. Serafin White- date approxamate     CHOLECYSTECTOMY, LAPAROSCOPIC      july 25, 2017    COLONOSCOPY      HEMORRHOID SURGERY      HERNIA REPAIR      8/2016     Current Outpatient Medications   Medication Sig Dispense Refill    cyanocobalamin ([facility] VITAMIN B12) 1000 MCG tablet Take 1 tablet by mouth daily 30 tablet 12    simvastatin (ZOCOR) 20 MG tablet TAKE 1 TABLET BY MOUTH DAILY  1    HYDROcodone-acetaminophen (NORCO)  MG per tablet Take 1 tablet by mouth every 6 hours as needed for Pain.        No current toxic-appearing or diaphoretic. Comments: Wearing a facial mask. HENT:      Head: Normocephalic and atraumatic. Right Ear: External ear normal.      Left Ear: External ear normal.      Nose: Nose normal.      Mouth/Throat:      Mouth: Mucous membranes are moist.   Eyes:      General: No scleral icterus. Right eye: No discharge. Left eye: No discharge. Conjunctiva/sclera: Conjunctivae normal.   Neck:      Trachea: No tracheal deviation. Cardiovascular:      Rate and Rhythm: Normal rate and regular rhythm. Pulmonary:      Effort: Pulmonary effort is normal. No respiratory distress. Breath sounds: Normal breath sounds. No wheezing or rales. Chest:   Breasts:     Right: No supraclavicular adenopathy. Left: No supraclavicular adenopathy. Abdominal:      General: Bowel sounds are normal. There is no distension. Palpations: Abdomen is soft. Tenderness: There is no abdominal tenderness. There is no guarding. Genitourinary:     Comments: Exam deferred  Musculoskeletal:         General: No tenderness or deformity. Cervical back: Neck supple. No muscular tenderness. Comments: Normal ROM all four extremities   Lymphadenopathy:      Cervical:      Right cervical: No superficial or deep cervical adenopathy. Left cervical: No superficial or deep cervical adenopathy. Upper Body:      Right upper body: No supraclavicular adenopathy. Left upper body: No supraclavicular adenopathy. Comments:      Skin:     General: Skin is warm and dry. Findings: No erythema or rash. Neurological:      Mental Status: He is alert and oriented to person, place, and time. Comments: follows commands, non-focal   Psychiatric:         Behavior: Behavior normal. Behavior is cooperative. Thought Content: Thought content normal.         Judgment: Judgment normal.      Comments: Alert and oriented to person, place and time.      Vitals:    08/16/22 5450 BP: 120/80   Pulse: 70   SpO2: 97%   Weight: 163 lb 11.2 oz (74.3 kg)   Height: 5' 6\" (1.676 m)      Wt Readings from Last 3 Encounters:   08/16/22 163 lb 11.2 oz (74.3 kg)   04/12/22 166 lb 11.2 oz (75.6 kg)   12/17/21 160 lb (72.6 kg)     LABS:  CBC:  Lab Results   Component Value Date    WBC 3.87 (L) 08/09/2022    HGB 13.5 (L) 08/09/2022    HCT 41.4 08/09/2022    MCV 98.3 (H) 08/09/2022     (L) 08/09/2022    LYMPHOPCT 34.6 08/09/2022    RBC 4.21 (L) 08/09/2022    MCH 32.1 08/09/2022    MCHC 32.6 08/09/2022    RDW 12.0 08/09/2022     CMP:  Lab Results   Component Value Date     04/05/2022    K 4.5 04/05/2022     08/09/2022    CO2 32 (H) 08/09/2022    BUN 10 04/05/2022    CREATININE 1.0 06/21/2022    GLUCOSE 90 04/05/2022    CALCIUM 9.1 08/09/2022    PROT 6.4 08/09/2022    PROT 6.4 08/09/2022    LABALBU 3.53 (L) 08/09/2022    BILITOT 0.7 04/05/2022    ALKPHOS 108 08/09/2022    AST 23 04/05/2022    ALT 17 (L) 04/05/2022    LABGLOM >60 06/21/2022    GFRAA >60 06/21/2022    AGRATIO 1.0 10/28/2021    GLOB 3.2 04/05/2022     ASSESSMENT:    1. Smoldering multiple myeloma ([address]    2. B12 deficiency    3. History of bladder cancer    4. Leukopenia, unspecified type    5. Thrombocytopenia ([address]       Smoldering Multiple Myeloma  Labs:      Myeloma studies were requested 4/5/2022, not completed and will be drawn today  Serology has been stable, continue observation    Leukopenia, intermittent, stable      Thrombocytopenia chronic, stable  Likely multifactorial including both smoldering myeloma and B12 deficiency  Platelets improved, 834,545      Negative Bone marrow aspirate and biopsy as noted above    Vitamin B12 deficiency  Vitamin B12 level was 205 on 10/28/2020 with elevated MMA of 2164 on 11/10/2020  Jesus Alberto received B12 injections briefly, though preferred oral supplementation.     Continue B12 1000 mcg po daily  B12 level today was requested 4/5/2022, not completed and will be drawn today     History of bladder cancer  S/p TURBT 8/22/2017, 2/22/2019, 2/2/2021, 9/2/2021, 3/25/2022    Cystoscopy 2/2/2021 by Dr. Norman Craven documented four bladder tumors. The largest was 2 cm on the anterior bladder wall with an additional three 1 cm tumors that are located on the dome. Cystoscopy,TURBT and gemcitabine intravesical instillation on was performed 2/22/2021 by Dr. Norman Craven. The 2 cm anterior superficial bladder wall tumor was biopsied. After near complete removal, the largest lesion as well as the other 2 previously mentioned lesions were fulgurated. 2000 mg gemcitabine was placed intravesically. 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 cystoscopy 9/2/2021 by Dr. Norman Craven was without evidence of bladder mass. Plans are for repeat cystoscopy in 6 months    Cystoscopy, TURBT 3/25/2022 for anterior bladder wall lesion: Pathology revealed Urothelial papilloma. Health Maintenance  PSA: As per urology  Colonoscopy 6/10/2021 by Dr. Mario Aguilar revealed two 3-4 mm benign polyps. Specifically, the terminal ileum appeared normal (h/o abn PET)    PLAN:  Continue oral B12 supplement  Repeat B12 level tin 6 months  Repeat myeloma markers prior to return in 6 months    Orders Placed This Encounter   Procedures    CBC with Auto Differential    Comprehensive Metabolic Panel    Vitamin B12    MONOCLONAL PROTEIN AND FLC, SERUM       Return in about 6 months (around 2/16/2023) for follow up with CORNELIUS Strange (No CBC day of apt). I have seen, examined and reviewed this patient medication list, appropriate labs and imaging studies. I reviewed relevant medical records and others physicians notes. I discussed the plan of care with the patient. I answered all questions to the patients satisfaction.  I have also reviewed the chief complaint (CC) and part of the history (History of Present Illness (HPI), Past Family Social History [facility]), or Review of Systems (ROS) and made changes when appropriated. Office note/cystoscopy by Dr. Chelsea Phillips reviewed, summarized as noted above. Dictated utilizing Dragon transcription software.          CORNLEIUS Willett  11:21 AM  8/16/2022

## 2022-08-16 ENCOUNTER — HOSPITAL ENCOUNTER (OUTPATIENT)
Dept: INFUSION THERAPY | Age: 82
Discharge: HOME | End: 2022-08-16
Payer: MEDICARE

## 2022-08-16 ENCOUNTER — OFFICE VISIT (OUTPATIENT)
Dept: HEMATOLOGY | Age: 82
End: 2022-08-16
Payer: MEDICARE

## 2022-08-16 VITALS
BODY MASS INDEX: 26.31 KG/M2 | DIASTOLIC BLOOD PRESSURE: 80 MMHG | OXYGEN SATURATION: 97 % | HEIGHT: 66 IN | HEART RATE: 70 BPM | SYSTOLIC BLOOD PRESSURE: 120 MMHG | WEIGHT: 163.7 LBS

## 2022-08-16 DIAGNOSIS — D72.819 LEUKOPENIA, UNSPECIFIED TYPE: ICD-10-CM

## 2022-08-16 DIAGNOSIS — Z85.51 HISTORY OF BLADDER CANCER: ICD-10-CM

## 2022-08-16 DIAGNOSIS — D47.2 SMOLDERING MULTIPLE MYELOMA: Primary | ICD-10-CM

## 2022-08-16 DIAGNOSIS — D69.6 THROMBOCYTOPENIA (HCC): ICD-10-CM

## 2022-08-16 DIAGNOSIS — E53.8 B12 DEFICIENCY: ICD-10-CM

## 2022-08-16 PROCEDURE — G8417 CALC BMI ABV UP PARAM F/U: HCPCS | Performed by: NURSE PRACTITIONER

## 2022-08-16 PROCEDURE — 1123F ACP DISCUSS/DSCN MKR DOCD: CPT | Performed by: NURSE PRACTITIONER

## 2022-08-16 PROCEDURE — 1036F TOBACCO NON-USER: CPT | Performed by: NURSE PRACTITIONER

## 2022-08-16 PROCEDURE — 99211 OFF/OP EST MAY X REQ PHY/QHP: CPT

## 2022-08-16 PROCEDURE — 99214 OFFICE O/P EST MOD 30 MIN: CPT | Performed by: NURSE PRACTITIONER

## 2022-08-16 PROCEDURE — G8427 DOCREV CUR MEDS BY ELIG CLIN: HCPCS | Performed by: NURSE PRACTITIONER

## 2022-08-16 ASSESSMENT — ENCOUNTER SYMPTOMS
CONSTIPATION: 1
VOMITING: 0
SORE THROAT: 0
WHEEZING: 0
NAUSEA: 0
EYE ITCHING: 0
EYE DISCHARGE: 0
TROUBLE SWALLOWING: 0
SHORTNESS OF BREATH: 0
ABDOMINAL PAIN: 0
COUGH: 0
BACK PAIN: 1
DIARRHEA: 0

## 2022-09-22 ENCOUNTER — OFFICE VISIT (OUTPATIENT)
Dept: NEUROLOGY | Age: 82
End: 2022-09-22
Payer: MEDICARE

## 2022-09-22 VITALS
WEIGHT: 163 LBS | DIASTOLIC BLOOD PRESSURE: 77 MMHG | HEIGHT: 66 IN | HEART RATE: 54 BPM | BODY MASS INDEX: 26.2 KG/M2 | SYSTOLIC BLOOD PRESSURE: 148 MMHG

## 2022-09-22 DIAGNOSIS — G47.33 OBSTRUCTIVE SLEEP APNEA: Primary | ICD-10-CM

## 2022-09-22 DIAGNOSIS — Z99.89 CPAP (CONTINUOUS POSITIVE AIRWAY PRESSURE) DEPENDENCE: ICD-10-CM

## 2022-09-22 PROCEDURE — G8417 CALC BMI ABV UP PARAM F/U: HCPCS | Performed by: PHYSICIAN ASSISTANT

## 2022-09-22 PROCEDURE — 99213 OFFICE O/P EST LOW 20 MIN: CPT | Performed by: PHYSICIAN ASSISTANT

## 2022-09-22 PROCEDURE — 1036F TOBACCO NON-USER: CPT | Performed by: PHYSICIAN ASSISTANT

## 2022-09-22 PROCEDURE — G8427 DOCREV CUR MEDS BY ELIG CLIN: HCPCS | Performed by: PHYSICIAN ASSISTANT

## 2022-09-22 PROCEDURE — 1123F ACP DISCUSS/DSCN MKR DOCD: CPT | Performed by: PHYSICIAN ASSISTANT

## 2022-09-22 NOTE — LETTER
[facility]  [address] [place] [address] Drive, [address] [place] [address]  Phone [phone]  Fax [phone]             Re:  Jayden Allred    23  :  1940  Address: [address]  [id number]       Replinishible PAP Supplies, 1 year supply  Item HPCPS Code Frequency   Mask of choice  or  1 per 3 months   Nasal Mask cushion/pillows  or  2 per 30 days   Full Face Mask Interface  1 per 30 days   Headgear  1 per 6 months   Tubing, length of choice  or  1 per 3 months   Water Chamber  1 per 6 months   Chinstrap  1 per 6 months   Disposable Filters  2 per 30 days   Reusable Filters  1 per 6 months     Diagnoses:  Obstructive sleep apnea (G47.33)  Length of Need: Lifetime, 99    Ordering Provider: Luis M Titus PA-C  NPI:  [id number]        Signature: [unfilled]        Date: 2022      Electronically Signed by Luis M Titus PA-C  on 2022 at 9:09 AM

## 2022-09-22 NOTE — PATIENT INSTRUCTIONS
Patient education: Sleep apnea in adults       INTRODUCTION -- Normally during sleep, air moves through the throat and in and out of the lungs at a regular rhythm. In a person with sleep apnea, air movement is periodically diminished or stopped. There are two types of sleep apnea: obstructive sleep apnea and central sleep apnea. In obstructive sleep apnea, breathing is abnormal because of narrowing or closure of the throat. In central sleep apnea, breathing is abnormal because of a change in the breathing control and rhythm. Sleep apnea is a serious condition that can affect a person's ability to safely perform normal daily activities and can affect long term health. Approximately 25 percent of adults are at risk for sleep apnea of some degree. Men are more commonly affected than women. Other risk factors include middle and older age, being overweight or obese, and having a small mouth and throat. This topic review focuses on the most common type of sleep apnea in adults, obstructive sleep apnea (MELONY). HOW SLEEP APNEA OCCURS -- The throat is surrounded by muscles that control the airway for speaking, swallowing, and breathing. During sleep, these muscles are less active, and this causes the throat to narrow. In most people, this narrowing does not affect breathing. In others, it can cause snoring, sometimes with reduced or completely blocked airflow. A completely blocked airway without airflow is called an obstructive apnea. Partial obstruction with diminished airflow is called a hypopnea. A person may have apnea and hypopnea during sleep. Insufficient breathing due to apnea or hypopnea causes oxygen levels to fall and carbon dioxide to rise. Because the airway is blocked, breathing faster or harder does not help to improve oxygen levels until the airway is reopened. Typically, the obstruction requires the person to awaken to activate the upper airway muscles.  Once the airway is opened, the person then takes several deep breaths to catch up on breathing. As the person awakens, he or she may move briefly, snort or snore, and take a deep breath. Less frequently, a person may awaken completely with a sensation of gasping, smothering, or choking. If the person falls back to sleep quickly, he or she will not remember the event. Many people with sleep apnea are unaware of their abnormal breathing in sleep, and all patients underestimate how often their sleep is interrupted. Awakening from sleep causes sleep to be unrefreshing and causes fatigue and daytime sleepiness. Anatomic causes of obstructive sleep apnea --  Most patients have MELONY because of a small upper airway. As the bones of the face and skull develop, some people develop a small lower face, a small mouth, and a tongue that seems too large for the mouth. These features are genetically determined, which explains why MELONY tends to cluster in families. Obesity is another major factor. Tonsil enlargement can be an important cause, especially in children. SLEEP APNEA SYMPTOMS -- The main symptoms of MELONY are loud snoring, fatigue, and daytime sleepiness. However, some people have no symptoms. For example, if the person does not have a bed partner, he or she may not be aware of the snoring. Fatigue and sleepiness have many causes and are often attributed to overwork and increasing age. As a result, a person may be slow to recognize that they have a problem. A bed partner or spouse often prompts the patient to seek medical care. Other symptoms may include one or more of the following:  ?Restless sleep  ? Awakening with choking, gasping, or smothering  ? Morning headaches, dry mouth, or sore throat  ? Waking frequently to urinate  ? Awakening unrested, groggy  ? Low energy, difficulty concentrating, memory impairment    Risk factors -- Certain factors increase the risk of sleep apnea.   ?Increasing age - MELONY occurs at all ages, but it is more common in middle and older age adults. ?Male sex - MELONY is two times more common in men, especially in middle age. ?Obesity - The more obese a person is, the more likely he or she is to have MELONY. ? Sedation from medication or alcohol - This interferes with the ability to awaken from sleep and can lengthen periods of apnea (no breathing), with potentially dangerous consequences. ? Abnormality of the airway. SLEEP APNEA CONSEQUENCES -- Complications of sleep apnea can include daytime sleepiness and difficulty concentrating. The consequence of this is an increased risk of accidents and errors in daily activities. Studies have shown that people with severe MELONY are more than twice as likely to be involved in a motor vehicle accident as people without these conditions. People with MELONY are encouraged to discuss options for driving, working, and performing other high-risk tasks with a healthcare provider. In addition, people with untreated MELONY may have an increased risk of cardiovascular problems such as high blood pressure, heart attack, abnormal heart rhythms, or stroke. This risk may be due to changes in the heart rate and blood pressure that occur during sleep. SLEEP APNEA DIAGNOSIS -- The diagnosis of MELONY is best made by a knowledgeable sleep medicine specialist who has an understanding of the individual's health issues. The diagnosis is usually based upon the person's medical history, physical examination, and testing, including:  ? A complaint of snoring and ineffective sleep  ? Neck size (greater than 16 inches in men or 14 inches in women) is associated with an increased risk of sleep apnea  ? A small upper airway: difficulty seeing the throat because of a tongue that is large for the mouth  ? High blood pressure, especially if it is resistant to treatment  ? If a bed partner has observed the patient during episodes of stopped breathing (apnea), choking, or gasping during sleep, there is a strong possibility of sleep apnea. Testing is usually performed in a sleep laboratory. A full sleep study is called a polysomnogram. The polysomnogram measures the breathing effort and airflow, blood oxygen level, heart rate and rhythm, duration of the various stages of sleep, body position, and movement of the arms/legs. Home monitoring devices are available that can perform a sleep study. This is a reasonable alternative to conventional testing in a sleep laboratory if the clinician strongly suspects moderate or severe sleep apnea and the patient does not have other illnesses or sleep disorders that may interfere with the results. SLEEP APNEA TREATMENT -- Sleep apnea is best treated by a knowledgeable sleep medicine specialist. The goal of treatment is to maintain an open airway during sleep. Effective treatment will eliminate the symptoms of sleep disturbance; long-term health consequences are also reduced. Most treatments require nightly use. The challenge for the clinician and the patient is to select an effective therapy that is appropriate for the patient's problem and that is acceptable for long term use. Auto-titrating CPAP delivers an amount of PAP that varies during the night. The variation is dependent on event detection software algorithms, which will increase the pressure gradually in response to flow changes until adequate patency is detected. After a period of sustained upper airway patency, the delivered level of pressure gradually decreases until the algorithm identifies recurrent upper airway obstruction, at which point the delivered pressure again increases. The result is that the delivered pressure varies throughout the night, in an effort to provide the lowest pressure that is necessary to maintain upper airway patency. Continuous positive airway pressure (CPAP) -- The most effective treatment for sleep apnea uses air pressure from a mechanical device to keep the upper airway open during sleep.  A CPAP (continuous positive airway pressure)  device uses an air-tight attachment to the nose, typically a mask, connected to a tube and a blower which generates the pressure. Devices that fit comfortably into the nasal opening, rather than over the nose, are also available. CPAP should be used any time the person sleeps (day or night). The CPAP device is usually used for the first time in the sleep lab, where a technician can adjust the pressure and select the best equipment to keep the airway open. Alternatively, an auto device with a self-adjusting pressure feature, provided with proper education and training, can get treatment started without another sleep test. While the treatment may seem uncomfortable, noisy, or bulky at first, most people accept the treatment after experiencing better sleep. However, difficulty with mask comfort and nasal congestion prevent up to 50 percent of people from using the treatment on a regular basis. Continued follow up with a healthcare provider helps to ensure that the treatment is effective and comfortable. Information from the CPAP machine is often used by physicians, therapists, and insurers to track the success of treatment. CPAP can be delivered with different features to improve comfort and solve problems that may come up during treatment. Changes in treatment may be needed if symptoms do not improve or if the persons condition changes, such as a gain or loss of weight. Adjust sleep position -- Adjusting sleep position (to stay off the back) may help improve sleep quality in people who have MELONY when sleeping on the back. However, this is difficult to maintain throughout the night and is rarely an adequate solution. Weight loss -- Weight loss may be helpful for obese or overweight patients. Weight loss may be accomplished with dietary changes, exercise, and/or surgical treatment.  However, it can be difficult to maintain weight loss; the five-year success of non-surgical weight loss is only 5 percent, meaning that 95 percent of people regain lost weight. Avoid alcohol and other sedatives -- Alcohol can worsen sleepiness, potentially increasing the risk of accidents or injury. People with MELONY are often counseled to drink little to no alcohol, even during the daytime. Similarly, people who take anti-anxiety medications or sedatives to sleep should speak with their healthcare provider about the safety of these medications. People with MELONY must notify all healthcare providers, including surgeons, about their condition and the potential risks of being sedated. People with MELONY who are given anesthesia and/or pain medications require special management and close monitoring to reduce the risk of a blocked airway. Dental devices -- A dental device, called an oral appliance or mandibular advancement device, can reposition the jaw (mandible), bringing the tongue and soft palate forward as well. This may relieve obstruction in some people. This treatment is excellent for reducing snoring, although the effect on MELONY is sometimes more limited. As a result, dental devices are best used for mild cases of MELONY when relief of snoring is the main goal. Failure to tolerate and accept CPAP is another indication for dental devices. While dental devices are not as effective as CPAP for MELONY, some patients prefer a dental device to CPAP. Side effects of dental devices are generally minor but may include changes to the bite with prolonged use. Surgical treatment -- Surgery is an alternative therapy for patients who cannot tolerate or do not improve with nonsurgical treatments such as CPAP or oral devices. Surgery can also be used in combination with other nonsurgical treatments. Surgical procedures reshape structures in the upper airways or surgically reposition bone or soft tissue. Uvulopalatopharyngoplasty (UPPP) removes the uvula and excessive tissue in the throat, including the tonsils, if present. Other procedures, such as maxillomandibular advancement (MMA), address both the upper and lower pharyngeal airway more globally. UPPP alone has limited success rates (less than 50 percent) and people can relapse (when MELONY symptoms return after surgery). As a result, this surgery is only recommended in a minority of people and should be considered with caution. MMA may have a higher success rate, particularly in people with abnormal jaw (maxilla and mandible) anatomy, but it is the most complicated procedure. A newer surgical approach, nerve stimulation to protrude the tongue, has promising success rates in very selected people. Tracheostomy creates a permanent opening in the neck. It is reserved for people with severe disease in whom less drastic measures have failed or are inappropriate. Although it is always successful in eliminating obstructive sleep apnea, tracheostomy requires significant lifestyle changes and carries some serious risks (eg, infection, bleeding, blockage). All surgical treatments require discussions about the goals of treatment, the expected outcomes, and potential complications. Hypoglossal nerve stimulator- \"Inspire\" device    PAP treatment failure:  Possible causes of treatment failure include nonadherence or suboptimal adherence, weight gain, an inappropriate level of prescribed positive pressure, or an additional disorder causing sleepiness (eg, narcolepsy) that may require alterations in the therapeutic regimen. A review of medications should also be undertaken since many drugs may lead to sleepiness. Inadequate sleep time may also negate the expected effects from treatment of MELONY. Also, pt's can have persistent hypersomnolence associated with sleep apnea even in the presence of adequate therapy and at those times Provigil or Nuvigil or other stimulants may be indicated.     Once the patient's positive airway pressure therapy has been optimized and symptoms resolved, a regimen of long-term follow-up should be established. Annual visits are reasonable, with more frequent visits in between if new issues arise. The purpose of long-term follow-up is to assess usage and monitor for recurrent MELONY, new side effects, air leakage, and fluctuations in body weight. WHERE TO GET MORE INFORMATION -- Your healthcare provider is the best source of information for questions and concerns related to your medical problem. Organizations  American [employer]nea Association  Provides information about sleep apnea to the public, publishes a newsletter, and serves as an advocate for people with the disorder. Esther, [address] [facility], [address] [facility]   [email or web address]   [email or web address]   Tel: [phone]   Fax: [facility] organization that works to [employer] and safety by promoting public understanding of sleep and sleep disorders. Supports sleep-related education, research, and advocacy; produces and distributes educational materials to the public and healthcare professionals; and offers postdoctoral fellowships and grants for sleep researchers. [address]   [email or web address]   [email or web address]   Tel: [phone]   Fax: [phone]    Important information:  Medicare/private insurance CPAP/BiPAP/APAP requirements:  Medicare/private insurance has specific requirements for PAP compliance that must be met during the first 90 days of use to continue coverage for CPAP/BiPAP/APAP  from day 91 and beyond. The policy requires that patients use a PAP device 4 hours per 24 hour period, at least 70% of the time over a 30 day period. This data must be downloaded as a report direct from the PAP devices. This is called a compliance download. Your PAP supplier will assist you in this matter.      Note:  Where applicable, we will utilize PAP device efficiency

## 2022-09-22 NOTE — PROGRESS NOTES
[facility]  [address] [place] [address] Drive, [address]  [place]  Phone [phone]  Fax [phone]       [facility] Sleep Follow Up Encounter      Information:   Patient Name: Bob Rod  :   1940  Age:   81 y.o. MRN:   [id number]  Account #:  [de-identified]  Today:                22    Provider:  Elder Hodges PA-C    Chief Complaint   Patient presents with    Sleep Apnea     No problems at this time         Subjective:   Bob Rod is a 81 y.o. male  with a history of severe MELONY who comes in for a sleep clinic follow up. He was referred for a PSG due to his c/o snoring and excessive daytime somnolence. The PSG, 18 revealed an AHI of 57.6. He is prescribed auto CPAP therapy with a pressure range of 8cm to 20cm. He had a recalled [employer] device and it was replaced a couple of months ago. The compliance report indicates that he is averaging 8 hours of CPAP use per day. He reports that consistent PAP use has alleviated the previous MELONY symptoms. He is uses a nasal mask.      Location or symptom:  MELONY  Onset:  PS2018   Timing:  q hs  Severity:  Severe  Associated:  Snoring, witnessed apneas, and excessive daytime somnolence  Alleviated:  CPAP (replaced )       Objective:     Past Medical History:   Diagnosis Date    AAA (abdominal aortic aneurysm) (HCC)     Arthritis     Bilateral low back pain without sciatica 2016    Bilateral shoulder pain 2016    Cancer ([facility] [address] 2017    Bladder CA    Carotid artery stenosis     CPAP (continuous positive airway pressure) dependence     8cm to 20cm    DDD (degenerative disc disease), lumbar     Hyperlipidemia     Obstructive sleep apnea     AHI: 57.6 per PSG, 2018       Past Surgical History:   Procedure Laterality Date    BLADDER SURGERY  2017    Removal of Cancer by Dr. Winkler Began  2021    Dr. Guicho Ramos- date approxamate     CHOLECYSTECTOMY, LAPAROSCOPIC      2017 COLONOSCOPY      HEMORRHOID SURGERY      HERNIA REPAIR      8/2016       Recent Hospitalizations      Significant Injuries      Family History   Problem Relation Age of Onset    Other Mother         Natural causes    Cancer Sister         Lung    Other Father         Emphysema       Social History  Social History     Tobacco Use   Smoking Status Former    Packs/day: 0.25    Years: 67.00    Pack years: 16.75    Types: Cigarettes    Quit date: 02/2019    Years since quitting: 3.6   Smokeless Tobacco Never     Social History     Substance and Sexual Activity   Alcohol Use No     Social History     Substance and Sexual Activity   Drug Use No         Current Outpatient Medications   Medication Sig Dispense Refill    simvastatin (ZOCOR) 20 MG tablet TAKE 1 TABLET BY MOUTH DAILY  1    HYDROcodone-acetaminophen (NORCO)  MG per tablet Take 1 tablet by mouth every 6 hours as needed for Pain. cyanocobalamin ([employer] VITAMIN B12) 1000 MCG tablet Take 1 tablet by mouth daily 30 tablet 12     No current facility-administered medications for this visit.        Allergies:  Celebrex [celecoxib]    REVIEW OF SYSTEMS     Constitutional: []Fever []Sweats []Chills [] Recent Injury   [x] Denies all unless marked  HENT:[]Headache  [] Head Injury  [] Sore Throat  [] Ear Pain  [] Dizziness [] Hearing Loss   [x] Denies all unless marked  Musculoskeletal: [] Arthralgia  [] Myalgias [] Muscle cramps  [] Muscle twitches   [x] Denies all unless marked   Spine:  [] Neck pain  [] Back pain  [] Sciaticia  [x] Denies all unless marked  Neurological:[] Visual Disturbance [] Double Vision [] Slurred Speech [] Trouble swallowing  [] Vertigo [] Tingling [] Numbness [] Weakness [] Loss of Balance   [] Loss of Consciousness [] Memory Loss [] Seizures  [x] Denies all unless marked  Psychiatric/Behavioral:[] Depression [] Anxiety  [x] Denies all unless marked  Sleep: []  Insomnia [] Sleep Disturbance [] Snoring [] Restless Legs [] Daytime Sleepiness [x] Sleep Apnea  [x] Denies all unless marked    The MA has completed the ROS with the patient. I have reviewed it in its' entirety with the patient and agree with the documentation. PHYSICAL EXAM  BP (!) 148/77   Pulse 54   Ht 5' 6\" (1.676 m)   Wt 163 lb (73.9 kg)   BMI 26.31 kg/m²     Constitutional -  Alert in NAD, well developed, pleasant and cooperative with exam  HEENT- Conjunctiva normal.  No scars, masses, or lesions over external nose or ears, hearing intact, no neck masses noted, no jugular vein distension, no bruit  Cardiac- Regular rate and rhythm  Pulmonary- Clear to auscultation, good expansion, normal effort without use of accessory muscles  Musculoskeletal - No significant wasting of muscles noted. No bony deformities  Extremities - No clubbing, cyanosis or edema  Skin - Warm, dry, and intact. No rash, erythema, or pallor  Psychiatric - Mood, affect, and behavior appear normal      Neurological exam  Awake, alert, fluent oriented  appropriate affect  Attention and concentration appear appropriate  Recent and remote memory appears unremarkable  Speech normal without dysarthria  No clear issues with language of fund of knowledge    Cranial Nerve Exam     CN III, IV,VI-EOMI, No nystagmus, conjugate eye movements, no ptosis  CN VII-No facial assymetry    Motor Exam    Antigravity throughout upper and lower extremities bilaterally    Tremors and coordination    No tremors in hands or head noted     Gait    Normal base and speed  No ataxia    I reviewed the following studies:       []  :  Clinical laboratory test results     []  :  Radiology reports                    [x]  :  Review and summarization of medical records-compliance report      []     Request for medical records       []  :  Reviewed previous/recent polysomnogram report(s)      []  :  Irving Sleepiness Scale       [x]  :  Compliance report :  The auto CPAP is set at a pressure of 8cm to 20cm.  Compliance download shows that he uses device: 100% of the time;  percentage of days with usage >=4 hours: 100%. AHI: 0.6    Assessment:       ICD-10-CM    1. Obstructive sleep apnea  G47.33       2. CPAP (continuous positive airway pressure) dependence  Z99.89              []  :  Stable     []  :  Improved                       [x]  :  Well controlled              []  :  Resolving     []  :  Resolved     []  :  Inadequately controlled     []  :  Worsening     []  :  Additional workup planned    Gerlene Screws is compliant and benefiting from therapy as indicated by compliance evaluation and patient report. Plan:     No orders of the defined types were placed in this encounter. 1.   Previously advised of the etiology,  pathophysiology, diagnosis, treatment options, and risks of untreated MELONY. Risks may include, but are not limited to  hypertension, coronary artery disease, atrial fibrillation, CHF, diabetes, stroke, weight gain, impaired cognition, daytime somnolence, and motor vehicle accidents. Advised to abstain from driving or operating heavy machinery when drowsy and the use of respiratory suppressants. Discussed diagnostic studies and potential treatment plan. 2.  Will evaluate for PAP clinical benefit and and compliance during a 30 day period within the preceding 90 days PRN. 3.  The following educational material has been included in this visit after visit summary for your review: MELONY/PAP guidelines-Discussed with the patient and all questions fully answered. 4.  Continue PAP therapy. The patient voices understanding and recognizes the need for adherence to the prescribed therapy  5. Order-supplies-[facility]  6. Monitor B/P and follow up with PCP if it persists to be elevated. (>160/>100); ER if symptomatic  7.   Follow up in 1 year       Replinishible PAP Supplies, 1 year supply  Item HPCPS Code Frequency   Mask of choice  or  1 per 3 months   Nasal Mask cushion/pillows  or  2 per 30 days   Full Face Mask Interface  1 per 30 days   Headgear  1 per 6 months   Tubing, length of choice  or  1 per 3 months   Water Chamber  1 per 6 months   Chinstrap  1 per 6 months   Disposable Filters  2 per 30 days   Reusable Filters  1 per 6 months     Diagnoses:  Obstructive sleep apnea (G47.33)  Length of Need: Lifetime, 99    Ordering Provider: Kam Argueta PA-C  NPI:  [id number]

## 2022-11-16 ENCOUNTER — OFFICE VISIT (OUTPATIENT)
Dept: PULMONOLOGY | Facility: CLINIC | Age: 82
End: 2022-11-16

## 2022-11-16 VITALS
SYSTOLIC BLOOD PRESSURE: 122 MMHG | OXYGEN SATURATION: 95 % | DIASTOLIC BLOOD PRESSURE: 70 MMHG | BODY MASS INDEX: 27.29 KG/M2 | HEIGHT: 65 IN | WEIGHT: 163.8 LBS | HEART RATE: 72 BPM

## 2022-11-16 DIAGNOSIS — J84.10 LUNG FIBROSIS: Primary | ICD-10-CM

## 2022-11-16 PROCEDURE — 99213 OFFICE O/P EST LOW 20 MIN: CPT | Performed by: INTERNAL MEDICINE

## 2022-11-16 NOTE — PROGRESS NOTES
"Background:  Pt with multiple benign lung nodules.  AAA. pulm fibrosis   Chief Complaint  Lung fibrosis, Pulmonary infiltrate, and Lung Nodule    Subjective    History of Present Illness       Ezekiel ALDRIDGE presents to [facility] PULMONARY [facility] CRITICAL CARE MEDICINE.  History of Present Illness  He has had favorable oncology follow up.  He says the cold messes up his golf.  He has hx pulm fibrosis, favorable cxr last year.  No new complaints.  No decline in exercise tolerance over the past year.     Tobacco Use: Medium Risk   • Smoking Tobacco Use: Former   • Smokeless Tobacco Use: Never   • Passive Exposure: Not on file      Current Outpatient Medications   Medication Instructions   • HYDROcodone-acetaminophen (NORCO) 5-325 MG per tablet 1 tablet, Oral, Every 6 Hours PRN   • simvastatin (ZOCOR) 20 mg, Oral, Nightly   • vitamin B-12 (CYANOCOBALAMIN) 1,000 mcg, Oral, Daily      Objective     Vital Signs:   /70   Pulse 72   Ht 165 cm (64.96\")   Wt 74.3 kg (163 lb 12.8 oz)   SpO2 95%   BMI 27.29 kg/m²   Physical Exam  Constitutional:       Appearance: Normal appearance. He is not ill-appearing or diaphoretic.   Eyes:      Extraocular Movements: Extraocular movements intact.   Pulmonary:      Effort: Pulmonary effort is normal. No respiratory distress.      Breath sounds: No wheezing, rhonchi or rales.   Skin:     Findings: No erythema or rash.   Neurological:      Mental Status: He is alert.        Result Review  Data Reviewed:{ Labs  Result Review  Imaging  Media :23}   XR Chest 2 View (12/02/2021 09:38)   The lungs are clear. The cardiomediastinal silhouette and pulmonary  vascularity are within normal limits. The osseous structures and  surrounding soft tissues demonstrate no acute abnormality.                    Assessment and Plan  {CC Problem List  Visit Diagnosis  ROS  Review (Popup)  Health Maintenance  Quality  BestPractice  Medications  SmartSets  SnapShot Encounters "  Media :23}   Diagnoses and all orders for this visit:    1. Lung fibrosis (HCC) (Primary)  -     XR Chest 2 View; Future    he appears stable  Will plan follow up cxr next year  Call before then if dyspnea or other pulmonary symptoms develop  He reports having had pneumococcal vaccine but it does not show up in our incomplete registry      Follow Up {Instructions Charge Capture  Follow-up Communications :23}   Return in about 1 year (around 11/16/2023).  Patient was given instructions and counseling regarding his condition or for health maintenance advice. Please see specific information pulled into the AVS if appropriate.    Electronically signed by Derrek Campa MD, 11/16/2022, 11:37 CST

## 2022-11-28 RX ORDER — OMEGA-3/DHA/EPA/FISH OIL 35-113.5MG
TABLET,CHEWABLE ORAL
Qty: 90 TABLET | Refills: 4 | Status: SHIPPED | OUTPATIENT
Start: 2022-11-28

## 2022-12-20 ENCOUNTER — HOSPITAL ENCOUNTER (OUTPATIENT)
Dept: CT IMAGING | Age: 82
Discharge: HOME | End: 2022-12-20
Payer: MEDICARE

## 2022-12-20 DIAGNOSIS — I71.40 AAA (ABDOMINAL AORTIC ANEURYSM) WITHOUT RUPTURE: ICD-10-CM

## 2022-12-20 PROCEDURE — 74176 CT ABD & PELVIS W/O CONTRAST: CPT

## 2022-12-28 ENCOUNTER — TRANSCRIBE ORDERS (OUTPATIENT)
Dept: ADMINISTRATIVE | Facility: HOSPITAL | Age: 82
End: 2022-12-28
Payer: MEDICARE

## 2022-12-28 ENCOUNTER — LAB (OUTPATIENT)
Dept: LAB | Facility: HOSPITAL | Age: 82
End: 2022-12-28
Payer: MEDICARE

## 2022-12-28 DIAGNOSIS — R05.1 ACUTE COUGH: Primary | ICD-10-CM

## 2022-12-28 DIAGNOSIS — R05.1 ACUTE COUGH: ICD-10-CM

## 2022-12-28 LAB
FLUAV AG NPH QL: NEGATIVE
FLUBV AG NPH QL IA: NEGATIVE
SARS-COV-2 RNA PNL SPEC NAA+PROBE: NOT DETECTED

## 2022-12-28 PROCEDURE — C9803 HOPD COVID-19 SPEC COLLECT: HCPCS

## 2022-12-28 PROCEDURE — 87804 INFLUENZA ASSAY W/OPTIC: CPT

## 2022-12-28 PROCEDURE — 87635 SARS-COV-2 COVID-19 AMP PRB: CPT

## 2023-01-11 ENCOUNTER — TELEPHONE (OUTPATIENT)
Dept: UROLOGY | Facility: CLINIC | Age: 83
End: 2023-01-11
Payer: MEDICARE

## 2023-01-11 NOTE — TELEPHONE ENCOUNTER
I called pt to let them know I sent dr kraus a message about rschd surgery and when I hear back from him I would call and let them know if we can go ahead with the surgery or if pt will need to come in to office

## 2023-01-11 NOTE — TELEPHONE ENCOUNTER
Pt wife called to reschedule an appt for cysto with Dr Covington. I told her his MA would give him an called back with an appt. She voiced understanding

## 2023-03-21 ENCOUNTER — PROCEDURE VISIT (OUTPATIENT)
Dept: UROLOGY | Facility: CLINIC | Age: 83
End: 2023-03-21
Payer: MEDICARE

## 2023-03-21 DIAGNOSIS — C67.8 MALIGNANT NEOPLASM OF OVERLAPPING SITES OF BLADDER: Primary | ICD-10-CM

## 2023-03-21 LAB
BILIRUB BLD-MCNC: NEGATIVE MG/DL
CLARITY, POC: CLEAR
COLOR UR: YELLOW
GLUCOSE UR STRIP-MCNC: NEGATIVE MG/DL
KETONES UR QL: NEGATIVE
LEUKOCYTE EST, POC: NEGATIVE
NITRITE UR-MCNC: NEGATIVE MG/ML
PH UR: 6 [PH] (ref 5–8)
PROT UR STRIP-MCNC: NEGATIVE MG/DL
RBC # UR STRIP: NEGATIVE /UL
SP GR UR: 1.02 (ref 1–1.03)
UROBILINOGEN UR QL: NORMAL

## 2023-03-21 PROCEDURE — 81001 URINALYSIS AUTO W/SCOPE: CPT | Performed by: UROLOGY

## 2023-03-21 PROCEDURE — 52000 CYSTOURETHROSCOPY: CPT | Performed by: UROLOGY

## 2023-03-21 RX ORDER — SODIUM CHLORIDE 450 MG/100ML
100 INJECTION, SOLUTION INTRAVENOUS CONTINUOUS
Status: CANCELLED | OUTPATIENT
Start: 2023-03-21

## 2023-03-21 NOTE — PROGRESS NOTES
CC: I am here for the doctor to look at my bladder    Cystoscopy procedure note  Pre- operative diagnosis:  Bladder cancer surveillance    Post operative diagnosis:  Bladder Tumor    08/22/2017: TURBT  Final Diagnosis   1.  Bladder tumor, right lateral wall, transurethral resection:  Low-grade papillary urothelial carcinoma, noninvasive.  Muscularis propria not present.     2.  Bladder tumor, transurethral resection:  Low-grade papillary urothelial carcinoma, noninvasive.  Muscularis propria identified with no evidence of tumor involvement.     AJCC pathologic stage:  pTa Nx   Electronically signed by Jon Pires MD on 8/23/2017 at 0928     02/2019: TURBT  Final Diagnosis   Urinary bladder, biopsy of bladder dome: Noninvasive low-grade papillary urothelial carcinoma with severe underlying chronic inflammation.     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      Electronically signed by Jon Pires MD on 3/29/2022 at 1226    Procedure:  The patient was prepped and draped in a normal sterile fashion.  The urethra was anesthetized with 2% lidocaine jelly.  A flexible cystoscope was introduced per urethra.      Anterior urethra: No evidence of stricture.  Prostatic urethra: Bilateral lateral lobe enlargement with mildly obstructive appearance.  No urothelial lesion  Bladder: Patient has an anteriorly placed urothelial lesion very close to the same area we have previously resected were we have found neoplasm low malignant potential as well as a inverted papilloma.    Patient tolerated the procedure well    Complications: none    Blood loss:  minimal       ASSESSMENT AND PLAN          Problem List Items Addressed This Visit        Hematology and Neoplasia    Malignant neoplasm of overlapping sites of bladder (HCC) - Primary    Overview     Added automatically from request for surgery [id number]         Relevant Orders    POC Urinalysis Dipstick, Multipro   Think this anterior lesion needs to be resected.  I Josefina set him up for that next week.  I told him likelihood for having were catheter below    No follow-ups on file.      Cale Covington MD  3/21/2023  12:04 CDT

## 2023-03-31 ENCOUNTER — PRE-ADMISSION TESTING (OUTPATIENT)
Dept: PREADMISSION TESTING | Facility: HOSPITAL | Age: 83
End: 2023-03-31
Payer: MEDICARE

## 2023-03-31 VITALS
RESPIRATION RATE: 18 BRPM | OXYGEN SATURATION: 96 % | HEART RATE: 61 BPM | WEIGHT: 159.83 LBS | BODY MASS INDEX: 26.63 KG/M2 | DIASTOLIC BLOOD PRESSURE: 78 MMHG | HEIGHT: 65 IN | SYSTOLIC BLOOD PRESSURE: 138 MMHG

## 2023-03-31 LAB
ANION GAP SERPL CALCULATED.3IONS-SCNC: 9 MMOL/L (ref 5–15)
BUN SERPL-MCNC: 9 MG/DL (ref 8–23)
BUN/CREAT SERPL: 13.6 (ref 7–25)
CALCIUM SPEC-SCNC: 9.2 MG/DL (ref 8.6–10.5)
CHLORIDE SERPL-SCNC: 103 MMOL/L (ref 98–107)
CO2 SERPL-SCNC: 30 MMOL/L (ref 22–29)
CREAT SERPL-MCNC: 0.66 MG/DL (ref 0.76–1.27)
DEPRECATED RDW RBC AUTO: 44.6 FL (ref 37–54)
EGFRCR SERPLBLD CKD-EPI 2021: 93.6 ML/MIN/1.73
ERYTHROCYTE [DISTWIDTH] IN BLOOD BY AUTOMATED COUNT: 12.1 % (ref 12.3–15.4)
GLUCOSE SERPL-MCNC: 100 MG/DL (ref 65–99)
HCT VFR BLD AUTO: 41.3 % (ref 37.5–51)
HGB BLD-MCNC: 13.7 G/DL (ref 13–17.7)
MCH RBC QN AUTO: 33.2 PG (ref 26.6–33)
MCHC RBC AUTO-ENTMCNC: 33.2 G/DL (ref 31.5–35.7)
MCV RBC AUTO: 100 FL (ref 79–97)
PLATELET # BLD AUTO: 143 10*3/MM3 (ref 140–450)
PMV BLD AUTO: 11.2 FL (ref 6–12)
POTASSIUM SERPL-SCNC: 4 MMOL/L (ref 3.5–5.2)
RBC # BLD AUTO: 4.13 10*6/MM3 (ref 4.14–5.8)
SODIUM SERPL-SCNC: 142 MMOL/L (ref 136–145)
WBC NRBC COR # BLD: 4.98 10*3/MM3 (ref 3.4–10.8)

## 2023-03-31 PROCEDURE — 80048 BASIC METABOLIC PNL TOTAL CA: CPT

## 2023-03-31 PROCEDURE — 85027 COMPLETE CBC AUTOMATED: CPT

## 2023-03-31 PROCEDURE — 36415 COLL VENOUS BLD VENIPUNCTURE: CPT

## 2023-03-31 PROCEDURE — 93005 ELECTROCARDIOGRAM TRACING: CPT

## 2023-03-31 PROCEDURE — 93010 ELECTROCARDIOGRAM REPORT: CPT | Performed by: EMERGENCY MEDICINE

## 2023-03-31 RX ORDER — CARVEDILOL 3.12 MG/1
3.12 TABLET ORAL 2 TIMES DAILY WITH MEALS
COMMUNITY
Start: 2023-01-10

## 2023-03-31 NOTE — DISCHARGE INSTRUCTIONS
Before you come to the hospital        Arrival time: AS DIRECTED BY OFFICE     YOU MAY TAKE THE FOLLOWING MEDICATION(S) THE MORNING OF SURGERY WITH A SIP OF WATER:   Hydrocodone/Acetaminophen (NORCO) if needed for pain  Carvedilol (COREG)           ALL OTHER HOME MEDICATION CHECK WITH YOUR PHYSICIAN (especially if   you are taking diabetes medicines or blood thinners)    Do not take any Erectile Dysfunction medications (EX: CIALIS, VIAGRA) 24 hours prior to surgery.      If you were given and instructed to use a germ- killing soap, use as directed the night before surgery and again the morning of surgery or as directed by your surgeon. (Use one-half of the bottle with each shower.)   See attached information for How to Use Chlorhexidine for Bathing if applicable.            Eating and drinking restrictions prior to scheduled arrival time    2 Hours before arrival time STOP   Drinking Clear liquids (water, apple juice-no pulp)     6 Hours before arrival time STOP   Milk or drinks that contain milk, full liquids    6 Hours before arrival time STOP   Light meals or foods, such as toast or cereal    8 Hours before arrival time STOP   Heavy foods, such as meat, fried foods, or fatty foods    (It is extremely important that you follow these guidelines to prevent delay or cancelation of your procedure)     Clear Liquids  Water and flavored water                                                                      Clear Fruit juices, such as cranberry juice and apple juice.  Black coffee (NO cream of any kind, including powdered).  Plain tea  Clear bouillon or broth.  Flavored gelatin.  Soda.  Gatorade or Powerade.  Full liquid examples  Juices that have pulp.  Frozen ice pops that contain fruit pieces.  Coffee with creamer  Milk.  Yogurt.                MANAGING PAIN AFTER SURGERY    We know you are probably wondering what your pain will be like after surgery.  Following surgery it is unrealistic to expect you will not  have pain.   Pain is how our bodies let us know that something is wrong or cautions us to be careful.  That said, our goal is to make your pain tolerable.    Methods we may use to treat your pain include (oral or IV medications, PCAs, epidurals, nerve blocks, etc.)   While some procedures require IV pain medications for a short time after surgery, transitioning to pain medications by mouth allows for better management of pain.   Your nurse will encourage you to take oral pain medications whenever possible.  IV medications work almost immediately, but only last a short while.  Taking medications by mouth allows for a more constant level of medication in your blood stream for a longer period of time.      Once your pain is out of control it is harder to get back under control.  It is important you are aware when your next dose of pain medication is due.  If you are admitted, your nurse may write the time of your next dose on the white board in your room to help you remember.      We are interested in your pain and encourage you to inform us about aggravating factors during your visit.   Many times a simple repositioning every few hours can make a big difference.    If your physician says it is okay, do not let your pain prevent you from getting out of bed. Be sure to call your nurse for assistance prior to getting up so you do not fall.      Before surgery, please decide your tolerable pain goal.  These faces help describe the pain ratings we use on a 0-10 scale.   Be prepared to tell us your goal and whether or not you take pain or anxiety medications at home.          Preparing for Surgery  Preparing for surgery is an important part of your care. It can make things go more smoothly and help you avoid complications. The steps leading up to surgery may vary among hospitals. Follow all instructions given to you by your health care providers. Ask questions if you do not understand something. Talk about any concerns that  you have.  Here are some questions to consider asking before your surgery:  If my surgery is not an emergency (is elective), when would be the best time to have the surgery?  What arrangements do I need to make for work, home, or school?  What will my recovery be like? How long will it be before I can return to normal activities?  Will I need to prepare my home? Will I need to arrange care for me or my children?  Should I expect to have pain after surgery? What are my pain management options? Are there nonmedical options that I can try for pain?  Tell a health care provider about:  Any allergies you have.  All medicines you are taking, including vitamins, herbs, eye drops, creams, and over-the-counter medicines.  Any problems you or family members have had with anesthetic medicines.  Any blood disorders you have.  Any surgeries you have had.  Any medical conditions you have.  Whether you are pregnant or may be pregnant.  What are the risks?  The risks and complications of surgery depend on the specific procedure that you have. Discuss all the risks with your health care providers before your surgery. Ask about common surgical complications, which may include:  Infection.  Bleeding or a need for blood replacement (transfusion).  Allergic reactions to medicines.  Damage to surrounding nerves, tissues, or structures.  A blood clot.  Scarring.  Failure of the surgery to correct the problem.  Follow these instructions before the procedure:  Several days or weeks before your procedure  You may have a physical exam by your primary health care provider to make sure it is safe for you to have surgery.  You may have testing. This may include a chest X-ray, blood and urine tests, electrocardiogram (ECG), or other testing.  Ask your health care provider about:  Changing or stopping your regular medicines. This is especially important if you are taking diabetes medicines or blood thinners.  Taking medicines such as aspirin and  ibuprofen. These medicines can thin your blood. Do not take these medicines unless your health care provider tells you to take them.  Taking over-the-counter medicines, vitamins, herbs, and supplements.  Do not use any products that contain nicotine or tobacco, such as cigarettes and e-cigarettes. If you need help quitting, ask your health care provider.  Avoid alcohol.  Ask your health care provider if there are exercises you can do to prepare for surgery.  Eat a healthy diet.   Plan to have someone 18 years of age or older to take you home from the hospital. We will need to verify your ride on the morning of surgery if you are being discharged home on the same day. Tell your ride to be expecting a call from the hospital prior to your procedure.   Plan to have a responsible adult care for you for at least 24 hours after you leave the hospital or clinic. This is important.  The day before your procedure  You may be given antibiotic medicine to take by mouth to help prevent infection. Take it as told by your health care provider.  You may be asked to shower with a germ-killing soap.  Follow instructions from your health care provider about eating and drinking restrictions. This includes gum, mints and hard candy.  Pack comfortable clothes according to your procedure.   The day of your procedure  You may need to take another shower with a germ-killing soap before you leave home in the morning.  With a small sip of water, take only the medicines that you are told to take.  Remove all jewelry including rings.   Leave anything you consider valuable at home except hearing aids if needed.  You do not need to bring your home medications into the hospital.   Do not wear any makeup, nail polish, powder, deodorant, lotion, hair accessories, or anything on your skin or body except your clothes.  If you will be staying in the hospital, bring a case to hold your glasses, contacts, or dentures. You may also want to bring your robe  and non-skid footwear.       (Do not use denture adhesives since you will be asked to remove them during  surgery).   If you wear oxygen at home, bring it with you the day of surgery.  If instructed by your health care provider, bring your sleep apnea device with you on the day of your surgery (if this applies to you).  You may want to leave your suitcase and sleep apnea device in the car until after surgery.   Arrive at the hospital as scheduled.  Bring a friend or family member with you who can help to answer questions and be present while you meet with your health care provider.  At the hospital  When you arrive at the hospital:  Go to registration located at the main entrance of the hospital. You will be registered and given a beeper and a sticker sheet. Take the stickers to the Outpatient nurses desk and place in the black tray. This is to notify staff that you have arrived. Then return to the lobby to wait.   When your beeper lights up and vibrates proceed through the double doors, under the stairs, and a member of the Outpatient Surgery staff will escort you to your preoperative room.  You may have to wear compression sleeves. These help to prevent blood clots and reduce swelling in your legs.  An IV may be inserted into one of your veins.              In the operating room, you may be given one or more of the following:        A medicine to help you relax (sedative).        A medicine to numb the area (local anesthetic).        A medicine to make you fall asleep (general anesthetic).        A medicine that is injected into an area of your body to numb everything below the                      injection site (regional anesthetic).  You may be given an antibiotic through your IV to help prevent infection.  Your surgical site will be marked or identified.    Contact a health care provider if you:  Develop a fever of more than 100.4°F (38°C) or other feelings of illness during the 48 hours before your  surgery.  Have symptoms that get worse.  Have questions or concerns about your surgery.  Summary  Preparing for surgery can make the procedure go more smoothly and lower your risk of complications.  Before surgery, make a list of questions and concerns to discuss with your surgeon. Ask about the risks and possible complications.  In the days or weeks before your surgery, follow all instructions from your health care provider. You may need to stop smoking, avoid alcohol, follow eating restrictions, and change or stop your regular medicines.  Contact your surgeon if you develop a fever or other signs of illness during the few days before your surgery.  This information is not intended to replace advice given to you by your health care provider. Make sure you discuss any questions you have with your health care provider.  Document Revised: 12/21/2018 Document Reviewed: 10/23/2018  Elsevier Patient Education © 2021 [employer]

## 2023-04-03 ENCOUNTER — ANESTHESIA EVENT (OUTPATIENT)
Dept: PERIOP | Facility: HOSPITAL | Age: 83
End: 2023-04-03
Payer: MEDICARE

## 2023-04-03 ENCOUNTER — ANESTHESIA (OUTPATIENT)
Dept: PERIOP | Facility: HOSPITAL | Age: 83
End: 2023-04-03
Payer: MEDICARE

## 2023-04-03 ENCOUNTER — HOSPITAL ENCOUNTER (OUTPATIENT)
Facility: HOSPITAL | Age: 83
Setting detail: HOSPITAL OUTPATIENT SURGERY
Discharge: HOME | End: 2023-04-03
Attending: UROLOGY | Admitting: UROLOGY
Payer: MEDICARE

## 2023-04-03 ENCOUNTER — APPOINTMENT (OUTPATIENT)
Dept: GENERAL RADIOLOGY | Facility: HOSPITAL | Age: 83
End: 2023-04-03
Payer: MEDICARE

## 2023-04-03 VITALS
HEART RATE: 54 BPM | RESPIRATION RATE: 15 BRPM | OXYGEN SATURATION: 97 % | DIASTOLIC BLOOD PRESSURE: 70 MMHG | SYSTOLIC BLOOD PRESSURE: 147 MMHG | TEMPERATURE: 98 F

## 2023-04-03 DIAGNOSIS — C67.8 MALIGNANT NEOPLASM OF OVERLAPPING SITES OF BLADDER: ICD-10-CM

## 2023-04-03 DIAGNOSIS — Z85.51 HISTORY OF BLADDER CANCER: Primary | ICD-10-CM

## 2023-04-03 DIAGNOSIS — N32.9 LESION OF BLADDER: ICD-10-CM

## 2023-04-03 LAB
QT INTERVAL: 460 MS
QTC INTERVAL: 423 MS

## 2023-04-03 PROCEDURE — 25010000002 PROPOFOL 10 MG/ML EMULSION: Performed by: NURSE ANESTHETIST, CERTIFIED REGISTERED

## 2023-04-03 PROCEDURE — 88305 TISSUE EXAM BY PATHOLOGIST: CPT | Performed by: UROLOGY

## 2023-04-03 PROCEDURE — 52234 CYSTOSCOPY AND TREATMENT: CPT | Performed by: UROLOGY

## 2023-04-03 PROCEDURE — 25010000002 CEFAZOLIN PER 500 MG: Performed by: UROLOGY

## 2023-04-03 PROCEDURE — S0260 H&P FOR SURGERY: HCPCS | Performed by: UROLOGY

## 2023-04-03 RX ORDER — SODIUM CHLORIDE 0.9 % (FLUSH) 0.9 %
3-10 SYRINGE (ML) INJECTION AS NEEDED
Status: DISCONTINUED | OUTPATIENT
Start: 2023-04-03 | End: 2023-04-03 | Stop reason: HOSPADM

## 2023-04-03 RX ORDER — PROPOFOL 10 MG/ML
VIAL (ML) INTRAVENOUS AS NEEDED
Status: DISCONTINUED | OUTPATIENT
Start: 2023-04-03 | End: 2023-04-03 | Stop reason: SURG

## 2023-04-03 RX ORDER — SODIUM CHLORIDE 9 MG/ML
40 INJECTION, SOLUTION INTRAVENOUS AS NEEDED
Status: DISCONTINUED | OUTPATIENT
Start: 2023-04-03 | End: 2023-04-03 | Stop reason: HOSPADM

## 2023-04-03 RX ORDER — OXYCODONE HYDROCHLORIDE AND ACETAMINOPHEN 5; 325 MG/1; MG/1
1 TABLET ORAL ONCE AS NEEDED
Status: DISCONTINUED | OUTPATIENT
Start: 2023-04-03 | End: 2023-04-03 | Stop reason: HOSPADM

## 2023-04-03 RX ORDER — FENTANYL CITRATE 50 UG/ML
25 INJECTION, SOLUTION INTRAMUSCULAR; INTRAVENOUS
Status: DISCONTINUED | OUTPATIENT
Start: 2023-04-03 | End: 2023-04-03 | Stop reason: HOSPADM

## 2023-04-03 RX ORDER — LIDOCAINE HYDROCHLORIDE 10 MG/ML
0.5 INJECTION, SOLUTION EPIDURAL; INFILTRATION; INTRACAUDAL; PERINEURAL ONCE AS NEEDED
Status: DISCONTINUED | OUTPATIENT
Start: 2023-04-03 | End: 2023-04-03 | Stop reason: HOSPADM

## 2023-04-03 RX ORDER — CEPHALEXIN 500 MG/1
500 CAPSULE ORAL 2 TIMES DAILY
Qty: 5 CAPSULE | Refills: 0 | Status: SHIPPED | OUTPATIENT
Start: 2023-04-03

## 2023-04-03 RX ORDER — MIDAZOLAM HYDROCHLORIDE 1 MG/ML
0.5 INJECTION INTRAMUSCULAR; INTRAVENOUS
Status: DISCONTINUED | OUTPATIENT
Start: 2023-04-03 | End: 2023-04-03 | Stop reason: HOSPADM

## 2023-04-03 RX ORDER — FLUMAZENIL 0.1 MG/ML
0.2 INJECTION INTRAVENOUS AS NEEDED
Status: DISCONTINUED | OUTPATIENT
Start: 2023-04-03 | End: 2023-04-03 | Stop reason: HOSPADM

## 2023-04-03 RX ORDER — SODIUM CHLORIDE 0.9 % (FLUSH) 0.9 %
3 SYRINGE (ML) INJECTION AS NEEDED
Status: DISCONTINUED | OUTPATIENT
Start: 2023-04-03 | End: 2023-04-03 | Stop reason: HOSPADM

## 2023-04-03 RX ORDER — MAGNESIUM HYDROXIDE 1200 MG/15ML
LIQUID ORAL AS NEEDED
Status: DISCONTINUED | OUTPATIENT
Start: 2023-04-03 | End: 2023-04-03 | Stop reason: HOSPADM

## 2023-04-03 RX ORDER — SODIUM CHLORIDE 0.9 % (FLUSH) 0.9 %
3 SYRINGE (ML) INJECTION EVERY 12 HOURS SCHEDULED
Status: DISCONTINUED | OUTPATIENT
Start: 2023-04-03 | End: 2023-04-03 | Stop reason: HOSPADM

## 2023-04-03 RX ORDER — LIDOCAINE HYDROCHLORIDE 20 MG/ML
INJECTION, SOLUTION EPIDURAL; INFILTRATION; INTRACAUDAL; PERINEURAL AS NEEDED
Status: DISCONTINUED | OUTPATIENT
Start: 2023-04-03 | End: 2023-04-03 | Stop reason: SURG

## 2023-04-03 RX ORDER — SORBITOL 30 G/1000ML
IRRIGANT IRRIGATION AS NEEDED
Status: DISCONTINUED | OUTPATIENT
Start: 2023-04-03 | End: 2023-04-03 | Stop reason: HOSPADM

## 2023-04-03 RX ORDER — OXYCODONE AND ACETAMINOPHEN 10; 325 MG/1; MG/1
1 TABLET ORAL EVERY 4 HOURS PRN
Status: DISCONTINUED | OUTPATIENT
Start: 2023-04-03 | End: 2023-04-03 | Stop reason: HOSPADM

## 2023-04-03 RX ORDER — NALOXONE HCL 0.4 MG/ML
0.4 VIAL (ML) INJECTION AS NEEDED
Status: DISCONTINUED | OUTPATIENT
Start: 2023-04-03 | End: 2023-04-03 | Stop reason: HOSPADM

## 2023-04-03 RX ORDER — LABETALOL HYDROCHLORIDE 5 MG/ML
5 INJECTION, SOLUTION INTRAVENOUS
Status: DISCONTINUED | OUTPATIENT
Start: 2023-04-03 | End: 2023-04-03 | Stop reason: HOSPADM

## 2023-04-03 RX ORDER — PHENAZOPYRIDINE HYDROCHLORIDE 200 MG/1
200 TABLET, FILM COATED ORAL 3 TIMES DAILY PRN
Qty: 21 TABLET | Refills: 0 | Status: SHIPPED | OUTPATIENT
Start: 2023-04-03 | End: 2023-04-10

## 2023-04-03 RX ORDER — SODIUM CHLORIDE, SODIUM LACTATE, POTASSIUM CHLORIDE, CALCIUM CHLORIDE 600; 310; 30; 20 MG/100ML; MG/100ML; MG/100ML; MG/100ML
1000 INJECTION, SOLUTION INTRAVENOUS CONTINUOUS
Status: DISCONTINUED | OUTPATIENT
Start: 2023-04-03 | End: 2023-04-03 | Stop reason: HOSPADM

## 2023-04-03 RX ORDER — DROPERIDOL 2.5 MG/ML
0.62 INJECTION, SOLUTION INTRAMUSCULAR; INTRAVENOUS ONCE AS NEEDED
Status: DISCONTINUED | OUTPATIENT
Start: 2023-04-03 | End: 2023-04-03 | Stop reason: HOSPADM

## 2023-04-03 RX ORDER — IBUPROFEN 600 MG/1
600 TABLET ORAL ONCE AS NEEDED
Status: DISCONTINUED | OUTPATIENT
Start: 2023-04-03 | End: 2023-04-03 | Stop reason: HOSPADM

## 2023-04-03 RX ORDER — ONDANSETRON 2 MG/ML
4 INJECTION INTRAMUSCULAR; INTRAVENOUS ONCE AS NEEDED
Status: DISCONTINUED | OUTPATIENT
Start: 2023-04-03 | End: 2023-04-03 | Stop reason: HOSPADM

## 2023-04-03 RX ORDER — ACETAMINOPHEN 500 MG
1000 TABLET ORAL ONCE
Status: COMPLETED | OUTPATIENT
Start: 2023-04-03 | End: 2023-04-03

## 2023-04-03 RX ORDER — SODIUM CHLORIDE 450 MG/100ML
100 INJECTION, SOLUTION INTRAVENOUS CONTINUOUS
Status: DISCONTINUED | OUTPATIENT
Start: 2023-04-03 | End: 2023-04-03 | Stop reason: HOSPADM

## 2023-04-03 RX ORDER — SODIUM CHLORIDE, SODIUM LACTATE, POTASSIUM CHLORIDE, CALCIUM CHLORIDE 600; 310; 30; 20 MG/100ML; MG/100ML; MG/100ML; MG/100ML
100 INJECTION, SOLUTION INTRAVENOUS CONTINUOUS
Status: DISCONTINUED | OUTPATIENT
Start: 2023-04-03 | End: 2023-04-03 | Stop reason: HOSPADM

## 2023-04-03 RX ADMIN — SODIUM CHLORIDE, POTASSIUM CHLORIDE, SODIUM LACTATE AND CALCIUM CHLORIDE 1000 ML: 600; 310; 30; 20 INJECTION, SOLUTION INTRAVENOUS at 06:50

## 2023-04-03 RX ADMIN — CEFAZOLIN 2 G: 2 INJECTION, POWDER, FOR SOLUTION INTRAMUSCULAR; INTRAVENOUS at 08:31

## 2023-04-03 RX ADMIN — ACETAMINOPHEN 1000 MG: 500 TABLET, FILM COATED ORAL at 07:37

## 2023-04-03 RX ADMIN — PROPOFOL INJECTABLE EMULSION 110 MG: 10 INJECTION, EMULSION INTRAVENOUS at 08:22

## 2023-04-03 RX ADMIN — LIDOCAINE HYDROCHLORIDE 100 MG: 20 INJECTION, SOLUTION EPIDURAL; INFILTRATION; INTRACAUDAL at 08:22

## 2023-04-03 NOTE — ANESTHESIA PREPROCEDURE EVALUATION
Anesthesia Evaluation     Patient summary reviewed and Nursing notes reviewed   no history of anesthetic complications:  NPO Solid Status: > 8 hours  NPO Liquid Status: > 8 hours           Airway   Mallampati: I  TM distance: >3 FB  Neck ROM: full  No difficulty expected  Dental    (+) edentulous    Pulmonary    (+) a smoker Former, COPD, sleep apnea on CPAP,   Cardiovascular   Exercise tolerance: good (4-7 METS)    (+) hyperlipidemia,       Neuro/Psych  (-) seizures  GI/Hepatic/Renal/Endo    (-) liver disease, no renal disease, diabetes    Musculoskeletal     Abdominal    Substance History      OB/GYN          Other   arthritis,    history of cancer (bladder) active                      Anesthesia Plan    ASA 3     general     intravenous induction     Anesthetic plan, risks, benefits, and alternatives have been provided, discussed and informed consent has been obtained with: patient.        CODE STATUS:

## 2023-04-03 NOTE — H&P
Urology H&P    Mr. ALDRIDGE is 83 y.o. male    CHIEF COMPLAINT: Bladder tumor    HPI  Anterior bladder tumor noted on surveillance cystoscopy.    The following portions of the patient's history were reviewed and updated as appropriate: allergies, current medications, past family history, past medical history, past social history, past surgical history and problem list.    Review of Systems  Review  of systems  Constitutional: Negative for chills and fever.   Gastrointestinal: Negative for abdominal pain, anal bleeding and blood in stool.   Genitourinary: Negative for flank pain and hematuria.      Medications Prior to Admission   Medication Sig Dispense Refill Last Dose   • carvedilol (COREG) 3.125 MG tablet Take 1 tablet by mouth 2 (Two) Times a Day With Meals.   4/2/2023 at 1900   • HYDROcodone-acetaminophen (NORCO) 5-325 MG per tablet Take 1 tablet by mouth Every 6 (Six) Hours As Needed (Pain). 8 tablet 0 4/3/2023 at 0445   • simvastatin (ZOCOR) 20 MG tablet Take 1 tablet by mouth Every Night.   4/2/2023   • vitamin B-12 (CYANOCOBALAMIN) 1000 MCG tablet Take 1 tablet by mouth Daily.   4/2/2023         Current Facility-Administered Medications:   •  ceFAZolin 2 gm IVPB in 100 mL NS (MBP), 2 g, Intravenous, Once, Cale Covington MD  •  lactated ringers infusion 1,000 mL, 1,000 mL, Intravenous, Continuous, Cale Covington MD, Last Rate: 25 mL/hr at 04/03/23 0650, 1,000 mL at 04/03/23 0650  •  lactated ringers infusion, 100 mL/hr, Intravenous, Continuous, Pham Issa MD  •  lidocaine PF 1% (XYLOCAINE) injection 0.5 mL, 0.5 mL, Injection, Once PRN, Pham Issa MD  •  midazolam (VERSED) injection 0.5 mg, 0.5 mg, Intravenous, Q10 Min PRN, Pham Issa MD  •  sodium chloride 0.45 % infusion, 100 mL/hr, Intravenous, Continuous, Cale Covington MD  •  sodium chloride 0.9 % flush 3 mL, 3 mL, Intravenous, PRN, Cale Covington MD  •  sodium chloride 0.9 % flush 3 mL, 3 mL, Intravenous, Q12H,  Pham Issa MD  •  sodium chloride 0.9 % flush 3-10 mL, 3-10 mL, Intravenous, PRN, Pham Issa MD  •  sodium chloride 0.9 % infusion 40 mL, 40 mL, Intravenous, PRN, Pham Issa MD    Past Medical History:   Diagnosis Date   • AAA (abdominal aortic aneurysm)    • Arthritis    • Bilateral shoulder pain    • Cancer 08/2017    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   • Carotid artery stenosis    • Chronic low back pain    • COPD (chronic obstructive pulmonary disease)    • Degenerative disc disease, lumbar    • Diverticulosis    • History of adenomatous polyp of colon    • Hyperlipidemia    • Multiple myeloma    • Sleep apnea with use of continuous positive airway pressure (CPAP)        Past Surgical History:   Procedure Laterality Date   • CHOLECYSTECTOMY WITH INTRAOPERATIVE CHOLANGIOGRAM N/A 7/25/2017    Procedure: CHOLECYSTECTOMY LAPAROSCOPIC AND REMOVAL CYST ON CHEST;  Surgeon: Ina Issa MD;  Location: [facility] OR;  Service:    • COLONOSCOPY  07/24/2012    Nine polyps removed; Repeat 3 years   • COLONOSCOPY N/A 9/19/2017    One diminutive polyp in ascending colon-Complete resection-Polyp tissue not retrieved; Two 4-5mm polyps at hepatic flexure; One 6mm polyp in proximal transverse colon; One 7mm polyp in distal transverse colon-Complete resection-polyp tissue not retrieved; One 5mm polyp in proximal descending colon-Complete resection-Polyp tissue not retrieved; Diverticulosis in sigmoid colon; Repeat 3 years   • COLONOSCOPY N/A 9/1/2020    One 5mm tubular adenomatous polyp in the ascending colon; Two 5-6mm tubular adenomatous polyps in the proximal transverse colon; One 5mm tubular adenomatous polyp in the sigmoid colon; Diverticulosis in the sigmoid colon; The examination was otherwise normal on direct and retroflexion views; Repeat 3 years   • COLONOSCOPY N/A 6/10/2021    Procedure: COLONOSCOPY WITH ANESTHESIA;  Surgeon: Jimmie Tirado MD;  Location: [facility] ENDOSCOPY;   Service: Gastroenterology;  Laterality: N/A;  pre hx colon polyp  post   dr patsy mims   • CYSTOSCOPY BLADDER BIOPSY N/A 2019    Procedure: CYSTOSCOPY BILATERAL RETROGRADE PYELOGRAM BLADDER BIOPSY WITH FULGURATION OF 1 CM BLADDER TUMOR;  Surgeon: Cale Covington MD;  Location:  [facility] OR;  Service: Urology   • HEMORRHOIDECTOMY     • INGUINAL HERNIA REPAIR Bilateral    • TRANSURETHRAL RESECTION OF BLADDER TUMOR Bilateral 2017    Procedure: CYSTOSCOPY TRANSURETHRAL RESECTION OF BLADDER TUMOR bilateral retrogrades;  Surgeon: Cale Covington MD;  Location:  [facility] OR;  Service:    • TRANSURETHRAL RESECTION OF BLADDER TUMOR N/A 2021    Procedure: CYSTOSCOPY TRANSURETHRAL RESECTION OF BLADDER TUMOR, GEMCITABINE INTRAVESICAL INSTILLATION & BILATERAL RETROGRADE URETEROPYELOGRAMS;  Surgeon: Cale Covington MD;  Location:  [facility] OR;  Service: Urology;  Laterality: N/A;   • TRANSURETHRAL RESECTION OF BLADDER TUMOR N/A 3/25/2022    Procedure: CYSTOSCOPY TRANSURETHRAL RESECTION OF BLADDER TUMOR;  Surgeon: Cale Covington MD;  Location:  [facility] OR;  Service: Urology;  Laterality: N/A;   • TRANSURETHRAL RESECTION OF BLADDER TUMOR  3/25/2022    Procedure: ;  Surgeon: Cale Covington MD;  Location:  [facility] OR;  Service: Urology;;       Social History     Socioeconomic History   • Marital status:    Tobacco Use   • Smoking status: Former     Packs/day: 0.75     Years: 55.00     Pack years: 41.25     Types: Cigarettes     Quit date: 2019     Years since quittin.2   • Smokeless tobacco: Never   • Tobacco comments:     recently quit   Vaping Use   • Vaping Use: Never used   Substance and Sexual Activity   • Alcohol use: No   • Drug use: No   • Sexual activity: Defer       Family History   Problem Relation Age of Onset   • No Known Problems Father    • No Known Problems Mother    • Colon cancer Neg Hx    • Colon polyps Neg Hx        /86 (BP Location: Left arm, Patient Position: Sitting)   Pulse  52   Temp 97 °F (36.1 °C) (Temporal)   Resp 16   SpO2 98%     Physical Exam  Constitutional: Patient is without distress or deformity.  Vital signs are reviewed as above.    Neuro: No confusion; No disorientation; Alert and oriented  Pulmonary: No respiratory distress.   Skin: No pallor or diaphoresis        Lab Results   Component Value Date    GLUCOSE 100 (H) 03/31/2023    BUN 9 03/31/2023    CREATININE 0.66 (L) 03/31/2023    EGFRIFNONA >60 06/21/2022    EGFRIFAFRI >60 06/21/2022    BCR 13.6 03/31/2023    CO2 30.0 (H) 03/31/2023    CALCIUM 9.2 03/31/2023    ALBUMIN 2.7 (L) 01/05/2023    AST 28 01/05/2023    ALT 36 01/05/2023     Lab Results   Component Value Date    GLUCOSE 100 (H) 03/31/2023    CALCIUM 9.2 03/31/2023     03/31/2023    K 4.0 03/31/2023    CO2 30.0 (H) 03/31/2023     03/31/2023    BUN 9 03/31/2023    CREATININE 0.66 (L) 03/31/2023    EGFRIFAFRI >60 06/21/2022    EGFRIFNONA >60 06/21/2022    BCR 13.6 03/31/2023    ANIONGAP 9.0 03/31/2023     Lab Results   Component Value Date    WBC 4.98 03/31/2023    HGB 13.7 03/31/2023    HCT 41.3 03/31/2023    .0 (H) 03/31/2023     03/31/2023     No results found for: PSA  No results found for: URINECX  Brief Urine Lab Results  (Last result in the past 365 days)      Color   Clarity   Blood   Leuk Est   Nitrite   Protein   CREAT   Urine HCG        03/21/23 1401 Yellow   Clear   Negative   Negative   Negative   Negative                   Imaging Results (Last 7 Days)     ** No results found for the last 168 hours. **          Assessment and Plan  Bladder tumor  -Plan cystoscopy with resection/removal of bladder lesion.      (Please note that portions of this note were completed with a voice recognition program.)  Cale Covington MD  04/03/23  07:54 CDT

## 2023-04-03 NOTE — OP NOTE
"Operative Summary    Ezekiel DIEGO MILLAY  Date of Procedure: 4/3/2023    Pre-op Diagnosis:   Malignant neoplasm of overlapping sites of bladder (HCC) [C67.8]    Post-op Diagnosis:     Post-Op Diagnosis Codes:     * Malignant neoplasm of overlapping sites of bladder [C67.8]    Procedure/CPT® Codes:      Procedure(s):  CYSTOSCOPY BLADDER BIOPSY/CAUTERIZATION BLADDER (1x1 cm)  LESION WITH FULGURATION    Surgeon(s):  Cale Covington MD    Anesthesia: General    Staff:   Circulator: Rafa Medrano RN  Scrub Person: Khadra Irizarry; Pearl Gardiner    Indications for procedure:  Anterior bladder lesion    Findings:   #1. Cystoscopy: Anterior bladder lesion.  No other lesions identified.  Orifices orthotopic position normal configuration  #2. Interpretation of retrograde pyelogram(s): The retrograde pyelogram(s) interpretation(s) is/are dictated per separate report under \"Brief op note\" document type.        Procedure details:  After appropriate anesthesia, positioning, prep and drape, timeout protocol was observed.     A 22 [place] cystoscope sheath with 30° lens is inserted into the bladder.  I also used a 70° lens to carry out a panendoscopic inspection of the urinary bladder.  Findings are described above.       The previously described bladder lesion as described is then addressed.  Given the anterior location lesion I did have to apply external pressure above the pubis on the anterior surface of the bladder to expose the lesion.  This is a 1 x 1 cm papillary appearing lesion that is clearly superficial.  In fact using a resectoscope cutting loop I did not attempt to resect into muscle.  This is clearly noninvasive especially given the prior history of mass in similar location being benign.  I did cauterize the base of the resection site.  .     Estimated Blood Loss: Less than 30 mL    Specimens:                Specimens     ID Source Type Tests Collected By Collected At Frozen?    A Urinary Bladder Tissue · TISSUE " PATHOLOGY EXAM   Cale Covington MD 4/3/23 0845     Description: ANTERIOR BLADDER WALL MASS            Drains: None     Complications: none    Plan: Patient will follow-up in the office in a year for cystoscopy.  He is to contact me in 1 week if he is not heard from me regarding the biopsy results    Cale Covington MD     Date: 4/3/2023  Time: 08:58 CDT

## 2023-04-03 NOTE — ANESTHESIA PROCEDURE NOTES
Airway  Urgency: elective    Date/Time: 4/3/2023 8:22 AM  Airway not difficult    General Information and Staff    Patient location during procedure: OR  CRNA/CAA: Antione Greenwood CRNA    Indications and Patient Condition  Indications for airway management: airway protection    Preoxygenated: yes  Mask difficulty assessment: 1 - vent by mask    Final Airway Details  Final airway type: supraglottic airway      Successful airway: unique  Size 4  Airway Seal Pressure (cm H2O): 20     Number of attempts at approach: 1  Assessment: lips, teeth, and gum same as pre-op and atraumatic intubation

## 2023-04-03 NOTE — ANESTHESIA POSTPROCEDURE EVALUATION
Patient: Ezekiel ALDRIDGE    Procedure Summary     Date: 04/03/23 Room / Location:  [facility]  /  [facility]    Anesthesia Start: 0818 Anesthesia Stop: 0854    Procedure: CYSTOSCOPY BLADDER BIOPSY/CAUTERIZATION BLADDER LESION WITH FULGURATION (Bladder) Diagnosis:       Malignant neoplasm of overlapping sites of bladder      (Malignant neoplasm of overlapping sites of bladder (HCC) [C67.8])    Surgeons: Cale Covington MD Provider: Antione Greenwood CRNA    Anesthesia Type: general ASA Status: 3          Anesthesia Type: general    Vitals  Vitals Value Taken Time   /70 04/03/23 0957   Temp 98 °F (36.7 °C) 04/03/23 0913   Pulse 54 04/03/23 0957   Resp 15 04/03/23 0918   SpO2 97 % 04/03/23 0957           Post Anesthesia Care and Evaluation    Patient location during evaluation: PACU  Patient participation: complete - patient participated  Level of consciousness: awake and alert  Pain management: adequate    Airway patency: patent  Anesthetic complications: No anesthetic complications    Cardiovascular status: acceptable  Respiratory status: acceptable  Hydration status: acceptable    Comments: Blood pressure 147/70, pulse 54, temperature 98 °F (36.7 °C), temperature source Temporal, resp. rate 15, SpO2 97 %.    Pt discharged from PACU based on alex score >8

## 2023-04-10 ENCOUNTER — TELEPHONE (OUTPATIENT)
Dept: UROLOGY | Facility: CLINIC | Age: 83
End: 2023-04-10
Payer: MEDICARE

## 2023-04-10 NOTE — TELEPHONE ENCOUNTER
Pt called and wants pathology results. He also wants to know if he is still supposed to be passing blood on day 8. Please advise.

## 2023-04-11 ENCOUNTER — TELEPHONE (OUTPATIENT)
Dept: UROLOGY | Facility: CLINIC | Age: 83
End: 2023-04-11
Payer: MEDICARE

## 2023-04-12 NOTE — TELEPHONE ENCOUNTER
----- Message from Sondra Nelson sent at 4/11/2023  2:29 PM CDT -----  Regarding: Path results  Patient called about his path results. He also stated that he is still passing some blood and wanted to make sure that was normal.      Please advise.

## 2023-04-12 NOTE — TELEPHONE ENCOUNTER
He still having some blood in his urine.  He is not having clots or thickness to the urine.  He is told to limit his activity a couple more days.  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  We will need to do cystoscopy on him again in a year.  Electronically signed by Cale Covington MD, 04/11/23, 7:02 PM CDT.

## 2023-05-26 ENCOUNTER — HOSPITAL ENCOUNTER (OUTPATIENT)
Dept: GENERAL RADIOLOGY | Facility: HOSPITAL | Age: 83
Discharge: HOME | End: 2023-05-26
Payer: MEDICARE

## 2023-05-26 ENCOUNTER — OFFICE VISIT (OUTPATIENT)
Dept: FAMILY MEDICINE CLINIC | Facility: CLINIC | Age: 83
End: 2023-05-26
Payer: MEDICARE

## 2023-05-26 ENCOUNTER — TELEPHONE (OUTPATIENT)
Dept: FAMILY MEDICINE CLINIC | Facility: CLINIC | Age: 83
End: 2023-05-26

## 2023-05-26 VITALS
HEART RATE: 62 BPM | SYSTOLIC BLOOD PRESSURE: 144 MMHG | HEIGHT: 65 IN | RESPIRATION RATE: 18 BRPM | BODY MASS INDEX: 26.66 KG/M2 | DIASTOLIC BLOOD PRESSURE: 68 MMHG | WEIGHT: 160 LBS

## 2023-05-26 DIAGNOSIS — M25.512 ACUTE PAIN OF LEFT SHOULDER: Primary | ICD-10-CM

## 2023-05-26 DIAGNOSIS — M25.512 ACUTE PAIN OF LEFT SHOULDER: ICD-10-CM

## 2023-05-26 PROCEDURE — 99213 OFFICE O/P EST LOW 20 MIN: CPT | Performed by: NURSE PRACTITIONER

## 2023-05-26 PROCEDURE — 73030 X-RAY EXAM OF SHOULDER: CPT

## 2023-05-26 NOTE — PROGRESS NOTES
"Chief Complaint  Shoulder Injury and Fall    Subjective    History of Present Illness      Patient presents to [facility] for   History of Present Illness  Pt c/o left shoulder pain. Pt reports falling yesterday 5/25/23 on nadir surface. Pt does have skin tear to right forearm with bruising and mild swelling.     Review of Systems    I have reviewed and agree with the HPI information as above.  Christina Howard, APRN     Objective   Vital Signs:   /68   Pulse 62   Resp 18   Ht 163.8 cm (64.5\")   Wt 72.6 kg (160 lb)   BMI 27.04 kg/m²     BMI is >= 25 and <30. (Overweight) The following options were offered after discussion;: weight loss educational material (shared in after visit summary), exercise counseling/recommendations, and nutrition counseling/recommendations      Physical Exam  Vitals and nursing note reviewed.   Constitutional:       Appearance: Normal appearance. He is well-developed.   HENT:      Head: Normocephalic and atraumatic.      Right Ear: Tympanic membrane, ear canal and external ear normal.      Left Ear: Tympanic membrane, ear canal and external ear normal.      Nose: Nose normal. No septal deviation, nasal tenderness or congestion.      Mouth/Throat:      Lips: Pink. No lesions.      Mouth: Mucous membranes are moist. No oral lesions.      Dentition: Normal dentition.      Pharynx: Oropharynx is clear. No pharyngeal swelling, oropharyngeal exudate or posterior oropharyngeal erythema.   Eyes:      General: Lids are normal. Vision grossly intact. No scleral icterus.        Right eye: No discharge.         Left eye: No discharge.      Extraocular Movements: Extraocular movements intact.      Conjunctiva/sclera: Conjunctivae normal.      Right eye: Right conjunctiva is not injected.      Left eye: Left conjunctiva is not injected.      Pupils: Pupils are equal, round, and reactive to light.   Neck:      Thyroid: No thyroid mass.      Trachea: Trachea " normal.   Cardiovascular:      Rate and Rhythm: Normal rate and regular rhythm.      Heart sounds: Normal heart sounds. No murmur heard.    No gallop.   Pulmonary:      Effort: Pulmonary effort is normal.      Breath sounds: Normal breath sounds and air entry. No wheezing, rhonchi or rales.   Musculoskeletal:         General: No deformity. Normal range of motion.      Left shoulder: Tenderness and bony tenderness present. Decreased range of motion.      Cervical back: Full passive range of motion without pain, normal range of motion and neck supple.      Thoracic back: Normal.      Right lower leg: No edema.      Left lower leg: No edema.   Skin:     General: Skin is warm and dry.      Coloration: Skin is not jaundiced.      Findings: No rash.          Neurological:      Mental Status: He is alert and oriented to person, place, and time.      Sensory: Sensation is intact.      Motor: Motor function is intact.      Coordination: Coordination is intact.      Gait: Gait is intact.      Deep Tendon Reflexes: Reflexes are normal and symmetric.   Psychiatric:         Mood and Affect: Mood and affect normal.         Behavior: Behavior normal.         Judgment: Judgment normal.        LEXII-7:      PHQ-2 Depression Screening  Little interest or pleasure in doing things? 0-->not at all   Feeling down, depressed, or hopeless? 0-->not at all   PHQ-2 Total Score 0     PHQ-9 Depression Screening  Little interest or pleasure in doing things? 0-->not at all   Feeling down, depressed, or hopeless? 0-->not at all   Trouble falling or staying asleep, or sleeping too much?     Feeling tired or having little energy?     Poor appetite or overeating?     Feeling bad about yourself - or that you are a failure or have let yourself or your family down?     Trouble concentrating on things, such as reading the newspaper or watching television?     Moving or speaking so slowly that other people could have noticed? Or the opposite - being so  fidgety or restless that you have been moving around a lot more than usual?     Thoughts that you would be better off dead, or of hurting yourself in some way?     PHQ-9 Total Score 0   If you checked off any problems, how difficult have these problems made it for you to do your work, take care of things at home, or get along with other people?        Result Review  Data Reviewed:   The following data was reviewed by: TERRY Matute on 05/26/2023:    Data reviewed : Radiologic studies XR shoulder        XR Shoulder 2+ View Left (05/26/2023 09:58)      Assessment and Plan      Diagnoses and all orders for this visit:    1. Acute pain of left shoulder (Primary)  -     XR Shoulder 2+ View Left; Future  -     MRI Shoulder Left Without Contrast; Future    Patient fell walking up an embankment. He land on his left shoulder. It is tender to touch and difficulty to move. Xray was completed and reviewed and found to be unremarkable le. Will precede with a mri.   Declined pain medication.           Follow Up   Return if symptoms worsen or fail to improve.  Patient was given instructions and counseling regarding his condition or for health maintenance advice. Please see specific information pulled into the AVS if appropriate.

## 2023-05-26 NOTE — TELEPHONE ENCOUNTER
Called patient and informed of Shoulder xray was ok. Will precede with MRI at this time. LAWRENCE.

## 2023-05-26 NOTE — TELEPHONE ENCOUNTER
----- Message from TERRY Matute sent at 5/26/2023  2:11 PM CDT -----  Shoulder xray was ok. Will precede with MRI at this time.    none

## 2023-05-26 NOTE — TELEPHONE ENCOUNTER
Caller: Anali Velez    Relationship: Emergency Contact    Best call back number: [phone]    What is the best time to reach you:ANYTIME    Who are you requesting to speak with (clinical staff, provider,  specific staff member): CLINICAL        What was the call regarding: PATIENT WOULD LIKE A CALL BACK REGARDING HIS SHOULDER XRAY FROM THIS MORNING.    Do you require a callback: YES ASAP

## 2023-06-14 ENCOUNTER — HOSPITAL ENCOUNTER (OUTPATIENT)
Dept: MRI IMAGING | Facility: HOSPITAL | Age: 83
Discharge: HOME | End: 2023-06-14
Admitting: NURSE PRACTITIONER
Payer: MEDICARE

## 2023-06-14 DIAGNOSIS — M25.512 ACUTE PAIN OF LEFT SHOULDER: ICD-10-CM

## 2023-06-14 PROCEDURE — 73221 MRI JOINT UPR EXTREM W/O DYE: CPT

## 2023-06-15 ENCOUNTER — TELEPHONE (OUTPATIENT)
Dept: FAMILY MEDICINE CLINIC | Facility: CLINIC | Age: 83
End: 2023-06-15
Payer: MEDICARE

## 2023-06-15 DIAGNOSIS — R93.89 ABNORMAL MRI: ICD-10-CM

## 2023-06-15 DIAGNOSIS — M25.512 ACUTE PAIN OF LEFT SHOULDER: Primary | ICD-10-CM

## 2023-06-15 NOTE — TELEPHONE ENCOUNTER
----- Message from TERRY Matute sent at 6/14/2023  3:50 PM CDT -----  Regarding: FW:   Multiple tears noted to shoulder.  Refer to ortho   ----- Message -----  From: Interface, [employer]  Sent: 6/14/2023   9:51 AM CDT  To: TERRY Matute

## 2023-10-30 ENCOUNTER — TELEPHONE (OUTPATIENT)
Dept: PULMONOLOGY | Facility: CLINIC | Age: 83
End: 2023-10-30

## 2023-10-30 NOTE — TELEPHONE ENCOUNTER
"    Caller: Ezekiel Velez \"Colin\"    Relationship to patient: Self    Best call back number: [phone]      Patient is needing: PT HAD CT SCAN DONE AND WANTS TO KNOW IF THAT IS GOOD ENOUGH OR WILL HE STILL NEED TO HAVE THE XRAY DONE, PLEASE CALL TO ADVISE  "

## 2023-11-16 ENCOUNTER — OFFICE VISIT (OUTPATIENT)
Dept: PULMONOLOGY | Facility: CLINIC | Age: 83
End: 2023-11-16
Payer: MEDICARE

## 2023-11-16 VITALS
SYSTOLIC BLOOD PRESSURE: 126 MMHG | WEIGHT: 162 LBS | HEIGHT: 65 IN | OXYGEN SATURATION: 96 % | DIASTOLIC BLOOD PRESSURE: 70 MMHG | BODY MASS INDEX: 26.99 KG/M2 | HEART RATE: 60 BPM

## 2023-11-16 DIAGNOSIS — J84.10 LUNG FIBROSIS: Primary | ICD-10-CM

## 2023-11-16 DIAGNOSIS — Z29.11 NEED FOR PROPHYLACTIC VACCINATION AND INOCULATION AGAINST RESPIRATORY SYNCYTIAL VIRUS (RSV): ICD-10-CM

## 2023-11-16 PROCEDURE — 99214 OFFICE O/P EST MOD 30 MIN: CPT | Performed by: INTERNAL MEDICINE

## 2023-11-16 RX ORDER — PANTOPRAZOLE SODIUM 40 MG/1
1 TABLET, DELAYED RELEASE ORAL DAILY
COMMUNITY
Start: 2023-10-27

## 2023-11-16 NOTE — PROGRESS NOTES
"Background:  Pt with multiple benign lung nodules.  AAA. pulm fibrosis   Chief Complaint  Lung fibrosis    Subjective    History of Present Illness     Ezekiel Velez is here for follow up with [facility].  History of Present Illness  Pt ofllows up after ct chest in September showed some low risk nodules.  No increased cough or sputum production.  No decline in exertional tolerance     Tobacco Use: Medium Risk (11/16/2023)    Patient History     Smoking Tobacco Use: Former     Smokeless Tobacco Use: Never     Passive Exposure: Not on file      Current Outpatient Medications   Medication Instructions    carvedilol (COREG) 3.125 mg, 2 Times Daily With Meals    cephalexin (KEFLEX) 500 mg, Oral, 2 Times Daily, Starting tonight    HYDROcodone-acetaminophen (NORCO) 5-325 MG per tablet 1 tablet, Oral, Every 6 Hours PRN    pantoprazole (PROTONIX) 40 MG EC tablet 1 tablet, Oral, Daily    RSVPreF3 Vac Recomb Adjuvanted (AREXVY) 120 mcg, Intramuscular, Once    simvastatin (ZOCOR) 20 mg, Oral, Nightly    vitamin B-12 (CYANOCOBALAMIN) 1,000 mcg, Oral, Daily      Objective     Vital Signs:   /70   Pulse 60   Ht 163.8 cm (64.5\")   Wt 73.5 kg (162 lb)   SpO2 96% Comment: RA  BMI 27.38 kg/m²   Physical Exam  Constitutional:       Appearance: Normal appearance. He is not ill-appearing or diaphoretic.   Eyes:      Extraocular Movements: Extraocular movements intact.   Pulmonary:      Effort: Pulmonary effort is normal. No respiratory distress.      Breath sounds: No wheezing, rhonchi or rales.   Skin:     Findings: No erythema or rash.   Neurological:      Mental Status: He is alert.      Result Review  Data Reviewed:               CT Chest Without Contrast Diagnostic (09/29/2023 12:00 EDT)   Parenchyma and Airways: Central airways are patent without endobronchial lesion.  No focal consolidation or interstitial disease.  Bilateral small sub centimeter pulmonary nodules " are stable.  No new nodules.   SCANNED - PULMONARY RESULTS (04/23/2018 00:00)      Assessment and Plan    Diagnoses and all orders for this visit:    1. Lung fibrosis (Primary)    2. Need for prophylactic vaccination and inoculation against respiratory syncytial virus (RSV)  -     RSVPreF3 Vac Recomb Adjuvanted (AREXVY) 120 MCG/0.5ML reconstituted suspension injection; Inject 0.5 mL into the appropriate muscle as directed by prescriber 1 (One) Time for 1 dose.  Dispense: 1 each; Refill: 0    Will plan reassessment in spring with spirometry  Consider follow up ct next year to reassess fibrosis and small nodules which are likely benign  No need for antifibrotic therapy for now  No inhalers for now  Rsv vaccine recommended.  Pt counseled regarding benefit and rx provided.    Follow Up   Return in about 6 months (around 5/16/2024) for Spirometry and DLCO.  Patient was given instructions and counseling regarding his condition or for health maintenance advice. Please see specific information pulled into the AVS if appropriate.    Electronically signed by Derrek Campa MD, 11/16/2023, 15:30 CST

## 2024-03-25 ENCOUNTER — TELEPHONE (OUTPATIENT)
Dept: UROLOGY | Facility: CLINIC | Age: 84
End: 2024-03-25
Payer: MEDICARE

## 2024-03-25 NOTE — TELEPHONE ENCOUNTER
"  Caller: Ezekiel Velez \"Colin\"     Relationship: SELF    Best call back number: [phone]    What is the best time to reach you: ANYTIME    Who are you requesting to speak with (clinical staff, provider,  specific staff member): NON- CLINICAL    Do you know the name of the person who called:     What was the call regarding: PT CALLED NEEDING TO SCHEDULE HIS 1 YEAR CYSTO. PT HUNG UP BEFORE HUB COULD TRANSFER TO FRONT OFFICE. PLEASE CALL TO SCHEDULE PT       "

## 2024-04-11 ENCOUNTER — PROCEDURE VISIT (OUTPATIENT)
Dept: UROLOGY | Facility: CLINIC | Age: 84
End: 2024-04-11
Payer: MEDICARE

## 2024-04-11 DIAGNOSIS — C67.8 MALIGNANT NEOPLASM OF OVERLAPPING SITES OF BLADDER: Primary | ICD-10-CM

## 2024-04-11 DIAGNOSIS — D49.4 BLADDER TUMOR: ICD-10-CM

## 2024-04-11 NOTE — PROGRESS NOTES
CC: I am here for the doctor to look at my bladder    Cystoscopy procedure note  Pre- operative diagnosis:  Bladder cancer surveillance    Post operative diagnosis:  Bladder Tumor    08/22/2017: TURBT  Final Diagnosis   1.  Bladder tumor, right lateral wall, transurethral resection:  Low-grade papillary urothelial carcinoma, noninvasive.  Muscularis propria not present.     2.  Bladder tumor, transurethral resection:  Low-grade papillary urothelial carcinoma, noninvasive.  Muscularis propria identified with no evidence of tumor involvement.     AJCC pathologic stage:  pTa Nx   Electronically signed by Jon Pires MD on 8/23/2017 at 0928      02/2019: TURBT  Final Diagnosis   Urinary bladder, biopsy of bladder dome: Noninvasive low-grade papillary urothelial carcinoma with severe underlying chronic inflammation.     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                                                  Electronically signed by Jon Pires MD on 3/29/2022 at 1226    Procedure:  The patient was prepped and draped in a normal sterile fashion.  The urethra was anesthetized with 2% lidocaine jelly.  A flexible cystoscope was introduced per urethra.      Anterior urethra: The urethra is patent from meatus to sphincter. There is no urothelial lesion, stone, foreign body or other mass  Prostatic urethra: Prostate findings on cystoscopy: Prostate gland is expected.  There is mild lateral lobe enlargement but it does not appear obstructive.  There are no urothelial lesions.  Verumontanum is normal in size  Bladder:  Papillary lesion right posterior lateral wall less than 2 cm.    Patient tolerated the procedure well    Complications: none    Blood loss: minimal       ASSESSMENT AND PLAN          Problem List Items Addressed This Visit          Hematology and Neoplasia    Malignant neoplasm of overlapping sites of bladder - Primary    Overview     Added automatically from request for surgery [id number]         Relevant Orders    POC Urinalysis Dipstick, Multipro (Completed)     Other Visit Diagnoses       Bladder tumor        Relevant Orders    Case Request (Completed)            No follow-ups on file.      Cale Covington MD  4/11/2024  10:15 CDT

## 2024-05-09 ENCOUNTER — PRE-ADMISSION TESTING (OUTPATIENT)
Dept: PREADMISSION TESTING | Facility: HOSPITAL | Age: 84
End: 2024-05-09
Payer: MEDICARE

## 2024-05-09 VITALS
HEART RATE: 74 BPM | SYSTOLIC BLOOD PRESSURE: 134 MMHG | WEIGHT: 163.8 LBS | BODY MASS INDEX: 27.29 KG/M2 | OXYGEN SATURATION: 95 % | HEIGHT: 65 IN | RESPIRATION RATE: 18 BRPM | DIASTOLIC BLOOD PRESSURE: 71 MMHG

## 2024-05-09 LAB
ANION GAP SERPL CALCULATED.3IONS-SCNC: 8 MMOL/L (ref 5–15)
BUN SERPL-MCNC: 10 MG/DL (ref 8–23)
BUN/CREAT SERPL: 12.2 (ref 7–25)
CALCIUM SPEC-SCNC: 9 MG/DL (ref 8.6–10.5)
CHLORIDE SERPL-SCNC: 102 MMOL/L (ref 98–107)
CO2 SERPL-SCNC: 32 MMOL/L (ref 22–29)
CREAT SERPL-MCNC: 0.82 MG/DL (ref 0.76–1.27)
DEPRECATED RDW RBC AUTO: 44.7 FL (ref 37–54)
EGFRCR SERPLBLD CKD-EPI 2021: 87.2 ML/MIN/1.73
ERYTHROCYTE [DISTWIDTH] IN BLOOD BY AUTOMATED COUNT: 12.4 % (ref 12.3–15.4)
GLUCOSE SERPL-MCNC: 105 MG/DL (ref 65–99)
HCT VFR BLD AUTO: 39.7 % (ref 37.5–51)
HGB BLD-MCNC: 13.4 G/DL (ref 13–17.7)
MCH RBC QN AUTO: 33.3 PG (ref 26.6–33)
MCHC RBC AUTO-ENTMCNC: 33.8 G/DL (ref 31.5–35.7)
MCV RBC AUTO: 98.5 FL (ref 79–97)
PLATELET # BLD AUTO: 129 10*3/MM3 (ref 140–450)
PMV BLD AUTO: 10.8 FL (ref 6–12)
POTASSIUM SERPL-SCNC: 3.9 MMOL/L (ref 3.5–5.2)
RBC # BLD AUTO: 4.03 10*6/MM3 (ref 4.14–5.8)
SODIUM SERPL-SCNC: 142 MMOL/L (ref 136–145)
WBC NRBC COR # BLD AUTO: 4.35 10*3/MM3 (ref 3.4–10.8)

## 2024-05-09 PROCEDURE — 80048 BASIC METABOLIC PNL TOTAL CA: CPT

## 2024-05-09 PROCEDURE — 85027 COMPLETE CBC AUTOMATED: CPT

## 2024-05-09 PROCEDURE — 93005 ELECTROCARDIOGRAM TRACING: CPT

## 2024-05-09 PROCEDURE — 36415 COLL VENOUS BLD VENIPUNCTURE: CPT

## 2024-05-09 NOTE — DISCHARGE INSTRUCTIONS

## 2024-05-10 LAB
QT INTERVAL: 442 MS
QTC INTERVAL: 452 MS

## 2024-05-14 ENCOUNTER — TELEPHONE (OUTPATIENT)
Dept: UROLOGY | Facility: CLINIC | Age: 84
End: 2024-05-14
Payer: MEDICARE

## 2024-05-14 NOTE — TELEPHONE ENCOUNTER
Called patient to remind them to arrive at patient registration on 5/15 at 6AM for the procedure with Dr. Covington. Spoke with patient. Told patient if they had any questions to please contact our office at [phone].

## 2024-05-15 ENCOUNTER — HOSPITAL ENCOUNTER (OUTPATIENT)
Facility: HOSPITAL | Age: 84
Setting detail: HOSPITAL OUTPATIENT SURGERY
Discharge: HOME | End: 2024-05-15
Attending: UROLOGY | Admitting: UROLOGY
Payer: MEDICARE

## 2024-05-15 ENCOUNTER — ANESTHESIA EVENT (OUTPATIENT)
Dept: PERIOP | Facility: HOSPITAL | Age: 84
End: 2024-05-15
Payer: MEDICARE

## 2024-05-15 ENCOUNTER — ANESTHESIA (OUTPATIENT)
Dept: PERIOP | Facility: HOSPITAL | Age: 84
End: 2024-05-15
Payer: MEDICARE

## 2024-05-15 VITALS
DIASTOLIC BLOOD PRESSURE: 74 MMHG | TEMPERATURE: 97.5 F | OXYGEN SATURATION: 96 % | RESPIRATION RATE: 16 BRPM | HEART RATE: 71 BPM | SYSTOLIC BLOOD PRESSURE: 160 MMHG

## 2024-05-15 DIAGNOSIS — D49.4 BLADDER TUMOR: ICD-10-CM

## 2024-05-15 PROCEDURE — 25010000002 GLYCOPYRROLATE 0.4 MG/2ML SOLUTION: Performed by: NURSE ANESTHETIST, CERTIFIED REGISTERED

## 2024-05-15 PROCEDURE — 25010000002 FENTANYL CITRATE (PF) 100 MCG/2ML SOLUTION: Performed by: NURSE ANESTHETIST, CERTIFIED REGISTERED

## 2024-05-15 PROCEDURE — 25010000002 PROPOFOL 10 MG/ML EMULSION: Performed by: NURSE ANESTHETIST, CERTIFIED REGISTERED

## 2024-05-15 PROCEDURE — 52234 CYSTOSCOPY AND TREATMENT: CPT | Performed by: UROLOGY

## 2024-05-15 PROCEDURE — 25810000003 LACTATED RINGERS PER 1000 ML: Performed by: UROLOGY

## 2024-05-15 PROCEDURE — 25010000002 DEXAMETHASONE PER 1 MG: Performed by: NURSE ANESTHETIST, CERTIFIED REGISTERED

## 2024-05-15 PROCEDURE — 88307 TISSUE EXAM BY PATHOLOGIST: CPT | Performed by: UROLOGY

## 2024-05-15 PROCEDURE — 25010000002 CEFAZOLIN PER 500 MG: Performed by: NURSE ANESTHETIST, CERTIFIED REGISTERED

## 2024-05-15 PROCEDURE — 25010000002 ONDANSETRON PER 1 MG: Performed by: NURSE ANESTHETIST, CERTIFIED REGISTERED

## 2024-05-15 RX ORDER — HYDROCODONE BITARTRATE AND ACETAMINOPHEN 5; 325 MG/1; MG/1
1 TABLET ORAL EVERY 4 HOURS PRN
Status: DISCONTINUED | OUTPATIENT
Start: 2024-05-15 | End: 2024-05-15 | Stop reason: HOSPADM

## 2024-05-15 RX ORDER — CEFAZOLIN SODIUM 1 G/3ML
INJECTION, POWDER, FOR SOLUTION INTRAMUSCULAR; INTRAVENOUS AS NEEDED
Status: DISCONTINUED | OUTPATIENT
Start: 2024-05-15 | End: 2024-05-15 | Stop reason: SURG

## 2024-05-15 RX ORDER — EPHEDRINE SULFATE 50 MG/ML
INJECTION, SOLUTION INTRAVENOUS AS NEEDED
Status: DISCONTINUED | OUTPATIENT
Start: 2024-05-15 | End: 2024-05-15 | Stop reason: SURG

## 2024-05-15 RX ORDER — ACETAMINOPHEN 500 MG
1000 TABLET ORAL ONCE
Status: COMPLETED | OUTPATIENT
Start: 2024-05-15 | End: 2024-05-15

## 2024-05-15 RX ORDER — DROPERIDOL 2.5 MG/ML
0.62 INJECTION, SOLUTION INTRAMUSCULAR; INTRAVENOUS ONCE AS NEEDED
Status: DISCONTINUED | OUTPATIENT
Start: 2024-05-15 | End: 2024-05-15 | Stop reason: HOSPADM

## 2024-05-15 RX ORDER — ONDANSETRON 2 MG/ML
4 INJECTION INTRAMUSCULAR; INTRAVENOUS ONCE AS NEEDED
Status: DISCONTINUED | OUTPATIENT
Start: 2024-05-15 | End: 2024-05-15 | Stop reason: HOSPADM

## 2024-05-15 RX ORDER — SODIUM CHLORIDE, SODIUM LACTATE, POTASSIUM CHLORIDE, CALCIUM CHLORIDE 600; 310; 30; 20 MG/100ML; MG/100ML; MG/100ML; MG/100ML
100 INJECTION, SOLUTION INTRAVENOUS CONTINUOUS
Status: DISCONTINUED | OUTPATIENT
Start: 2024-05-15 | End: 2024-05-15 | Stop reason: HOSPADM

## 2024-05-15 RX ORDER — FENTANYL CITRATE 50 UG/ML
INJECTION, SOLUTION INTRAMUSCULAR; INTRAVENOUS AS NEEDED
Status: DISCONTINUED | OUTPATIENT
Start: 2024-05-15 | End: 2024-05-15 | Stop reason: SURG

## 2024-05-15 RX ORDER — SODIUM CHLORIDE 9 MG/ML
40 INJECTION, SOLUTION INTRAVENOUS AS NEEDED
Status: DISCONTINUED | OUTPATIENT
Start: 2024-05-15 | End: 2024-05-15 | Stop reason: HOSPADM

## 2024-05-15 RX ORDER — NALOXONE HCL 0.4 MG/ML
0.4 VIAL (ML) INJECTION AS NEEDED
Status: DISCONTINUED | OUTPATIENT
Start: 2024-05-15 | End: 2024-05-15 | Stop reason: HOSPADM

## 2024-05-15 RX ORDER — LABETALOL HYDROCHLORIDE 5 MG/ML
5 INJECTION, SOLUTION INTRAVENOUS
Status: DISCONTINUED | OUTPATIENT
Start: 2024-05-15 | End: 2024-05-15 | Stop reason: HOSPADM

## 2024-05-15 RX ORDER — CEPHALEXIN 500 MG/1
500 CAPSULE ORAL 2 TIMES DAILY
Qty: 5 CAPSULE | Refills: 0 | Status: SHIPPED | OUTPATIENT
Start: 2024-05-15

## 2024-05-15 RX ORDER — ONDANSETRON 2 MG/ML
INJECTION INTRAMUSCULAR; INTRAVENOUS AS NEEDED
Status: DISCONTINUED | OUTPATIENT
Start: 2024-05-15 | End: 2024-05-15 | Stop reason: SURG

## 2024-05-15 RX ORDER — DEXAMETHASONE SODIUM PHOSPHATE 4 MG/ML
INJECTION, SOLUTION INTRA-ARTICULAR; INTRALESIONAL; INTRAMUSCULAR; INTRAVENOUS; SOFT TISSUE AS NEEDED
Status: DISCONTINUED | OUTPATIENT
Start: 2024-05-15 | End: 2024-05-15 | Stop reason: SURG

## 2024-05-15 RX ORDER — SODIUM CHLORIDE 0.9 % (FLUSH) 0.9 %
3-10 SYRINGE (ML) INJECTION AS NEEDED
Status: DISCONTINUED | OUTPATIENT
Start: 2024-05-15 | End: 2024-05-15 | Stop reason: HOSPADM

## 2024-05-15 RX ORDER — HYDROCODONE BITARTRATE AND ACETAMINOPHEN 10; 325 MG/1; MG/1
1 TABLET ORAL EVERY 4 HOURS PRN
Status: DISCONTINUED | OUTPATIENT
Start: 2024-05-15 | End: 2024-05-15 | Stop reason: HOSPADM

## 2024-05-15 RX ORDER — LIDOCAINE HYDROCHLORIDE 10 MG/ML
0.5 INJECTION, SOLUTION EPIDURAL; INFILTRATION; INTRACAUDAL; PERINEURAL ONCE AS NEEDED
Status: DISCONTINUED | OUTPATIENT
Start: 2024-05-15 | End: 2024-05-15 | Stop reason: HOSPADM

## 2024-05-15 RX ORDER — SODIUM CHLORIDE 0.9 % (FLUSH) 0.9 %
3 SYRINGE (ML) INJECTION EVERY 12 HOURS SCHEDULED
Status: DISCONTINUED | OUTPATIENT
Start: 2024-05-15 | End: 2024-05-15 | Stop reason: HOSPADM

## 2024-05-15 RX ORDER — IBUPROFEN 600 MG/1
600 TABLET ORAL EVERY 6 HOURS PRN
Status: DISCONTINUED | OUTPATIENT
Start: 2024-05-15 | End: 2024-05-15 | Stop reason: HOSPADM

## 2024-05-15 RX ORDER — FLUMAZENIL 0.1 MG/ML
0.2 INJECTION INTRAVENOUS AS NEEDED
Status: DISCONTINUED | OUTPATIENT
Start: 2024-05-15 | End: 2024-05-15 | Stop reason: HOSPADM

## 2024-05-15 RX ORDER — FENTANYL CITRATE 50 UG/ML
50 INJECTION, SOLUTION INTRAMUSCULAR; INTRAVENOUS
Status: DISCONTINUED | OUTPATIENT
Start: 2024-05-15 | End: 2024-05-15 | Stop reason: HOSPADM

## 2024-05-15 RX ORDER — SODIUM CHLORIDE, SODIUM LACTATE, POTASSIUM CHLORIDE, CALCIUM CHLORIDE 600; 310; 30; 20 MG/100ML; MG/100ML; MG/100ML; MG/100ML
1000 INJECTION, SOLUTION INTRAVENOUS CONTINUOUS
Status: DISCONTINUED | OUTPATIENT
Start: 2024-05-15 | End: 2024-05-15 | Stop reason: HOSPADM

## 2024-05-15 RX ORDER — MIDAZOLAM HYDROCHLORIDE 1 MG/ML
0.5 INJECTION INTRAMUSCULAR; INTRAVENOUS
Status: DISCONTINUED | OUTPATIENT
Start: 2024-05-15 | End: 2024-05-15 | Stop reason: HOSPADM

## 2024-05-15 RX ORDER — GLYCOPYRROLATE 0.2 MG/ML
INJECTION INTRAMUSCULAR; INTRAVENOUS AS NEEDED
Status: DISCONTINUED | OUTPATIENT
Start: 2024-05-15 | End: 2024-05-15 | Stop reason: SURG

## 2024-05-15 RX ORDER — PROPOFOL 10 MG/ML
VIAL (ML) INTRAVENOUS AS NEEDED
Status: DISCONTINUED | OUTPATIENT
Start: 2024-05-15 | End: 2024-05-15 | Stop reason: SURG

## 2024-05-15 RX ORDER — PHENAZOPYRIDINE HYDROCHLORIDE 200 MG/1
200 TABLET, FILM COATED ORAL 3 TIMES DAILY PRN
Qty: 6 TABLET | Refills: 0 | Status: SHIPPED | OUTPATIENT
Start: 2024-05-15 | End: 2024-05-17

## 2024-05-15 RX ORDER — LIDOCAINE HYDROCHLORIDE 20 MG/ML
INJECTION, SOLUTION EPIDURAL; INFILTRATION; INTRACAUDAL; PERINEURAL AS NEEDED
Status: DISCONTINUED | OUTPATIENT
Start: 2024-05-15 | End: 2024-05-15 | Stop reason: SURG

## 2024-05-15 RX ORDER — SORBITOL 30 G/1000ML
IRRIGANT IRRIGATION AS NEEDED
Status: DISCONTINUED | OUTPATIENT
Start: 2024-05-15 | End: 2024-05-15 | Stop reason: HOSPADM

## 2024-05-15 RX ORDER — SODIUM CHLORIDE 0.9 % (FLUSH) 0.9 %
3 SYRINGE (ML) INJECTION AS NEEDED
Status: DISCONTINUED | OUTPATIENT
Start: 2024-05-15 | End: 2024-05-15 | Stop reason: HOSPADM

## 2024-05-15 RX ADMIN — FENTANYL CITRATE 50 MCG: 50 INJECTION, SOLUTION INTRAMUSCULAR; INTRAVENOUS at 08:52

## 2024-05-15 RX ADMIN — DEXAMETHASONE SODIUM PHOSPHATE 4 MG: 4 INJECTION, SOLUTION INTRAMUSCULAR; INTRAVENOUS at 08:43

## 2024-05-15 RX ADMIN — SODIUM CHLORIDE, POTASSIUM CHLORIDE, SODIUM LACTATE AND CALCIUM CHLORIDE 1000 ML: 600; 310; 30; 20 INJECTION, SOLUTION INTRAVENOUS at 07:01

## 2024-05-15 RX ADMIN — ONDANSETRON 4 MG: 2 INJECTION INTRAMUSCULAR; INTRAVENOUS at 08:43

## 2024-05-15 RX ADMIN — CEFAZOLIN 2 G: 1 INJECTION, POWDER, FOR SOLUTION INTRAMUSCULAR; INTRAVENOUS at 08:46

## 2024-05-15 RX ADMIN — LIDOCAINE HYDROCHLORIDE 100 MG: 20 INJECTION, SOLUTION EPIDURAL; INFILTRATION; INTRACAUDAL; PERINEURAL at 08:36

## 2024-05-15 RX ADMIN — PROPOFOL 100 MG: 10 INJECTION, EMULSION INTRAVENOUS at 08:36

## 2024-05-15 RX ADMIN — FENTANYL CITRATE 25 MCG: 50 INJECTION, SOLUTION INTRAMUSCULAR; INTRAVENOUS at 08:46

## 2024-05-15 RX ADMIN — GLYCOPYRROLATE 0.1 MG: 0.2 INJECTION INTRAMUSCULAR; INTRAVENOUS at 08:39

## 2024-05-15 RX ADMIN — EPHEDRINE SULFATE 15 MG: 50 INJECTION INTRAVENOUS at 08:43

## 2024-05-15 RX ADMIN — FENTANYL CITRATE 25 MCG: 50 INJECTION, SOLUTION INTRAMUSCULAR; INTRAVENOUS at 08:36

## 2024-05-15 RX ADMIN — ACETAMINOPHEN 1000 MG: 500 TABLET, FILM COATED ORAL at 07:29

## 2024-05-15 NOTE — ANESTHESIA PROCEDURE NOTES
Airway  Urgency: elective    Date/Time: 5/15/2024 8:36 AM  Airway not difficult    General Information and Staff    Patient location during procedure: OR  CRNA/CAA: Elizabeth Naranjo CRNA    Indications and Patient Condition  Indications for airway management: airway protection    Preoxygenated: yes  Mask difficulty assessment: 0 - not attempted    Final Airway Details  Final airway type: supraglottic airway      Successful airway: I-gel  Size 4     Number of attempts at approach: 1  Assessment: lips, teeth, and gum same as pre-op and atraumatic intubation

## 2024-05-15 NOTE — H&P
Urology H&P    Mr. Velez is 84 y.o. male    CHIEF COMPLAINT: Bladder cancer    HPI  Patient here with tumor identified on surveillance cystoscopy.  It is consistent with urothelial carcinoma.  He is here to get a transurethral section of this.    The following portions of the patient's history were reviewed and updated as appropriate: allergies, current medications, past family history, past medical history, past social history, past surgical history and problem list.    Review of Systems   Constitutional:  Negative for chills and fever.   Gastrointestinal:  Negative for abdominal pain, anal bleeding and blood in stool.   Genitourinary:  Negative for dysuria and hematuria.         Medications Prior to Admission   Medication Sig Dispense Refill Last Dose    HYDROcodone-acetaminophen (NORCO) 5-325 MG per tablet Take 1 tablet by mouth Every 6 (Six) Hours As Needed (Pain). 8 tablet 0 5/15/2024 at 0400    pantoprazole (PROTONIX) 40 MG EC tablet Take 1 tablet by mouth Daily.   5/14/2024 at 0800    simvastatin (ZOCOR) 20 MG tablet Take 1 tablet by mouth Every Night.   5/14/2024 at 2000    vitamin B-12 (CYANOCOBALAMIN) 1000 MCG tablet Take 1 tablet by mouth Daily.   5/14/2024 at 2000    carvedilol (COREG) 3.125 MG tablet Take 1 tablet by mouth 2 (Two) Times a Day With Meals. (Patient not taking: Reported on 5/26/2023)       cephalexin (Keflex) 500 MG capsule Take 1 capsule by mouth 2 (Two) Times a Day. Starting tonight (Patient not taking: Reported on 11/16/2023) 5 capsule 0          Current Facility-Administered Medications:     lactated ringers infusion 1,000 mL, 1,000 mL, Intravenous, Continuous, Cale Covington MD, Last Rate: 25 mL/hr at 05/15/24 0701, 1,000 mL at 05/15/24 0701    lactated ringers infusion, 100 mL/hr, Intravenous, Continuous, Pham Issa MD    lidocaine PF 1% (XYLOCAINE) injection 0.5 mL, 0.5 mL, Intradermal, Once PRN, Cale Covington MD    midazolam (VERSED) injection 0.5 mg, 0.5 mg,  Intravenous, Q10 Min PRN, Pham Issa MD    sodium chloride 0.9 % flush 3 mL, 3 mL, Intravenous, PRN, Cale Covington MD    sodium chloride 0.9 % flush 3 mL, 3 mL, Intravenous, Q12H, Pham Issa MD    sodium chloride 0.9 % flush 3-10 mL, 3-10 mL, Intravenous, PRN, Pham Issa MD    sodium chloride 0.9 % infusion 40 mL, 40 mL, Intravenous, PRN, Pham Issa MD    Past Medical History:   Diagnosis Date    AAA (abdominal aortic aneurysm)     Arthritis     Bilateral shoulder pain     Cancer 08/2017    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    Carotid artery stenosis     Chronic low back pain     COPD (chronic obstructive pulmonary disease)     Degenerative disc disease, lumbar     Diverticulosis     History of adenomatous polyp of colon     Hyperlipidemia     Multiple myeloma     Sleep apnea with use of continuous positive airway pressure (CPAP)        Past Surgical History:   Procedure Laterality Date    CHOLECYSTECTOMY WITH INTRAOPERATIVE CHOLANGIOGRAM N/A 7/25/2017    Procedure: CHOLECYSTECTOMY LAPAROSCOPIC AND REMOVAL CYST ON CHEST;  Surgeon: Ina Issa MD;  Location: [facility];  Service:     COLONOSCOPY  07/24/2012    Nine polyps removed; Repeat 3 years    COLONOSCOPY N/A 9/19/2017    One diminutive polyp in ascending colon-Complete resection-Polyp tissue not retrieved; Two 4-5mm polyps at hepatic flexure; One 6mm polyp in proximal transverse colon; One 7mm polyp in distal transverse colon-Complete resection-polyp tissue not retrieved; One 5mm polyp in proximal descending colon-Complete resection-Polyp tissue not retrieved; Diverticulosis in sigmoid colon; Repeat 3 years    COLONOSCOPY N/A 9/1/2020    One 5mm tubular adenomatous polyp in the ascending colon; Two 5-6mm tubular adenomatous polyps in the proximal transverse colon; One 5mm tubular adenomatous polyp in the sigmoid colon; Diverticulosis in the sigmoid colon; The examination was otherwise normal on  direct and retroflexion views; Repeat 3 years    COLONOSCOPY N/A 6/10/2021    Procedure: COLONOSCOPY WITH ANESTHESIA;  Surgeon: Jimmie Tirado MD;  Location:  [facility];  Service: Gastroenterology;  Laterality: N/A;  pre hx colon polyp  post   dr patsy mims    CYSTOSCOPY BLADDER BIOPSY N/A 2/22/2019    Procedure: CYSTOSCOPY BILATERAL RETROGRADE PYELOGRAM BLADDER BIOPSY WITH FULGURATION OF 1 CM BLADDER TUMOR;  Surgeon: Cale Covington MD;  Location:  [facility];  Service: Urology    CYSTOSCOPY BLADDER BIOPSY N/A 4/3/2023    Procedure: CYSTOSCOPY BLADDER BIOPSY/CAUTERIZATION BLADDER LESION WITH FULGURATION;  Surgeon: Cale Covington MD;  Location:  [facility];  Service: Urology;  Laterality: N/A;    HEMORRHOIDECTOMY      INGUINAL HERNIA REPAIR Bilateral     TRANSURETHRAL RESECTION OF BLADDER TUMOR Bilateral 8/22/2017    Procedure: CYSTOSCOPY TRANSURETHRAL RESECTION OF BLADDER TUMOR bilateral retrogrades;  Surgeon: Cale Covington MD;  Location:  [facility];  Service:     TRANSURETHRAL RESECTION OF BLADDER TUMOR N/A 2/22/2021    Procedure: CYSTOSCOPY TRANSURETHRAL RESECTION OF BLADDER TUMOR, GEMCITABINE INTRAVESICAL INSTILLATION & BILATERAL RETROGRADE URETEROPYELOGRAMS;  Surgeon: Cale Covington MD;  Location:  [facility];  Service: Urology;  Laterality: N/A;    TRANSURETHRAL RESECTION OF BLADDER TUMOR N/A 3/25/2022    Procedure: CYSTOSCOPY TRANSURETHRAL RESECTION OF BLADDER TUMOR;  Surgeon: Cale Covington MD;  Location:  [facility];  Service: Urology;  Laterality: N/A;    TRANSURETHRAL RESECTION OF BLADDER TUMOR  3/25/2022    Procedure: ;  Surgeon: Cale Covington MD;  Location:  [facility];  Service: Urology;;       Social History     Socioeconomic History    Marital status:    Tobacco Use    Smoking status: Former     Current packs/day: 0.00     Average packs/day: 0.8 packs/day for 55.0 years (41.3 ttl pk-yrs)     Types: Cigarettes     Start date: 1964     Quit date: 2019     Years since  "quittin.3    Smokeless tobacco: Never    Tobacco comments:     recently quit   Vaping Use    Vaping status: Never Used   Substance and Sexual Activity    Alcohol use: No    Drug use: No    Sexual activity: Defer       Family History   Problem Relation Age of Onset    No Known Problems Father     No Known Problems Mother     Colon cancer Neg Hx     Colon polyps Neg Hx        /74 (BP Location: Left arm, Patient Position: Sitting)   Pulse 65   Temp 97.2 °F (36.2 °C) (Temporal)   Resp 18   SpO2 95%     Physical Exam  Constitutional:   Temp:  [97.2 °F (36.2 °C)] 97.2 °F (36.2 °C)  Heart Rate:  [65-66] 65  Resp:  [16-18] 18  BP: (145)/(74) 145/74  ] Well developed, well nourished, no distress  Respiratory:   Effort unlabored; Movements symmetric  GI:   No mass or hernia noted, not distended or tender   No enlargement of spleen or liver noted  Skin:   No pallor or cyanosis; No obvious rash  Psych:   Alert, Oriented x 3         Lab Results   Component Value Date    GLUCOSE 105 (H) 2024    BUN 10 2024    CREATININE 0.82 2024    EGFRIFNONA >60 2022    EGFRIFAFRI >60 2022    BCR 12.2 2024    CO2 32.0 (H) 2024    CALCIUM 9.0 2024    ALBUMIN 2.7 (L) 2023    AST 28 2023    ALT 36 2023     Lab Results   Component Value Date    GLUCOSE 105 (H) 2024    CALCIUM 9.0 2024     2024    K 3.9 2024    CO2 32.0 (H) 2024     2024    BUN 10 2024    CREATININE 0.82 2024    EGFRIFAFRI >60 2022    EGFRIFNONA >60 2022    BCR 12.2 2024    ANIONGAP 8.0 2024     Lab Results   Component Value Date    WBC 4.35 2024    HGB 13.4 2024    HCT 39.7 2024    MCV 98.5 (H) 2024     (L) 2024     No results found for: \"PSA\"  No results found for: \"URINECX\"  Brief Urine Lab Results  (Last result in the past 365 days)        Color   Clarity   Blood   Leuk Est   " "Nitrite   Protein   CREAT   Urine HCG        04/11/24 0948 Yellow   Clear   Negative   Negative   Negative   Negative                     Imaging Results (Last 7 Days)       ** No results found for the last 168 hours. **            Assessment and Plan  Recurrent bladder tumor  -Plan transurethral resection    Surveillance cystoscopy in office revealed \"Bladder: Papillary lesion right posterior lateral wall less than 2 cm. \"   (Please note that portions of this note were completed with a voice recognition program.)  Cale Covington MD  05/15/24  08:32 CDT            "

## 2024-05-15 NOTE — ANESTHESIA PREPROCEDURE EVALUATION
Anesthesia Evaluation     no history of anesthetic complications:   NPO Solid Status: > 8 hours  NPO Liquid Status: > 8 hours           Airway   Mallampati: III  Dental    (+) upper dentures and lower dentures    Pulmonary    (+) a smoker (quit 2019) Former, COPD,sleep apnea on CPAP  Cardiovascular   Exercise tolerance: good (4-7 METS)    (+) PVD, hyperlipidemia,  carotid artery disease  (-) CAD      Neuro/Psych  (-) seizures, TIA, CVA  GI/Hepatic/Renal/Endo    (-) liver disease, no renal disease, diabetes    Musculoskeletal     Abdominal    Substance History      OB/GYN          Other   arthritis, blood dyscrasia (multiple myeloma),   history of cancer (bladder cancer) active                Anesthesia Plan    ASA 3     general     intravenous induction     Anesthetic plan, risks, benefits, and alternatives have been provided, discussed and informed consent has been obtained with: patient.    CODE STATUS:

## 2024-05-15 NOTE — OP NOTE
Operative Summary    Ezekiel A Millay  Date of Procedure: 5/15/2024    Pre-op Diagnosis:   Bladder tumor [D49.4]    Post-op Diagnosis:     Post-Op Diagnosis Codes:     * Bladder tumor [D49.4]    Procedure/CPT® Codes:      Procedure(s):  CYSTOSCOPY TRANSURETHRAL RESECTION OF 1 CM BLADDER TUMOR    Surgeon(s):  Cale Covington MD    Anesthesia: General    Staff:   Circulator: Pham Huynh RN  Scrub Person: Ghassan Barajas; Sol Castillo    Indications for procedure:  Bladder tumor    Findings:   1 cm bladder tumor posterior aspect bladder right of midline    Procedure details:  After appropriate anesthesia, positioning, prep and drape, timeout protocol was observed.     23 Dutch cystoscope sheath was inserted.  No other tumors were identified using both a 30 and 70 degree lens.  There was no evidence of erythema or papillary lesion other than the after mentioned posterior bladder tumor.    I used a Taughber forceps to resect this.  I did not attempt to get into muscle as this is clearly a superficial low-grade tumor.  I fulgurated around the base of this with the Taughber forceps.  At the conclusion of three-way Louie catheters placed and started on continuous bladder irrigation.    Estimated Blood Loss: Less than 30 mL    Specimens:                Specimens       ID Source Type Tests Collected By Collected At Frozen?    A Urinary Bladder Tissue TISSUE PATHOLOGY EXAM   Cale Covington MD 5/15/24 1138     Description: POSTERIOR BLADDER WALL TUMOR              Drains: 18 Dutch three-way Louie catheter    Complications: none    Plan: Patient will follow-up in the office in a year for surveillance cystoscopy.      (Please note that portions of this note were completed with a voice recognition program.)  Cale Covington MD     Date: 5/15/2024  Time: 08:58 CDT

## 2024-05-15 NOTE — ANESTHESIA POSTPROCEDURE EVALUATION
Patient: Ezekiel Velez    Procedure Summary       Date: 05/15/24 Room / Location: [facility] OR 01 /  [facility]    Anesthesia Start: 0831 Anesthesia Stop: 0900    Procedure: CYSTOSCOPY TRANSURETHRAL RESECTION OF BLADDER TUMOR (Bladder) Diagnosis:       Bladder tumor      (Bladder tumor [D49.4])    Surgeons: Cale Covington MD Provider: Elizabeth Naranjo CRNA    Anesthesia Type: general ASA Status: 3            Anesthesia Type: general    Vitals  Vitals Value Taken Time   /74 05/15/24 0923   Temp 97.5 °F (36.4 °C) 05/15/24 0910   Pulse 74 05/15/24 0923   Resp 16 05/15/24 0923   SpO2 95 % 05/15/24 0923           Post Anesthesia Care and Evaluation    Patient location during evaluation: PHASE II  Patient participation: complete - patient participated  Level of consciousness: awake and awake and alert  Pain score: 0  Pain management: adequate    Airway patency: patent  Anesthetic complications: No anesthetic complications  PONV Status: none  Cardiovascular status: acceptable  Respiratory status: acceptable  Hydration status: acceptable    Comments: Patient discharged according to acceptable Geni score per RN assessment. See nursing records for further information.     Blood pressure 151/74, pulse 74, temperature 97.5 °F (36.4 °C), resp. rate 16, SpO2 95%.

## 2024-05-16 ENCOUNTER — TELEPHONE (OUTPATIENT)
Dept: UROLOGY | Facility: CLINIC | Age: 84
End: 2024-05-16
Payer: MEDICARE

## 2024-05-23 ENCOUNTER — OFFICE VISIT (OUTPATIENT)
Dept: PULMONOLOGY | Facility: CLINIC | Age: 84
End: 2024-05-23
Payer: MEDICARE

## 2024-05-23 ENCOUNTER — PROCEDURE VISIT (OUTPATIENT)
Dept: PULMONOLOGY | Facility: CLINIC | Age: 84
End: 2024-05-23
Payer: MEDICARE

## 2024-05-23 VITALS
HEART RATE: 54 BPM | WEIGHT: 162 LBS | DIASTOLIC BLOOD PRESSURE: 70 MMHG | OXYGEN SATURATION: 97 % | SYSTOLIC BLOOD PRESSURE: 116 MMHG | BODY MASS INDEX: 26.99 KG/M2 | HEIGHT: 65 IN

## 2024-05-23 DIAGNOSIS — J84.10 LUNG FIBROSIS: Primary | ICD-10-CM

## 2024-05-23 DIAGNOSIS — R91.1 LUNG NODULE: ICD-10-CM

## 2024-05-23 NOTE — PROCEDURES
Spirometry with Diffusion Capacity    Performed by: Micaela Mcnamara, RRT  Authorized by: Derrek Campa MD     Pre Drug % Predicted    FVC: 85%   FEV1: 75%   FEF 25-75%: 59%   FEV1/FVC: 67%   DLCO: 86%   D/VAsb: 99%    Interpretation   Spirometry   Spirometry shows moderate obstruction.   Review of FVL curve   Patient's effort is normal.   Diffusion Capacity  The patient's diffusion capacity is normal.  Diffusion capacity is normal when corrected for alveolar volume.   Overall comments: Results stable compared with previous study in 2018  Electronically signed by Derrek Campa MD, 5/23/2024, 15:35 CDT

## 2024-05-23 NOTE — PROGRESS NOTES
"Background:  Pt ww pulm fibrosis, quit smoking 2014   Chief Complaint  Lung fibrosis    Subjective    History of Present Illness     Ezekiel Velez is here for follow up with [facility].  History of Present Illness  Pt follows up with hx pulmonary fibrosis and some low risk nodules on ct in 2023.  He can still play 18 holes.  He had a hospital stay when it was not clear what the problem was.  He has had some superficial bladder tumors.     Tobacco Use: Medium Risk (5/23/2024)    Patient History     Smoking Tobacco Use: Former     Smokeless Tobacco Use: Never     Passive Exposure: Not on file      Current Outpatient Medications   Medication Instructions    cephalexin (KEFLEX) 500 mg, Oral, 2 Times Daily    HYDROcodone-acetaminophen (NORCO) 5-325 MG per tablet 1 tablet, Oral, Every 6 Hours PRN    pantoprazole (PROTONIX) 40 MG EC tablet 1 tablet, Oral, Daily    simvastatin (ZOCOR) 20 mg, Oral, Nightly    vitamin B-12 (CYANOCOBALAMIN) 1,000 mcg, Oral, Daily      Objective     Vital Signs:   /70   Pulse 54   Ht 163.8 cm (64.5\")   Wt 73.5 kg (162 lb)   SpO2 97% Comment: RA  BMI 27.38 kg/m²   Physical Exam  Constitutional:       Appearance: Normal appearance. He is not ill-appearing.   HENT:      Nose: Nose normal.   Eyes:      Extraocular Movements: Extraocular movements intact.   Pulmonary:      Effort: Pulmonary effort is normal. No respiratory distress.      Breath sounds: No wheezing, rhonchi or rales.   Neurological:      Mental Status: He is alert.        Result Review  Data Reviewed:         PFT Values          5/23/2024    14:45   Pre Drug PFT Results   FVC 85   FEV1 75   FEF 25-75% 59   FEV1/FVC 67   Other Tests PFT Results   DLCO 86   D/VAsb 99                Assessment and Plan    Diagnoses and all orders for this visit:    1. Lung fibrosis (Primary)  -     Spirometry with Diffusion Capacity    2. Lung nodule    Follow up ct next year  Follow up " PFT next year  If fibrosis, nodule, and physiology are normal then we can discontinue surveillance and follow up prn after that  Continue activities such as golf    Follow Up   Return in about 1 year (around 5/23/2025) for Spirometry and DLCO.  Patient was given instructions and counseling regarding his condition or for health maintenance advice. Please see specific information pulled into the AVS if appropriate.    Electronically signed by Derrek Campa MD, 5/23/2024, 15:04 CDT

## 2024-11-28 ENCOUNTER — OUTSIDE FACILITY SERVICE (OUTPATIENT)
Age: 84
End: 2024-11-28
Payer: MEDICARE

## 2024-11-30 ENCOUNTER — OUTSIDE FACILITY SERVICE (OUTPATIENT)
Age: 84
End: 2024-11-30

## 2024-11-30 PROCEDURE — OUTSIDEPOS PR OUTSIDE POS PLACEHOLDER: Performed by: INTERNAL MEDICINE

## 2025-03-03 NOTE — PROGRESS NOTES
Subjective    Mr. Velez is 85 y.o. male    Chief Complaint: History of Bladder Cancer/Gross Hematuria    History of Present Illness  Patient presents for follow-up after some gross hematuria seen from 1 to 2 days recently.  Patient emergency department 02/27/2025 due to abdominal pain with bloating it was determined this was a GI related issue.  However he did have her noticed blood in the urine soon after that event.  Has not seen any since he denies any difficulties urinating.  His urine is completely clear today.  He does have history of bladder cancer and has an upcoming bladder cancer surveillance cystoscopy May 15, 2025.    The following portions of the patient's history were reviewed and updated as appropriate: allergies, current medications, past family history, past medical history, past social history, past surgical history and problem list.    Review of Systems   Genitourinary:  Positive for hematuria.         Current Outpatient Medications:     HYDROcodone-acetaminophen (HYCET) 7.5-325 MG/15ML solution, Take  by mouth Every 6 (Six) Hours As Needed for Moderate Pain., Disp: , Rfl:     memantine (NAMENDA) 10 MG tablet, Take 1 tablet by mouth 2 (Two) Times a Day., Disp: , Rfl:     pantoprazole (PROTONIX) 40 MG EC tablet, Take 1 tablet by mouth Daily., Disp: , Rfl:     vitamin B-12 (CYANOCOBALAMIN) 1000 MCG tablet, Take 1 tablet by mouth Daily., Disp: , Rfl:     brompheniramine-pseudoephedrine-DM 30-2-10 MG/5ML syrup, Take 5 mL by mouth 4 (Four) Times a Day As Needed for Congestion or Cough. (Patient not taking: Reported on 3/7/2025), Disp: 118 mL, Rfl: 0    predniSONE (DELTASONE) 10 MG (21) dose pack, Take  by mouth Daily. Use as directed on package (Patient not taking: Reported on 3/7/2025), Disp: 21 each, Rfl: 0    simvastatin (ZOCOR) 20 MG tablet, Take 1 tablet by mouth Every Night. (Patient not taking: Reported on 3/7/2025), Disp: , Rfl:     Past Medical History:   Diagnosis Date    AAA (abdominal  aortic aneurysm)     Arthritis     Bilateral shoulder pain     Cancer 08/2017    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    Carotid artery stenosis     Chronic low back pain     COPD (chronic obstructive pulmonary disease)     Degenerative disc disease, lumbar     Diverticulosis     History of adenomatous polyp of colon     Hyperlipidemia     Multiple myeloma     Sleep apnea with use of continuous positive airway pressure (CPAP)        Past Surgical History:   Procedure Laterality Date    CHOLECYSTECTOMY WITH INTRAOPERATIVE CHOLANGIOGRAM N/A 7/25/2017    Procedure: CHOLECYSTECTOMY LAPAROSCOPIC AND REMOVAL CYST ON CHEST;  Surgeon: Ina Issa MD;  Location: [facility] OR;  Service:     COLONOSCOPY  07/24/2012    Nine polyps removed; Repeat 3 years    COLONOSCOPY N/A 9/19/2017    One diminutive polyp in ascending colon-Complete resection-Polyp tissue not retrieved; Two 4-5mm polyps at hepatic flexure; One 6mm polyp in proximal transverse colon; One 7mm polyp in distal transverse colon-Complete resection-polyp tissue not retrieved; One 5mm polyp in proximal descending colon-Complete resection-Polyp tissue not retrieved; Diverticulosis in sigmoid colon; Repeat 3 years    COLONOSCOPY N/A 9/1/2020    One 5mm tubular adenomatous polyp in the ascending colon; Two 5-6mm tubular adenomatous polyps in the proximal transverse colon; One 5mm tubular adenomatous polyp in the sigmoid colon; Diverticulosis in the sigmoid colon; The examination was otherwise normal on direct and retroflexion views; Repeat 3 years    COLONOSCOPY N/A 6/10/2021    Procedure: COLONOSCOPY WITH ANESTHESIA;  Surgeon: Jimmie Tirado MD;  Location: [facility] ENDOSCOPY;  Service: Gastroenterology;  Laterality: N/A;  pre hx colon polyp  post   dr patsy mims    CYSTOSCOPY BLADDER BIOPSY N/A 2/22/2019    Procedure: CYSTOSCOPY BILATERAL RETROGRADE PYELOGRAM BLADDER BIOPSY WITH FULGURATION OF 1 CM BLADDER TUMOR;  Surgeon: Cale Covington MD;  Location:   [facility] OR;  Service: Urology    CYSTOSCOPY BLADDER BIOPSY N/A 4/3/2023    Procedure: CYSTOSCOPY BLADDER BIOPSY/CAUTERIZATION BLADDER LESION WITH FULGURATION;  Surgeon: Cale Covington MD;  Location:  [facility] OR;  Service: Urology;  Laterality: N/A;    HEMORRHOIDECTOMY      INGUINAL HERNIA REPAIR Bilateral     TRANSURETHRAL RESECTION OF BLADDER TUMOR Bilateral 2017    Procedure: CYSTOSCOPY TRANSURETHRAL RESECTION OF BLADDER TUMOR bilateral retrogrades;  Surgeon: Cale Covington MD;  Location:  [facility] OR;  Service:     TRANSURETHRAL RESECTION OF BLADDER TUMOR N/A 2021    Procedure: CYSTOSCOPY TRANSURETHRAL RESECTION OF BLADDER TUMOR, GEMCITABINE INTRAVESICAL INSTILLATION & BILATERAL RETROGRADE URETEROPYELOGRAMS;  Surgeon: Cale Covington MD;  Location:  [facility] OR;  Service: Urology;  Laterality: N/A;    TRANSURETHRAL RESECTION OF BLADDER TUMOR N/A 3/25/2022    Procedure: CYSTOSCOPY TRANSURETHRAL RESECTION OF BLADDER TUMOR;  Surgeon: Cale Covington MD;  Location:  [facility] OR;  Service: Urology;  Laterality: N/A;    TRANSURETHRAL RESECTION OF BLADDER TUMOR  3/25/2022    Procedure: ;  Surgeon: Cale Covington MD;  Location:  [facility] OR;  Service: Urology;;    TRANSURETHRAL RESECTION OF BLADDER TUMOR N/A 5/15/2024    Procedure: CYSTOSCOPY TRANSURETHRAL RESECTION OF BLADDER TUMOR;  Surgeon: Cale Covington MD;  Location:  [facility] OR;  Service: Urology;  Laterality: N/A;       Social History     Socioeconomic History    Marital status:    Tobacco Use    Smoking status: Former     Current packs/day: 0.00     Average packs/day: 0.8 packs/day for 55.0 years (41.3 ttl pk-yrs)     Types: Cigarettes     Start date:      Quit date: 2019     Years since quittin.1    Smokeless tobacco: Never    Tobacco comments:     recently quit   Vaping Use    Vaping status: Never Used   Substance and Sexual Activity    Alcohol use: No    Drug use: No    Sexual activity: Defer       Family History   Problem Relation Age  "of Onset    No Known Problems Father     No Known Problems Mother     Colon cancer Neg Hx     Colon polyps Neg Hx        Objective    Temp 97 °F (36.1 °C) (Infrared)   Ht 167.6 cm (66\")   Wt 71.8 kg (158 lb 6.4 oz)   BMI 25.57 kg/m²     Physical Exam  Constitutional:       Appearance: Normal appearance.   HENT:      Head: Normocephalic and atraumatic.   Pulmonary:      Effort: Pulmonary effort is normal.   Skin:     Coloration: Skin is not pale.   Neurological:      Mental Status: He is alert.   Psychiatric:         Mood and Affect: Mood normal.         Behavior: Behavior normal.             Results for orders placed or performed in visit on 03/07/25   POC Urinalysis Dipstick, Multipro    Collection Time: 03/07/25 11:16 AM    Specimen: Urine   Result Value Ref Range    Color Yellow Yellow, Straw, Dark Yellow, Ann    Clarity, UA Clear Clear    Glucose, UA Negative Negative mg/dL    Bilirubin Negative Negative    Ketones, UA Negative Negative    Specific Gravity  1.010 1.005 - 1.030    Blood, UA Negative Negative    pH, Urine 6.0 5.0 - 8.0    Protein, POC Negative Negative mg/dL    Urobilinogen, UA 0.2 E.U./dL Normal, 0.2 E.U./dL    Nitrite, UA Negative Negative    Leukocytes Negative Negative   IPSS Questionnaire (AUA-7):  Incomplete emptying  Over the past month, how often have you had a sensation of not emptying your bladder completely after you finished urinating?: Not at all (03/07/25 1111)  Frequency  Over the past month, how often have you had to urinate again less than two hours after you finishied urinating ?: Not at all (03/07/25 1111)  Intermittency  Over the past month, how often have you found you stopped and started again several time when you urinated ?: Not at all (03/07/25 1111)  Urgency  Over the last month, how often have you found it difficult  have you found it difficult to postpone urination ?: Not at all (03/07/25 1111)  Weak Stream  Over the past month, how often have you had a weak " urinary stream ?: Not at all (03/07/25 1111)  Straining  Over the past month, how often have you had to push or strain to begin urination ?: Not at all (03/07/25 1111)  Nocturia  Over the past month, how many times did you most typically get up to urinate from the time you went to bed until the time you got up in the morning ?: 2 times (03/07/25 1111)  Quality of life due to urinary symptoms  If you were to spend the rest of your life with your urinary condition the way it is now, how would feel about that?: Mostly satisfied (03/07/25 1111)    Scores  Total IPSS Score: 2 (03/07/25 1111)  Total Score = Symptomatic Level: Mildly symptomatic: 0-7 (03/07/25 1111)       Assessment and Plan    Diagnoses and all orders for this visit:    1. Gross hematuria (Primary)  -     POC Urinalysis Dipstick, Multipro  -     CT Abdomen Pelvis With & Without Contrast; Future  -     Non-gynecologic Cytology; Future  -     Non-gynecologic Cytology    2. History of bladder cancer  -     POC Urinalysis Dipstick, Multipro  -     CT Abdomen Pelvis With & Without Contrast; Future  -     Non-gynecologic Cytology; Future  -     Non-gynecologic Cytology    An episode of hematuria after he admitted to the hospital for abdominal pain it was related to GI condition.  Has not had any blood since and denies any pain with urination.  His urine is crystal clear today.  Does have a history of bladder cancer and does have a in office cystoscopy scheduled for May 15 with Dr. Covington.    Last week should worsen I would just recommend he continue with his original cystoscopy schedule for May 15.    I will however get a CT urogram and send urine for cytology.

## 2025-03-07 ENCOUNTER — OFFICE VISIT (OUTPATIENT)
Dept: UROLOGY | Facility: CLINIC | Age: 85
End: 2025-03-07
Payer: MEDICARE

## 2025-03-07 VITALS — TEMPERATURE: 97 F | WEIGHT: 158.4 LBS | HEIGHT: 66 IN | BODY MASS INDEX: 25.46 KG/M2

## 2025-03-07 DIAGNOSIS — Z85.51 HISTORY OF BLADDER CANCER: ICD-10-CM

## 2025-03-07 DIAGNOSIS — R31.0 GROSS HEMATURIA: Primary | ICD-10-CM

## 2025-03-07 LAB
BILIRUB BLD-MCNC: NEGATIVE MG/DL
CLARITY, POC: CLEAR
COLOR UR: YELLOW
GLUCOSE UR STRIP-MCNC: NEGATIVE MG/DL
KETONES UR QL: NEGATIVE
LEUKOCYTE EST, POC: NEGATIVE
NITRITE UR-MCNC: NEGATIVE MG/ML
PH UR: 6 [PH] (ref 5–8)
PROT UR STRIP-MCNC: NEGATIVE MG/DL
RBC # UR STRIP: NEGATIVE /UL
SP GR UR: 1.01 (ref 1–1.03)
UROBILINOGEN UR QL: NORMAL

## 2025-03-07 PROCEDURE — 88112 CYTOPATH CELL ENHANCE TECH: CPT | Performed by: PHYSICIAN ASSISTANT

## 2025-03-07 RX ORDER — HYDROCODONE BITARTRATE AND ACETAMINOPHEN 7.5; 325 MG/15ML; MG/15ML
SOLUTION ORAL EVERY 6 HOURS PRN
COMMUNITY

## 2025-03-07 RX ORDER — MEMANTINE HYDROCHLORIDE 10 MG/1
10 TABLET ORAL 2 TIMES DAILY
COMMUNITY

## 2025-03-11 ENCOUNTER — RESULTS FOLLOW-UP (OUTPATIENT)
Dept: UROLOGY | Facility: CLINIC | Age: 85
End: 2025-03-11
Payer: MEDICARE

## 2025-03-11 NOTE — TELEPHONE ENCOUNTER
----- Message from Desmond Melendez sent at 3/11/2025  3:45 PM CDT -----  Regarding: urine cytology  Urine cytology is suspicious he is scheduled for cystoscopy with Dr. Covington however could see if there is an earlier slot for the cystoscopy.  ----- Message -----  From: Allie Fletcher MA  Sent: 3/7/2025  11:16 AM CDT  To: JEWEL Fierro

## 2025-04-03 ENCOUNTER — HOSPITAL ENCOUNTER (OUTPATIENT)
Dept: CT IMAGING | Facility: HOSPITAL | Age: 85
Discharge: HOME | End: 2025-04-03
Admitting: PHYSICIAN ASSISTANT
Payer: MEDICARE

## 2025-04-03 DIAGNOSIS — R31.0 GROSS HEMATURIA: ICD-10-CM

## 2025-04-03 DIAGNOSIS — Z85.51 HISTORY OF BLADDER CANCER: ICD-10-CM

## 2025-04-03 LAB — CREAT BLDA-MCNC: 1 MG/DL (ref 0.6–1.3)

## 2025-04-03 PROCEDURE — 25510000001 IOPAMIDOL 61 % SOLUTION: Performed by: PHYSICIAN ASSISTANT

## 2025-04-03 PROCEDURE — 82565 ASSAY OF CREATININE: CPT

## 2025-04-03 PROCEDURE — 74178 CT ABD&PLV WO CNTR FLWD CNTR: CPT

## 2025-04-03 RX ORDER — IOPAMIDOL 612 MG/ML
100 INJECTION, SOLUTION INTRAVASCULAR
Status: COMPLETED | OUTPATIENT
Start: 2025-04-03 | End: 2025-04-03

## 2025-04-03 RX ADMIN — IOPAMIDOL 100 ML: 612 INJECTION, SOLUTION INTRAVENOUS at 15:43

## 2025-04-18 ENCOUNTER — PROCEDURE VISIT (OUTPATIENT)
Dept: UROLOGY | Facility: CLINIC | Age: 85
End: 2025-04-18
Payer: MEDICARE

## 2025-04-18 DIAGNOSIS — Z85.51 HISTORY OF BLADDER CANCER: Primary | ICD-10-CM

## 2025-04-18 LAB
BILIRUB BLD-MCNC: NEGATIVE MG/DL
CLARITY, POC: CLEAR
COLOR UR: YELLOW
GLUCOSE UR STRIP-MCNC: NEGATIVE MG/DL
KETONES UR QL: ABNORMAL
LEUKOCYTE EST, POC: NEGATIVE
NITRITE UR-MCNC: NEGATIVE MG/ML
PH UR: 5.5 [PH] (ref 5–8)
PROT UR STRIP-MCNC: NEGATIVE MG/DL
RBC # UR STRIP: NEGATIVE /UL
SP GR UR: 1.02 (ref 1–1.03)
UROBILINOGEN UR QL: ABNORMAL

## 2025-04-18 NOTE — PROGRESS NOTES
CC: I am here for the doctor to look at my bladder    Cystoscopy procedure note  Pre- operative diagnosis:  Bladder cancer surveillance    Post operative diagnosis:  Bladder Tumor    08/22/2017: TURBT  Final Diagnosis   1.  Bladder tumor, right lateral wall, transurethral resection:  Low-grade papillary urothelial carcinoma, noninvasive.  Muscularis propria not present.     2.  Bladder tumor, transurethral resection:  Low-grade papillary urothelial carcinoma, noninvasive.  Muscularis propria identified with no evidence of tumor involvement.     AJCC pathologic stage:  pTa Nx   Electronically signed by Jon Pires MD on 8/23/2017 at 0928      02/2019: TURBT  Final Diagnosis   Urinary bladder, biopsy of bladder dome: Noninvasive low-grade papillary urothelial carcinoma with severe underlying chronic inflammation.     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                                                  Electronically signed by Jon Pires MD on 3/29/2022 at 1226    Procedure:  The patient was prepped and draped in a normal sterile fashion.  The urethra was anesthetized with 2% lidocaine jelly.  A well lubricated flexible cystoscope was introduced per urethra.  Moderately enlarged prostate noted with lateral lobe hyperplasia.  Does produce somewhat of an obstructive appearance.  The bladder is moderately trabeculated.  There is a 2 cm bladder tumor located anteriorly consistent with recurrent urothelial carcinoma.  Grossly this appears to be superficial disease.  There appears to be no gross obstruction at the  bladder neck. The ureteral orifces are essentially normal in locationn and there is clear efflux of urine.    Patient tolerated the procedure well    Complications: none    Blood loss: minimal       ASSESSMENT AND PLAN          Problem List Items Addressed This Visit          Hematology and Neoplasia    History of bladder cancer - Primary    Relevant Orders    POC Urinalysis Dipstick, Multipro     Given these recent events and today's cystoscopy, I had to evaluate the patient to assess fitness for possible surgery, formulate and discuss a plan, that included alternatives, risks and benefits of management.This is a significant, separately identifiable evaluation and management service . This went well beyond the typical description of procedural findings and therefore an additional evaluation and management was required.        Cale Covington MD  4/18/2025  08:06 CDT

## 2025-04-18 NOTE — PROGRESS NOTES
Chief Complaint  Recurrent bladder cancer    Subjective          Ezekiel Velez presents to [facility] UROLOGY   Cystoscopy finding  The patient's cystoscopy today revealed papillary bladder neoplasm worrisome for urothelial cancer on today's exam  will require further evaluation. This is a recurrence of a previous diagnosis finding warranting further evaluation. Onset of this is unknown. It was found in the context of cystoscopy being done for an evaluation of prior hx of urothelial carcinoma.  Symptoms include gross hematuria.        08/22/2017: TURBT  Final Diagnosis   1.  Bladder tumor, right lateral wall, transurethral resection:  Low-grade papillary urothelial carcinoma, noninvasive.  Muscularis propria not present.     2.  Bladder tumor, transurethral resection:  Low-grade papillary urothelial carcinoma, noninvasive.  Muscularis propria identified with no evidence of tumor involvement.     AJCC pathologic stage:  pTa Nx   Electronically signed by Jon Pires MD on 8/23/2017 at 0928      02/2019: TURBT  Final Diagnosis   Urinary bladder, biopsy of bladder dome: Noninvasive low-grade papillary urothelial carcinoma with severe underlying chronic inflammation.     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                                                  Electronically signed by Jno Pires MD on 3/29/2022 at 1226              Current Outpatient Medications:     brompheniramine-pseudoephedrine-DM 30-2-10 MG/5ML syrup, Take 5 mL by mouth 4 (Four) Times a Day As Needed for Congestion or Cough.  (Patient not taking: Reported on 3/7/2025), Disp: 118 mL, Rfl: 0    HYDROcodone-acetaminophen (HYCET) 7.5-325 MG/15ML solution, Take  by mouth Every 6 (Six) Hours As Needed for Moderate Pain., Disp: , Rfl:     memantine (NAMENDA) 10 MG tablet, Take 1 tablet by mouth 2 (Two) Times a Day., Disp: , Rfl:     pantoprazole (PROTONIX) 40 MG EC tablet, Take 1 tablet by mouth Daily., Disp: , Rfl:     predniSONE (DELTASONE) 10 MG (21) dose pack, Take  by mouth Daily. Use as directed on package (Patient not taking: Reported on 3/7/2025), Disp: 21 each, Rfl: 0    simvastatin (ZOCOR) 20 MG tablet, Take 1 tablet by mouth Every Night. (Patient not taking: Reported on 3/7/2025), Disp: , Rfl:     vitamin B-12 (CYANOCOBALAMIN) 1000 MCG tablet, Take 1 tablet by mouth Daily., Disp: , Rfl:   Past Medical History:   Diagnosis Date    AAA (abdominal aortic aneurysm)     Arthritis     Bilateral shoulder pain     Cancer 08/2017    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    Carotid artery stenosis     Chronic low back pain     COPD (chronic obstructive pulmonary disease)     Degenerative disc disease, lumbar     Diverticulosis     History of adenomatous polyp of colon     Hyperlipidemia     Multiple myeloma     Sleep apnea with use of continuous positive airway pressure (CPAP)      Past Surgical History:   Procedure Laterality Date    CHOLECYSTECTOMY WITH INTRAOPERATIVE CHOLANGIOGRAM N/A 7/25/2017    Procedure: CHOLECYSTECTOMY LAPAROSCOPIC AND REMOVAL CYST ON CHEST;  Surgeon: Ina Issa MD;  Location: [facility];  Service:     COLONOSCOPY  07/24/2012    Nine polyps removed; Repeat 3 years    COLONOSCOPY N/A 9/19/2017    One diminutive polyp in ascending colon-Complete resection-Polyp tissue not retrieved; Two 4-5mm polyps at hepatic flexure; One 6mm polyp in proximal transverse colon; One 7mm polyp in distal transverse colon-Complete resection-polyp tissue not retrieved; One 5mm polyp in proximal descending colon-Complete  resection-Polyp tissue not retrieved; Diverticulosis in sigmoid colon; Repeat 3 years    COLONOSCOPY N/A 9/1/2020    One 5mm tubular adenomatous polyp in the ascending colon; Two 5-6mm tubular adenomatous polyps in the proximal transverse colon; One 5mm tubular adenomatous polyp in the sigmoid colon; Diverticulosis in the sigmoid colon; The examination was otherwise normal on direct and retroflexion views; Repeat 3 years    COLONOSCOPY N/A 6/10/2021    Procedure: COLONOSCOPY WITH ANESTHESIA;  Surgeon: Jimmie Tirado MD;  Location:  [facility];  Service: Gastroenterology;  Laterality: N/A;  pre hx colon polyp  post   dr patsy mims    CYSTOSCOPY BLADDER BIOPSY N/A 2/22/2019    Procedure: CYSTOSCOPY BILATERAL RETROGRADE PYELOGRAM BLADDER BIOPSY WITH FULGURATION OF 1 CM BLADDER TUMOR;  Surgeon: Cale Covington MD;  Location: [facility] OR;  Service: Urology    CYSTOSCOPY BLADDER BIOPSY N/A 4/3/2023    Procedure: CYSTOSCOPY BLADDER BIOPSY/CAUTERIZATION BLADDER LESION WITH FULGURATION;  Surgeon: Cale Covington MD;  Location: [facility] OR;  Service: Urology;  Laterality: N/A;    HEMORRHOIDECTOMY      INGUINAL HERNIA REPAIR Bilateral     TRANSURETHRAL RESECTION OF BLADDER TUMOR Bilateral 8/22/2017    Procedure: CYSTOSCOPY TRANSURETHRAL RESECTION OF BLADDER TUMOR bilateral retrogrades;  Surgeon: Cale Covington MD;  Location: [facility] OR;  Service:     TRANSURETHRAL RESECTION OF BLADDER TUMOR N/A 2/22/2021    Procedure: CYSTOSCOPY TRANSURETHRAL RESECTION OF BLADDER TUMOR, GEMCITABINE INTRAVESICAL INSTILLATION & BILATERAL RETROGRADE URETEROPYELOGRAMS;  Surgeon: Cale Covington MD;  Location: [facility] OR;  Service: Urology;  Laterality: N/A;    TRANSURETHRAL RESECTION OF BLADDER TUMOR N/A 3/25/2022    Procedure: CYSTOSCOPY TRANSURETHRAL RESECTION OF BLADDER TUMOR;  Surgeon: Cale Covington MD;  Location: [facility] OR;  Service: Urology;  Laterality: N/A;    TRANSURETHRAL RESECTION OF BLADDER TUMOR  3/25/2022     Procedure: ;  Surgeon: Cale Covington MD;  Location: [facility] OR;  Service: Urology;;    TRANSURETHRAL RESECTION OF BLADDER TUMOR N/A 5/15/2024    Procedure: CYSTOSCOPY TRANSURETHRAL RESECTION OF BLADDER TUMOR;  Surgeon: Cale Covington MD;  Location: [facility] OR;  Service: Urology;  Laterality: N/A;           Review of Systems   Constitutional:  Negative for chills and fever.   Gastrointestinal:  Negative for abdominal pain, anal bleeding and blood in stool.   Genitourinary:  Positive for hematuria. Negative for dysuria.         Objective   PHYSICAL EXAM  Vital Signs:   There were no vitals taken for this visit.    Physical Exam  Constitutional:      ] Well developed, well nourished, no distress  Respiratory:   Effort unlabored; Movements symmetric  GI:   No mass or hernia noted, not distended or tender   No enlargement of spleen or liver noted  Skin:   No pallor or cyanosis; No obvious rash  Psych:   Alert, Oriented x 3         DATA  Result Review :              Results for orders placed or performed in visit on 04/18/25   POC Urinalysis Dipstick, Multipro    Collection Time: 04/18/25 10:30 AM    Specimen: Urine   Result Value Ref Range    Color Yellow Yellow, Straw, Dark Yellow, Ann    Clarity, UA Clear Clear    Glucose, UA Negative Negative mg/dL    Bilirubin Negative Negative    Ketones, UA Trace (A) Negative    Specific Gravity  1.025 1.005 - 1.030    Blood, UA Negative Negative    pH, Urine 5.5 5.0 - 8.0    Protein, POC Negative Negative mg/dL    Urobilinogen, UA 0.2 E.U./dL Normal, 0.2 E.U./dL    Nitrite, UA Negative Negative    Leukocytes Negative Negative     CT Abdomen Pelvis With & Without Contrast (04/03/2025 15:42)   CT ABDOMEN PELVIS W WO CONTRAST- - 4/3/2025 2:24 PM     HISTORY: Gross hematuria history of bladder cancer; R31.0-Gross  hematuria; Z85.51-Personal history of malignant neoplasm of bladder       COMPARISON: None     DOSE LENGTH PRODUCT: 1431.28 mGy.cm Automatic exposure control  was  utilized to make radiation dose as low as reasonably achievable.     TECHNIQUE: Unenhanced and enhanced axial images of the abdomen and  pelvis obtained with multiplanar reformats.     FINDINGS:  VISUALIZED CHEST: No pleural or pericardial effusion. Lung bases clear.  Scattered coronary artery calcifications.     LIVER: No suspicious liver lesion. Patent portal and hepatic veins.      BILIARY: Cholecystectomy. No bile duct dilation.     PANCREAS: Normal pancreas contour and enhancement.     SPLEEN: Normal size and contour.      ADRENAL: LEFT adrenal nodule measuring 2.3 x 1.3 cm and -9 Hounsfield  units, most consistent with adrenal adenoma. RIGHT adrenal nodule  measures 1 cm and -11 Hounsfield units, most consistent with adrenal  adenoma.     GENITOURINARY:  No hydronephrosis. No nephrolithiasis. No solid enhancing renal lesion.  LEFT renal cysts measuring 6.3 cm and 3.3 cm, without complicating  features. Appropriate delayed opacification of the renal collecting  system bilaterally.     Normal contour of the urinary bladder. Normal prostate size. Suspect  bilateral hydroceles in the partially visualized scrotum, which is not  optimally evaluated by CT.     PERITONEUM: No free air or ascites.     GI TRACT: Normal configuration of the stomach and duodenum.  No  abnormally dilated loops of bowel or bowel wall thickening. Colonic  diverticula. No evidence of acute diverticulitis. Moderate amount of  colonic stool. Normal appendix on axial series 5, images 66-79.     VESSELS: Fusiform abdominal aortic aneurysm measures 5.0 x 4.3 cm for a  length of 6.2 cm with eccentric calcified and noncalcified plaque.     Celiac, splenic arteries are patent. Conventional LEFT hepatic artery.  RIGHT hepatic artery replaced to the aorta. Superior mesenteric and  RIGHT renal arteries patent. Severe appearing stenosis of the patent  LEFT renal artery. Inferior mesenteric artery appears opacified.     RIGHT common iliac artery  measures 1.9 cm. Bilateral common iliac,  internal iliac, external iliac, partially visualized common femoral,  superficial femoral, deep femoral arteries appear grossly patent, not  optimally evaluated on this non-CTA exam.     RETROPERITONEUM: No mass, lymphadenopathy or hemorrhage.     SOFT TISSUES: Suspect prior LEFT inguinal hernia repair. Small  fat-containing RIGHT inguinal hernia. Small fat-containing umbilical  hernia.     BONES: No acute or suspicious bony finding.        IMPRESSION:  1. No hydronephrosis, nephrolithiasis, or solid enhancing renal lesion.  LEFT renal cysts without complicating features. Appropriate delayed  opacification of the renal collecting system.  2. Fusiform abdominal aortic aneurysm measures 5.0 x 4.3 cm for a length  of 6.2 cm with eccentric calcified and noncalcified plaque or mural  thrombus. RIGHT common iliac artery measures 1.9 cm.  3. Bilateral adrenal adenomas. Other incidental findings as above.        This report was signed and finalized on 4/3/2025 4:03 PM by Dr Stella Herrera MD.                 ASSESSMENT AND PLAN          Problem List Items Addressed This Visit          Hematology and Neoplasia    History of bladder cancer - Primary    Relevant Orders    POC Urinalysis Dipstick, Multipro (Completed)     The patient likely has recurrence of superficial low grade disease.   The rationale for intravesical chemotherapy is discussed as follows:  About 80% bladder cancers are non-muscle invasive bladder cancers (NMIBC) restricted to the urothelium (clinical stage Ta) or lamina propria (stage T1) at first, which is featured by the in-situ flat carcinoma (stage Tis) . Tumor relapse following transurethral resection (TUR) has been identified as a main issue in treating non-muscle invasive bladder cancer. Specifically, the mechanisms regarding NMIBC relapse following TUR are shown as follows, (1) residual tumor originated from we discussed the current tumor burden as well as  "the transurethral management of this.  However, he is getting a bladder tumor pretty much every time we look in on an annual basis.  The incomplete resection; (2) floating cancer cell implantation in traumatized bladder sites; (3) incidence of new neoplasm due to high cancer aggressiveness; (4) relapses at the urothelial instability sites (atypia, hyperplasia and dysplasia) ; (5) over 50-70% tumor relapse since no adjuvant treatment is given, and approximately 15% cases develop muscle-invasive cancers eventually.  It is noted that intravesical gemcitabine appears to be more efficacious than mitomycin or BCG in this group of patients.  Tumor reduction of near 30% has been noted in some studies.  It is also noted that gemcitabine appears to cause less \"chemical cystitis \"in patients then mitomycin.  Risks of allergic reaction, chemical cystitis (burning, urgency and frequency) are discussed with the patient.           FOLLOW UP     No follow-ups on file.        (Please note that portions of this note were completed with a voice recognition program.)  Cale Covington MD  04/18/25  10:53 CDT    "

## 2025-04-18 NOTE — H&P (VIEW-ONLY)
Chief Complaint  Recurrent bladder cancer    Subjective          Ezekiel Velez presents to [facility] UROLOGY   Cystoscopy finding  The patient's cystoscopy today revealed papillary bladder neoplasm worrisome for urothelial cancer on today's exam  will require further evaluation. This is a recurrence of a previous diagnosis finding warranting further evaluation. Onset of this is unknown. It was found in the context of cystoscopy being done for an evaluation of prior hx of urothelial carcinoma.  Symptoms include gross hematuria.        08/22/2017: TURBT  Final Diagnosis   1.  Bladder tumor, right lateral wall, transurethral resection:  Low-grade papillary urothelial carcinoma, noninvasive.  Muscularis propria not present.     2.  Bladder tumor, transurethral resection:  Low-grade papillary urothelial carcinoma, noninvasive.  Muscularis propria identified with no evidence of tumor involvement.     AJCC pathologic stage:  pTa Nx   Electronically signed by Jon Pires MD on 8/23/2017 at 0928      02/2019: TURBT  Final Diagnosis   Urinary bladder, biopsy of bladder dome: Noninvasive low-grade papillary urothelial carcinoma with severe underlying chronic inflammation.     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                                                  Electronically signed by Jon Pires MD on 3/29/2022 at 1226              Current Outpatient Medications:     brompheniramine-pseudoephedrine-DM 30-2-10 MG/5ML syrup, Take 5 mL by mouth 4 (Four) Times a Day As Needed for Congestion or Cough.  (Patient not taking: Reported on 3/7/2025), Disp: 118 mL, Rfl: 0    HYDROcodone-acetaminophen (HYCET) 7.5-325 MG/15ML solution, Take  by mouth Every 6 (Six) Hours As Needed for Moderate Pain., Disp: , Rfl:     memantine (NAMENDA) 10 MG tablet, Take 1 tablet by mouth 2 (Two) Times a Day., Disp: , Rfl:     pantoprazole (PROTONIX) 40 MG EC tablet, Take 1 tablet by mouth Daily., Disp: , Rfl:     predniSONE (DELTASONE) 10 MG (21) dose pack, Take  by mouth Daily. Use as directed on package (Patient not taking: Reported on 3/7/2025), Disp: 21 each, Rfl: 0    simvastatin (ZOCOR) 20 MG tablet, Take 1 tablet by mouth Every Night. (Patient not taking: Reported on 3/7/2025), Disp: , Rfl:     vitamin B-12 (CYANOCOBALAMIN) 1000 MCG tablet, Take 1 tablet by mouth Daily., Disp: , Rfl:   Past Medical History:   Diagnosis Date    AAA (abdominal aortic aneurysm)     Arthritis     Bilateral shoulder pain     Cancer 08/2017    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    Carotid artery stenosis     Chronic low back pain     COPD (chronic obstructive pulmonary disease)     Degenerative disc disease, lumbar     Diverticulosis     History of adenomatous polyp of colon     Hyperlipidemia     Multiple myeloma     Sleep apnea with use of continuous positive airway pressure (CPAP)      Past Surgical History:   Procedure Laterality Date    CHOLECYSTECTOMY WITH INTRAOPERATIVE CHOLANGIOGRAM N/A 7/25/2017    Procedure: CHOLECYSTECTOMY LAPAROSCOPIC AND REMOVAL CYST ON CHEST;  Surgeon: Ina Issa MD;  Location: [facility];  Service:     COLONOSCOPY  07/24/2012    Nine polyps removed; Repeat 3 years    COLONOSCOPY N/A 9/19/2017    One diminutive polyp in ascending colon-Complete resection-Polyp tissue not retrieved; Two 4-5mm polyps at hepatic flexure; One 6mm polyp in proximal transverse colon; One 7mm polyp in distal transverse colon-Complete resection-polyp tissue not retrieved; One 5mm polyp in proximal descending colon-Complete  resection-Polyp tissue not retrieved; Diverticulosis in sigmoid colon; Repeat 3 years    COLONOSCOPY N/A 9/1/2020    One 5mm tubular adenomatous polyp in the ascending colon; Two 5-6mm tubular adenomatous polyps in the proximal transverse colon; One 5mm tubular adenomatous polyp in the sigmoid colon; Diverticulosis in the sigmoid colon; The examination was otherwise normal on direct and retroflexion views; Repeat 3 years    COLONOSCOPY N/A 6/10/2021    Procedure: COLONOSCOPY WITH ANESTHESIA;  Surgeon: Jimmie Tirado MD;  Location:  [facility];  Service: Gastroenterology;  Laterality: N/A;  pre hx colon polyp  post   dr patsy mims    CYSTOSCOPY BLADDER BIOPSY N/A 2/22/2019    Procedure: CYSTOSCOPY BILATERAL RETROGRADE PYELOGRAM BLADDER BIOPSY WITH FULGURATION OF 1 CM BLADDER TUMOR;  Surgeon: Cale Covington MD;  Location: [facility] OR;  Service: Urology    CYSTOSCOPY BLADDER BIOPSY N/A 4/3/2023    Procedure: CYSTOSCOPY BLADDER BIOPSY/CAUTERIZATION BLADDER LESION WITH FULGURATION;  Surgeon: Cale Covington MD;  Location: [facility] OR;  Service: Urology;  Laterality: N/A;    HEMORRHOIDECTOMY      INGUINAL HERNIA REPAIR Bilateral     TRANSURETHRAL RESECTION OF BLADDER TUMOR Bilateral 8/22/2017    Procedure: CYSTOSCOPY TRANSURETHRAL RESECTION OF BLADDER TUMOR bilateral retrogrades;  Surgeon: Cale Covington MD;  Location: [facility] OR;  Service:     TRANSURETHRAL RESECTION OF BLADDER TUMOR N/A 2/22/2021    Procedure: CYSTOSCOPY TRANSURETHRAL RESECTION OF BLADDER TUMOR, GEMCITABINE INTRAVESICAL INSTILLATION & BILATERAL RETROGRADE URETEROPYELOGRAMS;  Surgeon: Cale Covington MD;  Location: [facility] OR;  Service: Urology;  Laterality: N/A;    TRANSURETHRAL RESECTION OF BLADDER TUMOR N/A 3/25/2022    Procedure: CYSTOSCOPY TRANSURETHRAL RESECTION OF BLADDER TUMOR;  Surgeon: Cale Covington MD;  Location: [facility] OR;  Service: Urology;  Laterality: N/A;    TRANSURETHRAL RESECTION OF BLADDER TUMOR  3/25/2022     Procedure: ;  Surgeon: Cale Covington MD;  Location: [facility] OR;  Service: Urology;;    TRANSURETHRAL RESECTION OF BLADDER TUMOR N/A 5/15/2024    Procedure: CYSTOSCOPY TRANSURETHRAL RESECTION OF BLADDER TUMOR;  Surgeon: Cale Covington MD;  Location: [facility] OR;  Service: Urology;  Laterality: N/A;           Review of Systems   Constitutional:  Negative for chills and fever.   Gastrointestinal:  Negative for abdominal pain, anal bleeding and blood in stool.   Genitourinary:  Positive for hematuria. Negative for dysuria.         Objective   PHYSICAL EXAM  Vital Signs:   There were no vitals taken for this visit.    Physical Exam  Constitutional:      ] Well developed, well nourished, no distress  Respiratory:   Effort unlabored; Movements symmetric  GI:   No mass or hernia noted, not distended or tender   No enlargement of spleen or liver noted  Skin:   No pallor or cyanosis; No obvious rash  Psych:   Alert, Oriented x 3         DATA  Result Review :              Results for orders placed or performed in visit on 04/18/25   POC Urinalysis Dipstick, Multipro    Collection Time: 04/18/25 10:30 AM    Specimen: Urine   Result Value Ref Range    Color Yellow Yellow, Straw, Dark Yellow, Ann    Clarity, UA Clear Clear    Glucose, UA Negative Negative mg/dL    Bilirubin Negative Negative    Ketones, UA Trace (A) Negative    Specific Gravity  1.025 1.005 - 1.030    Blood, UA Negative Negative    pH, Urine 5.5 5.0 - 8.0    Protein, POC Negative Negative mg/dL    Urobilinogen, UA 0.2 E.U./dL Normal, 0.2 E.U./dL    Nitrite, UA Negative Negative    Leukocytes Negative Negative     CT Abdomen Pelvis With & Without Contrast (04/03/2025 15:42)   CT ABDOMEN PELVIS W WO CONTRAST- - 4/3/2025 2:24 PM     HISTORY: Gross hematuria history of bladder cancer; R31.0-Gross  hematuria; Z85.51-Personal history of malignant neoplasm of bladder       COMPARISON: None     DOSE LENGTH PRODUCT: 1431.28 mGy.cm Automatic exposure control  was  utilized to make radiation dose as low as reasonably achievable.     TECHNIQUE: Unenhanced and enhanced axial images of the abdomen and  pelvis obtained with multiplanar reformats.     FINDINGS:  VISUALIZED CHEST: No pleural or pericardial effusion. Lung bases clear.  Scattered coronary artery calcifications.     LIVER: No suspicious liver lesion. Patent portal and hepatic veins.      BILIARY: Cholecystectomy. No bile duct dilation.     PANCREAS: Normal pancreas contour and enhancement.     SPLEEN: Normal size and contour.      ADRENAL: LEFT adrenal nodule measuring 2.3 x 1.3 cm and -9 Hounsfield  units, most consistent with adrenal adenoma. RIGHT adrenal nodule  measures 1 cm and -11 Hounsfield units, most consistent with adrenal  adenoma.     GENITOURINARY:  No hydronephrosis. No nephrolithiasis. No solid enhancing renal lesion.  LEFT renal cysts measuring 6.3 cm and 3.3 cm, without complicating  features. Appropriate delayed opacification of the renal collecting  system bilaterally.     Normal contour of the urinary bladder. Normal prostate size. Suspect  bilateral hydroceles in the partially visualized scrotum, which is not  optimally evaluated by CT.     PERITONEUM: No free air or ascites.     GI TRACT: Normal configuration of the stomach and duodenum.  No  abnormally dilated loops of bowel or bowel wall thickening. Colonic  diverticula. No evidence of acute diverticulitis. Moderate amount of  colonic stool. Normal appendix on axial series 5, images 66-79.     VESSELS: Fusiform abdominal aortic aneurysm measures 5.0 x 4.3 cm for a  length of 6.2 cm with eccentric calcified and noncalcified plaque.     Celiac, splenic arteries are patent. Conventional LEFT hepatic artery.  RIGHT hepatic artery replaced to the aorta. Superior mesenteric and  RIGHT renal arteries patent. Severe appearing stenosis of the patent  LEFT renal artery. Inferior mesenteric artery appears opacified.     RIGHT common iliac artery  measures 1.9 cm. Bilateral common iliac,  internal iliac, external iliac, partially visualized common femoral,  superficial femoral, deep femoral arteries appear grossly patent, not  optimally evaluated on this non-CTA exam.     RETROPERITONEUM: No mass, lymphadenopathy or hemorrhage.     SOFT TISSUES: Suspect prior LEFT inguinal hernia repair. Small  fat-containing RIGHT inguinal hernia. Small fat-containing umbilical  hernia.     BONES: No acute or suspicious bony finding.        IMPRESSION:  1. No hydronephrosis, nephrolithiasis, or solid enhancing renal lesion.  LEFT renal cysts without complicating features. Appropriate delayed  opacification of the renal collecting system.  2. Fusiform abdominal aortic aneurysm measures 5.0 x 4.3 cm for a length  of 6.2 cm with eccentric calcified and noncalcified plaque or mural  thrombus. RIGHT common iliac artery measures 1.9 cm.  3. Bilateral adrenal adenomas. Other incidental findings as above.        This report was signed and finalized on 4/3/2025 4:03 PM by Dr Stella Herrera MD.                 ASSESSMENT AND PLAN          Problem List Items Addressed This Visit          Hematology and Neoplasia    History of bladder cancer - Primary    Relevant Orders    POC Urinalysis Dipstick, Multipro (Completed)     The patient likely has recurrence of superficial low grade disease.   The rationale for intravesical chemotherapy is discussed as follows:  About 80% bladder cancers are non-muscle invasive bladder cancers (NMIBC) restricted to the urothelium (clinical stage Ta) or lamina propria (stage T1) at first, which is featured by the in-situ flat carcinoma (stage Tis) . Tumor relapse following transurethral resection (TUR) has been identified as a main issue in treating non-muscle invasive bladder cancer. Specifically, the mechanisms regarding NMIBC relapse following TUR are shown as follows, (1) residual tumor originated from we discussed the current tumor burden as well as  "the transurethral management of this.  However, he is getting a bladder tumor pretty much every time we look in on an annual basis.  The incomplete resection; (2) floating cancer cell implantation in traumatized bladder sites; (3) incidence of new neoplasm due to high cancer aggressiveness; (4) relapses at the urothelial instability sites (atypia, hyperplasia and dysplasia) ; (5) over 50-70% tumor relapse since no adjuvant treatment is given, and approximately 15% cases develop muscle-invasive cancers eventually.  It is noted that intravesical gemcitabine appears to be more efficacious than mitomycin or BCG in this group of patients.  Tumor reduction of near 30% has been noted in some studies.  It is also noted that gemcitabine appears to cause less \"chemical cystitis \"in patients then mitomycin.  Risks of allergic reaction, chemical cystitis (burning, urgency and frequency) are discussed with the patient.           FOLLOW UP     No follow-ups on file.        (Please note that portions of this note were completed with a voice recognition program.)  Cale Covington MD  04/18/25  10:53 CDT    "

## 2025-04-30 ENCOUNTER — TELEPHONE (OUTPATIENT)
Dept: UROLOGY | Facility: CLINIC | Age: 85
End: 2025-04-30
Payer: MEDICARE

## 2025-04-30 ENCOUNTER — PREP FOR SURGERY (OUTPATIENT)
Dept: UROLOGY | Facility: CLINIC | Age: 85
End: 2025-04-30
Payer: MEDICARE

## 2025-04-30 ENCOUNTER — PREP FOR SURGERY (OUTPATIENT)
Dept: OTHER | Facility: HOSPITAL | Age: 85
End: 2025-04-30
Payer: MEDICARE

## 2025-04-30 DIAGNOSIS — C67.8 MALIGNANT NEOPLASM OF OVERLAPPING SITES OF BLADDER: Primary | ICD-10-CM

## 2025-04-30 NOTE — TELEPHONE ENCOUNTER
Called pt to tell them that Dr. Covington wanted to schedule the procedure on 5/9/2025. Pt confirmed that day would work. Told him I would call back later today with pre op instructions.

## 2025-05-01 NOTE — TELEPHONE ENCOUNTER
Pt returned call, I relayed Pre op and surgery information. Pt v/u, he is going to call vascular to verify surgery dates with them.

## 2025-05-05 ENCOUNTER — PRE-ADMISSION TESTING (OUTPATIENT)
Dept: PREADMISSION TESTING | Facility: HOSPITAL | Age: 85
End: 2025-05-05
Payer: MEDICARE

## 2025-05-05 VITALS
WEIGHT: 159.61 LBS | BODY MASS INDEX: 26.59 KG/M2 | RESPIRATION RATE: 16 BRPM | SYSTOLIC BLOOD PRESSURE: 163 MMHG | HEIGHT: 65 IN | DIASTOLIC BLOOD PRESSURE: 91 MMHG | OXYGEN SATURATION: 96 % | HEART RATE: 83 BPM

## 2025-05-05 LAB
ANION GAP SERPL CALCULATED.3IONS-SCNC: 8 MMOL/L (ref 5–15)
BUN SERPL-MCNC: 11 MG/DL (ref 8–23)
BUN/CREAT SERPL: 13.6 (ref 7–25)
CALCIUM SPEC-SCNC: 8.8 MG/DL (ref 8.6–10.5)
CHLORIDE SERPL-SCNC: 101 MMOL/L (ref 98–107)
CO2 SERPL-SCNC: 30 MMOL/L (ref 22–29)
CREAT SERPL-MCNC: 0.81 MG/DL (ref 0.76–1.27)
DEPRECATED RDW RBC AUTO: 43.6 FL (ref 37–54)
EGFRCR SERPLBLD CKD-EPI 2021: 86.9 ML/MIN/1.73
ERYTHROCYTE [DISTWIDTH] IN BLOOD BY AUTOMATED COUNT: 12.6 % (ref 12.3–15.4)
GLUCOSE SERPL-MCNC: 106 MG/DL (ref 65–99)
HCT VFR BLD AUTO: 39.8 % (ref 37.5–51)
HGB BLD-MCNC: 13 G/DL (ref 13–17.7)
MCH RBC QN AUTO: 31.2 PG (ref 26.6–33)
MCHC RBC AUTO-ENTMCNC: 32.7 G/DL (ref 31.5–35.7)
MCV RBC AUTO: 95.4 FL (ref 79–97)
PLATELET # BLD AUTO: 122 10*3/MM3 (ref 140–450)
PMV BLD AUTO: 11 FL (ref 6–12)
POTASSIUM SERPL-SCNC: 3.9 MMOL/L (ref 3.5–5.2)
RBC # BLD AUTO: 4.17 10*6/MM3 (ref 4.14–5.8)
SODIUM SERPL-SCNC: 139 MMOL/L (ref 136–145)
WBC NRBC COR # BLD AUTO: 3.61 10*3/MM3 (ref 3.4–10.8)

## 2025-05-05 PROCEDURE — 85027 COMPLETE CBC AUTOMATED: CPT

## 2025-05-05 PROCEDURE — 80048 BASIC METABOLIC PNL TOTAL CA: CPT

## 2025-05-05 PROCEDURE — 93005 ELECTROCARDIOGRAM TRACING: CPT

## 2025-05-05 PROCEDURE — 36415 COLL VENOUS BLD VENIPUNCTURE: CPT

## 2025-05-05 RX ORDER — PANTOPRAZOLE SODIUM 40 MG/1
40 TABLET, DELAYED RELEASE ORAL DAILY
COMMUNITY

## 2025-05-05 RX ORDER — HYDROCODONE BITARTRATE AND ACETAMINOPHEN 7.5; 325 MG/1; MG/1
1 TABLET ORAL EVERY 6 HOURS PRN
COMMUNITY

## 2025-05-05 NOTE — DISCHARGE INSTRUCTIONS

## 2025-05-06 LAB
QT INTERVAL: 444 MS
QTC INTERVAL: 450 MS

## 2025-05-07 ENCOUNTER — TELEPHONE (OUTPATIENT)
Dept: UROLOGY | Facility: CLINIC | Age: 85
End: 2025-05-07
Payer: MEDICARE

## 2025-05-07 NOTE — TELEPHONE ENCOUNTER
Called patient to remind them to arrive at patient registration on 5/9/2025 at 11:00 am for the procedure with Dr. Covington. Spoke with patient.  Patient was informed that the surgery schedule fluctuates so there is no given start time. And to be prepared to be here all day. Told patient if they had any questions to please contact our office at [phone].

## 2025-05-09 ENCOUNTER — ANESTHESIA EVENT (OUTPATIENT)
Dept: PERIOP | Facility: HOSPITAL | Age: 85
End: 2025-05-09
Payer: MEDICARE

## 2025-05-09 ENCOUNTER — APPOINTMENT (OUTPATIENT)
Dept: GENERAL RADIOLOGY | Facility: HOSPITAL | Age: 85
End: 2025-05-09
Payer: MEDICARE

## 2025-05-09 ENCOUNTER — ANESTHESIA (OUTPATIENT)
Dept: PERIOP | Facility: HOSPITAL | Age: 85
End: 2025-05-09
Payer: MEDICARE

## 2025-05-09 ENCOUNTER — HOSPITAL ENCOUNTER (OUTPATIENT)
Facility: HOSPITAL | Age: 85
Setting detail: HOSPITAL OUTPATIENT SURGERY
Discharge: HOME | End: 2025-05-09
Attending: UROLOGY | Admitting: UROLOGY
Payer: MEDICARE

## 2025-05-09 VITALS
OXYGEN SATURATION: 94 % | DIASTOLIC BLOOD PRESSURE: 93 MMHG | TEMPERATURE: 99.5 F | RESPIRATION RATE: 16 BRPM | HEART RATE: 74 BPM | SYSTOLIC BLOOD PRESSURE: 179 MMHG

## 2025-05-09 DIAGNOSIS — D49.4 BLADDER TUMOR: Primary | ICD-10-CM

## 2025-05-09 PROCEDURE — 25810000003 LACTATED RINGERS PER 1000 ML: Performed by: UROLOGY

## 2025-05-09 PROCEDURE — 25010000002 PROPOFOL 10 MG/ML EMULSION

## 2025-05-09 PROCEDURE — 52235 CYSTOSCOPY AND TREATMENT: CPT | Performed by: UROLOGY

## 2025-05-09 PROCEDURE — 25010000002 FENTANYL CITRATE (PF) 50 MCG/ML SOLUTION: Performed by: ANESTHESIOLOGY

## 2025-05-09 PROCEDURE — 51720 TREATMENT OF BLADDER LESION: CPT | Performed by: UROLOGY

## 2025-05-09 PROCEDURE — 25010000002 GEMCITABINE RECONSTITUTED SOLUTION: Performed by: UROLOGY

## 2025-05-09 PROCEDURE — 25010000002 CEFAZOLIN PER 500 MG: Performed by: UROLOGY

## 2025-05-09 PROCEDURE — 25010000002 LIDOCAINE PF 2% 2 % SOLUTION

## 2025-05-09 PROCEDURE — 25010000002 SUGAMMADEX 200 MG/2ML SOLUTION

## 2025-05-09 PROCEDURE — 25010000002 FENTANYL CITRATE (PF) 100 MCG/2ML SOLUTION

## 2025-05-09 PROCEDURE — 25010000002 ONDANSETRON PER 1 MG

## 2025-05-09 RX ORDER — FENTANYL CITRATE 50 UG/ML
INJECTION, SOLUTION INTRAMUSCULAR; INTRAVENOUS AS NEEDED
Status: DISCONTINUED | OUTPATIENT
Start: 2025-05-09 | End: 2025-05-09 | Stop reason: SURG

## 2025-05-09 RX ORDER — CEPHALEXIN 500 MG/1
500 CAPSULE ORAL 2 TIMES DAILY
Qty: 5 CAPSULE | Refills: 0 | Status: SHIPPED | OUTPATIENT
Start: 2025-05-09

## 2025-05-09 RX ORDER — ONDANSETRON 2 MG/ML
INJECTION INTRAMUSCULAR; INTRAVENOUS AS NEEDED
Status: DISCONTINUED | OUTPATIENT
Start: 2025-05-09 | End: 2025-05-09 | Stop reason: SURG

## 2025-05-09 RX ORDER — FENTANYL CITRATE 50 UG/ML
50 INJECTION, SOLUTION INTRAMUSCULAR; INTRAVENOUS
Status: COMPLETED | OUTPATIENT
Start: 2025-05-09 | End: 2025-05-09

## 2025-05-09 RX ORDER — HYDROCODONE BITARTRATE AND ACETAMINOPHEN 10; 325 MG/1; MG/1
1 TABLET ORAL EVERY 4 HOURS PRN
Status: DISCONTINUED | OUTPATIENT
Start: 2025-05-09 | End: 2025-05-09 | Stop reason: HOSPADM

## 2025-05-09 RX ORDER — LIDOCAINE HYDROCHLORIDE 10 MG/ML
0.5 INJECTION, SOLUTION EPIDURAL; INFILTRATION; INTRACAUDAL; PERINEURAL ONCE AS NEEDED
Status: DISCONTINUED | OUTPATIENT
Start: 2025-05-09 | End: 2025-05-09 | Stop reason: HOSPADM

## 2025-05-09 RX ORDER — LIDOCAINE HYDROCHLORIDE 20 MG/ML
INJECTION, SOLUTION EPIDURAL; INFILTRATION; INTRACAUDAL; PERINEURAL AS NEEDED
Status: DISCONTINUED | OUTPATIENT
Start: 2025-05-09 | End: 2025-05-09 | Stop reason: SURG

## 2025-05-09 RX ORDER — PHENAZOPYRIDINE HYDROCHLORIDE 200 MG/1
200 TABLET, FILM COATED ORAL 3 TIMES DAILY PRN
Qty: 21 TABLET | Refills: 0 | Status: SHIPPED | OUTPATIENT
Start: 2025-05-09

## 2025-05-09 RX ORDER — NALOXONE HCL 0.4 MG/ML
0.4 VIAL (ML) INJECTION AS NEEDED
Status: DISCONTINUED | OUTPATIENT
Start: 2025-05-09 | End: 2025-05-09 | Stop reason: HOSPADM

## 2025-05-09 RX ORDER — ROCURONIUM BROMIDE 10 MG/ML
INJECTION, SOLUTION INTRAVENOUS AS NEEDED
Status: DISCONTINUED | OUTPATIENT
Start: 2025-05-09 | End: 2025-05-09 | Stop reason: SURG

## 2025-05-09 RX ORDER — HYDROCODONE BITARTRATE AND ACETAMINOPHEN 5; 325 MG/1; MG/1
1 TABLET ORAL EVERY 4 HOURS PRN
Status: DISCONTINUED | OUTPATIENT
Start: 2025-05-09 | End: 2025-05-09 | Stop reason: HOSPADM

## 2025-05-09 RX ORDER — SODIUM CHLORIDE 0.9 % (FLUSH) 0.9 %
3 SYRINGE (ML) INJECTION AS NEEDED
Status: DISCONTINUED | OUTPATIENT
Start: 2025-05-09 | End: 2025-05-09 | Stop reason: HOSPADM

## 2025-05-09 RX ORDER — FLUMAZENIL 0.1 MG/ML
0.2 INJECTION INTRAVENOUS AS NEEDED
Status: DISCONTINUED | OUTPATIENT
Start: 2025-05-09 | End: 2025-05-09 | Stop reason: HOSPADM

## 2025-05-09 RX ORDER — ONDANSETRON 2 MG/ML
4 INJECTION INTRAMUSCULAR; INTRAVENOUS ONCE AS NEEDED
Status: DISCONTINUED | OUTPATIENT
Start: 2025-05-09 | End: 2025-05-09 | Stop reason: HOSPADM

## 2025-05-09 RX ORDER — SODIUM CHLORIDE, SODIUM LACTATE, POTASSIUM CHLORIDE, CALCIUM CHLORIDE 600; 310; 30; 20 MG/100ML; MG/100ML; MG/100ML; MG/100ML
1000 INJECTION, SOLUTION INTRAVENOUS CONTINUOUS
Status: DISCONTINUED | OUTPATIENT
Start: 2025-05-09 | End: 2025-05-09 | Stop reason: HOSPADM

## 2025-05-09 RX ORDER — LABETALOL HYDROCHLORIDE 5 MG/ML
5 INJECTION, SOLUTION INTRAVENOUS
Status: DISCONTINUED | OUTPATIENT
Start: 2025-05-09 | End: 2025-05-09 | Stop reason: HOSPADM

## 2025-05-09 RX ORDER — PROPOFOL 10 MG/ML
VIAL (ML) INTRAVENOUS AS NEEDED
Status: DISCONTINUED | OUTPATIENT
Start: 2025-05-09 | End: 2025-05-09 | Stop reason: SURG

## 2025-05-09 RX ORDER — SORBITOL 30 G/1000ML
IRRIGANT IRRIGATION AS NEEDED
Status: DISCONTINUED | OUTPATIENT
Start: 2025-05-09 | End: 2025-05-09 | Stop reason: HOSPADM

## 2025-05-09 RX ADMIN — ONDANSETRON 4 MG: 2 INJECTION INTRAMUSCULAR; INTRAVENOUS at 16:29

## 2025-05-09 RX ADMIN — FENTANYL CITRATE 50 MCG: 50 INJECTION, SOLUTION INTRAMUSCULAR; INTRAVENOUS at 16:57

## 2025-05-09 RX ADMIN — FENTANYL CITRATE 100 MCG: 50 INJECTION, SOLUTION INTRAMUSCULAR; INTRAVENOUS at 15:59

## 2025-05-09 RX ADMIN — SODIUM CHLORIDE, POTASSIUM CHLORIDE, SODIUM LACTATE AND CALCIUM CHLORIDE 1000 ML: 600; 310; 30; 20 INJECTION, SOLUTION INTRAVENOUS at 15:33

## 2025-05-09 RX ADMIN — ROCURONIUM BROMIDE 30 MG: 10 INJECTION, SOLUTION INTRAVENOUS at 15:59

## 2025-05-09 RX ADMIN — LIDOCAINE HYDROCHLORIDE 100 MG: 20 INJECTION, SOLUTION EPIDURAL; INFILTRATION; INTRACAUDAL; PERINEURAL at 15:59

## 2025-05-09 RX ADMIN — HYDROCODONE BITARTRATE AND ACETAMINOPHEN 1 TABLET: 10; 325 TABLET ORAL at 16:48

## 2025-05-09 RX ADMIN — FENTANYL CITRATE 50 MCG: 50 INJECTION, SOLUTION INTRAMUSCULAR; INTRAVENOUS at 16:45

## 2025-05-09 RX ADMIN — SUGAMMADEX 200 MG: 100 INJECTION, SOLUTION INTRAVENOUS at 16:30

## 2025-05-09 RX ADMIN — CEFAZOLIN 2000 MG: 2 INJECTION, POWDER, FOR SOLUTION INTRAMUSCULAR; INTRAVENOUS at 16:04

## 2025-05-09 RX ADMIN — PROPOFOL 100 MG: 10 INJECTION, EMULSION INTRAVENOUS at 15:59

## 2025-05-09 NOTE — OP NOTE
Operative Summary    Ezekiel JOHNATHON Millay  Date of Procedure: 5/9/2025    Pre-op Diagnosis:   Malignant neoplasm of overlapping sites of bladder [C67.8]    Post-op Diagnosis:     Post-Op Diagnosis Codes:     * Malignant neoplasm of overlapping sites of bladder [C67.8]    Procedure/CPT® Codes:  MS CYSTOURETHROSCOPY W/DEST &/RMVL TUMOR medium  Procedure(s):  CYSTOSCOPY WITH FULGURATION OF 2.5 CM BLADDER TUMOR & GEMCITIBINE INSTILLATION    Surgeon(s):  Cale Covington MD    Anesthesia: General    Staff:   Circulator: Sue Yuan RN  Scrub Person: Ghassan Barajas; Yesenia Villagomez    Indications for procedure:  Recurrent superficial bladder cancer    Findings:   Recurrent superficial bladder cancer on the left anterior lateral wall    Procedure details:  After appropriate anesthesia, positioning, prep and drape, timeout protocol was observed.     Difficult to find tumor on the left anterior lateral wall near the bladder neck.  90s extrinsic compression and 30 degree lens to see it.  I also inspected with a 70 degree lens and saw no other lesions.    Using resectoscope and cutting loop I just fulgurated this 2.5 cm lesion.  I did not do a resection on this.  The location made it where I was very nervous about possibly causing bladder perforation.  This patient has a long history of low-grade disease.    At the conclusion of the fulguration I instilled 2 g gemcitabine intravesically through a three-way Louie catheter and plug both ports.  Will drain his bladder in about an hour and have him void prior to discharge.    Estimated Blood Loss: Less than 30    Specimens:                None      Drains: none    Complications: none    Plan: Office cystoscopy in a year      (Please note that portions of this note were completed with a voice recognition program.)  Cale Covington MD     Date: 5/9/2025  Time: 16:44 CDT

## 2025-05-09 NOTE — ANESTHESIA PROCEDURE NOTES
Airway  Date/Time: 5/9/2025 4:01 PM  Airway not difficult    General Information and Staff    Patient location during procedure: OR  CRNA/CAA: Jony Man CRNA    Indications and Patient Condition  Indications for airway management: airway protection    Preoxygenated: yes    Mask difficulty assessment: 1 - vent by mask    Final Airway Details    Final airway type: endotracheal airway      Successful airway: ETT  Cuffed: yes   Successful intubation technique: direct laryngoscopy  Endotracheal tube insertion site: oral  Blade: Ambrosio  Blade size: 2  ETT size (mm): 7.5  Cormack-Lehane Classification: grade I - full view of glottis  Placement verified by: capnometry   Measured from: gums  ETT/EBT to gums (cm): 22  Number of attempts at approach: 1  Assessment: lips, teeth, and gum same as pre-op and atraumatic intubation

## 2025-05-09 NOTE — ANESTHESIA POSTPROCEDURE EVALUATION
Patient: Ezekiel Velez    Procedure Summary       Date: 05/09/25 Room / Location:  [facility]  [facility]    Anesthesia Start: 1556 Anesthesia Stop: 1640    Procedure: CYSTOSCOPY WITH FULGURATION OF BLADDER TUMOR & GEMCITIBINE INSTILLATION Diagnosis:       Malignant neoplasm of overlapping sites of bladder      (Malignant neoplasm of overlapping sites of bladder [C67.8])    Surgeons: Cale Covington MD Provider: Jony Man CRNA    Anesthesia Type: general ASA Status: 3            Anesthesia Type: general    Vitals  Vitals Value Taken Time   /82 05/09/25 17:20   Temp 99.5 °F (37.5 °C) 05/09/25 16:38   Pulse 68 05/09/25 17:25   Resp 20 05/09/25 16:50   SpO2 95 % 05/09/25 17:25   Vitals shown include unfiled device data.        Post Anesthesia Care and Evaluation    Patient location during evaluation: PACU  Patient participation: complete - patient participated  Level of consciousness: awake and alert  Pain management: adequate    Airway patency: patent  Anesthetic complications: No anesthetic complications    Cardiovascular status: acceptable  Respiratory status: acceptable  Hydration status: acceptable    Comments: Blood pressure 158/78, pulse 62, temperature 99.5 °F (37.5 °C), temperature source Temporal, resp. rate 20, SpO2 98%.    Pt discharged from PACU based on alex score >8

## 2025-05-14 ENCOUNTER — TELEPHONE (OUTPATIENT)
Dept: UROLOGY | Facility: CLINIC | Age: 85
End: 2025-05-14
Payer: MEDICARE

## 2025-05-14 NOTE — TELEPHONE ENCOUNTER
Pt's wife called to see if we have gotten the pathology report back. Told her we have not gotten it yet. She expressed v/u.

## 2025-05-21 ENCOUNTER — TELEPHONE (OUTPATIENT)
Dept: UROLOGY | Facility: CLINIC | Age: 85
End: 2025-05-21
Payer: MEDICARE

## 2025-05-21 NOTE — TELEPHONE ENCOUNTER
"----- Message from Cale Covington sent at 5/20/2025  1:29 PM CDT -----  Regarding: RE: path results  Ok for vascular surgery when they feel it is appropriateHe needs cystoscopy again in one year.  ----- Message -----  From: Pam Marin RN  Sent: 5/16/2025   1:17 PM CDT  To: Cale Covington MD  Subject: path results                                     This patients wife called asking about path results. I read your op note..\"Using resectoscope and cutting loop I just fulgurated this 2.5 cm lesion.  I did not do a resection on this.  The location made it where I was very nervous about possibly causing bladder perforation.  This patient has a long history of low-grade disease.\"I informed her the end result of the surgery was there was no path to send off due to what took place. She v/u. (And was grateful you decided this.) She is now curious what the next steps are for you; I informed her as of right now your note stated to f/u in one years time, but I would ask to ensure that is still what you anticipate. She also stated the patient is to have vascular surgery and wanted to know if you approved. I told her to make sure the patient finishes his antibiotic therapy you prescribed, f/u with [facility] and let them know he had his TURBT, and allow them to decide if he is ready on their end, but once again I would check with you to ensure you approved of this as well. I will call her back with your response.Thank you sir!  "

## 2025-05-23 ENCOUNTER — HOSPITAL ENCOUNTER (OUTPATIENT)
Dept: CT IMAGING | Facility: HOSPITAL | Age: 85
Discharge: HOME | End: 2025-05-23
Payer: MEDICARE

## 2025-05-23 DIAGNOSIS — R91.1 LUNG NODULE: ICD-10-CM

## 2025-05-23 DIAGNOSIS — J84.10 LUNG FIBROSIS: ICD-10-CM

## 2025-05-23 PROCEDURE — 71250 CT THORAX DX C-: CPT

## 2025-05-29 ENCOUNTER — PROCEDURE VISIT (OUTPATIENT)
Dept: PULMONOLOGY | Facility: CLINIC | Age: 85
End: 2025-05-29
Payer: MEDICARE

## 2025-05-29 ENCOUNTER — OFFICE VISIT (OUTPATIENT)
Dept: PULMONOLOGY | Facility: CLINIC | Age: 85
End: 2025-05-29
Payer: MEDICARE

## 2025-05-29 VITALS
OXYGEN SATURATION: 99 % | HEART RATE: 50 BPM | SYSTOLIC BLOOD PRESSURE: 130 MMHG | BODY MASS INDEX: 26.06 KG/M2 | WEIGHT: 156.4 LBS | DIASTOLIC BLOOD PRESSURE: 82 MMHG | HEIGHT: 65 IN

## 2025-05-29 DIAGNOSIS — J84.10 LUNG FIBROSIS: Primary | ICD-10-CM

## 2025-05-29 DIAGNOSIS — T17.500A MUCUS PLUGGING OF BRONCHI: ICD-10-CM

## 2025-05-29 NOTE — PROCEDURES
Spirometry with Diffusion Capacity    Performed by: Micaela Mcnamara, RRT  Authorized by: Derrek Campa MD     Pre Drug % Predicted    FVC: 83%   FEV1: 65%   FEF 25-75%: 40%   FEV1/FVC: 59%   DLCO: 74%   D/VAsb: 94%    Interpretation   Spirometry   Spirometry shows moderate obstruction. There is reduced midflow suggesting small airway/airflow obstruction.   Review of FVL curve   Patient's effort is normal.   Diffusion Capacity  The patient's diffusion capacity is normal.  Diffusion capacity is normal when corrected for alveolar volume.   Electronically signed by Derrek Campa MD, 5/29/2025, 17:23 CDT

## 2025-05-29 NOTE — PROGRESS NOTES
"Background:  Pt ww pulm fibrosis, quit smoking 2014   Chief Complaint  Lung Fibrosis    Subjective    History of Present Illness     Ezekiel Velez is here for follow up with [facility].  History of Present Illness  He has had congestion and soreness in the chest a few weeks.  He had surgery and chemo implanted in early May, prior to his most recent CT scan, and is scheduled for vascular surgery again this Monday. He has not had inhalers in a long time.  He feels he does not need them now.      Tobacco Use: Medium Risk (5/29/2025)    Patient History     Smoking Tobacco Use: Former     Smokeless Tobacco Use: Never     Passive Exposure: Past      Current Outpatient Medications   Medication Instructions    cephalexin (KEFLEX) 500 mg, Oral, 2 Times Daily    Fluticasone-Umeclidin-Vilant (TRELEGY ELLIPTA) 200-62.5-25 MCG/ACT inhaler 1 puff, Inhalation, Daily    HYDROcodone-acetaminophen (NORCO) 7.5-325 MG per tablet 1 tablet, Every 6 Hours PRN    memantine (NAMENDA) 10 mg, 2 Times Daily    pantoprazole (PROTONIX) 40 mg, Daily    vitamin B-12 (CYANOCOBALAMIN) 1,000 mcg, Daily      Objective     Vital Signs:   /82   Pulse 50   Ht 163.8 cm (64.5\")   Wt 70.9 kg (156 lb 6.4 oz)   SpO2 99% Comment: RA  BMI 26.43 kg/m²   Physical Exam  Constitutional:       General: He is not in acute distress.     Appearance: He is ill-appearing.   Pulmonary:      Breath sounds: Wheezing and rhonchi present. No rales.   Abdominal:      General: There is no distension.        Result Review  Data Reviewed:    XR Chest 2 View  Result Date: 5/28/2025  Impression: 1.  No acute cardiopulmonary process. ______________________________________ Electronically signed by: EDITH BALLARD M.D. Date:     05/28/2025 Time:    10:36     CT Chest Without Contrast Diagnostic  Result Date: 5/23/2025  Impression: 1. New patchy consolidation with small inflammatory nodules in the left upper lobe anteriorly " images 83 through 95 of series 4. This is likely infectious or inflammatory. There is also bronchial wall thickening left greater than right. There is also mucous plugging in left lower lobe bronchi. There is atelectasis along the left hemidiaphragm. 2. Stable small pulmonary nodules, as described. They are not yet stable for a full 2 years. I would recommend another follow-up CT in 6-12 months to document stability for over 2 years. 3. Atheromatous calcification of the thoracic aorta and coronary arteries. Cardiomegaly. Ectasia of the ascending aorta measuring 3.9 cm. 4. No pathologically enlarged lymph nodes. Prior cholecystectomy. Degenerative changes of the spine and shoulders.    This report was signed and finalized on 5/23/2025 3:07 PM by Dr. Kendall Smart MD.        PFT Values          5/23/2024    14:45 5/29/2025    14:45   Pre Drug PFT Results   FVC 85 83   FEV1 75 65   FEF 25-75% 59 40   FEV1/FVC 67 59   Other Tests PFT Results   DLCO 86 74   D/VAsb 99 94                Assessment and Plan    Diagnoses and all orders for this visit:    1. Lung fibrosis (Primary)  -     Spirometry with Diffusion Capacity    2. Mucus plugging of bronchi  -     Fluticasone-Umeclidin-Vilant (TRELEGY ELLIPTA) 200-62.5-25 MCG/ACT inhaler; Inhale 1 puff Daily for 14 days.  Dispense: 14 each; Refill: 0    Give trial of trelegy for next couple of weeks to optimize during perioperative period.  I suspect he had a viral illness back in the winter or early spring.  Residual cough and congestion may have been aggravated by his recent anesthesia.  Hopefully the inhaler will help alleviate any airway inflammation and bronchospasm.  We discussed pulmonary function test do show moderate obstruction and so I would expect the inhaler to help.  Anticipate his vascular surgery.  Reassess later in the summer.  Call if the inhaler helps.    Follow Up   Return in about 3 months (around 8/29/2025).  Patient was given instructions and counseling  regarding his condition or for health maintenance advice. Please see specific information pulled into the AVS if appropriate.    Electronically signed by Derrek Campa MD, 5/29/2025, 17:22 CDT

## 2025-08-26 PROBLEM — G30.9 MILD ALZHEIMER'S DEMENTIA WITHOUT BEHAVIORAL DISTURBANCE, PSYCHOTIC DISTURBANCE, MOOD DISTURBANCE, OR ANXIETY: Status: ACTIVE | Noted: 2025-08-26

## 2025-08-26 PROBLEM — F02.A0 MILD ALZHEIMER'S DEMENTIA WITHOUT BEHAVIORAL DISTURBANCE, PSYCHOTIC DISTURBANCE, MOOD DISTURBANCE, OR ANXIETY: Status: ACTIVE | Noted: 2025-08-26

## 2025-08-26 RX ORDER — ACETAMINOPHEN 325 MG/1
650 TABLET ORAL ONCE
OUTPATIENT
Start: 2025-08-26

## 2025-08-26 RX ORDER — SODIUM CHLORIDE 9 MG/ML
20 INJECTION, SOLUTION INTRAVENOUS ONCE
OUTPATIENT
Start: 2025-08-26

## 2025-08-26 RX ORDER — LORATADINE 10 MG/1
10 TABLET ORAL ONCE
OUTPATIENT
Start: 2025-08-26

## 2025-08-26 RX ORDER — HYDROCORTISONE SODIUM SUCCINATE 100 MG/2ML
100 INJECTION INTRAMUSCULAR; INTRAVENOUS AS NEEDED
OUTPATIENT
Start: 2025-08-26

## 2025-08-26 RX ORDER — ONDANSETRON 2 MG/ML
8 INJECTION INTRAMUSCULAR; INTRAVENOUS EVERY 6 HOURS PRN
Start: 2025-08-26

## 2025-08-26 RX ORDER — DIPHENHYDRAMINE HYDROCHLORIDE 50 MG/ML
50 INJECTION, SOLUTION INTRAMUSCULAR; INTRAVENOUS AS NEEDED
OUTPATIENT
Start: 2025-08-26

## 2025-08-26 RX ORDER — FAMOTIDINE 10 MG/ML
20 INJECTION, SOLUTION INTRAVENOUS AS NEEDED
OUTPATIENT
Start: 2025-08-26

## 2025-08-26 RX ORDER — FAMOTIDINE 20 MG/1
20 TABLET, FILM COATED ORAL ONCE
OUTPATIENT
Start: 2025-08-26

## 2025-08-29 ENCOUNTER — OFFICE VISIT (OUTPATIENT)
Dept: PULMONOLOGY | Facility: CLINIC | Age: 85
End: 2025-08-29
Payer: MEDICARE

## 2025-08-29 VITALS
OXYGEN SATURATION: 96 % | WEIGHT: 157 LBS | HEIGHT: 65 IN | BODY MASS INDEX: 26.16 KG/M2 | SYSTOLIC BLOOD PRESSURE: 168 MMHG | HEART RATE: 67 BPM | DIASTOLIC BLOOD PRESSURE: 72 MMHG

## 2025-08-29 DIAGNOSIS — J84.10 LUNG FIBROSIS: Chronic | ICD-10-CM

## 2025-08-29 DIAGNOSIS — R91.8 LUNG NODULES: Chronic | ICD-10-CM

## 2025-08-29 DIAGNOSIS — J44.9 STAGE 2 MODERATE COPD BY GOLD CLASSIFICATION: Primary | Chronic | ICD-10-CM

## 2025-08-29 PROCEDURE — 99214 OFFICE O/P EST MOD 30 MIN: CPT | Performed by: NURSE PRACTITIONER

## 2025-08-29 RX ORDER — SIMVASTATIN 20 MG
20 TABLET ORAL NIGHTLY
COMMUNITY

## (undated) DEVICE — GOWN,NON-REINFORCED,SIRUS,SET IN SLV,XXL: Brand: MEDLINE

## (undated) DEVICE — SYRINGE,PISTON,IRRIGATION,60ML,STERILE: Brand: MEDLINE

## (undated) DEVICE — PAD GRND REM POLYHESIVE A/ DISP

## (undated) DEVICE — PK TURNOVER CYSTO RM

## (undated) DEVICE — THE SINGLE USE ETRAP – POLYP TRAP IS USED FOR SUCTION RETRIEVAL OF ENDOSCOPICALLY REMOVED POLYPS.: Brand: ETRAP

## (undated) DEVICE — MASK,OXYGEN,MED CONC,ADLT,7' TUB, UC: Brand: PENDING

## (undated) DEVICE — WIPE THERAWASH SLV SPEC CARE 2PK

## (undated) DEVICE — BAG,DRAINAGE,4L,A/R TOWER,LL,SLIDE TAP: Brand: MEDLINE

## (undated) DEVICE — ENDOPATH XCEL DILATING TIP TROCARS WITH STABILITY SLEEVES: Brand: ENDOPATH XCEL

## (undated) DEVICE — 3M™ STERI-STRIP™ REINFORCED ADHESIVE SKIN CLOSURES, R1547, 1/2 IN X 4 IN (12 MM X 100 MM), 6 STRIPS/ENVELOPE: Brand: 3M™ STERI-STRIP™

## (undated) DEVICE — GLV SURG BIOGEL M LTX PF 8

## (undated) DEVICE — CUTTING ELECTRODE MONO 24FR 12/30°  FOR RESECTOSCOPES, TELESCOPE Ø 4 MM, FOR SHEATHS, INTERMITTENT/CONTINUOUS IRRIGATION, 24/26 FR, LOOP: ROUND, WIRE Ø 0.25 MM, FORK COLOR YELLOW, STEM COLOR TRANSPARENT, PACK = 10 PCS, FOR SHARK RESECTOSCOPE, STERILE, FOR SINGLE USE: Brand: SHARK

## (undated) DEVICE — SNAR POLYP SENSATION MICRO OVL 13 240X40

## (undated) DEVICE — YANKAUER,BULB TIP WITH VENT: Brand: ARGYLE

## (undated) DEVICE — DOVER HYDROGEL COATED LATEX FOLEY CATHETER, 5 ML, 3-WAY 18 FR/CH (6.0 MM): Brand: DOVER

## (undated) DEVICE — TBG SMPL FLTR LINE NASL 02/C02 A/ BX/100

## (undated) DEVICE — PK CYSTO 30

## (undated) DEVICE — CLIP APPLIER: Brand: ENDO CLIP

## (undated) DEVICE — HYDROGEL COATED LATEX FOLEY CATHETER, 5 CC, 3-WAY, 20 FR (6.7 MM): Brand: DOVER

## (undated) DEVICE — GW SENSR DUALFLEX NITNL STR .038IN 3X150CM

## (undated) DEVICE — THE CHANNEL CLEANING BRUSH IS A NYLON FLEXI BRUSH ATTACHED TO A FLEXIBLE PLASTIC SHEATH DESIGNED TO SAFELY REMOVE DEBRIS FROM FLEXIBLE ENDOSCOPES.

## (undated) DEVICE — TRY PREP SCRB VAG PVP

## (undated) DEVICE — ENDOPATH XCEL WITH OPTIVIEW TECHNOLOGY DILATING TIP TROCARS WITH STABILITY SLEEVES: Brand: ENDOPATH XCEL OPTIVIEW

## (undated) DEVICE — SENSR O2 OXIMAX FNGR A/ 18IN NONSTR

## (undated) DEVICE — SUT VIC 0 SUTUPAK TIES 18IN J906G

## (undated) DEVICE — SYR CATH/TIP 50ML 2OZ STRL 1P/U

## (undated) DEVICE — ENDOPATH PNEUMONEEDLE INSUFFLATION NEEDLES WITH LUER LOCK CONNECTORS 120MM: Brand: ENDOPATH

## (undated) DEVICE — ENDOPOUCH RETRIEVER SPECIMEN RETRIEVAL BAGS: Brand: ENDOPOUCH RETRIEVER

## (undated) DEVICE — 2, DISPOSABLE SUCTION/IRRIGATOR WITHOUT DISPOSABLE TIP: Brand: STRYKEFLOW

## (undated) DEVICE — Device: Brand: DEFENDO AIR/WATER/SUCTION AND BIOPSY VALVE

## (undated) DEVICE — CLTH CLENS READYCLEANSE PERI CARE PK/5

## (undated) DEVICE — PK TURNOVER RM ADV

## (undated) DEVICE — MSK O2 MD CONCENTR A/ LF 7FT 1P/U

## (undated) DEVICE — ANTIBACTERIAL UNDYED BRAIDED (POLYGLACTIN 910), SYNTHETIC ABSORBABLE SUTURE: Brand: COATED VICRYL

## (undated) DEVICE — CUFF,BP,DISP,1 TUBE,ADULT,HP: Brand: MEDLINE

## (undated) DEVICE — GLV SURG BIOGEL LTX PF 6 1/2

## (undated) DEVICE — EVAC BLDR UROVAC W ADAPT

## (undated) DEVICE — ADAPT CYSTO FOR SYR TO SHEATH

## (undated) DEVICE — ENDOPATH XCEL WITH OPTIVIEW TECHNOLOGY UNIVERSAL TROCAR STABILITY SLEEVES: Brand: ENDOPATH XCEL OPTIVIEW

## (undated) DEVICE — BHS TURNOVER CYSTO: Brand: MEDLINE INDUSTRIES, INC.

## (undated) DEVICE — PAD LAP CHOLE: Brand: MEDLINE INDUSTRIES, INC.

## (undated) DEVICE — ENDOGATOR AUXILIARY WATER JET CONNECTOR: Brand: ENDOGATOR

## (undated) DEVICE — SNAR POLYP SENSATION JUMBO OVL 30 240X5

## (undated) DEVICE — CATH URETH FLEXIMA CONE TP 5F 70CM

## (undated) DEVICE — ELECTRD L HK EZ CLN 33CM

## (undated) DEVICE — ELECTRD BLD EDGE/INSUL1P 2.4X5.1MM STRL

## (undated) DEVICE — CVR BRD ARM 13X30

## (undated) DEVICE — GOWN,SIRUS,NONRNF,SETINSLV,2XL,18/CS: Brand: MEDLINE

## (undated) DEVICE — MINI ENDOCUT SCISSOR TIP, DISPOSABLE: Brand: RENEW

## (undated) DEVICE — TBG DRN URINARY 9/32IN

## (undated) DEVICE — ELECTRD LP CUT 24/26F YEL

## (undated) DEVICE — 60 ML SYRINGE,TOOMEY TYPE: Brand: MONOJECT